# Patient Record
Sex: FEMALE | Race: WHITE | NOT HISPANIC OR LATINO | Employment: OTHER | URBAN - METROPOLITAN AREA
[De-identification: names, ages, dates, MRNs, and addresses within clinical notes are randomized per-mention and may not be internally consistent; named-entity substitution may affect disease eponyms.]

---

## 2016-03-08 LAB — PHQ9 SCORE: 20

## 2016-03-17 LAB — PHQ9 SCORE: 20

## 2016-05-09 LAB — PHQ9 SCORE: 15

## 2017-01-04 ENCOUNTER — TELEPHONE (OUTPATIENT)
Dept: FAMILY MEDICINE | Facility: OTHER | Age: 57
End: 2017-01-04

## 2017-01-04 ENCOUNTER — TRANSFERRED RECORDS (OUTPATIENT)
Dept: HEALTH INFORMATION MANAGEMENT | Facility: CLINIC | Age: 57
End: 2017-01-04

## 2017-01-04 DIAGNOSIS — F41.1 GENERALIZED ANXIETY DISORDER: Primary | ICD-10-CM

## 2017-01-04 LAB — PHQ9 SCORE: 18

## 2017-01-04 NOTE — TELEPHONE ENCOUNTER
Pt calling. She would like a call in regards to not being able to take Xanax with her pain meds anymore. She saw a psychologist and they told her they couldn't prescribe it anyway. She would like to know what she can do. Please call.  Thank you,  Coby Ruelas- Pt Rep.

## 2017-01-05 RX ORDER — ALPRAZOLAM 0.5 MG/1
0.5 TABLET, EXTENDED RELEASE ORAL EVERY MORNING
Qty: 60 TABLET | Refills: 0 | Status: SHIPPED | OUTPATIENT
Start: 2017-01-05 | End: 2017-05-08 | Stop reason: ALTCHOICE

## 2017-01-05 NOTE — TELEPHONE ENCOUNTER
Spoke with pt, she saw psych that do not prescribe benzo to chronic pain patients either anymore.  They did not offer to help taper her off, she has been without xanax for 1 week and is not feeling well.  She was put on buspar.  I will reduce her dosage to 0.5 mg bid of xanax xr, she was taking 1mg bid.  Recheck in 1 month sooner as needed.  Plan to further taper to 1 5m xr daily for 1 month at that visit  Jonna Gaitan PA-C

## 2017-01-19 NOTE — PROGRESS NOTES
SUBJECTIVE:                                                    Linda Walsh is a 56 year old female who presents to clinic today for the following health issues:        Depression and Anxiety Follow-Up    Status since last visit: Worsened anxiety but depression is ok, she started BuSpar that Dr. Do at [facility] recommended to use in place of her Xanax. We have tapered her down in dosage to a 0.5 mg daily p.o. The extended release version. She states it is not particularly helpful but she is using to taper off. She is aware that she may not use this and narcotics together anymore.  Does not have planned follow-up with [facility] though her current counselor is no longer covered by her insurance.  She will see a different counselor in the interim.  She has downloaded an david called mindful meditation will be started tonight.  She has no current suicidal ideation, the guns were removed from her home as that was her plan that she has no active plans or intention.    Other associated symptoms:snapping at people and not letting people finish what they are saying    Complicating factors:     Significant life event: Yes-  Not able to see counselor any more due to insurance, she is willing to see summary also interim     Current substance abuse: None    PHQ-9 SCORE 6/20/2016 9/15/2016 11/22/2016   Total Score - - -   Total Score MyChart - - -   Total Score 20 24 22     MARLIN-7 SCORE 6/20/2016 9/15/2016 11/22/2016   Total Score - - -   Total Score 19 21 20        PHQ-9  English      PHQ-9   Any Language     GAD7         Amount of exercise or physical activity: depends on day and pain    Problems taking medications regularly: No    Medication side effects: none    Diet: regular (no restrictions)        Problem list and histories reviewed & adjusted, as indicated.  Additional history: as documented    BP Readings from Last 3 Encounters:   01/23/17 124/70   11/22/16 120/72   09/15/16 120/70    Wt Readings from  Last 3 Encounters:   09/15/16 162 lb (73.483 kg)   11/29/15 150 lb (68.04 kg)   03/27/15 154 lb (69.854 kg)                  Labs reviewed in EPIC    ROS:  C: NEGATIVE for fever, chills, change in weight  E/M: NEGATIVE for ear, mouth and throat problems  R: NEGATIVE for significant cough or SOB  CV: NEGATIVE for chest pain, palpitations or peripheral edema  MUSCULOSKELETAL: have nerve ablation as the trial nerve blocks were somewhat helpful. She still has her spinal cord stimulator that causes discomfort at the site of its insertion does help with her pain to some degree  PSYCHIATRIC: as above    OBJECTIVE:                                                    /70 mmHg  Pulse 86  Temp(Src) 98.6  F (37  C) (Oral)  Resp 12  Ht   Wt   There is no weight on file to calculate BMI.  GENERAL: healthy, alert and no distress  NECK: no adenopathy, no asymmetry, masses, or scars and thyroid normal to palpation  RESP: lungs clear to auscultation - no rales, rhonchi or wheezes  CV: regular rate and rhythm, normal S1 S2, no S3 or S4, no murmur, click or rub, no peripheral edema and peripheral pulses strong  MS: no gross musculoskeletal defects noted, no edema  PSYCH: mentation appears normal, affect normal/bright    Diagnostic Test Results:  No results found for this or any previous visit (from the past 24 hour(s)).     ASSESSMENT/PLAN:                                                        1. Major depressive disorder, single episode, moderate (H)  Continue current meds she has not yet increased her dosage she is only taking 60 mg per day, she will continue this until she has gotten accustomed to BuSpar then she may increase to 30 mg in the evening as prescribed  - DULoxetine (CYMBALTA) 30 MG EC capsule; Take 2 ( 60 mg ) in the morning, and 1 pill ( 30 mg ) in the evening  Dispense: 90 capsule; Refill: 1    2. Gastroesophageal reflux disease without esophagitis  Stable well-controlled  - pantoprazole (PROTONIX) 40 MG EC  tablet; Take 1 tablet (40 mg) by mouth daily  Dispense: 30 tablet; Refill: 5    3. Nausea  Uses as needed  - ondansetron (ZOFRAN-ODT) 4 MG ODT tab; DISSOLVE ONE TABLET BY MOUTH EVERY 8 HOURS AS NEEDED FOR NAUSEA  Dispense: 20 tablet; Refill: 1    4. Generalized anxiety disorder  Discussed use of medication, common side effects, how medication works and the fact that improvement in anticipated in 4-6 weeks. , tapering off of Xanax  - busPIRone (BUSPAR) 5 MG tablet; Start at 5 mg twice daily for 3 days, then 7.5 mg (1.5 tabs) twice daily for 3 days, then 10 mg (2 tabs) twice daily for 3 days, then 12.5 mg (2.5 tabs) twice daily for 3 days, then 15 mg (3 tabs) twice daily and stay at that dose  Dispense: 150 tablet; Refill: 0    Recheck in one month she should be seeing her counselor on a regular basis at preference is for her to have psychiatry management we will readdress this at a later date for now I am happy enough that she will be continuing counseling    Jonna Gaitan PA-C  [facility]  Electronically signed by Jonna Gaitan PA-C

## 2017-01-23 ENCOUNTER — OFFICE VISIT (OUTPATIENT)
Dept: FAMILY MEDICINE | Facility: OTHER | Age: 57
End: 2017-01-23
Payer: COMMERCIAL

## 2017-01-23 ENCOUNTER — TELEPHONE (OUTPATIENT)
Dept: FAMILY MEDICINE | Facility: OTHER | Age: 57
End: 2017-01-23

## 2017-01-23 VITALS
DIASTOLIC BLOOD PRESSURE: 70 MMHG | SYSTOLIC BLOOD PRESSURE: 124 MMHG | RESPIRATION RATE: 12 BRPM | TEMPERATURE: 98.6 F | HEART RATE: 86 BPM

## 2017-01-23 DIAGNOSIS — R11.0 NAUSEA: ICD-10-CM

## 2017-01-23 DIAGNOSIS — F32.1 MAJOR DEPRESSIVE DISORDER, SINGLE EPISODE, MODERATE (H): Primary | ICD-10-CM

## 2017-01-23 DIAGNOSIS — K21.9 GASTROESOPHAGEAL REFLUX DISEASE WITHOUT ESOPHAGITIS: ICD-10-CM

## 2017-01-23 DIAGNOSIS — F41.1 GENERALIZED ANXIETY DISORDER: ICD-10-CM

## 2017-01-23 PROCEDURE — 99213 OFFICE O/P EST LOW 20 MIN: CPT | Performed by: PHYSICIAN ASSISTANT

## 2017-01-23 RX ORDER — PANTOPRAZOLE SODIUM 40 MG/1
40 TABLET, DELAYED RELEASE ORAL DAILY
Qty: 30 TABLET | Refills: 5 | Status: SHIPPED | OUTPATIENT
Start: 2017-01-23 | End: 2019-04-24

## 2017-01-23 RX ORDER — ONDANSETRON 4 MG/1
TABLET, ORALLY DISINTEGRATING ORAL
Qty: 20 TABLET | Refills: 1 | Status: SHIPPED | OUTPATIENT
Start: 2017-01-23 | End: 2017-05-08

## 2017-01-23 RX ORDER — BUSPIRONE HYDROCHLORIDE 5 MG/1
TABLET ORAL
Qty: 150 TABLET | Refills: 0 | Status: SHIPPED
Start: 2017-01-23 | End: 2017-02-02 | Stop reason: SINTOL

## 2017-01-23 RX ORDER — DULOXETIN HYDROCHLORIDE 30 MG/1
CAPSULE, DELAYED RELEASE ORAL
Qty: 90 CAPSULE | Refills: 1 | Status: SHIPPED | OUTPATIENT
Start: 2017-01-23 | End: 2017-02-02

## 2017-01-23 ASSESSMENT — ANXIETY QUESTIONNAIRES
3. WORRYING TOO MUCH ABOUT DIFFERENT THINGS: NEARLY EVERY DAY
2. NOT BEING ABLE TO STOP OR CONTROL WORRYING: NEARLY EVERY DAY
GAD7 TOTAL SCORE: 20
1. FEELING NERVOUS, ANXIOUS, OR ON EDGE: NEARLY EVERY DAY
5. BEING SO RESTLESS THAT IT IS HARD TO SIT STILL: NEARLY EVERY DAY
IF YOU CHECKED OFF ANY PROBLEMS ON THIS QUESTIONNAIRE, HOW DIFFICULT HAVE THESE PROBLEMS MADE IT FOR YOU TO DO YOUR WORK, TAKE CARE OF THINGS AT HOME, OR GET ALONG WITH OTHER PEOPLE: EXTREMELY DIFFICULT
7. FEELING AFRAID AS IF SOMETHING AWFUL MIGHT HAPPEN: MORE THAN HALF THE DAYS
6. BECOMING EASILY ANNOYED OR IRRITABLE: NEARLY EVERY DAY

## 2017-01-23 ASSESSMENT — PAIN SCALES - GENERAL: PAINLEVEL: EXTREME PAIN (9)

## 2017-01-23 ASSESSMENT — PATIENT HEALTH QUESTIONNAIRE - PHQ9: 5. POOR APPETITE OR OVEREATING: NEARLY EVERY DAY

## 2017-01-23 NOTE — NURSING NOTE
"Chief Complaint   Patient presents with     Depression     Panel Management     Flu, Mammo, CC screen, Honoring Choices, Hep C, DAP, PHQ/MARLIN       Initial /70 mmHg  Pulse 86  Temp(Src) 98.6  F (37  C) (Oral)  Resp 12  Ht   Wt  Estimated body mass index is 27.79 kg/(m^2) as calculated from the following:    Height as of 6/29/16: 5' 4\" (1.626 m).    Weight as of 9/15/16: 162 lb (73.483 kg).  BP completed using cuff size: regular      Carmencita Vela CMA (AAMA)      "

## 2017-01-23 NOTE — MR AVS SNAPSHOT
After Visit Summary   1/23/2017    Linda Walsh    MRN: [id number]           Patient Information     Date Of Birth          1960        Visit Information        Provider Department      1/23/2017 3:15 PM Jonna Gaitan PA-C [facility]        Today's Diagnoses     Major depressive disorder, single episode, moderate (H)    -  1     Gastroesophageal reflux disease without esophagitis         Nausea         Generalized anxiety disorder            Follow-ups after your visit        Who to contact     If you have questions or need follow up information about today's clinic visit or your schedule please contact [facility] directly at [phone].  Normal or non-critical lab and imaging results will be communicated to you by Momspothart, letter or phone within 4 business days after the clinic has received the results. If you do not hear from us within 7 days, please contact the clinic through Momspothart or phone. If you have a critical or abnormal lab result, we will notify you by phone as soon as possible.  Submit refill requests through Exacaster or call your pharmacy and they will forward the refill request to us. Please allow 3 business days for your refill to be completed.          Additional Information About Your Visit        MyChart Information     Exacaster gives you secure access to your electronic health record. If you see a primary care provider, you can also send messages to your care team and make appointments. If you have questions, please call your primary care clinic.  If you do not have a primary care provider, please call [phone] and they will assist you.        Care EveryWhere ID     This is your Care EveryWhere ID. This could be used by other organizations to access your [facility] medical records  [id number]        Your Vitals Were     Pulse Temperature Respirations             86 98.6  F (37  C) (Oral) 12          Blood Pressure from Last 3 Encounters:    01/23/17 124/70   11/22/16 120/72   09/15/16 120/70    Weight from Last 3 Encounters:   09/15/16 162 lb (73.483 kg)   11/29/15 150 lb (68.04 kg)   03/27/15 154 lb (69.854 kg)              Today, you had the following     No orders found for display         Today's Medication Changes          These changes are accurate as of: 1/23/17  3:49 PM.  If you have any questions, ask your nurse or doctor.               Start taking these medicines.        Dose/Directions    busPIRone 5 MG tablet   Commonly known as:  BUSPAR   Used for:  Generalized anxiety disorder   Started by:  Jonna Gaitan PA-C        Start at 5 mg twice daily for 3 days, then 7.5 mg (1.5 tabs) twice daily for 3 days, then 10 mg (2 tabs) twice daily for 3 days, then 12.5 mg (2.5 tabs) twice daily for 3 days, then 15 mg (3 tabs) twice daily and stay at that dose   Quantity:  150 tablet   Refills:  0            Where to get your medicines      These medications were sent to [facility] [place] [facility] [place] - [address]   [address] [place] [address]     Phone:  [phone]    - busPIRone 5 MG tablet  - DULoxetine 30 MG EC capsule  - ondansetron 4 MG ODT tab  - pantoprazole 40 MG EC tablet             Primary Care Provider Office Phone # Fax #    Jonna Gaitan PA-C [phone]       [facility] [address] [place] [address]        Thank you!     Thank you for choosing [facility]  for your care. Our goal is always to provide you with excellent care. Hearing back from our patients is one way we can continue to improve our services. Please take a few minutes to complete the written survey that you may receive in the mail after your visit with us. Thank you!             Your Updated Medication List - Protect others around you: Learn how to safely use, store and throw away your medicines at www.disposemymeds.org.          This list is accurate as of: 1/23/17  3:49 PM.  Always  use your most recent med list.                   Brand Name Dispense Instructions for use    albuterol 108 (90 BASE) MCG/ACT Inhaler    albuterol    1 Inhaler    Inhale 2 puffs into the lungs every 4 hours as needed for shortness of breath / dyspnea       ALPRAZolam 0.5 MG 24 hr tablet    XANAX XR    60 tablet    Take 1 tablet (0.5 mg) by mouth every morning       busPIRone 5 MG tablet    BUSPAR    150 tablet    Start at 5 mg twice daily for 3 days, then 7.5 mg (1.5 tabs) twice daily for 3 days, then 10 mg (2 tabs) twice daily for 3 days, then 12.5 mg (2.5 tabs) twice daily for 3 days, then 15 mg (3 tabs) twice daily and stay at that dose       CELEBREX PO      Take 400 mg by mouth daily       DULoxetine 30 MG EC capsule    CYMBALTA    90 capsule    Take 2 ( 60 mg ) in the morning, and 1 pill ( 30 mg ) in the evening       FentaNYL 37.5 MCG/HR Pt72      1 patch every 72 hours       gabapentin 300 MG capsule    NEURONTIN     Take 900 mg by mouth daily       ipratropium - albuterol 0.5 mg/2.5 mg/3 mL 0.5-2.5 (3) MG/3ML neb solution    DUONEB    1 Box    Take 1 vial (3 mLs) by nebulization every 6 hours as needed for shortness of breath / dyspnea       ondansetron 4 MG ODT tab    ZOFRAN-ODT    20 tablet    DISSOLVE ONE TABLET BY MOUTH EVERY 8 HOURS AS NEEDED FOR NAUSEA       order for DME     1 Device    Equipment being ordered: Nebulizer       oxyCODONE 5 MG IR tablet    ROXICODONE    20 tablet    Take 1 tablet (5 mg) by mouth every 6 hours as needed for pain       pantoprazole 40 MG EC tablet    PROTONIX    30 tablet    Take 1 tablet (40 mg) by mouth daily       polyethylene glycol powder    MIRALAX    3 Bottle    Take 51 g (3 capfuls) by mouth 2 times daily       traZODone 50 MG tablet    DESYREL    30 tablet    TAKE ONE TO TWO TABLETS BY MOUTH AT BEDTIME AS NEEDED FOR SLEEP

## 2017-01-23 NOTE — TELEPHONE ENCOUNTER
PA required on: Ondansetron (B vs D coverage determination required)  Patient Insurance is: [employer] D  Insurance phone number is:   ID #: [id number]  BIN#: [id number]    Please let us know when PA is granted or denied. Thank you.    Josefina Gilliam, Float Technician  [place]: [place]

## 2017-01-24 ENCOUNTER — TELEPHONE (OUTPATIENT)
Dept: FAMILY MEDICINE | Facility: OTHER | Age: 57
End: 2017-01-24

## 2017-01-24 DIAGNOSIS — R53.83 FATIGUE, UNSPECIFIED TYPE: ICD-10-CM

## 2017-01-24 DIAGNOSIS — Z11.59 NEED FOR HEPATITIS C SCREENING TEST: Primary | ICD-10-CM

## 2017-01-24 DIAGNOSIS — R11.0 NAUSEA: ICD-10-CM

## 2017-01-24 DIAGNOSIS — R23.2 HOT FLASHES: ICD-10-CM

## 2017-01-24 DIAGNOSIS — F32.1 MAJOR DEPRESSIVE DISORDER, SINGLE EPISODE, MODERATE (H): ICD-10-CM

## 2017-01-24 NOTE — TELEPHONE ENCOUNTER
Reason for call:  Other  Reason for Call: Request for an order or referral:    Order or referral being requested: Labs    Date needed: as soon as possible    Has the patient been seen by the PCP for this problem? YES    Additional comments: Pt was seen on 1/23/17 with NP and pt was told she needs to have labs done . I do not see any orders . Please call and advice     Phone number Patient can be reached at:  Home number on file [phone] (home)    Best Time:  anytime    Can we leave a detailed message on this number?  YES    Call taken on 1/24/2017 at 12:26 PM by Sindi Hutton

## 2017-01-24 NOTE — TELEPHONE ENCOUNTER
Hormone levels, liver functions, and thyroid labs is also something she stated you had talked about yesterday and would like to do. Pt is scheduled for lab visit, please place order.    Carmencita Vela CMA ([facility])

## 2017-01-25 DIAGNOSIS — R11.0 NAUSEA: ICD-10-CM

## 2017-01-25 DIAGNOSIS — Z11.59 NEED FOR HEPATITIS C SCREENING TEST: ICD-10-CM

## 2017-01-25 DIAGNOSIS — R23.2 HOT FLASHES: ICD-10-CM

## 2017-01-25 DIAGNOSIS — F32.1 MAJOR DEPRESSIVE DISORDER, SINGLE EPISODE, MODERATE (H): ICD-10-CM

## 2017-01-25 DIAGNOSIS — R53.83 FATIGUE, UNSPECIFIED TYPE: ICD-10-CM

## 2017-01-25 PROCEDURE — 86803 HEPATITIS C AB TEST: CPT | Performed by: PHYSICIAN ASSISTANT

## 2017-01-25 PROCEDURE — 83002 ASSAY OF GONADOTROPIN (LH): CPT | Performed by: PHYSICIAN ASSISTANT

## 2017-01-25 PROCEDURE — 84443 ASSAY THYROID STIM HORMONE: CPT | Performed by: PHYSICIAN ASSISTANT

## 2017-01-25 PROCEDURE — 36415 COLL VENOUS BLD VENIPUNCTURE: CPT | Performed by: PHYSICIAN ASSISTANT

## 2017-01-25 PROCEDURE — 83001 ASSAY OF GONADOTROPIN (FSH): CPT | Performed by: PHYSICIAN ASSISTANT

## 2017-01-25 PROCEDURE — 80053 COMPREHEN METABOLIC PANEL: CPT | Performed by: PHYSICIAN ASSISTANT

## 2017-01-25 ASSESSMENT — ANXIETY QUESTIONNAIRES: GAD7 TOTAL SCORE: 20

## 2017-01-25 ASSESSMENT — PATIENT HEALTH QUESTIONNAIRE - PHQ9: SUM OF ALL RESPONSES TO PHQ QUESTIONS 1-9: 24

## 2017-01-26 LAB
ALBUMIN SERPL-MCNC: 4 G/DL (ref 3.4–5)
ALP SERPL-CCNC: 71 U/L (ref 40–150)
ALT SERPL W P-5'-P-CCNC: 28 U/L (ref 0–50)
ANION GAP SERPL CALCULATED.3IONS-SCNC: 9 MMOL/L (ref 3–14)
AST SERPL W P-5'-P-CCNC: 20 U/L (ref 0–45)
BILIRUB SERPL-MCNC: 0.8 MG/DL (ref 0.2–1.3)
BUN SERPL-MCNC: 12 MG/DL (ref 7–30)
CALCIUM SERPL-MCNC: 8.3 MG/DL (ref 8.5–10.1)
CHLORIDE SERPL-SCNC: 106 MMOL/L (ref 94–109)
CO2 SERPL-SCNC: 26 MMOL/L (ref 20–32)
CREAT SERPL-MCNC: 0.89 MG/DL (ref 0.52–1.04)
FSH SERPL-ACNC: 75 IU/L
GFR SERPL CREATININE-BSD FRML MDRD: 66 ML/MIN/1.7M2
GLUCOSE SERPL-MCNC: 87 MG/DL (ref 70–99)
LH SERPL-ACNC: 25.2 IU/L
POTASSIUM SERPL-SCNC: 3.9 MMOL/L (ref 3.4–5.3)
PROT SERPL-MCNC: 6.9 G/DL (ref 6.8–8.8)
SODIUM SERPL-SCNC: 141 MMOL/L (ref 133–144)
TSH SERPL DL<=0.005 MIU/L-ACNC: 1.38 MU/L (ref 0.4–4)

## 2017-01-27 LAB — HCV AB SERPL QL IA: NORMAL

## 2017-01-31 ENCOUNTER — TELEPHONE (OUTPATIENT)
Dept: FAMILY MEDICINE | Facility: OTHER | Age: 57
End: 2017-01-31

## 2017-01-31 NOTE — TELEPHONE ENCOUNTER
"Linda Walsh is a 56 year old female    S-(situation): Patient is calling today with report of vomiting s/p taking buspar    B-(background): Patient started on Buspar on 01/25/2017 and vomited within 2 hours of taking.    A-(assessment):   Vomited all day after taking  No vomiting since  Reports \"I will not take that medication again.\"      R-(recommendations): Would like to discuss other medications.  Scheduled OV for 02/07/2016 with NP. Patient feels it would be ok to wait until then.  Will comply with recommendation: yes     If further questions/concerns or if Sxs do not improve, worsen or new Sxs develop, call your PCP or [facility] as soon as possible.    Ketty Jiang RN      "

## 2017-01-31 NOTE — TELEPHONE ENCOUNTER
[facility] phone call message- patient reporting a symptom:    Symptom or request: sweating and vomiting    Duration (how long have symptoms been present): since starting new medication  Have you been treated for this before? Yes    Additional comments: Patient was put on Buspirone and 2 hours after taking it she was sweating and vomiting all day. Please advise.    Call taken on 1/31/2017 at 8:19 AM by Neelam Erwin

## 2017-02-01 ENCOUNTER — TELEPHONE (OUTPATIENT)
Dept: FAMILY MEDICINE | Facility: OTHER | Age: 57
End: 2017-02-01

## 2017-02-01 NOTE — PROGRESS NOTES
SUBJECTIVE:                                                    Linda Walsh is a 56 year old female who presents to clinic today for the following health issues:           Depression and Anxiety Follow-Up    Status since last visit: Worsened , she tried BuSpar in hopes of improving her anxiety. It made her sick. She was vomiting to the point of needing antinausea medications.  She felt ill even 3 days after her first pill. She is now back to baseline.  She is only taking 30 mg of Cymbalta twice daily. She finds this to be the most be beneficial of all of the antidepressant med she has been on.  Her anxiety is heightened because we tapered her off of her Xanax.  She is not able to take Xanax along with her chronic pain medications.    Other associated symptoms: cries all the time, not able to sleep, irritable, restless, mind races  No current suicidal ideation intent. She states she must get back to counseling, she had to stop seeing her therapist due to insurance changes  They were doing DBT She is waiting to hear from her counselor regarding who she should see.  She is doing different apps on her phone regarding mindfulness     Complicating factors:     Significant life event: No     Current substance abuse: None    PHQ-9 SCORE 9/15/2016 11/22/2016 1/23/2017   Total Score - - -   Total Score MyChart - - -   Total Score 24 22 24     MARLIN-7 SCORE 9/15/2016 11/22/2016 1/23/2017   Total Score - - -   Total Score 21 20 20        PHQ-9  English      PHQ-9   Any Language     GAD7         Amount of exercise or physical activity: depends on the day    Problems taking medications regularly: No    Medication side effects: Buspar made her feel sick and vomited    Diet: regular (no restrictions)        Problem list and histories reviewed & adjusted, as indicated.  Additional history: as documented    BP Readings from Last 3 Encounters:   02/02/17 120/70   01/23/17 124/70   11/22/16 120/72    Wt Readings from Last 3  "Encounters:   02/02/17 165 lb (74.844 kg)   09/15/16 162 lb (73.483 kg)   11/29/15 150 lb (68.04 kg)                  Labs reviewed in EPIC    ROS:      OBJECTIVE:                                                    /70 mmHg  Pulse 84  Temp(Src) 99.1  F (37.3  C) (Oral)  Resp 12  Ht 5' 4\" (1.626 m)  Wt 165 lb (74.844 kg)  BMI 28.31 kg/m2  Body mass index is 28.31 kg/(m^2).  GENERAL: healthy, alert and no distress  PSYCH: affect is flat, tearful at times  Particularly when discussing her frustration regarding not being, judgement and insight intact and appearance well groomed    Diagnostic Test Results:  none      ASSESSMENT/PLAN:                                                        I encouraged her to go back to psychiatry to seek medication management, I do not have any more to offer her in regards to other medication management options.  I certainly agree she must go back to counseling which she will work on this afternoon    1. Generalized anxiety disorder  Discontinue BuSpar, will try increased dosing of Cymbalta, max doing is 120 mg daily Called pharmacy regarding if viibryd could be used in combination with Cymbalta, he thought it was too similar med, we will not use this med We will try to increase dosing of Cymbalta to see if we can get improvement    2. Insomnia, unspecified type  She does best if she takes 3 pills , due to chronic pain she is not a candidate for Hypnotics such as Ambien  - traZODone (DESYREL) 50 MG tablet; Take 3 tablets (150 mg) by mouth At Bedtime  Dispense: 90 tablet; Refill: 5    3. Major depressive disorder, single episode, moderate (H)    - DULoxetine (CYMBALTA) 30 MG EC capsule; Take 2 capsules (60 mg) by mouth 2 times daily  Dispense: 120 capsule; Refill: 1    To ER or call 911 if feeling suicidal,, it back into counseling and start to see psych once again    Jonna Gaitan PA-C  [facility]  Electronically signed by Jonna Gaitan PA-C   "

## 2017-02-01 NOTE — TELEPHONE ENCOUNTER
Reason for call:  Patient is having trouble with her medication Busbar.  She would like to know what she can do.  She is not sleeping. She would like to know if she could be worked in.

## 2017-02-01 NOTE — TELEPHONE ENCOUNTER
Spoke with patient. Took it one day and she vomited crazy.  Did need antinausea suppository.  States that she felt so crappy. Was not sleeping. Maybe 2-3 hours.   would like to talk with Jonna about other options.    appointment made for tomorrow.    Jeremias Díaz RN

## 2017-02-02 ENCOUNTER — OFFICE VISIT (OUTPATIENT)
Dept: FAMILY MEDICINE | Facility: OTHER | Age: 57
End: 2017-02-02
Payer: COMMERCIAL

## 2017-02-02 VITALS
HEART RATE: 84 BPM | RESPIRATION RATE: 12 BRPM | HEIGHT: 64 IN | TEMPERATURE: 99.1 F | WEIGHT: 165 LBS | BODY MASS INDEX: 28.17 KG/M2 | SYSTOLIC BLOOD PRESSURE: 120 MMHG | DIASTOLIC BLOOD PRESSURE: 70 MMHG

## 2017-02-02 DIAGNOSIS — F41.1 GENERALIZED ANXIETY DISORDER: Primary | ICD-10-CM

## 2017-02-02 DIAGNOSIS — G47.00 INSOMNIA, UNSPECIFIED TYPE: ICD-10-CM

## 2017-02-02 DIAGNOSIS — F32.1 MAJOR DEPRESSIVE DISORDER, SINGLE EPISODE, MODERATE (H): ICD-10-CM

## 2017-02-02 PROCEDURE — 99213 OFFICE O/P EST LOW 20 MIN: CPT | Performed by: PHYSICIAN ASSISTANT

## 2017-02-02 RX ORDER — DULOXETIN HYDROCHLORIDE 30 MG/1
60 CAPSULE, DELAYED RELEASE ORAL 2 TIMES DAILY
Qty: 120 CAPSULE | Refills: 1 | Status: SHIPPED | OUTPATIENT
Start: 2017-02-02 | End: 2017-02-02

## 2017-02-02 RX ORDER — ALPRAZOLAM 0.5 MG/1
0.5 TABLET, EXTENDED RELEASE ORAL EVERY MORNING
Qty: 60 TABLET | Refills: 0 | Status: CANCELLED | OUTPATIENT
Start: 2017-02-02

## 2017-02-02 RX ORDER — DULOXETIN HYDROCHLORIDE 30 MG/1
60 CAPSULE, DELAYED RELEASE ORAL 2 TIMES DAILY
Qty: 120 CAPSULE | Refills: 1 | Status: SHIPPED | OUTPATIENT
Start: 2017-02-02 | End: 2019-04-24

## 2017-02-02 RX ORDER — TRAZODONE HYDROCHLORIDE 50 MG/1
150 TABLET, FILM COATED ORAL AT BEDTIME
Qty: 90 TABLET | Refills: 5 | Status: SHIPPED | OUTPATIENT
Start: 2017-02-02 | End: 2017-10-27

## 2017-02-02 ASSESSMENT — ANXIETY QUESTIONNAIRES
5. BEING SO RESTLESS THAT IT IS HARD TO SIT STILL: NEARLY EVERY DAY
7. FEELING AFRAID AS IF SOMETHING AWFUL MIGHT HAPPEN: NEARLY EVERY DAY
IF YOU CHECKED OFF ANY PROBLEMS ON THIS QUESTIONNAIRE, HOW DIFFICULT HAVE THESE PROBLEMS MADE IT FOR YOU TO DO YOUR WORK, TAKE CARE OF THINGS AT HOME, OR GET ALONG WITH OTHER PEOPLE: EXTREMELY DIFFICULT
3. WORRYING TOO MUCH ABOUT DIFFERENT THINGS: NEARLY EVERY DAY
1. FEELING NERVOUS, ANXIOUS, OR ON EDGE: NEARLY EVERY DAY
GAD7 TOTAL SCORE: 21
2. NOT BEING ABLE TO STOP OR CONTROL WORRYING: NEARLY EVERY DAY
6. BECOMING EASILY ANNOYED OR IRRITABLE: NEARLY EVERY DAY

## 2017-02-02 ASSESSMENT — PAIN SCALES - GENERAL: PAINLEVEL: WORST PAIN (10)

## 2017-02-02 ASSESSMENT — PATIENT HEALTH QUESTIONNAIRE - PHQ9: 5. POOR APPETITE OR OVEREATING: NEARLY EVERY DAY

## 2017-02-02 NOTE — NURSING NOTE
"Chief Complaint   Patient presents with     Anxiety     Panel Management     Flu shot, Mammo, Colon cancer screen, Honoring choices, dap, phq, pratik       Initial /70 mmHg  Pulse 84  Temp(Src) 99.1  F (37.3  C) (Oral)  Resp 12  Ht 5' 4\" (1.626 m)  Wt 165 lb (74.844 kg)  BMI 28.31 kg/m2 Estimated body mass index is 28.31 kg/(m^2) as calculated from the following:    Height as of this encounter: 5' 4\" (1.626 m).    Weight as of this encounter: 165 lb (74.844 kg).  BP completed using cuff size: regular    Carmencita Vela CMA (AAMA)    "

## 2017-02-02 NOTE — MR AVS SNAPSHOT
After Visit Summary   2/2/2017    Linda Walsh    MRN: [id number]           Patient Information     Date Of Birth          1960        Visit Information        Provider Department      2/2/2017 11:00 AM Jonna Gaitan PA-C [facility]        Today's Diagnoses     Generalized anxiety disorder    -  1     Insomnia, unspecified type         Major depressive disorder, single episode, moderate (H)            Follow-ups after your visit        Your next 10 appointments already scheduled     Feb 07, 2017 10:00 AM   Office Visit with Jonna Gaitan PA-C   [facility] ([facility])    [address] [address] [phone]           Bring a current list of meds and any records pertaining to this visit.  For Physicals, please bring immunization records and any forms needing to be filled out.  Please arrive 10 minutes early to complete paperwork.              Who to contact     If you have questions or need follow up information about today's clinic visit or your schedule please contact [facility] directly at [phone].  Normal or non-critical lab and imaging results will be communicated to you by Seventymmhart, letter or phone within 4 business days after the clinic has received the results. If you do not hear from us within 7 days, please contact the clinic through DorsaVIt or phone. If you have a critical or abnormal lab result, we will notify you by phone as soon as possible.  Submit refill requests through [email or web address] or call your pharmacy and they will forward the refill request to us. Please allow 3 business days for your refill to be completed.          Additional Information About Your Visit        MyChart Information     [email or web address] gives you secure access to your electronic health record. If you see a primary care provider, you can also send messages to your care team and make appointments. If you have questions, please  "call your primary care clinic.  If you do not have a primary care provider, please call [phone] and they will assist you.        Care EveryWhere ID     This is your Care EveryWhere ID. This could be used by other organizations to access your [place] medical records  [id number]        Your Vitals Were     Pulse Temperature Respirations Height BMI (Body Mass Index)       84 99.1  F (37.3  C) (Oral) 12 5' 4\" (1.626 m) 28.31 kg/m2        Blood Pressure from Last 3 Encounters:   02/02/17 120/70   01/23/17 124/70   11/22/16 120/72    Weight from Last 3 Encounters:   02/02/17 165 lb (74.844 kg)   09/15/16 162 lb (73.483 kg)   11/29/15 150 lb (68.04 kg)              We Performed the Following     DEPRESSION ACTION PLAN (DAP)          Today's Medication Changes          These changes are accurate as of: 2/2/17 11:48 AM.  If you have any questions, ask your nurse or doctor.               These medicines have changed or have updated prescriptions.        Dose/Directions    DULoxetine 30 MG EC capsule   Commonly known as:  CYMBALTA   This may have changed:    - how much to take  - how to take this  - when to take this   Used for:  Major depressive disorder, single episode, moderate (H)   Changed by:  Jonna Gaitan PA-C        Dose:  60 mg   Take 2 capsules (60 mg) by mouth 2 times daily Take 2 ( 60 mg ) in the morning, and 1 pill ( 30 mg ) in the evening   Quantity:  120 capsule   Refills:  1       traZODone 50 MG tablet   Commonly known as:  DESYREL   This may have changed:  See the new instructions.   Used for:  Insomnia, unspecified type   Changed by:  Jonna Gaitan PA-C        Dose:  150 mg   Take 3 tablets (150 mg) by mouth At Bedtime   Quantity:  90 tablet   Refills:  5         Stop taking these medicines if you haven't already. Please contact your care team if you have questions.     busPIRone 5 MG tablet   Commonly known as:  BUSPAR   Stopped by:  Jonna Gaitan PA-C                Where to get your " medicines      These medications were sent to [facility] [place] - [address]   [address] [place] [address]     Phone:  [phone]    - DULoxetine 30 MG EC capsule  - traZODone 50 MG tablet             Primary Care Provider Office Phone # Fax #    Jonna Gaitan PA-C [phone]       [facility] [address] [place] [address]        Thank you!     Thank you for choosing [facility]  for your care. Our goal is always to provide you with excellent care. Hearing back from our patients is one way we can continue to improve our services. Please take a few minutes to complete the written survey that you may receive in the mail after your visit with us. Thank you!             Your Updated Medication List - Protect others around you: Learn how to safely use, store and throw away your medicines at www.disposemymeds.org.          This list is accurate as of: 2/2/17 11:48 AM.  Always use your most recent med list.                   Brand Name Dispense Instructions for use    albuterol 108 (90 BASE) MCG/ACT Inhaler    albuterol    1 Inhaler    Inhale 2 puffs into the lungs every 4 hours as needed for shortness of breath / dyspnea       ALPRAZolam 0.5 MG 24 hr tablet    XANAX XR    60 tablet    Take 1 tablet (0.5 mg) by mouth every morning       CELEBREX PO      Take 400 mg by mouth daily       DULoxetine 30 MG EC capsule    CYMBALTA    120 capsule    Take 2 capsules (60 mg) by mouth 2 times daily Take 2 ( 60 mg ) in the morning, and 1 pill ( 30 mg ) in the evening       FentaNYL 37.5 MCG/HR Pt72      1 patch every 72 hours       gabapentin 300 MG capsule    NEURONTIN     Take 900 mg by mouth daily       ipratropium - albuterol 0.5 mg/2.5 mg/3 mL 0.5-2.5 (3) MG/3ML neb solution    DUONEB    1 Box    Take 1 vial (3 mLs) by nebulization every 6 hours as needed for shortness of breath / dyspnea       ondansetron 4 MG ODT tab     ZOFRAN-ODT    20 tablet    DISSOLVE ONE TABLET BY MOUTH EVERY 8 HOURS AS NEEDED FOR NAUSEA       order for DME     1 Device    Equipment being ordered: Nebulizer       oxyCODONE 5 MG IR tablet    ROXICODONE    20 tablet    Take 1 tablet (5 mg) by mouth every 6 hours as needed for pain       pantoprazole 40 MG EC tablet    PROTONIX    30 tablet    Take 1 tablet (40 mg) by mouth daily       polyethylene glycol powder    MIRALAX    3 Bottle    Take 51 g (3 capfuls) by mouth 2 times daily       traZODone 50 MG tablet    DESYREL    90 tablet    Take 3 tablets (150 mg) by mouth At Bedtime

## 2017-02-02 NOTE — Clinical Note
My Depression Action Plan  Name: Linda Walsh   Date of Birth 1960  Date: 2/1/2017    My doctor: Jonna Gaitan   My clinic: [address] [address] [phone]          GREEN    ZONE   Good Control    What it looks like:     Things are going generally well. You have normal up s and down s. You may even feel depressed from time to time, but bad moods usually last less than a day.   What you need to do:  1. Continue to care for yourself (see self care plan)  2. Check your depression survival kit and update it as needed  3. Follow your physician s recommendations including any medication.  4. Do not stop taking medication unless you consult with your physician first.           YELLOW         ZONE Getting Worse    What it looks like:     Depression is starting to interfere with your life.     It may be hard to get out of bed; you may be starting to isolate yourself from others.    Symptoms of depression are starting to last most all day and this has happened for several days.     You may have suicidal thoughts but they are not constant.   What you need to do:     1. Call your care team, your response to treatment will improve if you keep your care team informed of your progress. Yellow periods are signs an adjustment may need to be made.     2. Continue your self-care, even if you have to fake it!    3. Talk to someone in your support network    4. Open up your depression survival kit           RED    ZONE Medical Alert - Get Help    What it looks like:     Depression is seriously interfering with your life.     You may experience these or other symptoms: You can t get out of bed most days, can t work or engage in other necessary activities, you have trouble taking care of basic hygiene, or basic responsibilities, thoughts of suicide or death that will not go away, self-injurious behavior.     What you need to do:  1. Call your care team and  request a same-day appointment. If they are not available (weekends or after hours) call your local crisis line, emergency room or 911.      Electronically signed by: Amilcar Murphy, February 1, 2017    Depression Self Care Plan / Survival Kit    Self-Care for Depression  Here s the deal. Your body and mind are really not as separate as most people think.  What you do and think affects how you feel and how you feel influences what you do and think. This means if you do things that people who feel good do, it will help you feel better.  Sometimes this is all it takes.  There is also a place for medication and therapy depending on how severe your depression is, so be sure to consult with your medical provider and/ or Behavioral Health Consultant if your symptoms are worsening or not improving.     In order to better manage my stress, I will:    Exercise  Get some form of exercise, every day. This will help reduce pain and release endorphins, the  feel good  chemicals in your brain. This is almost as good as taking antidepressants!  This is not the same as joining a gym and then never going! (they count on that by the way ) It can be as simple as just going for a walk or doing some gardening, anything that will get you moving.      Hygiene   Maintain good hygiene (Get out of bed in the morning, Make your bed, Brush your teeth, Take a shower, and Get dressed like you were going to work, even if you are unemployed).  If your clothes don't fit try to get ones that do.    Diet  I will strive to eat foods that are good for me, drink plenty of water, and avoid excessive sugar, caffeine, alcohol, and other mood-altering substances.  Some foods that are helpful in depression are: complex carbohydrates, B vitamins, flaxseed, fish or fish oil, fresh fruits and vegetables.    Psychotherapy  I agree to participate in Individual Therapy (if recommended).    Medication  If prescribed medications, I agree to take them.  Missing  doses can result in serious side effects.  I understand that drinking alcohol, or other illicit drug use, may cause potential side effects.  I will not stop my medication abruptly without first discussing it with my provider.    Staying Connected With Others  I will stay in touch with my friends, family members, and my primary care provider/team.    Use your imagination  Be creative.  We all have a creative side; it doesn t matter if it s oil painting, sand castles, or mud pies! This will also kick up the endorphins.    Witness Beauty  (AKA stop and smell the roses) Take a look outside, even in mid-winter. Notice colors, textures. Watch the squirrels and birds.     Service to others  Be of service to others.  There is always someone else in need.  By helping others we can  get out of ourselves  and remember the really important things.  This also provides opportunities for practicing all the other parts of the program.    Humor  Laugh and be silly!  Adjust your TV habits for less news and crime-drama and more comedy.    Control your stress  Try breathing deep, massage therapy, biofeedback, and meditation. Find time to relax each day.     My support system    Clinic Contact:  Phone number:    Contact 1:  Phone number:    Contact 2:  Phone number:    [facility]/:  Phone number:    Therapist:  Phone number:    Local crisis center:    Phone number:    Other community support:  Phone number:

## 2017-02-03 ASSESSMENT — PATIENT HEALTH QUESTIONNAIRE - PHQ9: SUM OF ALL RESPONSES TO PHQ QUESTIONS 1-9: 25

## 2017-02-03 ASSESSMENT — ANXIETY QUESTIONNAIRES: GAD7 TOTAL SCORE: 21

## 2017-02-06 ENCOUNTER — TELEPHONE (OUTPATIENT)
Dept: FAMILY MEDICINE | Facility: OTHER | Age: 57
End: 2017-02-06

## 2017-02-06 NOTE — TELEPHONE ENCOUNTER
2/6/2017    Call Regarding Preventive Health Screening Colonoscopy and Mammogram    Attempt 1    Message Patient will call back on own time due to insurance    Comments:       Outreach   CC

## 2017-03-15 ENCOUNTER — OFFICE VISIT (OUTPATIENT)
Dept: FAMILY MEDICINE | Facility: OTHER | Age: 57
End: 2017-03-15
Payer: COMMERCIAL

## 2017-03-15 VITALS
RESPIRATION RATE: 16 BRPM | HEIGHT: 64 IN | TEMPERATURE: 99.8 F | DIASTOLIC BLOOD PRESSURE: 70 MMHG | WEIGHT: 164.7 LBS | HEART RATE: 89 BPM | OXYGEN SATURATION: 98 % | BODY MASS INDEX: 28.12 KG/M2 | SYSTOLIC BLOOD PRESSURE: 104 MMHG

## 2017-03-15 DIAGNOSIS — J01.90 ACUTE SINUSITIS WITH SYMPTOMS > 10 DAYS: Primary | ICD-10-CM

## 2017-03-15 PROCEDURE — 99213 OFFICE O/P EST LOW 20 MIN: CPT | Performed by: FAMILY MEDICINE

## 2017-03-15 RX ORDER — CEPHALEXIN 500 MG/1
500 CAPSULE ORAL 4 TIMES DAILY
Qty: 40 CAPSULE | Refills: 0 | Status: SHIPPED | OUTPATIENT
Start: 2017-03-15 | End: 2017-10-31

## 2017-03-15 ASSESSMENT — ANXIETY QUESTIONNAIRES
7. FEELING AFRAID AS IF SOMETHING AWFUL MIGHT HAPPEN: NEARLY EVERY DAY
5. BEING SO RESTLESS THAT IT IS HARD TO SIT STILL: NOT AT ALL
6. BECOMING EASILY ANNOYED OR IRRITABLE: NEARLY EVERY DAY
2. NOT BEING ABLE TO STOP OR CONTROL WORRYING: NEARLY EVERY DAY
3. WORRYING TOO MUCH ABOUT DIFFERENT THINGS: NEARLY EVERY DAY
GAD7 TOTAL SCORE: 18
1. FEELING NERVOUS, ANXIOUS, OR ON EDGE: NEARLY EVERY DAY

## 2017-03-15 ASSESSMENT — PAIN SCALES - GENERAL: PAINLEVEL: NO PAIN (0)

## 2017-03-15 ASSESSMENT — PATIENT HEALTH QUESTIONNAIRE - PHQ9: 5. POOR APPETITE OR OVEREATING: NEARLY EVERY DAY

## 2017-03-15 NOTE — MR AVS SNAPSHOT
After Visit Summary   3/15/2017    Linda Walsh    MRN: [id number]           Patient Information     Date Of Birth          1960        Visit Information        Provider Department      3/15/2017 10:50 AM Dian Vaughan MD [facility]        Today's Diagnoses     Acute sinusitis with symptoms > 10 days    -  1      Care Instructions      Sinusitis (Antibiotic Treatment)    The sinuses are air-filled spaces within the bones of the face. They connect to the inside of the nose. Sinusitis is an inflammation of the tissue lining the sinus cavity. Sinus inflammation can occur during a cold. It can also be due to allergies to pollens and other particles in the air. Sinusitis can cause symptoms of sinus congestion and fullness. A sinus infection causes fever, headache and facial pain. There is often green or yellow drainage from the nose or into the back of the throat (post-nasal drip). You have been given antibiotics to treat this condition.  Home care:    Take the full course of antibiotics as instructed. Do not stop taking them, even if you feel better.    Drink plenty of water, hot tea, and other liquids. This may help thin mucus. It also may promote sinus drainage.    Heat may help soothe painful areas of the face. Use a towel soaked in hot water. Or,  the shower and direct the hot spray onto your face. Using a vaporizer along with a menthol rub at night may also help.     An expectorant containing guaifenesin may help thin the mucus and promote drainage from the sinuses.    Over-the-counter decongestants may be used unless a similar medicine was prescribed. Nasal sprays work the fastest. Use one that contains phenylephrine or oxymetazoline. First blow the nose gently. Then use the spray. Do not use these medicines more often than directed on the label or symptoms may get worse. You may also use tablets containing pseudoephedrine. Avoid products that combine  ingredients, because side effects may be increased. Read labels. You can also ask the pharmacist for help. (NOTE: Persons with high blood pressure should not use decongestants. They can raise blood pressure.)    Over-the-counter antihistamines may help if allergies contributed to your sinusitis.      Do not use nasal rinses or irrigation during an acute sinus infection, unless told to by your health care provider. Rinsing may spread the infection to other sinuses.    Use acetaminophen or ibuprofen to control pain, unless another pain medicine was prescribed. (If you have chronic liver or kidney disease or ever had a stomach ulcer, talk with your doctor before using these medicines. Aspirin should never be used in anyone under 18 years of age who is ill with a fever. It may cause severe liver damage.)    Don't smoke. This can worsen symptoms.  Follow-up care  Follow up with your healthcare provider or our staff if you are not improving within the next week.  When to seek medical advice  Call your healthcare provider if any of these occur:    Facial pain or headache becoming more severe    Stiff neck    Unusual drowsiness or confusion    Swelling of the forehead or eyelids    Vision problems, including blurred or double vision    Fever of 100.4 F (38 C) or higher, or as directed by your healthcare provider    Seizure    Breathing problems    Symptoms not resolving within 10 days    5996-1191 [employer]. [address] [place] [address], [place] [address]. All rights reserved. This information is not intended as a substitute for professional medical care. Always follow your healthcare professional's instructions.              Follow-ups after your visit        Who to contact     If you have questions or need follow up information about today's clinic visit or your schedule please contact [facility] directly at [phone].  Normal or non-critical lab and imaging results will be communicated to you  "by Inway Studiost, letter or phone within 4 business days after the clinic has received the results. If you do not hear from us within 7 days, please contact the clinic through Graffiti or phone. If you have a critical or abnormal lab result, we will notify you by phone as soon as possible.  Submit refill requests through Graffiti or call your pharmacy and they will forward the refill request to us. Please allow 3 business days for your refill to be completed.          Additional Information About Your Visit        Graffiti Information     Graffiti gives you secure access to your electronic health record. If you see a primary care provider, you can also send messages to your care team and make appointments. If you have questions, please call your primary care clinic.  If you do not have a primary care provider, please call [phone] and they will assist you.        Care EveryWhere ID     This is your Care EveryWhere ID. This could be used by other organizations to access your [place] medical records  [id number]        Your Vitals Were     Pulse Temperature Respirations Height Pulse Oximetry Breastfeeding?    89 99.8  F (37.7  C) (Temporal) 16 5' 4\" (1.626 m) 98% No    BMI (Body Mass Index)                   28.27 kg/m2            Blood Pressure from Last 3 Encounters:   03/15/17 104/70   02/02/17 120/70   01/23/17 124/70    Weight from Last 3 Encounters:   03/15/17 164 lb 11.2 oz (74.7 kg)   02/02/17 165 lb (74.8 kg)   09/15/16 162 lb (73.5 kg)              Today, you had the following     No orders found for display         Today's Medication Changes          These changes are accurate as of: 3/15/17 11:16 AM.  If you have any questions, ask your nurse or doctor.               Start taking these medicines.        Dose/Directions    cephALEXin 500 MG capsule   Commonly known as:  KEFLEX   Used for:  Acute sinusitis with symptoms > 10 days   Started by:  Dian Vaughan MD        Dose:  500 mg   Take 1 capsule " (500 mg) by mouth 4 times daily For ten days   Quantity:  40 capsule   Refills:  0            Where to get your medicines      These medications were sent to [facility] - [address]  [address] [place] [address]     Phone:  [phone]     cephALEXin 500 MG capsule                Primary Care Provider Office Phone # Fax #    Jonna MARI Gaitan [phone]       [facility] [address] [place] [address]        Thank you!     Thank you for choosing [facility]  for your care. Our goal is always to provide you with excellent care. Hearing back from our patients is one way we can continue to improve our services. Please take a few minutes to complete the written survey that you may receive in the mail after your visit with us. Thank you!             Your Updated Medication List - Protect others around you: Learn how to safely use, store and throw away your medicines at www.disposemymeds.org.          This list is accurate as of: 3/15/17 11:16 AM.  Always use your most recent med list.                   Brand Name Dispense Instructions for use    albuterol 108 (90 BASE) MCG/ACT Inhaler    albuterol    1 Inhaler    Inhale 2 puffs into the lungs every 4 hours as needed for shortness of breath / dyspnea       ALPRAZolam 0.5 MG 24 hr tablet    XANAX XR    60 tablet    Take 1 tablet (0.5 mg) by mouth every morning       CELEBREX PO      Take 400 mg by mouth daily       cephALEXin 500 MG capsule    KEFLEX    40 capsule    Take 1 capsule (500 mg) by mouth 4 times daily For ten days       DULoxetine 30 MG EC capsule    CYMBALTA    120 capsule    Take 2 capsules (60 mg) by mouth 2 times daily       FentaNYL 37.5 MCG/HR Pt72      1 patch every 72 hours       gabapentin 300 MG capsule    NEURONTIN     Take 900 mg by mouth daily       ipratropium - albuterol 0.5 mg/2.5 mg/3 mL 0.5-2.5 (3) MG/3ML neb solution    DUONEB    1 Box     Take 1 vial (3 mLs) by nebulization every 6 hours as needed for shortness of breath / dyspnea       ondansetron 4 MG ODT tab    ZOFRAN-ODT    20 tablet    DISSOLVE ONE TABLET BY MOUTH EVERY 8 HOURS AS NEEDED FOR NAUSEA       order for DME     1 Device    Equipment being ordered: Nebulizer       oxyCODONE 5 MG IR tablet    ROXICODONE    20 tablet    Take 1 tablet (5 mg) by mouth every 6 hours as needed for pain       pantoprazole 40 MG EC tablet    PROTONIX    30 tablet    Take 1 tablet (40 mg) by mouth daily       polyethylene glycol powder    MIRALAX    3 Bottle    Take 51 g (3 capfuls) by mouth 2 times daily       traZODone 50 MG tablet    DESYREL    90 tablet    Take 3 tablets (150 mg) by mouth At Bedtime

## 2017-03-15 NOTE — PATIENT INSTRUCTIONS
Sinusitis (Antibiotic Treatment)    The sinuses are air-filled spaces within the bones of the face. They connect to the inside of the nose. Sinusitis is an inflammation of the tissue lining the sinus cavity. Sinus inflammation can occur during a cold. It can also be due to allergies to pollens and other particles in the air. Sinusitis can cause symptoms of sinus congestion and fullness. A sinus infection causes fever, headache and facial pain. There is often green or yellow drainage from the nose or into the back of the throat (post-nasal drip). You have been given antibiotics to treat this condition.  Home care:    Take the full course of antibiotics as instructed. Do not stop taking them, even if you feel better.    Drink plenty of water, hot tea, and other liquids. This may help thin mucus. It also may promote sinus drainage.    Heat may help soothe painful areas of the face. Use a towel soaked in hot water. Or,  the shower and direct the hot spray onto your face. Using a vaporizer along with a menthol rub at night may also help.     An expectorant containing guaifenesin may help thin the mucus and promote drainage from the sinuses.    Over-the-counter decongestants may be used unless a similar medicine was prescribed. Nasal sprays work the fastest. Use one that contains phenylephrine or oxymetazoline. First blow the nose gently. Then use the spray. Do not use these medicines more often than directed on the label or symptoms may get worse. You may also use tablets containing pseudoephedrine. Avoid products that combine ingredients, because side effects may be increased. Read labels. You can also ask the pharmacist for help. (NOTE: Persons with high blood pressure should not use decongestants. They can raise blood pressure.)    Over-the-counter antihistamines may help if allergies contributed to your sinusitis.      Do not use nasal rinses or irrigation during an acute sinus infection, unless told to by  your health care provider. Rinsing may spread the infection to other sinuses.    Use acetaminophen or ibuprofen to control pain, unless another pain medicine was prescribed. (If you have chronic liver or kidney disease or ever had a stomach ulcer, talk with your doctor before using these medicines. Aspirin should never be used in anyone under 18 years of age who is ill with a fever. It may cause severe liver damage.)    Don't smoke. This can worsen symptoms.  Follow-up care  Follow up with your healthcare provider or our staff if you are not improving within the next week.  When to seek medical advice  Call your healthcare provider if any of these occur:    Facial pain or headache becoming more severe    Stiff neck    Unusual drowsiness or confusion    Swelling of the forehead or eyelids    Vision problems, including blurred or double vision    Fever of 100.4 F (38 C) or higher, or as directed by your healthcare provider    Seizure    Breathing problems    Symptoms not resolving within 10 days    0769-1055 [employer]. [address] [place] [address], [place] [address]. All rights reserved. This information is not intended as a substitute for professional medical care. Always follow your healthcare professional's instructions.
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
## 2017-03-15 NOTE — NURSING NOTE
"Chief Complaint   Patient presents with     Cough     Panel Management     Flu shot, Mammo, Colon Screening, Honoring Choices, PHQ9/MARLIN       Initial /70 (BP Location: Right arm, Cuff Size: Adult Regular)  Pulse 89  Temp 99.8  F (37.7  C) (Temporal)  Resp 16  Ht 5' 4\" (1.626 m)  Wt 164 lb 11.2 oz (74.7 kg)  SpO2 98%  Breastfeeding? No  BMI 28.27 kg/m2 Estimated body mass index is 28.27 kg/(m^2) as calculated from the following:    Height as of this encounter: 5' 4\" (1.626 m).    Weight as of this encounter: 164 lb 11.2 oz (74.7 kg).  Medication Reconciliation: complete     Aleja Thakkar MA    "

## 2017-03-16 ASSESSMENT — ANXIETY QUESTIONNAIRES: GAD7 TOTAL SCORE: 18

## 2017-03-16 ASSESSMENT — PATIENT HEALTH QUESTIONNAIRE - PHQ9: SUM OF ALL RESPONSES TO PHQ QUESTIONS 1-9: 22

## 2017-04-17 ENCOUNTER — MYC MEDICAL ADVICE (OUTPATIENT)
Dept: FAMILY MEDICINE | Facility: OTHER | Age: 57
End: 2017-04-17

## 2017-04-18 ENCOUNTER — TRANSFERRED RECORDS (OUTPATIENT)
Dept: HEALTH INFORMATION MANAGEMENT | Facility: CLINIC | Age: 57
End: 2017-04-18

## 2017-04-19 ENCOUNTER — TELEPHONE (OUTPATIENT)
Dept: FAMILY MEDICINE | Facility: OTHER | Age: 57
End: 2017-04-19

## 2017-04-19 NOTE — TELEPHONE ENCOUNTER
Summary:    Patient is due/failing the following:   COLONOSCOPY, MAMMOGRAM and PHQ9    Action needed:   Patient needs to do PHQ9. and schedule a colonoscopy or complete a FIT test and schedule a mammogram     Type of outreach:    Phone, left message for patient to call back.     Questions for provider review:    None                                                                                                                                    Berta Perez       Chart routed to Care Team .      Panel Management Review      Patient has the following on her problem list:     Depression / Dysthymia review  PHQ-9 SCORE 1/23/2017 2/2/2017 3/15/2017   Total Score - - -   Total Score MyChart - - -   Total Score 24 25 22      Patient is due for:  PHQ9      Composite cancer screening  Chart review shows that this patient is due/due soon for the following Mammogram and Colonoscopy

## 2017-04-21 ASSESSMENT — ANXIETY QUESTIONNAIRES
7. FEELING AFRAID AS IF SOMETHING AWFUL MIGHT HAPPEN: NEARLY EVERY DAY
3. WORRYING TOO MUCH ABOUT DIFFERENT THINGS: NEARLY EVERY DAY
IF YOU CHECKED OFF ANY PROBLEMS ON THIS QUESTIONNAIRE, HOW DIFFICULT HAVE THESE PROBLEMS MADE IT FOR YOU TO DO YOUR WORK, TAKE CARE OF THINGS AT HOME, OR GET ALONG WITH OTHER PEOPLE: VERY DIFFICULT
1. FEELING NERVOUS, ANXIOUS, OR ON EDGE: NEARLY EVERY DAY
2. NOT BEING ABLE TO STOP OR CONTROL WORRYING: NEARLY EVERY DAY
6. BECOMING EASILY ANNOYED OR IRRITABLE: SEVERAL DAYS

## 2017-04-21 ASSESSMENT — PATIENT HEALTH QUESTIONNAIRE - PHQ9: 5. POOR APPETITE OR OVEREATING: NEARLY EVERY DAY

## 2017-04-21 NOTE — TELEPHONE ENCOUNTER
Pt informed. Going to check with insurance on coverage. Will call back to schedule.  Thank you,  Coby Ruelas- Pt Rep.

## 2017-04-22 ASSESSMENT — PATIENT HEALTH QUESTIONNAIRE - PHQ9: SUM OF ALL RESPONSES TO PHQ QUESTIONS 1-9: 17

## 2017-04-26 ENCOUNTER — HOSPITAL ENCOUNTER (OUTPATIENT)
Dept: MAMMOGRAPHY | Facility: CLINIC | Age: 57
Discharge: HOME | End: 2017-04-26
Attending: PHYSICIAN ASSISTANT | Admitting: PHYSICIAN ASSISTANT
Payer: MEDICARE

## 2017-04-26 DIAGNOSIS — Z12.31 VISIT FOR SCREENING MAMMOGRAM: ICD-10-CM

## 2017-04-26 PROCEDURE — G0202 SCR MAMMO BI INCL CAD: HCPCS

## 2017-04-26 NOTE — PROGRESS NOTES
Appropriate assistive devices provided during their visit. yes(Yes, No, N/A) cane (list device)    Exam table and/or cart  placed in the lowest position. na (Yes, No, N/A)    Brakes on tables/carts/wheelchairs used at all times. na (Yes, No, N/A)    Non slip footwear applied. na(Yes, No, NA)    Patient was accompanied by staff throughout visit. yes (Yes, No, N/A)    Equipment safety straps used. na (Yes, No, N/A)    Assist with toileting. na (Yes, No, N/A)

## 2017-04-26 NOTE — TELEPHONE ENCOUNTER
"Scheduled patient for OV visit to discuss Menopause and hot flashes. She states she thought it was medications causing the hot flashes but then received them \"full force\". She is fine waiting until appointment to discuss treatment options.     Aleja Thakkar MA    "

## 2017-05-01 NOTE — PROGRESS NOTES
SUBJECTIVE:                                                    Linda Walsh is a 56 year old female who presents to clinic today for the following health issues:    Concern - hot flashes      Onset: x 1 year, she has had hysterectomy     Description:   Hot flashes- gets them anytime of the day, typically 6 times per day. Happens more at night. Patient will have to get up and change her clothes and sheets.  She is drenched. She wakes up 3 times per night with night sweats.    Intensity: severe    Progression of Symptoms:  worsening    Accompanying Signs & Symptoms:  no       Previous history of similar problem:   Has had for a good year but getting worse, getting to be intolerable.    Precipitating factors:   Worsened by: no    Alleviating factors:  Improved by: no    She has no history of breast cancer. She has recently had her mammogram.  She has not been informed of the results and they are not read yet.       Therapies Tried and outcome: nothing, notices that alcohol which she rarely drinks trigger hot flashes and night sweats          Problem list and histories reviewed & adjusted, as indicated.  Additional history: she sees a spine for pain management. She has been referred to the cannabis program. She is taking liquid cannabis twice a day for anxiety and for pain. They are tapering off her cannabis use and reducing her narcotic use at this time. She has not noted great pain relief yet though it is a work in progress.    BP Readings from Last 3 Encounters:   05/08/17 124/78   03/15/17 104/70   02/02/17 120/70    Wt Readings from Last 3 Encounters:   05/08/17 166 lb 8 oz (75.5 kg)   03/15/17 164 lb 11.2 oz (74.7 kg)   02/02/17 165 lb (74.8 kg)                  Labs reviewed in EPIC    Reviewed and updated as needed this visit by clinical staff       Reviewed and updated as needed this visit by Provider         ROS:  C: NEGATIVE for fever, chills, change in weight  E/M: NEGATIVE for ear, mouth and throat  "problems  R: NEGATIVE for significant cough or SOB  CV: NEGATIVE for chest pain, palpitations or peripheral edema  MUSCULOSKELETAL: sees pain clinic, she is requesting a physical therapy referral for her neck pain that has been causing her headaches  PSYCHIATRIC: she is using cannabis for her anxiety not helpful as of yet but they are in the initial part of the program, otherwise she states she has been stable, she is hopeful her pain will improve    OBJECTIVE:                                                    /78 (BP Location: Left arm, Patient Position: Fowlers, Cuff Size: Adult Large)  Pulse 86  Temp 99.5  F (37.5  C) (Temporal)  Resp 16  Ht 5' 4.57\" (1.64 m)  Wt 166 lb 8 oz (75.5 kg)  Breastfeeding? No  BMI 28.08 kg/m2  Body mass index is 28.08 kg/(m^2).   GENERAL: healthy, alert and no distress  NECK: no adenopathy, no asymmetry, masses, or scars and thyroid normal to palpation  RESP: lungs clear to auscultation - no rales, rhonchi or wheezes  CV: regular rate and rhythm, normal S1 S2, no S3 or S4, no murmur, click or rub, no peripheral edema and peripheral pulses strong  PSYCH: mentation appears normal, affect normal/bright  PSYCH: no paranoid or suicidal statements made    Diagnostic Test Results:  none      ASSESSMENT:                                                        PLAN:                                                    1. Menopausal syndrome (hot flashes)  We discussed the risks and benefits of HRT to include increased risk of heart attack stroke and blood clot. Patient is aware of the risks she is also aware that it is not clear whether or not HRT causes breast cancer. Certainly she has an estrogen receptor positive breast cancer will make it grow faster. She is aware of this. She has so many other medical issues she does not want to deal with hot flashes and is willing to accept the risks of HRT she is currently uninsured. She will call me back when she has insurance and we will try " transdermal estrogen only as she has had hysterectomy    2. Nausea  P.r.n. Usage use of this will likely reduce once her cannabis program has been properly titrated per patient  - ondansetron (ZOFRAN-ODT) 4 MG ODT tab; DISSOLVE ONE TABLET BY MOUTH EVERY 8 HOURS AS NEEDED FOR NAUSEA  Dispense: 20 tablet; Refill: 1    3. Neck pain  Patient request  - PHYSICAL THERAPY REFERRAL    4- cannabis drug program- referred by [facility]    Recheck as needed she may send me a mychart when she has insurance for transdermal estrogen,, she will not need another appointment as we discussed today    Jonna Gaitan PA-C  [facility]  Electronically signed by Jonna Gaitan PA-C

## 2017-05-08 ENCOUNTER — OFFICE VISIT (OUTPATIENT)
Dept: FAMILY MEDICINE | Facility: OTHER | Age: 57
End: 2017-05-08
Payer: MEDICARE

## 2017-05-08 VITALS
WEIGHT: 166.5 LBS | RESPIRATION RATE: 16 BRPM | HEIGHT: 65 IN | DIASTOLIC BLOOD PRESSURE: 78 MMHG | SYSTOLIC BLOOD PRESSURE: 124 MMHG | TEMPERATURE: 99.5 F | BODY MASS INDEX: 27.74 KG/M2 | HEART RATE: 86 BPM

## 2017-05-08 DIAGNOSIS — R11.0 NAUSEA: ICD-10-CM

## 2017-05-08 DIAGNOSIS — Z79.899 MEDICAL CANNABIS USE: ICD-10-CM

## 2017-05-08 DIAGNOSIS — M54.2 NECK PAIN: ICD-10-CM

## 2017-05-08 DIAGNOSIS — N95.1 MENOPAUSAL SYNDROME (HOT FLASHES): Primary | ICD-10-CM

## 2017-05-08 PROCEDURE — 99213 OFFICE O/P EST LOW 20 MIN: CPT | Performed by: PHYSICIAN ASSISTANT

## 2017-05-08 RX ORDER — ONDANSETRON 4 MG/1
TABLET, ORALLY DISINTEGRATING ORAL
Qty: 20 TABLET | Refills: 1 | Status: SHIPPED | OUTPATIENT
Start: 2017-05-08 | End: 2019-04-24

## 2017-05-08 ASSESSMENT — PAIN SCALES - GENERAL: PAINLEVEL: EXTREME PAIN (9)

## 2017-05-08 NOTE — NURSING NOTE
"Chief Complaint   Patient presents with     Menopausal Sx     Panel Management     honoring choices, mammogram, colonoscopy        Initial /78 (BP Location: Left arm, Patient Position: Fowlers, Cuff Size: Adult Large)  Pulse 86  Temp 99.5  F (37.5  C) (Temporal)  Resp 16  Ht 5' 4.57\" (1.64 m)  Wt 166 lb 8 oz (75.5 kg)  Breastfeeding? No  BMI 28.08 kg/m2 Estimated body mass index is 28.08 kg/(m^2) as calculated from the following:    Height as of this encounter: 5' 4.57\" (1.64 m).    Weight as of this encounter: 166 lb 8 oz (75.5 kg).  Medication Reconciliation: complete    "

## 2017-05-08 NOTE — MR AVS SNAPSHOT
After Visit Summary   5/8/2017    Linda Walsh    MRN: [id number]           Patient Information     Date Of Birth          1960        Visit Information        Provider Department      5/8/2017 12:00 PM Jonna Gaitan PA-C [facility] [place]        Today's Diagnoses     Menopausal syndrome (hot flashes)    -  1    Nausea        Neck pain        Medical cannabis use           Follow-ups after your visit        Additional Services     PHYSICAL THERAPY REFERRAL       *This therapy referral will be filtered to a centralized scheduling office at [facility] and the patient will receive a call to schedule an appointment at a [place] location most convenient for them. *     [facility] provides Physical Therapy evaluation and treatment and many specialty services across the [place] system.  If requesting a specialty program, please choose from the list below.    If you have not heard from the scheduling office within 2 business days, please call [phone] for all locations, with the exception of [place], please call [phone].  Treatment: Evaluation & Treatment  Special Instructions/Modalities: none  Special Programs: None    Please be aware that coverage of these services is subject to the terms and limitations of your health insurance plan.  Call member services at your health plan with any benefit or coverage questions.      **Note to Provider:  If you are referring outside of [place] for the therapy appointment, please list the name of the location in the  special instructions  above, print the referral and give to the patient to schedule the appointment.                  Who to contact     If you have questions or need follow up information about today's clinic visit or your schedule please contact [facility] directly at [phone].  Normal or non-critical lab and imaging results will be communicated to you by Dona  "letter or phone within 4 business days after the clinic has received the results. If you do not hear from us within 7 days, please contact the clinic through [facility] or phone. If you have a critical or abnormal lab result, we will notify you by phone as soon as possible.  Submit refill requests through [facility] or call your pharmacy and they will forward the refill request to us. Please allow 3 business days for your refill to be completed.          Additional Information About Your Visit        [facility] Information     [facility] gives you secure access to your electronic health record. If you see a primary care provider, you can also send messages to your care team and make appointments. If you have questions, please call your primary care clinic.  If you do not have a primary care provider, please call [phone] and they will assist you.        Care EveryWhere ID     This is your Care EveryWhere ID. This could be used by other organizations to access your [place] medical records  [id number]        Your Vitals Were     Pulse Temperature Respirations Height Breastfeeding? BMI (Body Mass Index)    86 99.5  F (37.5  C) (Temporal) 16 5' 4.57\" (1.64 m) No 28.08 kg/m2       Blood Pressure from Last 3 Encounters:   05/08/17 124/78   03/15/17 104/70   02/02/17 120/70    Weight from Last 3 Encounters:   05/08/17 166 lb 8 oz (75.5 kg)   03/15/17 164 lb 11.2 oz (74.7 kg)   02/02/17 165 lb (74.8 kg)              We Performed the Following     PHYSICAL THERAPY REFERRAL          Today's Medication Changes          These changes are accurate as of: 5/8/17  2:00 PM.  If you have any questions, ask your nurse or doctor.               Stop taking these medicines if you haven't already. Please contact your care team if you have questions.     ALPRAZolam 0.5 MG 24 hr tablet   Commonly known as:  XANAX XR   Stopped by:  Jonna Gaitan PA-C                Where to get your medicines      These medications were sent to [facility] - [id number]   [address] [place] [address]     Phone:  [phone]     ondansetron 4 MG ODT tab                Primary Care Provider Office Phone # Fax #    Jonna Gaitan PA-C [phone]       [facility] [address] [place] [address]        Thank you!     Thank you for choosing [facility]  for your care. Our goal is always to provide you with excellent care. Hearing back from our patients is one way we can continue to improve our services. Please take a few minutes to complete the written survey that you may receive in the mail after your visit with us. Thank you!             Your Updated Medication List - Protect others around you: Learn how to safely use, store and throw away your medicines at www.disposemymeds.org.          This list is accurate as of: 5/8/17  2:00 PM.  Always use your most recent med list.                   Brand Name Dispense Instructions for use    albuterol 108 (90 BASE) MCG/ACT Inhaler    albuterol    1 Inhaler    Inhale 2 puffs into the lungs every 4 hours as needed for shortness of breath / dyspnea       CELEBREX PO      Take 400 mg by mouth daily       cephALEXin 500 MG capsule    KEFLEX    40 capsule    Take 1 capsule (500 mg) by mouth 4 times daily For ten days       DULoxetine 30 MG EC capsule    CYMBALTA    120 capsule    Take 2 capsules (60 mg) by mouth 2 times daily       FentaNYL 37.5 MCG/HR Pt72      1 patch every 72 hours 25mcg/hr       gabapentin 300 MG capsule    NEURONTIN     Take 900 mg by mouth daily       ipratropium - albuterol 0.5 mg/2.5 mg/3 mL 0.5-2.5 (3) MG/3ML neb solution    DUONEB    1 Box    Take 1 vial (3 mLs) by nebulization every 6 hours as needed for shortness of breath / dyspnea       medical cannabis liquid     0 Information only    (This is NOT a prescription, and does not certify that the patient has a qualifying medical condition for medical  cannabis.  The purpose of this order is  to document that the patient reports taking medical cannabis.)       ondansetron 4 MG ODT tab    ZOFRAN-ODT    20 tablet    DISSOLVE ONE TABLET BY MOUTH EVERY 8 HOURS AS NEEDED FOR NAUSEA       order for DME     1 Device    Equipment being ordered: Nebulizer       oxyCODONE 5 MG IR tablet    ROXICODONE    20 tablet    Take 1 tablet (5 mg) by mouth every 6 hours as needed for pain       pantoprazole 40 MG EC tablet    PROTONIX    30 tablet    Take 1 tablet (40 mg) by mouth daily       polyethylene glycol powder    MIRALAX    3 Bottle    Take 51 g (3 capfuls) by mouth 2 times daily       traZODone 50 MG tablet    DESYREL    90 tablet    Take 3 tablets (150 mg) by mouth At Bedtime

## 2017-06-01 ENCOUNTER — TELEPHONE (OUTPATIENT)
Dept: FAMILY MEDICINE | Facility: OTHER | Age: 57
End: 2017-06-01

## 2017-06-01 NOTE — TELEPHONE ENCOUNTER
Pt informed. She is going to try and get in with one of her specialists.   Thank you,  Coby Ruelas- Pt Rep.

## 2017-06-01 NOTE — TELEPHONE ENCOUNTER
Lm for the patient to return call to the clinic to discuss the below. Will await to hear from patient. Corry Dolan RN, BSN     Please offer appointment in [place] for an acute issue. Corry Dolan RN, BSN

## 2017-06-01 NOTE — TELEPHONE ENCOUNTER
Attempted to reach patient.  Unable to reach will try again. There are currently no openings in [place] Jonna is unable to see patient today.  Karma Mc CMA ([facility])

## 2017-06-12 ENCOUNTER — TRANSFERRED RECORDS (OUTPATIENT)
Dept: HEALTH INFORMATION MANAGEMENT | Facility: CLINIC | Age: 57
End: 2017-06-12

## 2017-06-13 ENCOUNTER — MYC MEDICAL ADVICE (OUTPATIENT)
Dept: FAMILY MEDICINE | Facility: OTHER | Age: 57
End: 2017-06-13

## 2017-06-13 DIAGNOSIS — N95.1 MENOPAUSAL SYNDROME (HOT FLASHES): Primary | ICD-10-CM

## 2017-06-13 NOTE — TELEPHONE ENCOUNTER
Provider to review and advise. Per LOV note patient to contact the provider when she has insurance and we will try transdermal estrogen only as she has had hysterectomy. Corry Dolan RN, BSN

## 2017-06-19 ENCOUNTER — HOSPITAL ENCOUNTER (OUTPATIENT)
Dept: PHYSICAL THERAPY | Facility: OTHER | Age: 57
Setting detail: THERAPIES SERIES
End: 2017-06-19
Attending: PHYSICIAN ASSISTANT
Payer: MEDICARE

## 2017-06-19 PROCEDURE — 97140 MANUAL THERAPY 1/> REGIONS: CPT | Mod: GP | Performed by: PHYSICAL THERAPIST

## 2017-06-19 PROCEDURE — 40000718 ZZHC STATISTIC PT DEPARTMENT ORTHO VISIT: Performed by: PHYSICAL THERAPIST

## 2017-06-19 PROCEDURE — G8981 BODY POS CURRENT STATUS: HCPCS | Mod: GP,CL | Performed by: PHYSICAL THERAPIST

## 2017-06-19 PROCEDURE — 97163 PT EVAL HIGH COMPLEX 45 MIN: CPT | Mod: GP | Performed by: PHYSICAL THERAPIST

## 2017-06-19 PROCEDURE — G8982 BODY POS GOAL STATUS: HCPCS | Mod: GP,CJ | Performed by: PHYSICAL THERAPIST

## 2017-06-19 PROCEDURE — 97110 THERAPEUTIC EXERCISES: CPT | Mod: GP | Performed by: PHYSICAL THERAPIST

## 2017-06-19 NOTE — PROGRESS NOTES
06/19/17 1410   General Information   Type of Visit Initial OP Ortho PT Evaluation   Start of Care Date 06/19/17   Referring Physician Jonna Gaitan PA-C   Patient/Family Goals Statement To get rid of arm pain, neck and headache pain.    Orders Evaluate and Treat   Orders Comment None   Date of Order 05/08/17   Insurance Type Medicare   Medical Diagnosis Neck and headache pain.    Surgical/Medical history reviewed Yes   Precautions/Limitations other (see comments)  (Disability)   Weight-Bearing Status - LUE full weight-bearing   Weight-Bearing Status - RUE full weight-bearing   Weight-Bearing Status - LLE full weight-bearing   Weight-Bearing Status - RLE full weight-bearing   Body Part(s)   Body Part(s) Cervical Spine   Presentation and Etiology   Pertinent history of current problem (include personal factors and/or comorbidities that impact the POC) Mary reports that she started with neck and headache pain about 6 years ago after she hurt her ankle. 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 She walks with a SEC.    Impairments A. Pain;C. Swelling;D. Decreased ROM;E. Decreased flexibility;G. Impaired balance;F. Decreased strength and endurance;J. Burning;K. Numbness;N. Headaches;L. Tingling   Functional Limitations perform activities of daily living;perform required work activities;perform desired leisure / sports activities   Symptom Location Neck, head to the face and mouth, thorax and bilateral arms.    How/Where did it occur From insidious onset   Onset date of current episode/exacerbation 06/19/16   Chronicity Chronic   Pain rating (0-10 point scale) Other   Pain rating comment Neck pain is a 6/10 average 8/10 at worst, the headache pain to the teeth is a 5/10 pain average and 9/10 at worst and the arm symptoms to the fingers are  a 7/10 pain average and 8.5/10 at worst.    Pain quality A. Sharp;B. Dull;C. Aching   Frequency of pain/symptoms B. Intermittent   Pain/symptoms are: Worse during the night   Pain symptoms comment Wakes up with symptoms to the hands, then she feels better after about an hour, the early to mid afternoon the symptoms get worse as the day progresses.    Pain/symptoms exacerbated by G. Certain positions;C. Lifting;D. Carrying;A. Sitting;H. Overhead reach;J. ADL;K. Home tasks;L. Work tasks;I. Bending   Pain exacerbation comment Lying down in her bed increases the hand symptoms so will try to address sleeping position in the first 3 visits.    Pain/symptoms eased by E. Changing positions;I. OTC medication(s)   Pain eased by comment Better after pain meds and gabapentin kick in.    Progression of symptoms since onset: Worsened   Prior Level of Function   Prior Level of Function-Mobility Very limited due to lower extremity injury and RSD   Prior Level of Function-ADLs Independent   Functional Level Prior Comment Walks with a cane.   Current Level of Function   Current Community Support Family/friend caregiver   Patient role/employment history G. Disabled   Living environment [place]   Home/community accessibility 4 steps to enter   Current equipment-Gait/Locomotion Walker;Front wheeled walker;Axillary crutches;Standard cane   Fall Risk Screen   Fall screen completed by PT   Per patient - Fall 2 or more times in past year? Yes   Per patient - Fall with injury in past year? No   Is patient a fall risk? Yes   Fall screen comments Walking with an assistive device due to fall risk.    Functional Scales   Functional Scales Other   Other Scales  NDI 64%   Cervical Spine   Observation Mary has many pain behaviors from numerous muskuloskeletal and neurological issues.    Integumentary  Well healed scar at mid to lower thorax for nerve stimulator.    Posture Slight forward head posture.    Cervical Flexion ROM Limited by 100%    Cervical Extension ROM Limited by 50%   Cervical Right Side Bending ROM Limited by 50%   Cervical Left Side Bending ROM Limited by 50%   Cervical Right Rotation ROM Limited by 25%   Cervical Left Rotation ROM Limited by 25%   Shoulder/Wrist/Hand Strength Comments  strength left 52#s, right 813s.    Cervical/Shoulder Special Tests Comments Directional preference. Seated cervical retraction 25% able to decrease symptoms to the eyes from a 4/10 to a 3/10 and right mid finger from a 2/10 to a 1/10.    Palpation Very tight upper cervical paraspinals, levator scap tightness, upper trap. tightness.    Planned Therapy Interventions   Planned Therapy Interventions joint mobilization;manual therapy;neuromuscular re-education;ROM;strengthening;stretching   Planned Therapy Interventions Comment directional preference.   Planned Modality Interventions   Planned Modality Interventions Hot packs;Ultrasound   Planned Modality Interventions Comments Will use modalities as needed for symptom relief.    Clinical Impression   Criteria for Skilled Therapeutic Interventions Met yes, treatment indicated   PT Diagnosis Mechanical cervical involvment, limited ROM, headache pain, upper extremity weakness, numbness and tingling.    Influenced by the following impairments Pain, limited ROM, weakness, headaches.    Functional limitations due to impairments Sleeping, reading, turning/bending head and neck.    Clinical Presentation Unstable/Unpredictable   Clinical Presentation Rationale Clinical judgement, multiple body systems involved.   Clinical Decision Making (Complexity) High complexity   Therapy Frequency 2 times/Week   Predicted Duration of Therapy Intervention (days/wks) 12 weeks   Risk & Benefits of therapy have been explained Yes   Patient, Family & other staff in agreement with plan of care Yes   Education Assessment   Preferred Learning Style Listening;Reading;Demonstration   Barriers to Learning No barriers   ORTHO GOALS   PT  Ortho Eval Goals 1;2;3   Ortho Goal 1   Goal Identifier Pain   Goal Description Mary will use strategies learned in P.T. to decrease pain levels by 25% most days of the week   Target Date 07/19/17   Ortho Goal 2   Goal Identifier Numbness   Goal Description Mary will use strategies learned in P.T. to reduce the amount of numbness to the hands by 50% allowing for a more restful night's sleep.    Target Date 08/18/17   Ortho Goal 3   Goal Identifier Function   Goal Description Mary will improve her NDI by 30% indicating overall functional improvement.    Target Date 09/17/17   Total Evaluation Time   Total Evaluation Time 30   Therapy Certification   Certification date from 06/19/17   Certification date to 09/17/17   Medical Diagnosis Neck pain.

## 2017-06-20 RX ORDER — ESTRADIOL 0.04 MG/D
1 PATCH TRANSDERMAL WEEKLY
Qty: 4 PATCH | Refills: 11 | Status: SHIPPED | OUTPATIENT
Start: 2017-06-20 | End: 2018-07-31

## 2017-07-05 ENCOUNTER — HOSPITAL ENCOUNTER (OUTPATIENT)
Dept: PHYSICAL THERAPY | Facility: OTHER | Age: 57
Setting detail: THERAPIES SERIES
End: 2017-07-05
Attending: PHYSICIAN ASSISTANT
Payer: MEDICARE

## 2017-07-05 PROCEDURE — 97530 THERAPEUTIC ACTIVITIES: CPT | Mod: GP | Performed by: PHYSICAL THERAPIST

## 2017-07-05 PROCEDURE — 97140 MANUAL THERAPY 1/> REGIONS: CPT | Mod: GP | Performed by: PHYSICAL THERAPIST

## 2017-07-05 PROCEDURE — 97110 THERAPEUTIC EXERCISES: CPT | Mod: GP | Performed by: PHYSICAL THERAPIST

## 2017-07-05 PROCEDURE — 40000718 ZZHC STATISTIC PT DEPARTMENT ORTHO VISIT: Performed by: PHYSICAL THERAPIST

## 2017-07-08 ENCOUNTER — HEALTH MAINTENANCE LETTER (OUTPATIENT)
Age: 57
End: 2017-07-08

## 2017-08-02 ENCOUNTER — HOSPITAL ENCOUNTER (OUTPATIENT)
Dept: PHYSICAL THERAPY | Facility: OTHER | Age: 57
Setting detail: THERAPIES SERIES
End: 2017-08-02
Attending: PHYSICIAN ASSISTANT
Payer: MEDICARE

## 2017-08-02 PROCEDURE — 97140 MANUAL THERAPY 1/> REGIONS: CPT | Mod: GP | Performed by: PHYSICAL THERAPIST

## 2017-08-02 PROCEDURE — 97035 APP MDLTY 1+ULTRASOUND EA 15: CPT | Mod: GP | Performed by: PHYSICAL THERAPIST

## 2017-08-02 PROCEDURE — 97110 THERAPEUTIC EXERCISES: CPT | Mod: GP | Performed by: PHYSICAL THERAPIST

## 2017-08-02 PROCEDURE — 40000718 ZZHC STATISTIC PT DEPARTMENT ORTHO VISIT: Performed by: PHYSICAL THERAPIST

## 2017-08-17 ENCOUNTER — HOSPITAL ENCOUNTER (OUTPATIENT)
Dept: PHYSICAL THERAPY | Facility: OTHER | Age: 57
Setting detail: THERAPIES SERIES
End: 2017-08-17
Attending: PHYSICIAN ASSISTANT
Payer: MEDICARE

## 2017-08-17 PROCEDURE — 40000718 ZZHC STATISTIC PT DEPARTMENT ORTHO VISIT: Performed by: PHYSICAL THERAPIST

## 2017-08-17 PROCEDURE — 97140 MANUAL THERAPY 1/> REGIONS: CPT | Mod: GP | Performed by: PHYSICAL THERAPIST

## 2017-08-17 PROCEDURE — 97110 THERAPEUTIC EXERCISES: CPT | Mod: GP | Performed by: PHYSICAL THERAPIST

## 2017-08-17 PROCEDURE — 97035 APP MDLTY 1+ULTRASOUND EA 15: CPT | Mod: GP | Performed by: PHYSICAL THERAPIST

## 2017-08-18 ENCOUNTER — TELEPHONE (OUTPATIENT)
Dept: FAMILY MEDICINE | Facility: OTHER | Age: 57
End: 2017-08-18

## 2017-08-18 DIAGNOSIS — K59.00 CONSTIPATION, UNSPECIFIED CONSTIPATION TYPE: Primary | ICD-10-CM

## 2017-08-18 NOTE — TELEPHONE ENCOUNTER
----- Message from Sophy Berger, PT sent at 8/17/2017  1:40 PM CDT -----  Regarding: KIMBERLY marks for constipation  Jonna,   I am seeing Mary for her neck, we are able to get rid of her headaches with cervical positional exercises.   Today she was talking about her longstanding history of constipation so I told her about the work Sylvia So is doing with the constipation program and she is very interested in seeing Sylvia for it. Could you put an order in for women's health for her to see Sylvia if you are okay with it.   Thanks  Sophy HARRIS

## 2017-08-21 NOTE — TELEPHONE ENCOUNTER
Quita DIAZ (R) contacted Linda on 08/21/17 and left a message. If patient calls back please inform patient of message below, thanks

## 2017-09-12 ENCOUNTER — HOSPITAL ENCOUNTER (OUTPATIENT)
Dept: PHYSICAL THERAPY | Facility: OTHER | Age: 57
Setting detail: THERAPIES SERIES
End: 2017-09-12
Attending: PHYSICIAN ASSISTANT
Payer: MEDICARE

## 2017-09-12 PROCEDURE — 97110 THERAPEUTIC EXERCISES: CPT | Mod: GP | Performed by: PHYSICAL THERAPIST

## 2017-09-12 PROCEDURE — 40000718 ZZHC STATISTIC PT DEPARTMENT ORTHO VISIT: Performed by: PHYSICAL THERAPIST

## 2017-09-12 PROCEDURE — 97035 APP MDLTY 1+ULTRASOUND EA 15: CPT | Mod: GP | Performed by: PHYSICAL THERAPIST

## 2017-09-12 PROCEDURE — 97140 MANUAL THERAPY 1/> REGIONS: CPT | Mod: GP | Performed by: PHYSICAL THERAPIST

## 2017-09-18 ENCOUNTER — OFFICE VISIT (OUTPATIENT)
Dept: PSYCHOLOGY | Facility: CLINIC | Age: 57
End: 2017-09-18
Payer: COMMERCIAL

## 2017-09-18 DIAGNOSIS — F43.10 POST TRAUMATIC STRESS DISORDER (PTSD): ICD-10-CM

## 2017-09-18 DIAGNOSIS — F33.2 SEVERE RECURRENT MAJOR DEPRESSION WITHOUT PSYCHOTIC FEATURES (H): ICD-10-CM

## 2017-09-18 DIAGNOSIS — F41.1 GENERALIZED ANXIETY DISORDER: Primary | ICD-10-CM

## 2017-09-18 PROCEDURE — 90834 PSYTX W PT 45 MINUTES: CPT | Performed by: COUNSELOR

## 2017-09-18 ASSESSMENT — ANXIETY QUESTIONNAIRES
2. NOT BEING ABLE TO STOP OR CONTROL WORRYING: NEARLY EVERY DAY
5. BEING SO RESTLESS THAT IT IS HARD TO SIT STILL: NEARLY EVERY DAY
3. WORRYING TOO MUCH ABOUT DIFFERENT THINGS: NEARLY EVERY DAY
6. BECOMING EASILY ANNOYED OR IRRITABLE: NEARLY EVERY DAY
1. FEELING NERVOUS, ANXIOUS, OR ON EDGE: NEARLY EVERY DAY
7. FEELING AFRAID AS IF SOMETHING AWFUL MIGHT HAPPEN: NEARLY EVERY DAY
GAD7 TOTAL SCORE: 21
IF YOU CHECKED OFF ANY PROBLEMS ON THIS QUESTIONNAIRE, HOW DIFFICULT HAVE THESE PROBLEMS MADE IT FOR YOU TO DO YOUR WORK, TAKE CARE OF THINGS AT HOME, OR GET ALONG WITH OTHER PEOPLE: EXTREMELY DIFFICULT

## 2017-09-18 ASSESSMENT — PATIENT HEALTH QUESTIONNAIRE - PHQ9
5. POOR APPETITE OR OVEREATING: NEARLY EVERY DAY
SUM OF ALL RESPONSES TO PHQ QUESTIONS 1-9: 26

## 2017-09-18 NOTE — MR AVS SNAPSHOT
"                  MRN:[id number]                      After Visit Summary   2017    Linda Walsh    MRN: [id number]           Visit Information        Provider Department      2017 3:30 PM Nasrin Valladares LPC [facility] Generic      Your next 10 appointments already scheduled     Oct 16, 2017  2:30 PM CDT   Return Visit with Nasrin Valladares LPC   [facility] [place] ([facility])    [address]  [place] [address] [phone]              Care Instructions                                               Linda Walsh     SAFETY PLAN:   Step 1: Warning signs / cues (Thoughts, images, mood, situation, behavior) that a crisis may be developing:    Thoughts: \"I don't matter\", \"People would be better off without me\", \"I'm a burden\", \"I can't do this anymore\", \"I just want this to end\" and \"Nothing makes it better\"    Images: thoughts of her     Thinking Processes: ruminations (can't stop thinking about my problems): that she is in pain all the time and can't do things she used to, gets depressed that she will make plans to do something with people and ends up not being able to go due to health, racing thoughts, intrusive thoughts (bothersome, unwanted thoughts that come out of nowhere): \"what did I do to deserve this? Did I do something to provoke that (childhood)?\", highly critical and negative thoughts: \"I'm too fat, need to lose weight, that I'm not my usual self, I'm not pretty, I'm not good enough, I can't help myself or others, I'm inadequate.\" and disorganized thinking: \"I can't remember where I've put things, can't recall what I've done or if I've taken my meds. Missing appointments, get so confused.\"     Mood: worsening depression, hopelessness, helplessness, intense anger, intense worry, agitation, disinhibited (not caring about things or consequences) and mood swings    Behaviors: isolating/withdrawing , " "can't stop crying, impulsive, reckless behaviors (acting without thinking): acting on negative thoughts, giving things away, saying good-bye, aggression, not taking care of myself, not taking care of my responsibilities and sleeping too much    Situations: pain, relationship problems, trauma , financial stress and medical condition / diagnosis: CRPS.    Step 2: Coping strategies - Things I can do to take my mind off of my problems without contacting another person (relaxation technique, physical activity):    Distress Tolerance Strategies:  relaxation activities: cooking, lake-thinking about it or going there, arts and crafts: furniture, painting, madhuri jars, making signs, play with my pet , watch a funny movie: \"skit things,\" Funniest Home Videos, pray and paced breathing/progressive muscle relaxation    Physical Activities: [employer], crafts    Focus on helpful thoughts:  \"This is temporary\", \"I will get through this\", \"It always passes\", \"Ride the wave\" and think of grandkids.   Step 3: People and social settings that provide distraction:   Name: Susan Douglass Phone: [phone]   Name: Velasquez Douglass Phone: [phone]   Name: Amanda                 Phone: [phone]                  Name: Alpesh Douglass Phone: [phone]    work and hanging out at the [facility] with friends.   Step 4: Remind myself of people and things that are important to me and worth living for:  Going to the lake, my company, my grandchildren, my animals.     Step 5: When I am in crisis, I can ask these people to help me use my safety plan:   Name: Susan Dogulass Phone: [phone]   Name: Velasquez Douglass Phone: [phone]   Name: Amanda                 Phone: [phone]                  Name: Alpesh Douglass Phone: [phone]  Step 6: Making the environment safe:     be around others and secure medications in a safe, find alternate transportation.  Step 7: Professionals or agencies I can contact during a crisis:    [facility] Daytime and After Hours Crisis Number: 718-636-6785    Suicide Prevention Lifeline: 6-889-158-TALK (4695)    Text for Life: Text the word  LIFE  to 05511.  Local Crisis Services:     Call 911 or go to my nearest emergency department.   I helped develop this safety plan and agree to use it when needed.  I have been given a copy of this plan.      Client signature _________________________________________________________________  Today s date:  2017  Adapted from Safety Plan Template 2008 Griselda Perez and Livan Cutler is reprinted with the express permission of the authors.  No portion of the Safety Plan Template may be reproduced without the express, written permission.  You can contact the authors at [email or web address] or [email or web address].                                               Linda Walsh     SAFETY PLAN:   Step 1: Warning signs / cues (Thoughts, images, mood, situation, behavior) that a crisis may be developing:    Thoughts: \"I don't matter\", \"People would be better off without me\", \"I'm a burden\", \"I can't do this anymore\", \"I just want this to end\" and \"Nothing makes it better\"    Images: thoughts of her     Thinking Processes: ruminations (can't stop thinking about my problems): that she is in pain all the time and can't do things she used to, gets depressed that she will make plans to do something with people and ends up not being able to go due to health, racing thoughts, intrusive thoughts (bothersome, unwanted thoughts that come out of nowhere): \"what did I do to deserve this? Did I do something to provoke that (childhood)?\", highly critical and negative thoughts: \"I'm too fat, need to lose weight, that I'm not my usual self, I'm not pretty, I'm not good enough, I can't help myself or others, I'm inadequate.\" and disorganized thinking: \"I can't remember where I've put things, can't recall what I've done or if I've taken my meds. Missing " "appointments, get so confused.\"     Mood: worsening depression, hopelessness, helplessness, intense anger, intense worry, agitation, disinhibited (not caring about things or consequences) and mood swings    Behaviors: isolating/withdrawing , can't stop crying, impulsive, reckless behaviors (acting without thinking): acting on negative thoughts, giving things away, saying good-bye, aggression, not taking care of myself, not taking care of my responsibilities and sleeping too much    Situations: pain, relationship problems, trauma , financial stress and medical condition / diagnosis: CRPS.    Step 2: Coping strategies - Things I can do to take my mind off of my problems without contacting another person (relaxation technique, physical activity):    Distress Tolerance Strategies:  relaxation activities: cooking, lake-thinking about it or going there, arts and crafts: furniture, painting, madhuri jars, making signs, play with my pet , watch a funny movie: \"skit things,\" Funniest Home Videos, pray and paced breathing/progressive muscle relaxation    Physical Activities: [place] gym-pool, crafts    Focus on helpful thoughts:  \"This is temporary\", \"I will get through this\", \"It always passes\", \"Ride the wave\" and think of grandkids.   Step 3: People and social settings that provide distraction:   Name: Susan Douglass Phone: [phone]   Name: Velasquez Douglass Phone: [phone]   Name: Amanda                 Phone: [phone]                  Name: Alpesh Douglass Phone: [phone]    work and hanging out at the [facility] with friends.   Step 4: Remind myself of people and things that are important to me and worth living for:  Going to the lake, my company, my grandchildren, my animals.     Step 5: When I am in crisis, I can ask these people to help me use my safety plan:   Name: Susan Douglass Phone: [phone]   Name: Velasquez Douglass Phone: [phone]   Name: Amanda                 Phone: [phone]                  " Name: Alpesh Douglass Phone: [phone]  Step 6: Making the environment safe:     be around others and secure medications in a safe, find alternate transportation.  Step 7: Professionals or agencies I can contact during a crisis:    [facility] Daytime and After Hours Crisis Number: 917-411-1791    Suicide Prevention Lifeline: 3-467-856-TALK (9300)    Text for Life: Text the word  LIFE  to 22492.  Local Crisis Services:     Call 911 or go to my nearest emergency department.   I helped develop this safety plan and agree to use it when needed.  I have been given a copy of this plan.      Client signature _________________________________________________________________  Today s date:  9/18/2017  Adapted from Safety Plan Template 2008 Griselda Perez and [place] Cutler is reprinted with the express permission of the authors.  No portion of the Safety Plan Template may be reproduced without the express, written permission.  You can contact the authors at [email or web address] or [email or web address].               [facility] Information     [employer] gives you secure access to your electronic health record. If you see a primary care provider, you can also send messages to your care team and make appointments. If you have questions, please call your primary care clinic.  If you do not have a primary care provider, please call [phone] and they will assist you.        Care EveryWhere ID     This is your Care EveryWhere ID. This could be used by other organizations to access your [place] medical records  [id number]        Equal Access to Services     KARLOS GAR : selena Huitron, frank subramanian. So [facility] [phone].    ATENCIÓN: Si habla español, tiene a brandt disposición servicios gratuitos de asistencia lingüística. Llame al [phone].    We comply with applicable federal civil rights laws and [place] laws. We  do not discriminate on the basis of race, color, national origin, age, disability sex, sexual orientation or gender identity.

## 2017-09-18 NOTE — PATIENT INSTRUCTIONS
"Linda Walsh     SAFETY PLAN:   Step 1: Warning signs / cues (Thoughts, images, mood, situation, behavior) that a crisis may be developing:    Thoughts: \"I don't matter\", \"People would be better off without me\", \"I'm a burden\", \"I can't do this anymore\", \"I just want this to end\" and \"Nothing makes it better\"    Images: thoughts of her     Thinking Processes: ruminations (can't stop thinking about my problems): that she is in pain all the time and can't do things she used to, gets depressed that she will make plans to do something with people and ends up not being able to go due to health, racing thoughts, intrusive thoughts (bothersome, unwanted thoughts that come out of nowhere): \"what did I do to deserve this? Did I do something to provoke that (childhood)?\", highly critical and negative thoughts: \"I'm too fat, need to lose weight, that I'm not my usual self, I'm not pretty, I'm not good enough, I can't help myself or others, I'm inadequate.\" and disorganized thinking: \"I can't remember where I've put things, can't recall what I've done or if I've taken my meds. Missing appointments, get so confused.\"     Mood: worsening depression, hopelessness, helplessness, intense anger, intense worry, agitation, disinhibited (not caring about things or consequences) and mood swings    Behaviors: isolating/withdrawing , can't stop crying, impulsive, reckless behaviors (acting without thinking): acting on negative thoughts, giving things away, saying good-bye, aggression, not taking care of myself, not taking care of my responsibilities and sleeping too much    Situations: pain, relationship problems, trauma , financial stress and medical condition / diagnosis: CRPS.    Step 2: Coping strategies - Things I can do to take my mind off of my problems without contacting another person (relaxation technique, physical activity):    Distress Tolerance Strategies:  relaxation " "activities: cooking, lake-thinking about it or going there, arts and crafts: furniture, painting, madhuri jars, making signs, play with my pet , watch a funny movie: \"skit things,\" Funniest Home Videos, pray and paced breathing/progressive muscle relaxation    Physical Activities: [facility], crafts    Focus on helpful thoughts:  \"This is temporary\", \"I will get through this\", \"It always passes\", \"Ride the wave\" and think of grandkids.   Step 3: People and social settings that provide distraction:   Name: Susan Douglass Phone: [phone]   Name: Velasquez Douglass Phone: [phone]   Name: Amanda                 Phone: [phone]                  Name: Alpesh Douglass Phone: [phone]    work and hanging out at the [facility] with friends.   Step 4: Remind myself of people and things that are important to me and worth living for:  Going to the lake, my company, my grandchildren, my animals.     Step 5: When I am in crisis, I can ask these people to help me use my safety plan:   Name: Susan Douglass Phone: [phone]   Name: Velasquez Douglass Phone: [phone]   Name: Amanda                 Phone: [phone]                  Name: Alpesh Douglass Phone: [phone]  Step 6: Making the environment safe:     be around others and secure medications in a safe, find alternate transportation.  Step 7: Professionals or agencies I can contact during a crisis:    [facility] Daytime and After Hours Crisis Number: 423-180-8406    Suicide Prevention Lifeline: 0-807-123-TALK 8255)    Text for Life: Text the word  LIFE  to 08703.  Local Crisis Services:     Call 911 or go to my nearest emergency department.   I helped develop this safety plan and agree to use it when needed.  I have been given a copy of this plan.      Client signature _________________________________________________________________  Today s date:  9/18/2017  Adapted from Safety Plan Template 2008 Griselda Perez and Livan Cutler is " "reprinted with the express permission of the authors.  No portion of the Safety Plan Template may be reproduced without the express, written permission.  You can contact the authors at [email or web address] or [email or web address].                                               Linda   Ni[place]     SAFETY PLAN:   Step 1: Warning signs / cues (Thoughts, images, mood, situation, behavior) that a crisis may be developing:    Thoughts: \"I don't matter\", \"People would be better off without me\", \"I'm a burden\", \"I can't do this anymore\", \"I just want this to end\" and \"Nothing makes it better\"    Images: thoughts of her     Thinking Processes: ruminations (can't stop thinking about my problems): that she is in pain all the time and can't do things she used to, gets depressed that she will make plans to do something with people and ends up not being able to go due to health, racing thoughts, intrusive thoughts (bothersome, unwanted thoughts that come out of nowhere): \"what did I do to deserve this? Did I do something to provoke that (childhood)?\", highly critical and negative thoughts: \"I'm too fat, need to lose weight, that I'm not my usual self, I'm not pretty, I'm not good enough, I can't help myself or others, I'm inadequate.\" and disorganized thinking: \"I can't remember where I've put things, can't recall what I've done or if I've taken my meds. Missing appointments, get so confused.\"     Mood: worsening depression, hopelessness, helplessness, intense anger, intense worry, agitation, disinhibited (not caring about things or consequences) and mood swings    Behaviors: isolating/withdrawing , can't stop crying, impulsive, reckless behaviors (acting without thinking): acting on negative thoughts, giving things away, saying good-bye, aggression, not taking care of myself, not taking care of my responsibilities and sleeping too much    Situations: pain, relationship problems, trauma , financial stress and medical " "condition / diagnosis: CRPS.    Step 2: Coping strategies - Things I can do to take my mind off of my problems without contacting another person (relaxation technique, physical activity):    Distress Tolerance Strategies:  relaxation activities: cooking, lake-thinking about it or going there, arts and crafts: furniture, painting, madhuri jars, making signs, play with my pet , watch a funny movie: \"skit things,\" Funniest Home Videos, pray and paced breathing/progressive muscle relaxation    Physical Activities: [place] gym-pool, crafts    Focus on helpful thoughts:  \"This is temporary\", \"I will get through this\", \"It always passes\", \"Ride the wave\" and think of grandkids.   Step 3: People and social settings that provide distraction:   Name: Susan Douglass Phone: [phone]   Name: Velasquez oDuglass Phone: [phone]   Name: Amanda                 Phone: [phone]                  Name: Alpesh Douglass Phone: [phone]    work and hanging out at the [facility] with friends.   Step 4: Remind myself of people and things that are important to me and worth living for:  Going to the lake, my company, my grandchildren, my animals.     Step 5: When I am in crisis, I can ask these people to help me use my safety plan:   Name: Susan Douglass Phone: [phone]   Name: Velasquez Douglass Phone: [phone]   Name: Amanda                 Phone: [phone]                  Name: Alpesh Douglass Phone: [phone]  Step 6: Making the environment safe:     be around others and secure medications in a safe, find alternate transportation.  Step 7: Professionals or agencies I can contact during a crisis:    [facility] Daytime and After Hours Crisis Number: 233-687-0910    Suicide Prevention Lifeline: 5-103-552-IZPI (1745)    Text for Life: Text the word  LIFE  to 97998.  Local Crisis Services:     Call 911 or go to my nearest emergency department.   I helped develop this safety plan and agree to use it when needed.  I " have been given a copy of this plan.      Client signature _________________________________________________________________  Today s date:  9/18/2017  Adapted from Safety Plan Template 2008 Griselda Perez and Livan Cutler is reprinted with the express permission of the authors.  No portion of the Safety Plan Template may be reproduced without the express, written permission.  You can contact the authors at [email or web address] or [email or web address].

## 2017-09-18 NOTE — PROGRESS NOTES
Progress Note    Client Name: Linda Walsh  Date: 17         Service Type: Individual      Session Start Time: 3:15pm  Session End Time: 4:15pm      Session Length: 60mins     Session #: 1     Attendees: Client attended alone    Treatment Plan Last Reviewed: Will create next session  PHQ-9 / MARLIN-7 :      DATA      Progress Since Last Session (Related to Symptoms / Goals / Homework):   Symptoms: Unchanged    Homework: Did not complete      Episode of Care Goals: No improvement - CONTEMPLATION (Considering change and yet undecided); Intervened by assessing the negative and positive thinking (ambivalence) about behavior change     Current / Ongoing Stressors and Concerns:   The client is transferring from her previous clinic following the therapist.  The client has a current DA done this year in April.  The client stated she continues to struggle with high stress and high levels of depression.  She stated her  thought he was having a stroke about a month ago or so.  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  The client stated she had been suspecting this for some time.  She talked about how the transportation company they started is very busy but her  isn't running it effectively and has hired some rude people.  She stated she has been struggling still with her health and the pain she has been in while having to care for her dad who has been having several medical issues.  She said she and her daughter-in-law have been making him food and taking him to his appointments.  She said initially her dad almost . He had refused to go to the hospital in an ambulance so the client's mom didn't call one.  The client stated she transported him to the hospital and the doctor later told her that he should have been taken in an ambulance.        Treatment Objective(s) Addressed in This Session:   use  "thought-stopping strategy daily to reduce intrusive thoughts  Increase interest, engagement, and pleasure in doing things  Decrease frequency and intensity of feeling down, depressed, hopeless  Identify negative self-talk and behaviors: challenge core beliefs, myths, and actions       Intervention:   Worked with client on how she will implement self care for herself on a daily basis. Challeneged negative beliefs and completed safety plan.         ASSESSMENT: Current Emotional / Mental Status (status of significant symptoms):   Risk status (Self / Other harm or suicidal ideation)   Client denies current fears or concerns for personal safety.   Client reports the following current or recent suicidal ideation or behaviors: client \"always\" has SI. She will think about driving her car into something. She denies intention and consents to safety. .   Client denies current or recent homicidal ideation or behaviors.   Client denies current or recent self injurious behavior or ideation.   Client denies other safety concerns.   A safety and risk management plan has been developed including: Client consented to co-developed safety plan, which includes see safety plan in Pt. Instructions.     Appearance:   Appropriate    Eye Contact:   Good    Psychomotor Behavior: Normal    Attitude:   Cooperative  Guarded    Orientation:   All   Speech    Rate / Production: Normal     Volume:  Normal    Mood:    Anxious  Depressed    Affect:    Constricted    Thought Content:  Clear    Thought Form:  Coherent  Logical  Tangential    Insight:    Fair      Medication Review:   No changes to current psychiatric medication(s)     Medication Compliance:   Yes     Changes in Health Issues:   Yes: Pain, Associated Psychological Distress     Chemical Use Review:   Substance Use: Chemical use reviewed, no active concerns identified      Tobacco Use: No current tobacco use.       Collateral Reports Completed:   Not Applicable    PLAN: (Client Tasks / " Therapist Tasks / Other)  Return in one month. Practice mindfulness and follow safety plan.                                                                Nasrin Valladares [facility]  September 18, 2017

## 2017-09-19 ASSESSMENT — ANXIETY QUESTIONNAIRES: GAD7 TOTAL SCORE: 21

## 2017-09-29 ENCOUNTER — MYC REFILL (OUTPATIENT)
Dept: FAMILY MEDICINE | Facility: OTHER | Age: 57
End: 2017-09-29

## 2017-09-29 DIAGNOSIS — J45.40 MODERATE PERSISTENT ASTHMA WITHOUT COMPLICATION: ICD-10-CM

## 2017-09-29 RX ORDER — ALBUTEROL SULFATE 90 UG/1
2 AEROSOL, METERED RESPIRATORY (INHALATION) EVERY 4 HOURS PRN
Qty: 1 INHALER | Refills: 3 | Status: SHIPPED | OUTPATIENT
Start: 2017-09-29 | End: 2018-05-01

## 2017-09-29 NOTE — TELEPHONE ENCOUNTER
albuterol       Last Written Prescription Date: 6/20/16  Last Fill Quantity: 1, # refills: 3    Last Office Visit with [facility], [facility] or  Health prescribing provider:  5/8/17   Future Office Visit:    Next 5 appointments (look out 90 days)     Oct 16, 2017  2:30 PM CDT   Return Visit with Nasrin Valladares LPC   [facility] ([facility])    [address]  [facility] [address] [phone]                   Date of Last Asthma Action Plan Letter:   There are no preventive care reminders to display for this patient.   Asthma Control Test:   ACT Total Scores 11/14/2013   ACT TOTAL SCORE 21   ASTHMA ER VISITS 0 = None   ASTHMA HOSPITALIZATIONS 0 = None       Date of Last Spirometry Test:   No results found for this or any previous visit.    Routing refill request to provider for review/approval because:  A break in medication

## 2017-09-29 NOTE — TELEPHONE ENCOUNTER
Message from [facility]:  Original authorizing provider: Jonna Gaitan PA-C    Mary Walsh would like a refill of the following medications:  albuterol (ALBUTEROL) 108 (90 BASE) MCG/ACT inhaler [Jonna Gaitan PA-C]    Preferred pharmacy: [facility] [place] - [address]     Comment:  Could I please get a refill on my inhaler please?

## 2017-10-09 ENCOUNTER — TELEPHONE (OUTPATIENT)
Dept: FAMILY MEDICINE | Facility: OTHER | Age: 57
End: 2017-10-09

## 2017-10-09 NOTE — TELEPHONE ENCOUNTER
Reason for call:  Patient reporting a symptom    Symptom or request: symptoms    Duration (how long have symptoms been present): since Friday    Have you been treated for this before? No    Additional comments: Possible shingles. Patient is asking to speak to a nurse about this.    Phone Number patient can be reached at:  Home number on file [phone] (home) or Cell number on file:    Telephone Information:   Mobile [phone]       Best Time:  anytime    Can we leave a detailed message on this number:  YES    Call taken on 10/9/2017 at 10:41 AM by Neelam Erwin

## 2017-10-09 NOTE — TELEPHONE ENCOUNTER
Spoke with Mary.  Friday night upper back was burning. Burning and hurting on skin. No rash has developed. Right side is what is bothering.     Possible shingles.  Will continue to monitor.  OTC as needed for itching and pain.  Will call back with any questions or concerns.    Advised to keep distance from pregnant or know people not vaccinated to chicken pox.    No other questions at this time.    RECOMMENDED DISPOSITION:  Home care advice -   Will comply with recommendation: yes   If further questions/concerns or if Sx do not improve, worsen or new Sx develop, call your PCP or [facility] as soon as possible.    NOTES:  Disposition was determined by the first positive assessment question, therefore all previous assessment questions were negative.     Guideline used:adult rash  Telephone Triage Protocols for Nurses, 5th Edition, Shanita Díaz, BENI, BSN

## 2017-10-11 ENCOUNTER — TELEPHONE (OUTPATIENT)
Dept: FAMILY MEDICINE | Facility: OTHER | Age: 57
End: 2017-10-11

## 2017-10-11 NOTE — TELEPHONE ENCOUNTER
Reason for call:  Patient reporting a symptom    Symptom or request: symptoms    Duration (how long have symptoms been present): since last Friday    Have you been treated for this before? No    Additional comments: super sensitive skin and sore muscles      Phone Number patient can be reached at:  Home number on file [phone] (home) or Cell number on file:    Telephone Information:   Mobile [phone]       Best Time:  anytime    Can we leave a detailed message on this number:  YES    Call taken on 10/11/2017 at 9:13 AM by Neelam Erwin

## 2017-10-11 NOTE — TELEPHONE ENCOUNTER
Spoke with patient. Calling regarding same symptoms from last week.  See telephone encounter from Friday.  Took part of a muscle relaxer.  Pain in back and side, but has not noticed a rash. Hurts when anything touches her skin.   Has been taking pain medication as instructed by pain management.  Has appointment with them Tuesday.  Also called them.  Will continue to monitor now and follow up them.    Jeremias Díaz, RN, BSN

## 2017-10-23 ENCOUNTER — HOSPITAL ENCOUNTER (OUTPATIENT)
Dept: PHYSICAL THERAPY | Facility: OTHER | Age: 57
Setting detail: THERAPIES SERIES
End: 2017-10-23
Attending: PHYSICIAN ASSISTANT
Payer: MEDICARE

## 2017-10-23 PROCEDURE — 97140 MANUAL THERAPY 1/> REGIONS: CPT | Mod: GP | Performed by: PHYSICAL THERAPIST

## 2017-10-23 PROCEDURE — 97035 APP MDLTY 1+ULTRASOUND EA 15: CPT | Mod: GP | Performed by: PHYSICAL THERAPIST

## 2017-10-23 PROCEDURE — 97112 NEUROMUSCULAR REEDUCATION: CPT | Mod: GP | Performed by: PHYSICAL THERAPIST

## 2017-10-23 PROCEDURE — 40000718 ZZHC STATISTIC PT DEPARTMENT ORTHO VISIT: Performed by: PHYSICAL THERAPIST

## 2017-10-27 ENCOUNTER — TRANSFERRED RECORDS (OUTPATIENT)
Dept: HEALTH INFORMATION MANAGEMENT | Facility: CLINIC | Age: 57
End: 2017-10-27

## 2017-10-27 DIAGNOSIS — G47.00 INSOMNIA, UNSPECIFIED TYPE: ICD-10-CM

## 2017-10-30 RX ORDER — TRAZODONE HYDROCHLORIDE 50 MG/1
TABLET, FILM COATED ORAL
Qty: 90 TABLET | Refills: 5 | Status: SHIPPED | OUTPATIENT
Start: 2017-10-30 | End: 2019-09-10

## 2017-10-30 NOTE — TELEPHONE ENCOUNTER
Prescription approved per [facility] Refill Protocol.  Jreemias Díaz, RN, BSN

## 2017-10-31 ENCOUNTER — OFFICE VISIT (OUTPATIENT)
Dept: NEUROSURGERY | Facility: CLINIC | Age: 57
End: 2017-10-31
Payer: COMMERCIAL

## 2017-10-31 VITALS — RESPIRATION RATE: 18 BRPM | TEMPERATURE: 97.5 F | HEIGHT: 65 IN

## 2017-10-31 DIAGNOSIS — M54.50 CHRONIC BILATERAL LOW BACK PAIN WITHOUT SCIATICA: Primary | ICD-10-CM

## 2017-10-31 DIAGNOSIS — G89.29 CHRONIC BILATERAL LOW BACK PAIN WITHOUT SCIATICA: Primary | ICD-10-CM

## 2017-10-31 PROCEDURE — 99215 OFFICE O/P EST HI 40 MIN: CPT | Performed by: PHYSICIAN ASSISTANT

## 2017-10-31 ASSESSMENT — PAIN SCALES - GENERAL: PAINLEVEL: EXTREME PAIN (8)
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
## 2017-10-31 NOTE — PROGRESS NOTES
"Linda Walsh is a 57 year old female who presents for:  Chief Complaint   Patient presents with     Neurologic Problem     neck pain- but pt states that mid to low back is bad also        Initial Vitals:  Temp 97.5  F (36.4  C) (Temporal)  Resp 18  Ht 5' 4.57\" (1.64 m) Estimated body mass index is 28.08 kg/(m^2) as calculated from the following:    Height as of 5/8/17: 5' 4.57\" (1.64 m).    Weight as of 5/8/17: 166 lb 8 oz (75.5 kg).. There is no height or weight on file to calculate BSA. BP completed using cuff size: NA (Not Taken)  Extreme Pain (8)    Do you feel safe in your environment?  Yes  Do you need any refills today? No    Nursing Comments:         Anny Levin CMA    "
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
## 2017-10-31 NOTE — MR AVS SNAPSHOT
After Visit Summary   10/31/2017    Linda Walsh    MRN: [id number]           Patient Information     Date Of Birth          1960        Visit Information        Provider Department      10/31/2017 12:50 PM Teddy Andrews PA-C [facility]        Today's Diagnoses     Chronic bilateral low back pain without sciatica    -  1       Follow-ups after your visit        Follow-up notes from your care team     Return if symptoms worsen or fail to improve.      Future tests that were ordered for you today     Open Future Orders        Priority Expected Expires Ordered    MR Lumbar Spine w/o Contrast Routine  10/31/2018 10/31/2017            Who to contact     If you have questions or need follow up information about today's clinic visit or your schedule please contact [facility] directly at [phone].  Normal or non-critical lab and imaging results will be communicated to you by MyChart, letter or phone within 4 business days after the clinic has received the results. If you do not hear from us within 7 days, please contact the clinic through MyChart or phone. If you have a critical or abnormal lab result, we will notify you by phone as soon as possible.  Submit refill requests through [facility] or call your pharmacy and they will forward the refill request to us. Please allow 3 business days for your refill to be completed.          Additional Information About Your Visit        MyChart Information     [facility] gives you secure access to your electronic health record. If you see a primary care provider, you can also send messages to your care team and make appointments. If you have questions, please call your primary care clinic.  If you do not have a primary care provider, please call [phone] and they will assist you.        Care EveryWhere ID     This is your Care EveryWhere ID. This could be used by other organizations to access your [facility]  "records  [id number]        Your Vitals Were     Temperature Respirations Height             97.5  F (36.4  C) (Temporal) 18 5' 4.57\" (1.64 m)          Blood Pressure from Last 3 Encounters:   05/08/17 124/78   03/15/17 104/70   02/02/17 120/70    Weight from Last 3 Encounters:   05/08/17 166 lb 8 oz (75.5 kg)   03/15/17 164 lb 11.2 oz (74.7 kg)   02/02/17 165 lb (74.8 kg)               Primary Care Provider Office Phone # Fax #    Jonna Gaitan PA-C [phone] [address] [place] [address]        Equal Access to Services     [facility] RIN : Hadii aad ku hadasho Sokalebali, waaxda luqadaha, qaybta kaalmada adeegyada, frank marcos. So [facility] [phone].    ATENCIÓN: Si habla español, tiene a brandt disposición servicios gratuitos de asistencia lingüística. [facility] [phone].    We comply with applicable federal civil rights laws and [place] laws. We do not discriminate on the basis of race, color, national origin, age, disability, sex, sexual orientation, or gender identity.            Thank you!     Thank you for choosing [facility]  for your care. Our goal is always to provide you with excellent care. Hearing back from our patients is one way we can continue to improve our services. Please take a few minutes to complete the written survey that you may receive in the mail after your visit with us. Thank you!             Your Updated Medication List - Protect others around you: Learn how to safely use, store and throw away your medicines at www.disposemymeds.org.          This list is accurate as of: 10/31/17  2:51 PM.  Always use your most recent med list.                   Brand Name Dispense Instructions for use Diagnosis    albuterol 108 (90 BASE) MCG/ACT Inhaler    PROAIR HFA    1 Inhaler    Inhale 2 puffs into the lungs every 4 hours as needed for shortness of breath / dyspnea    Moderate persistent asthma without complication       " CELEBREX PO      Take 400 mg by mouth daily        DULoxetine 30 MG EC capsule    CYMBALTA    120 capsule    Take 2 capsules (60 mg) by mouth 2 times daily    Major depressive disorder, single episode, moderate (H)       estradiol 0.0375 MG/24HR Ptwk    CLIMARA    4 patch    Place 1 patch onto the skin once a week    Menopausal syndrome (hot flashes)       FentaNYL 37.5 MCG/HR Pt72      1 patch every 72 hours 25mcg/hr        gabapentin 300 MG capsule    NEURONTIN     Take 900 mg by mouth daily        ipratropium - albuterol 0.5 mg/2.5 mg/3 mL 0.5-2.5 (3) MG/3ML neb solution    DUONEB    1 Box    Take 1 vial (3 mLs) by nebulization every 6 hours as needed for shortness of breath / dyspnea    Moderate persistent asthma without complication       medical cannabis liquid     0 Information only    (This is NOT a prescription, and does not certify that the patient has a qualifying medical condition for medical cannabis.  The purpose of this order is  to document that the patient reports taking medical cannabis.)        ondansetron 4 MG ODT tab    ZOFRAN-ODT    20 tablet    DISSOLVE ONE TABLET BY MOUTH EVERY 8 HOURS AS NEEDED FOR NAUSEA    Nausea       order for DME     1 Device    Equipment being ordered: Nebulizer    Asthma exacerbation       oxyCODONE 5 MG IR tablet    ROXICODONE    20 tablet    Take 1 tablet (5 mg) by mouth every 6 hours as needed for pain        pantoprazole 40 MG EC tablet    PROTONIX    30 tablet    Take 1 tablet (40 mg) by mouth daily    Gastroesophageal reflux disease without esophagitis       polyethylene glycol powder    MIRALAX    3 Bottle    Take 51 g (3 capfuls) by mouth 2 times daily    Constipation, unspecified constipation type       traZODone 50 MG tablet    DESYREL    90 tablet    TAKE THREE TABLETS BY MOUTH AT BEDTIME    Insomnia, unspecified type

## 2017-11-08 ENCOUNTER — HOSPITAL ENCOUNTER (OUTPATIENT)
Dept: PHYSICAL THERAPY | Facility: OTHER | Age: 57
Setting detail: THERAPIES SERIES
End: 2017-11-08
Attending: PHYSICIAN ASSISTANT
Payer: MEDICARE

## 2017-11-08 PROCEDURE — 40000718 ZZHC STATISTIC PT DEPARTMENT ORTHO VISIT: Performed by: PHYSICAL THERAPIST

## 2017-11-08 PROCEDURE — 97140 MANUAL THERAPY 1/> REGIONS: CPT | Mod: GP | Performed by: PHYSICAL THERAPIST

## 2017-11-08 PROCEDURE — 97035 APP MDLTY 1+ULTRASOUND EA 15: CPT | Mod: GP | Performed by: PHYSICAL THERAPIST

## 2017-11-16 ENCOUNTER — HOSPITAL ENCOUNTER (OUTPATIENT)
Dept: PHYSICAL THERAPY | Facility: OTHER | Age: 57
Setting detail: THERAPIES SERIES
End: 2017-11-16
Attending: PHYSICIAN ASSISTANT
Payer: MEDICARE

## 2017-11-16 PROCEDURE — 97012 MECHANICAL TRACTION THERAPY: CPT | Mod: GP | Performed by: PHYSICAL THERAPIST

## 2017-11-16 PROCEDURE — 40000718 ZZHC STATISTIC PT DEPARTMENT ORTHO VISIT: Performed by: PHYSICAL THERAPIST

## 2017-11-16 PROCEDURE — 97530 THERAPEUTIC ACTIVITIES: CPT | Mod: GP | Performed by: PHYSICAL THERAPIST

## 2017-11-16 PROCEDURE — 97110 THERAPEUTIC EXERCISES: CPT | Mod: GP | Performed by: PHYSICAL THERAPIST

## 2017-11-20 ENCOUNTER — HOSPITAL ENCOUNTER (OUTPATIENT)
Dept: PHYSICAL THERAPY | Facility: OTHER | Age: 57
Setting detail: THERAPIES SERIES
End: 2017-11-20
Attending: PHYSICIAN ASSISTANT
Payer: MEDICARE

## 2017-11-20 PROCEDURE — 97140 MANUAL THERAPY 1/> REGIONS: CPT | Mod: GP | Performed by: PHYSICAL THERAPIST

## 2017-11-20 PROCEDURE — 40000718 ZZHC STATISTIC PT DEPARTMENT ORTHO VISIT: Performed by: PHYSICAL THERAPIST

## 2017-11-20 PROCEDURE — 97530 THERAPEUTIC ACTIVITIES: CPT | Mod: GP | Performed by: PHYSICAL THERAPIST

## 2017-11-22 ENCOUNTER — HOSPITAL ENCOUNTER (OUTPATIENT)
Dept: PHYSICAL THERAPY | Facility: OTHER | Age: 57
Setting detail: THERAPIES SERIES
End: 2017-11-22
Attending: PHYSICIAN ASSISTANT
Payer: MEDICARE

## 2017-11-22 ENCOUNTER — TELEPHONE (OUTPATIENT)
Dept: FAMILY MEDICINE | Facility: OTHER | Age: 57
End: 2017-11-22

## 2017-11-22 DIAGNOSIS — M54.2 CERVICALGIA: Primary | ICD-10-CM

## 2017-11-22 PROCEDURE — 97140 MANUAL THERAPY 1/> REGIONS: CPT | Mod: GP | Performed by: PHYSICAL THERAPIST

## 2017-11-22 PROCEDURE — G8982 BODY POS GOAL STATUS: HCPCS | Mod: GP,CJ | Performed by: PHYSICAL THERAPIST

## 2017-11-22 PROCEDURE — 40000718 ZZHC STATISTIC PT DEPARTMENT ORTHO VISIT: Performed by: PHYSICAL THERAPIST

## 2017-11-22 PROCEDURE — 97530 THERAPEUTIC ACTIVITIES: CPT | Mod: GP | Performed by: PHYSICAL THERAPIST

## 2017-11-22 PROCEDURE — G8981 BODY POS CURRENT STATUS: HCPCS | Mod: GP,CL | Performed by: PHYSICAL THERAPIST

## 2017-11-22 NOTE — TELEPHONE ENCOUNTER
----- Message from Sophy Berger, PT sent at 11/22/2017  1:13 PM CST -----  Regarding: Order for home cervical traction  I have tried cervical traction with Mary and it really seems to help decrease symptoms. Wondering if we can get an order for a home unit. Not sure if insurance will cover but there is a better chance with an order.   Thanks,   If you don't like this idea let me know.   Sophy Berger P.T.

## 2017-11-27 NOTE — ADDENDUM NOTE
Encounter addended by: Sophy Berger PT on: 11/27/2017  1:01 PM<BR>     Actions taken: Flowsheet data copied forward, Flowsheet accepted, Charge Capture section accepted

## 2017-11-28 ENCOUNTER — FCC EXTENDED DOCUMENTATION (OUTPATIENT)
Dept: PSYCHOLOGY | Facility: CLINIC | Age: 57
End: 2017-11-28

## 2017-11-28 ENCOUNTER — TELEPHONE (OUTPATIENT)
Dept: FAMILY MEDICINE | Facility: OTHER | Age: 57
End: 2017-11-28

## 2017-11-28 DIAGNOSIS — F43.10 POST TRAUMATIC STRESS DISORDER (PTSD): ICD-10-CM

## 2017-11-28 DIAGNOSIS — F33.2 SEVERE RECURRENT MAJOR DEPRESSION WITHOUT PSYCHOTIC FEATURES (H): Primary | ICD-10-CM

## 2017-11-28 DIAGNOSIS — F41.1 GENERALIZED ANXIETY DISORDER: ICD-10-CM

## 2017-11-28 NOTE — PROGRESS NOTES
Discharge Summary  Single Session    Client Name: Linda Walsh MRN#: [id number] YOB: 1960      Intake / Discharge Date: 9/18/17-11/28/17      DSM5 Diagnoses: (Sustained by DSM5 Criteria Listed Above)  Diagnoses: 296.33 (F33.2) Major Depressive Disorder, Recurrent Episode, Severe _  300.02 (F41.1) Generalized Anxiety Disorder  309.81 (F43.10) Posttraumatic Stress Disorder (includes Posttraumatic Stress Disorder for Children 6 Years and Younger)  Without dissociative symptoms  Psychosocial & Contextual Factors: Client has CRPS and other chronic health issues, she owns her own business, dysfunctional relationship with her   WHODAS 2.0 (12 item) Score: Unable to assess at discharge          Presenting Concern:  Client with severe functional depression.  Chronic suicidal ideation but plan and intent were not intense enough for her to act on the thoughts.  Chronic dysfunction between her and her  which would perpetuate her SI.  Client has significant history of complex trauma since childhood.  Client has chronic regional pain syndrome which makes her dependant on her  for support.  The disease has made it severely difficult for her to be her independent self and can leave her bed ridden for a day or so at a time.        Reason for Discharge:  Insurance: Client sought a provider who took her insurance.      Disposition at Time of Last Encounter:   Comments:   The writer called the client to check in as she hadn't come back in a few months.  Client stated she is now seeing a provider with [facility] whom the writer had recommended to her before.       Risk Management:   Client has had a history of suicidal ideation: intense impulses to hurt or kill herself.   A safety and risk management plan has been developed including: Referred client to: client stated she will be seeing the new therapist 2x a week      Referred To:  Susan Redd at [facility].         Nasrin Valladares, [facility]   11/28/2017

## 2017-11-28 NOTE — TELEPHONE ENCOUNTER
Reason for Call:  Form, our goal is to have forms completed with 72 hours, however, some forms may require a visit or additional information.    Type of letter, form or note:  medical    Who is the form from?: [facility] (if other please explain)    Where did the form come from: form was faxed in    What clinic location was the form placed at?: [facility] - [phone]    Where the form was placed: 's Box    What number is listed as a contact on the form?: [phone]       Additional comments: n/a    Call taken on 11/28/2017 at 12:23 PM by Neelam Erwin

## 2017-11-29 ENCOUNTER — TRANSFERRED RECORDS (OUTPATIENT)
Dept: HEALTH INFORMATION MANAGEMENT | Facility: CLINIC | Age: 57
End: 2017-11-29

## 2017-11-29 NOTE — TELEPHONE ENCOUNTER
Form has been faxed, sent to scanning and copy placed in Jonna Gaitan's fax folder  Closing encounter    Quita DIAZ (R)

## 2017-12-13 ENCOUNTER — HOSPITAL ENCOUNTER (OUTPATIENT)
Dept: PHYSICAL THERAPY | Facility: OTHER | Age: 57
Setting detail: THERAPIES SERIES
End: 2017-12-13
Attending: PHYSICIAN ASSISTANT
Payer: MEDICARE

## 2017-12-13 PROCEDURE — 97110 THERAPEUTIC EXERCISES: CPT | Mod: GP | Performed by: PHYSICAL THERAPIST

## 2017-12-13 PROCEDURE — 97530 THERAPEUTIC ACTIVITIES: CPT | Mod: GP,XU | Performed by: PHYSICAL THERAPIST

## 2017-12-13 PROCEDURE — 40000718 ZZHC STATISTIC PT DEPARTMENT ORTHO VISIT: Performed by: PHYSICAL THERAPIST

## 2017-12-13 PROCEDURE — 97140 MANUAL THERAPY 1/> REGIONS: CPT | Mod: GP | Performed by: PHYSICAL THERAPIST

## 2017-12-14 ENCOUNTER — TRANSFERRED RECORDS (OUTPATIENT)
Dept: HEALTH INFORMATION MANAGEMENT | Facility: CLINIC | Age: 57
End: 2017-12-14

## 2018-01-03 ENCOUNTER — TRANSFERRED RECORDS (OUTPATIENT)
Dept: HEALTH INFORMATION MANAGEMENT | Facility: CLINIC | Age: 58
End: 2018-01-03

## 2018-01-18 ENCOUNTER — TRANSFERRED RECORDS (OUTPATIENT)
Dept: HEALTH INFORMATION MANAGEMENT | Facility: CLINIC | Age: 58
End: 2018-01-18

## 2018-02-07 ENCOUNTER — TRANSFERRED RECORDS (OUTPATIENT)
Dept: HEALTH INFORMATION MANAGEMENT | Facility: CLINIC | Age: 58
End: 2018-02-07

## 2018-02-22 ENCOUNTER — TRANSFERRED RECORDS (OUTPATIENT)
Dept: HEALTH INFORMATION MANAGEMENT | Facility: CLINIC | Age: 58
End: 2018-02-22

## 2018-05-01 ENCOUNTER — VIRTUAL VISIT (OUTPATIENT)
Dept: FAMILY MEDICINE | Facility: OTHER | Age: 58
End: 2018-05-01
Payer: COMMERCIAL

## 2018-05-01 DIAGNOSIS — J01.00 ACUTE MAXILLARY SINUSITIS, RECURRENCE NOT SPECIFIED: ICD-10-CM

## 2018-05-01 DIAGNOSIS — J45.40 MODERATE PERSISTENT ASTHMA WITHOUT COMPLICATION: ICD-10-CM

## 2018-05-01 DIAGNOSIS — J30.2 ACUTE SEASONAL ALLERGIC RHINITIS, UNSPECIFIED TRIGGER: Primary | ICD-10-CM

## 2018-05-01 DIAGNOSIS — R11.0 NAUSEA: ICD-10-CM

## 2018-05-01 PROCEDURE — 98966 PH1 ASSMT&MGMT NQHP 5-10: CPT | Performed by: PHYSICIAN ASSISTANT

## 2018-05-01 RX ORDER — AZITHROMYCIN 250 MG/1
TABLET, FILM COATED ORAL
Qty: 6 TABLET | Refills: 0 | Status: SHIPPED | OUTPATIENT
Start: 2018-05-01 | End: 2018-08-21

## 2018-05-01 RX ORDER — FLUTICASONE PROPIONATE 50 MCG
1-2 SPRAY, SUSPENSION (ML) NASAL DAILY
Qty: 1 BOTTLE | Refills: 11 | Status: SHIPPED | OUTPATIENT
Start: 2018-05-01 | End: 2019-04-24

## 2018-05-01 RX ORDER — ONDANSETRON 4 MG/1
4-8 TABLET, ORALLY DISINTEGRATING ORAL
Qty: 20 TABLET | Refills: 1 | Status: SHIPPED | OUTPATIENT
Start: 2018-05-01 | End: 2018-08-21

## 2018-05-01 RX ORDER — ALBUTEROL SULFATE 90 UG/1
2 AEROSOL, METERED RESPIRATORY (INHALATION) EVERY 4 HOURS PRN
Qty: 1 INHALER | Refills: 3 | Status: SHIPPED | OUTPATIENT
Start: 2018-05-01 | End: 2019-04-24 | Stop reason: ALTCHOICE

## 2018-05-01 NOTE — PROGRESS NOTES
"Linda Walsh is a 57 year old female who is being evaluated via a telephone visit.      The patient has been notified of following:     \"This telephone visit will be conducted via a call between you and your physician/provider. We have found that certain health care needs can be provided without the need for a physical exam.  This service lets us provide the care you need with a short phone conversation.  If a prescription is necessary we can send it directly to your pharmacy.  If lab work is needed we can place an order for that and you can then stop by our lab to have the test done at a later time.    We will bill your insurance company for this service.  Please check with your medical insurance if this type of visit is covered. You may be responsible for the cost of this type of visit if insurance coverage is denied.  The typical cost is $30 (10min), $59 (11-20min) and $85 (21-30min).  Most often these visits are shorter than 10 minutes.    If during the course of the call the physician/provider feels a telephone visit is not appropriate, you will not be charged for this service.\"       Consent has been obtained for this service by care team member: yes.   See the scanned image in the medical record.    Linda Walsh complains of  Sinus Problem      I have reviewed and updated the patient's Past Medical History, Social History, Family History and Medication List.    ALLERGIES  Penicillins    Carmencita Vela CMA (AAMA)    (MA signature)    Additional provider notes: She has had 2-1/2 weeks of increasing sinus symptoms.  Initially she thought it may be allergies though it does not seem to be at this point.  Now she has intermittent low-grade temps, she started to become nauseated and vomit.  She has headaches facial pain pressure sore throat and chest congestion all typical symptoms when she has a sinus infection.  She has no diarrhea.  She is treating her discomfort with Tylenol alternating with ibuprofen.  She " has tried Nasacort but it made her feel wired.  She has done well with Flonase in the past.    Assessment/Plan:  (J45.40) Moderate persistent asthma without complication  Comment:   Plan: albuterol prn    Sinusitis-we will treat her with Zithromax and Flonase    Seasonal allergies-she may use Flonase year-round or seasonally as needed    Nausea-I have prescribed Zofran to be used as needed    Follow-up if she is having any worsening or changes of her symptoms    I have reviewed the note as documented above.  This accurately captures the substance of my conversation with the patient,  Electronically signed by Jonna Gaitan PA-C     Total time of call between patient and provider was 7 minutes

## 2018-05-01 NOTE — MR AVS SNAPSHOT
After Visit Summary   5/1/2018    Linda Walsh    MRN: [id number]           Patient Information     Date Of Birth          1960        Visit Information        Provider Department      5/1/2018 1:45 PM Jonna Gaitan PA-C [facility]        Today's Diagnoses     Acute seasonal allergic rhinitis, unspecified trigger    -  1    Moderate persistent asthma without complication        Acute maxillary sinusitis, recurrence not specified        Nausea           Follow-ups after your visit        Your next 10 appointments already scheduled     May 01, 2018  1:45 PM CDT   Telephone Visit with Jonna Gaitan PA-C   [facility]    [address] [address] [phone]           Note: this is not an onsite visit; there is no need to come to the facility.              Who to contact     If you have questions or need follow up information about today's clinic visit or your schedule please contact [facility] directly at [phone].  Normal or non-critical lab and imaging results will be communicated to you by beModelhart, letter or phone within 4 business days after the clinic has received the results. If you do not hear from us within 7 days, please contact the clinic through [employer] or phone. If you have a critical or abnormal lab result, we will notify you by phone as soon as possible.  Submit refill requests through [employer] or call your pharmacy and they will forward the refill request to us. Please allow 3 business days for your refill to be completed.          Additional Information About Your Visit        beModelhart Information     [employer] gives you secure access to your electronic health record. If you see a primary care provider, you can also send messages to your care team and make appointments. If you have questions, please call your primary care clinic.  If you do not have a primary care provider, please  call [phone] and they will assist you.        Care EveryWhere ID     This is your Care EveryWhere ID. This could be used by other organizations to access your [place] medical records  [id number]         Blood Pressure from Last 3 Encounters:   05/08/17 124/78   03/15/17 104/70   02/02/17 120/70    Weight from Last 3 Encounters:   05/08/17 166 lb 8 oz (75.5 kg)   03/15/17 164 lb 11.2 oz (74.7 kg)   02/02/17 165 lb (74.8 kg)              We Performed the Following     NONPHYSICIAN TELEPHONE ASSESSMENT 5-10 MIN          Today's Medication Changes          These changes are accurate as of 5/1/18 11:39 AM.  If you have any questions, ask your nurse or doctor.               Start taking these medicines.        Dose/Directions    azithromycin 250 MG tablet   Commonly known as:  ZITHROMAX   Used for:  Acute maxillary sinusitis, recurrence not specified        Two tablets first day, then one tablet daily for four days.   Quantity:  6 tablet   Refills:  0       fluticasone 50 MCG/ACT spray   Commonly known as:  FLONASE   Used for:  Acute maxillary sinusitis, recurrence not specified, Acute seasonal allergic rhinitis, unspecified trigger        Dose:  1-2 spray   Spray 1-2 sprays into both nostrils daily   Quantity:  1 Bottle   Refills:  11         These medicines have changed or have updated prescriptions.        Dose/Directions    * ondansetron 4 MG ODT tab   Commonly known as:  ZOFRAN-ODT   This may have changed:  Another medication with the same name was added. Make sure you understand how and when to take each.   Used for:  Nausea        DISSOLVE ONE TABLET BY MOUTH EVERY 8 HOURS AS NEEDED FOR NAUSEA   Quantity:  20 tablet   Refills:  1       * ondansetron 4 MG ODT tab   Commonly known as:  ZOFRAN ODT   This may have changed:  You were already taking a medication with the same name, and this prescription was added. Make sure you understand how and when to take each.   Used for:  Nausea        Dose:  4-8 mg   Take  1-2 tablets (4-8 mg) by mouth 3 times daily (before meals)   Quantity:  20 tablet   Refills:  1       * Notice:  This list has 2 medication(s) that are the same as other medications prescribed for you. Read the directions carefully, and ask your doctor or other care provider to review them with you.         Where to get your medicines      These medications were sent to [facility] - [address]   [address] [place] [address]     Phone:  [phone]     albuterol 108 (90 Base) MCG/ACT Inhaler    azithromycin 250 MG tablet    fluticasone 50 MCG/ACT spray    ondansetron 4 MG ODT tab                Primary Care Provider Office Phone # Fax #    Jonna Gaitan PA-C [phone] [address] [place] [address]        Equal Access to Services     [facility]: Hadii yasmine meier hadasho Soomaali, waaxda luqadaha, qaybta kaalmada adeegyada, frank angel haymarlene lorenzo . So [facility] [place] [phone].    ATENCIÓN: Si habla español, tiene a brandt disposición servicios gratuitos de asistencia lingüística. [facility] [phone].    We comply with applicable federal civil rights laws and [place] laws. We do not discriminate on the basis of race, color, national origin, age, disability, sex, sexual orientation, or gender identity.            Thank you!     Thank you for choosing [facility]  for your care. Our goal is always to provide you with excellent care. Hearing back from our patients is one way we can continue to improve our services. Please take a few minutes to complete the written survey that you may receive in the mail after your visit with us. Thank you!             Your Updated Medication List - Protect others around you: Learn how to safely use, store and throw away your medicines at www.disposemymeds.org.          This list is accurate as of 5/1/18 11:39 AM.  Always use your most recent med list.                   Brand Name  Dispense Instructions for use Diagnosis    albuterol 108 (90 Base) MCG/ACT Inhaler    PROAIR HFA    1 Inhaler    Inhale 2 puffs into the lungs every 4 hours as needed for shortness of breath / dyspnea    Moderate persistent asthma without complication       azithromycin 250 MG tablet    ZITHROMAX    6 tablet    Two tablets first day, then one tablet daily for four days.    Acute maxillary sinusitis, recurrence not specified       CELEBREX PO      Take 400 mg by mouth daily        DULoxetine 30 MG EC capsule    CYMBALTA    120 capsule    Take 2 capsules (60 mg) by mouth 2 times daily    Major depressive disorder, single episode, moderate (H)       estradiol 0.0375 MG/24HR Ptwk    CLIMARA    4 patch    Place 1 patch onto the skin once a week    Menopausal syndrome (hot flashes)       FentaNYL 37.5 MCG/HR Pt72      1 patch every 72 hours 25mcg/hr        fluticasone 50 MCG/ACT spray    FLONASE    1 Bottle    Spray 1-2 sprays into both nostrils daily    Acute maxillary sinusitis, recurrence not specified, Acute seasonal allergic rhinitis, unspecified trigger       gabapentin 300 MG capsule    NEURONTIN     Take 900 mg by mouth daily        ipratropium - albuterol 0.5 mg/2.5 mg/3 mL 0.5-2.5 (3) MG/3ML neb solution    DUONEB    1 Box    Take 1 vial (3 mLs) by nebulization every 6 hours as needed for shortness of breath / dyspnea    Moderate persistent asthma without complication       * ondansetron 4 MG ODT tab    ZOFRAN-ODT    20 tablet    DISSOLVE ONE TABLET BY MOUTH EVERY 8 HOURS AS NEEDED FOR NAUSEA    Nausea       * ondansetron 4 MG ODT tab    ZOFRAN ODT    20 tablet    Take 1-2 tablets (4-8 mg) by mouth 3 times daily (before meals)    Nausea       order for DME     1 Device    Equipment being ordered: Nebulizer    Asthma exacerbation       order for DME     1 Device    Equipment being ordered: home cervical traction unit    Cervicalgia       oxyCODONE IR 5 MG tablet    ROXICODONE    20 tablet    Take 1 tablet (5 mg)  by mouth every 6 hours as needed for pain        pantoprazole 40 MG EC tablet    PROTONIX    30 tablet    Take 1 tablet (40 mg) by mouth daily    Gastroesophageal reflux disease without esophagitis       polyethylene glycol powder    MIRALAX    3 Bottle    Take 51 g (3 capfuls) by mouth 2 times daily    Constipation, unspecified constipation type       traZODone 50 MG tablet    DESYREL    90 tablet    TAKE THREE TABLETS BY MOUTH AT BEDTIME    Insomnia, unspecified type       * Notice:  This list has 2 medication(s) that are the same as other medications prescribed for you. Read the directions carefully, and ask your doctor or other care provider to review them with you.

## 2018-05-08 ENCOUNTER — TELEPHONE (OUTPATIENT)
Dept: FAMILY MEDICINE | Facility: OTHER | Age: 58
End: 2018-05-08

## 2018-05-08 DIAGNOSIS — J45.40 MODERATE PERSISTENT ASTHMA WITHOUT COMPLICATION: ICD-10-CM

## 2018-05-08 RX ORDER — ALBUTEROL SULFATE 90 UG/1
2 AEROSOL, METERED RESPIRATORY (INHALATION) EVERY 6 HOURS PRN
Qty: 1 INHALER | Refills: 5 | Status: SHIPPED | OUTPATIENT
Start: 2018-05-08 | End: 2019-04-24

## 2018-05-08 NOTE — TELEPHONE ENCOUNTER
Per fax from [facility], a prior authorization is needed for ProAir HFA 90 MCG inhaler, or medication can be changed to Ventolin HFA which is on their formulary list  Please advise if you would like a PA started or change the medication  Thank you  Quita Vallejo RT (R)

## 2018-05-08 NOTE — TELEPHONE ENCOUNTER
Spoke with patient informed her of message below, patient states Ventolin actually works better for her any ways so she is fine with the change  Thanks  Quita Vallejo RT (R)

## 2018-06-04 ENCOUNTER — OFFICE VISIT (OUTPATIENT)
Dept: PSYCHOLOGY | Facility: CLINIC | Age: 58
End: 2018-06-04
Payer: COMMERCIAL

## 2018-06-04 DIAGNOSIS — F33.2 SEVERE RECURRENT MAJOR DEPRESSION WITHOUT PSYCHOTIC FEATURES (H): Primary | ICD-10-CM

## 2018-06-04 DIAGNOSIS — F43.10 POST TRAUMATIC STRESS DISORDER (PTSD): ICD-10-CM

## 2018-06-04 DIAGNOSIS — F41.1 GENERALIZED ANXIETY DISORDER: ICD-10-CM

## 2018-06-04 PROCEDURE — 90837 PSYTX W PT 60 MINUTES: CPT | Performed by: COUNSELOR

## 2018-06-04 ASSESSMENT — ANXIETY QUESTIONNAIRES
7. FEELING AFRAID AS IF SOMETHING AWFUL MIGHT HAPPEN: NEARLY EVERY DAY
1. FEELING NERVOUS, ANXIOUS, OR ON EDGE: NEARLY EVERY DAY
IF YOU CHECKED OFF ANY PROBLEMS ON THIS QUESTIONNAIRE, HOW DIFFICULT HAVE THESE PROBLEMS MADE IT FOR YOU TO DO YOUR WORK, TAKE CARE OF THINGS AT HOME, OR GET ALONG WITH OTHER PEOPLE: EXTREMELY DIFFICULT
5. BEING SO RESTLESS THAT IT IS HARD TO SIT STILL: NEARLY EVERY DAY
GAD7 TOTAL SCORE: 21
6. BECOMING EASILY ANNOYED OR IRRITABLE: NEARLY EVERY DAY
2. NOT BEING ABLE TO STOP OR CONTROL WORRYING: NEARLY EVERY DAY
3. WORRYING TOO MUCH ABOUT DIFFERENT THINGS: NEARLY EVERY DAY

## 2018-06-04 ASSESSMENT — PATIENT HEALTH QUESTIONNAIRE - PHQ9: 5. POOR APPETITE OR OVEREATING: NEARLY EVERY DAY

## 2018-06-04 NOTE — MR AVS SNAPSHOT
MRN:[id number]                      After Visit Summary   6/4/2018    Linda Walsh    MRN: [id number]           Visit Information        Provider Department      6/4/2018 12:30 PM English Nasrin Carin [facility] Generic      Your next 10 appointments already scheduled     Jun 11, 2018 12:30 PM CDT   Return Visit with Nasrin Valladares [facility] ([facility])    [address]  [facility] [address]   [phone]            Jun 18, 2018 12:30 PM CDT   Return Visit with Nasrin Valladares [facility] ([facility])    [address] [facility] [address] [phone]            Jun 25, 2018 12:30 PM CDT   Return Visit with Nasrin Valladares [facility]   [facility] ([facility])    [address] [facility] [address] [phone]              MyChart Information     [employer] gives you secure access to your electronic health record. If you see a primary care provider, you can also send messages to your care team and make appointments. If you have questions, please call your primary care clinic.  If you do not have a primary care provider, please call [phone] and they will assist you.        Care EveryWhere ID     This is your Care EveryWhere ID. This could be used by other organizations to access your [place] medical records  [id number]        Equal Access to Services     [facility]: Hadii yasmine geronimoo Soember, waaxda luqadaha, qaybta kaalmada adeegyada, waxay jeanne de la torre jaynachito marcos. So [facility] [phone].    ATENCIÓN: Si habla español, tiene a brandt disposición servicios gratuitos de asistencia lingüística. Llame al [phone].    We comply with applicable federal civil rights laws and [place] laws. We do not discriminate on the basis of race, color, national origin, age, disability, sex, sexual  orientation, or gender identity.

## 2018-06-05 ASSESSMENT — ANXIETY QUESTIONNAIRES: GAD7 TOTAL SCORE: 21

## 2018-06-05 ASSESSMENT — PATIENT HEALTH QUESTIONNAIRE - PHQ9: SUM OF ALL RESPONSES TO PHQ QUESTIONS 1-9: 22

## 2018-06-10 NOTE — PROGRESS NOTES
Progress Note    Client Name: Linda Walsh  Date: 6/4/18         Service Type: Individual      Session Start Time: 12:30pm  Session End Time: 1:30pm      Session Length: 60mins     Session #: 2     Attendees: Client attended alone    Treatment Plan Last Reviewed: Will create next session  PHQ-9 / MARLIN-7 : 22//21     DATA      Progress Since Last Session (Related to Symptoms / Goals / Homework):   Symptoms: Worsening    Homework: Did not complete      Episode of Care Goals: No improvement - CONTEMPLATION (Considering change and yet undecided); Intervened by assessing the negative and positive thinking (ambivalence) about behavior change     Current / Ongoing Stressors and Concerns:   The client is transferring from her previous clinic. She isn't sure if she is due for a renewed DA. She stated she's had a roller coaster of the past several months. She stated she was just recently in the hospital for suicidal ideation with intention. She stated that was because of a lot of the same stressors as before. She stated she and her  continue not to get along and she continues to be very unhappy. Her chronic illness isn't improving at all and she is continuously needing more help from her  and he doesn't help. 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  Her  went back to doing electrical work. She is no longer on medicare and that is why she is returning to receive services from [place].      Treatment Objective(s) Addressed in This Session:   use thought-stopping strategy daily to reduce intrusive thoughts  Increase interest, engagement, and pleasure in doing things  Decrease frequency and intensity of feeling down, depressed, hopeless       Intervention:   Processed through some of the recent events. Reviewed previous safety plan client had with her last therapist.   "        ASSESSMENT: Current Emotional / Mental Status (status of significant symptoms):   Risk status (Self / Other harm or suicidal ideation)   Client denies current fears or concerns for personal safety.   Client reports the following current or recent suicidal ideation or behaviors: client \"always\" has SI. She will think about driving her car into something. She denies intention and consents to safety. .   Client denies current or recent homicidal ideation or behaviors.   Client denies current or recent self injurious behavior or ideation.   Client denies other safety concerns.   A safety and risk management plan has been developed including: Client consented to co-developed safety plan, which includes see safety plan in Pt. Instructions.     Appearance:   Appropriate    Eye Contact:   Good    Psychomotor Behavior: Normal    Attitude:   Cooperative  Guarded    Orientation:   All   Speech    Rate / Production: Normal     Volume:  Normal    Mood:    Anxious  Depressed    Affect:    Constricted    Thought Content:  Clear    Thought Form:  Coherent  Logical  Tangential    Insight:    Fair      Medication Review:   No changes to current psychiatric medication(s)     Medication Compliance:   Yes     Changes in Health Issues:   Yes: Pain, Associated Psychological Distress     Chemical Use Review:   Substance Use: Chemical use reviewed, no active concerns identified      Tobacco Use: No current tobacco use.       Collateral Reports Completed:   Not Applicable    PLAN: (Client Tasks / Therapist Tasks / Other)   Practice mindfulness and follow safety plan.                                                           [facility]res [facility]    "

## 2018-06-11 ENCOUNTER — OFFICE VISIT (OUTPATIENT)
Dept: PSYCHOLOGY | Facility: CLINIC | Age: 58
End: 2018-06-11
Payer: COMMERCIAL

## 2018-06-11 DIAGNOSIS — F33.2 SEVERE RECURRENT MAJOR DEPRESSION WITHOUT PSYCHOTIC FEATURES (H): Primary | ICD-10-CM

## 2018-06-11 DIAGNOSIS — F41.1 GENERALIZED ANXIETY DISORDER: ICD-10-CM

## 2018-06-11 DIAGNOSIS — F43.10 POST TRAUMATIC STRESS DISORDER (PTSD): ICD-10-CM

## 2018-06-11 PROCEDURE — 90837 PSYTX W PT 60 MINUTES: CPT | Performed by: COUNSELOR

## 2018-06-11 NOTE — MR AVS SNAPSHOT
MRN:[id number]                      After Visit Summary   6/11/2018    Linda Walsh    MRN: [id number]           Visit Information        Provider Department      6/11/2018 12:30 PM Nasrin Valladares LPC [facility] Generic      Your next 10 appointments already scheduled     Jun 18, 2018 12:30 PM CDT   Return Visit with Nasrin Valladares LPC   [facility] ([facility])    [address]  [facility] [address]   [phone]            Jun 25, 2018 12:30 PM CDT   Return Visit with Nasrin Valladares LPC   [facility] ([facility])    [address]  [facility] [address]   [phone]            Jul 25, 2018  2:00 PM CDT   Return Visit with Nasrin Valladares LPC   [facility] [place] ([facility] [place])    [address]  [place] [address]   [phone]            Jul 30, 2018 12:30 PM CDT   Return Visit with Nasrin Valladares LPC   [facility] ([facility])    [address]  [facility] [address]   [phone]            Aug 07, 2018  1:00 PM CDT   Return Visit with Nasrin Valladares LPC   [facility] [place] ([facility] [place])    [address]  [place] [address]   [phone]            Aug 14, 2018  1:00 PM CDT   Return Visit with Nasrin Valladares LPC   [facility] [place] ([facility] [place])    [address]  [place] [address]   [phone]            Aug 21, 2018  1:00 PM CDT   Return Visit with Nasrin Valladares LPC   [facility] [place] ([facility] [place])    [address]  [place] [address]   [phone]            Aug 28, 2018  1:00 PM CDT   Return Visit with Nasrin Valladares LPC   [facility] [place] ([facility] [place])    [address]  [facility] [address]   [phone]              MyChart Information     [employer] gives you secure access to your electronic health record. If you see a primary care provider, you can also send messages to your care team and make appointments. If you have questions, please call your primary care clinic.  If you do not have a primary care provider, please call [phone] and they will assist you.        Care EveryWhere ID     This is your Care EveryWhere ID. This could be used by other organizations to access your [facility] medical records  [id number]        Equal Access to Services     KARLOS GAR : Geovanna Carranza, selena gómez, frank subramanian. So [facility] [phone].    ATENCIÓN: Si habla español, tiene a brandt disposición servicios gratuitos de asistencia lingüística. Raya al [phone].    We comply with applicable federal civil rights laws and [place] laws. We do not discriminate on the basis of race, color, national origin, age, disability, sex, sexual orientation, or gender identity.
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
## 2018-06-12 ENCOUNTER — TELEPHONE (OUTPATIENT)
Dept: PSYCHOLOGY | Facility: CLINIC | Age: 58
End: 2018-06-12

## 2018-06-21 NOTE — ADDENDUM NOTE
Encounter addended by: Sophy Berger, PT on: 6/21/2018  8:57 AM<BR>     Actions taken: Episode resolved

## 2018-06-21 NOTE — PROGRESS NOTES
Outpatient Physical Therapy Discharge Note     Patient: Linda Walsh  : 1960    Beginning/End Dates of Reporting Period:   to 2017  Mary has been seen for a total of 11 visits overall.    Referring Provider: Jonna Gaitan PA-C    Therapy Diagnosis: Mechanical cervical pain, radiating headaches and upper extremity pain.      Client Self Report: Had injections last week. She feels like her movement is better but has had increased arm symptoms. She thinks her hands feel better. She got a home cervical traction unit and has been using that during the day. At today's visit she reports left cervical pain, that travels down her upper trap. to the mid left forarm and is a 9/10.     Objective Measurements:  Objective Measure: NDI  Details: 64%    Objective Measure: Frequency of PREP  Details: 10-15 x a day    Objective Measure: Effect of PREP  Details: Consistently is able to get rid of her headaches.     Treatment: Directional preference, unloading, manual therapy, perception and reaction.     Goals:  Goal Identifier Pain   Goal Description Mary will use strategies learned in P.T. to decrease pain levels by 25% most days of the week   Target Date 17   Date Met      Progress:     Goal Identifier Numbness   Goal Description Mary will use strategies learned in P.T. to reduce the amount of numbness to the hands by 50% allowing for a more restful night's sleep.    Target Date 17   Date Met      Progress:     Goal Identifier Function   Goal Description Mary will improve her NDI by 30% indicating overall functional improvement.    Target Date 17   Date Met      Progress:     Progress Toward Goals:   Progress this reporting period: Short term relief of symptoms, not able to get long term relief.     Plan:  Discharge from therapy.    Discharge: Yes.    Reason for Discharge: No further expectation of progress.    Equipment Issued: None    Discharge Plan: Patient to continue home program to  help manage symptoms.     Thank you for this referral.    Sophy Berger P.T.

## 2018-06-25 ENCOUNTER — OFFICE VISIT (OUTPATIENT)
Dept: PSYCHOLOGY | Facility: CLINIC | Age: 58
End: 2018-06-25
Payer: COMMERCIAL

## 2018-06-25 DIAGNOSIS — F43.10 POST TRAUMATIC STRESS DISORDER (PTSD): ICD-10-CM

## 2018-06-25 DIAGNOSIS — F33.2 SEVERE RECURRENT MAJOR DEPRESSION WITHOUT PSYCHOTIC FEATURES (H): Primary | ICD-10-CM

## 2018-06-25 DIAGNOSIS — F41.1 GENERALIZED ANXIETY DISORDER: ICD-10-CM

## 2018-06-25 PROCEDURE — 90837 PSYTX W PT 60 MINUTES: CPT | Performed by: COUNSELOR
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
## 2018-06-25 NOTE — TELEPHONE ENCOUNTER
"Client called stating she was extremely stressed out by her  and his actions he is taking with their company. She stated she feels like she \"just can't take it anymore\" but was not suicidal. The writer and client came up with the plan for the client to do some self care (tanning) then readdress the issues with some safe people on her team. Then to address the issues with her  and have some people with her to help keep her grounded and diffuse the situation if needed.  "
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
## 2018-07-31 DIAGNOSIS — N95.1 MENOPAUSAL SYNDROME (HOT FLASHES): ICD-10-CM

## 2018-08-01 RX ORDER — ESTRADIOL 0.04 MG/D
PATCH TRANSDERMAL
Qty: 4 PATCH | Refills: 0 | Status: SHIPPED | OUTPATIENT
Start: 2018-08-01 | End: 2018-08-21

## 2018-08-01 NOTE — TELEPHONE ENCOUNTER
Left message for patient to return call to clinic.  Please inform of message below and help schedule appointment.    Sherry Mahan CMA  August 1, 2018

## 2018-08-01 NOTE — TELEPHONE ENCOUNTER
"Requested Prescriptions   Pending Prescriptions Disp Refills     estradiol 0.0375 MG/24HR PTWK [Pharmacy Med Name: ESTRADIOL 0.0375MG/24HR PTWK]  11     Sig: APPLY 1 PATCH ONTO THE SKIN ONCE A WEEK    Hormone Replacement Therapy Failed    7/31/2018 12:48 PM       Failed - Blood pressure under 140/90 in past 12 months    BP Readings from Last 3 Encounters:   05/08/17 124/78   03/15/17 104/70   02/02/17 120/70                Passed - Recent (12 mo) or future (30 days) visit within the authorizing provider's specialty    Patient had office visit in the last 12 months or has a visit in the next 30 days with authorizing provider or within the authorizing provider's specialty.  See \"Patient Info\" tab in inbasket, or \"Choose Columns\" in Meds & Orders section of the refill encounter.           Passed - Patient has mammogram in past 2 years on file if age 50-75       Passed - Patient is 18 years of age or older       Passed - No active pregnancy on record       Passed - No positive pregnancy test on record in past 12 months        Last ov 05/08/2017  Last fill 06/20/2017    Medication is being filled for 1 time refill only due to:  Patient needs to be seen because it has been more than one year since last visit.     Please call and help schedule.  Jeremias Díaz, RN, BSN            "

## 2018-08-02 NOTE — TELEPHONE ENCOUNTER
I informed patient of the message below.  No further questions at this time.  She will call back to schedule.  Amilcar Park, CMA

## 2018-08-14 ENCOUNTER — OFFICE VISIT (OUTPATIENT)
Dept: PSYCHOLOGY | Facility: CLINIC | Age: 58
End: 2018-08-14
Payer: COMMERCIAL

## 2018-08-14 DIAGNOSIS — F43.10 POST TRAUMATIC STRESS DISORDER (PTSD): ICD-10-CM

## 2018-08-14 DIAGNOSIS — F41.1 GENERALIZED ANXIETY DISORDER: ICD-10-CM

## 2018-08-14 DIAGNOSIS — F33.2 SEVERE RECURRENT MAJOR DEPRESSION WITHOUT PSYCHOTIC FEATURES (H): Primary | ICD-10-CM

## 2018-08-14 PROCEDURE — 90837 PSYTX W PT 60 MINUTES: CPT | Performed by: COUNSELOR

## 2018-08-14 ASSESSMENT — ANXIETY QUESTIONNAIRES
7. FEELING AFRAID AS IF SOMETHING AWFUL MIGHT HAPPEN: NEARLY EVERY DAY
5. BEING SO RESTLESS THAT IT IS HARD TO SIT STILL: MORE THAN HALF THE DAYS
2. NOT BEING ABLE TO STOP OR CONTROL WORRYING: NEARLY EVERY DAY
6. BECOMING EASILY ANNOYED OR IRRITABLE: NEARLY EVERY DAY
GAD7 TOTAL SCORE: 20
4. TROUBLE RELAXING: NEARLY EVERY DAY
3. WORRYING TOO MUCH ABOUT DIFFERENT THINGS: NEARLY EVERY DAY
GAD7 TOTAL SCORE: 20
7. FEELING AFRAID AS IF SOMETHING AWFUL MIGHT HAPPEN: NEARLY EVERY DAY
GAD7 TOTAL SCORE: 20
1. FEELING NERVOUS, ANXIOUS, OR ON EDGE: NEARLY EVERY DAY

## 2018-08-14 ASSESSMENT — PATIENT HEALTH QUESTIONNAIRE - PHQ9
10. IF YOU CHECKED OFF ANY PROBLEMS, HOW DIFFICULT HAVE THESE PROBLEMS MADE IT FOR YOU TO DO YOUR WORK, TAKE CARE OF THINGS AT HOME, OR GET ALONG WITH OTHER PEOPLE: EXTREMELY DIFFICULT
SUM OF ALL RESPONSES TO PHQ QUESTIONS 1-9: 23
SUM OF ALL RESPONSES TO PHQ QUESTIONS 1-9: 23

## 2018-08-14 NOTE — MR AVS SNAPSHOT
MRN:[id number]                      After Visit Summary   8/14/2018    Linda Walsh    MRN: [id number]           Visit Information        Provider Department      8/14/2018 1:00 PM Nasrin Valladares LPC [facility] [place] [facility]      Your next 10 appointments already scheduled     Aug 21, 2018 11:00 AM CDT   Office Visit with Jonna Gaitan PA-C   [facility] ([facility])    [address]  [facility] [address] [phone]           Bring a current list of meds and any records pertaining to this visit. For Physicals, please bring immunization records and any forms needing to be filled out. Please arrive 10 minutes early to complete paperwork.            Aug 21, 2018  1:00 PM CDT   Return Visit with Nasrin Valladares LPC   [facility] [place] ([facility] [place])    [address]  [place] [address]   [phone]            Aug 28, 2018  1:00 PM CDT   Return Visit with Nasrin Valladares LPC   [facility] [place] ([facility] [place])    [address]  [place] [address]   [phone]            Sep 05, 2018  1:00 PM CDT   Return Visit with Nasrin Valladares LPC   [facility] [place] ([facility] [place])    [address]  [place] [address]   [phone]            Sep 12, 2018 12:00 PM CDT   Return Visit with Nasrin Valladares LPC   [facility] [place] ([facility] [place])    [address]  [place] [address]   [phone]            Sep 19, 2018  1:00 PM CDT   Return Visit with Nasrin Valladares LPC   [facility] [place] ([facility] [place])    [address]  [place] [address]   [phone]            Sep 26, 2018 12:00 PM CDT   Return Visit with Nasrin Valladares LPC   [facility] [place] ([facility] [place])    [address]  [facility] [address]   [phone]            Oct 02, 2018  2:00 PM CDT   Return Visit with Nasrin Valladares LPC   [facility] ([facility])    [address]  [facility] [address]   [phone]            Oct 16, 2018  2:00 PM CDT   Return Visit with Nasrin Valladares LPC   [facility] ([facility])    [address]  [facility] [address]   [phone]            Oct 23, 2018  2:00 PM CDT   Return Visit with Nasrin Valladares LPC   [facility] ([facility])    [address] [facility] [address]  [facility] [address]   [phone]              MyChart Information     [employer] gives you secure access to your electronic health record. If you see a primary care provider, you can also send messages to your care team and make appointments. If you have questions, please call your primary care clinic.  If you do not have a primary care provider, please call [phone] and they will assist you.        Care EveryWhere ID     This is your Care EveryWhere ID. This could be used by other organizations to access your [place] medical records  [id number]        Equal Access to Services     KARLOS GAR AH: Geovanna keen Soember, waaxda luqadaha, qaybta kaalmada fanta, frank marcos. So [facility] [phone].    ATENCIÓN: Si habla español, tiene a brandt disposición servicios gratuitos de asistencia lingüística. Llame al [phone].    We comply with applicable federal civil rights laws and [place] laws. We do not discriminate on the basis of race, color, national origin, age, disability, sex, sexual orientation, or gender identity.
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
## 2018-08-15 ASSESSMENT — ANXIETY QUESTIONNAIRES: GAD7 TOTAL SCORE: 20

## 2018-08-15 ASSESSMENT — PATIENT HEALTH QUESTIONNAIRE - PHQ9: SUM OF ALL RESPONSES TO PHQ QUESTIONS 1-9: 23

## 2018-08-15 NOTE — PROGRESS NOTES
SUBJECTIVE:   Linda Walsh is a 58 year old female who presents to clinic today for the following health issues:    The ASCVD Risk score ([place] JOEY Jr, et al., 2013) failed to calculate for the following reasons:    Cannot find a previous HDL lab    Cannot find a previous total cholesterol lab  Patient is eligible for use of low-dose aspirin for primary prevention of heart attack and stroke.  Provider has discussed aspirin with patient and our decision was:     Prescribe:  Daily low-dose aspirin recommended for primary prevention, patient agrees with plan.        History of Present Illness     Asthma:     Cough::  No    Wheezing::  No    Dyspnea::  No    Use of rescue inhaler::  Twice a month    Taking Asthma medication as prescribed::  Yes    Asthma triggers::  Animal dander, Humidity and Pollens    ER/UC Visits or Admissions::  None    Depression & Anxiety Follow-up:     Depression/Anxiety:  Depression & Anxiety    Status since last visit::  Stable    Other associated symptoms of depression and anxiety::  None    Significant life event::  No    Current substance use::  Prescription Drugs       Today's PHQ-9         PHQ-9 Total Score:     (P) 21   PHQ-9 Q9 Suicidal ideation:   (P) More than half the days   Thoughts of suicide or self harm:      Self-harm Plan:        Self-harm Action:          Safety concerns for self or others:            Migraines:     Headache Symptoms are:  Worsening    Migraine frequency::  3 per week    Migraine Duration::  2 days    Ability to perform ADL's::  Yes    Migraine Rescue/Relief Medications::  Ibuprofen (Advil, Motrin)    Effectiveness of rescue/relief medications::  Minor relief    Migraine Preventative Medications::  None    Neurological symptoms::  Weakness    ER or UC Visits::  None    Diet:  Regular (no restrictions)  Frequency of exercise:  None  Taking medications regularly:  Yes  Medication side effects:  Not applicable  Additional concerns today:  No (She can tell when  the patch is on too long because it becomes less effective and she starts to have sweating and mood instability.  Both of these issues resolved when she has a fresh patch on)   estradiol is helping but can tell when she has had patch on too long        Problem list and histories reviewed & adjusted, as indicated.  Additional history: as documented    [facility] her pain clinic is managing her anxiety and pain meds at this time.  She has an appointment tomorrow she is considering doing more medical marijuana.  She tried this for very short time and did not find benefit but no she did not give it a long enough opportunity to work.  She continues to see Nasrin in counseling she is seeing her for many years her depression is well controlled but anxiety is still high.    She continues to have headaches, she believes these are stemmed from her neck pain which is worsened by her large breasts.  She has a grooving in her shoulders and is considering breast reduction to improve her neck pain and headaches.    BP Readings from Last 3 Encounters:   08/21/18 116/64   05/08/17 124/78   03/15/17 104/70    Wt Readings from Last 3 Encounters:   08/21/18 163 lb (73.9 kg)   05/08/17 166 lb 8 oz (75.5 kg)   03/15/17 164 lb 11.2 oz (74.7 kg)                  Labs reviewed in EPIC    ROS:  CONSTITUTIONAL: NEGATIVE for fever, chills, change in weight  ENT/MOUTH: NEGATIVE for ear, mouth and throat problems  RESP: NEGATIVE for significant cough or SOB  CV: NEGATIVE for chest pain, palpitations or peripheral edema  GI: Patient reports her irritable bowel syndrome and constipation are much better now that she is using MiraLAX has dietary changes and has infrequent use of Linzess.  MUSCULOSKELETAL: Chronic right foot pain due to reflex sympathetic dystrophy of left lower extremity  NEURO: Neck pain and headache  PSYCHIATRIC: Anxiety and depression managed by [facility] and counseling    OBJECTIVE:     /64  Pulse 74  Temp 97.9  F (36.6  C)  "(Temporal)  Resp 12  Ht 5' 4.57\" (1.64 m)  Wt 163 lb (73.9 kg)  LMP  (LMP Unknown)  SpO2 97%  BMI 27.49 kg/m2  Body mass index is 27.49 kg/(m^2).  GENERAL: healthy, alert and no distress  NECK: no adenopathy, no asymmetry, masses, or scars and thyroid normal to palpation  RESP: lungs clear to auscultation - no rales, rhonchi or wheezes  BREAST: Palpable grooves in her shoulders from her bra strap  CV: regular rate and rhythm, normal S1 S2, no S3 or S4, no murmur, click or rub, no peripheral edema and peripheral pulses strong  ABDOMEN: soft, nontender, no hepatosplenomegaly, no masses and bowel sounds normal  MS: Wearing a walking boot on left foot  PSYCH: mentation appears normal, affect normal/bright    Diagnostic Test Results:  none     ASSESSMENT/PLAN:         1. Major depressive disorder, single episode, moderate (H)  Managed by [facility]  - **Comprehensive metabolic panel FUTURE anytime; Future    2. Menopausal syndrome (hot flashes)  Mammogram has been scheduled  - estradiol 0.0375 MG/24HR PTWK; APPLY 1 PATCH ONTO THE SKIN ONCE A WEEK  Dispense: 12 patch; Refill: 11  - **Comprehensive metabolic panel FUTURE anytime; Future    3. Insomnia, unspecified type    - **Comprehensive metabolic panel FUTURE anytime; Future    4. Gastroesophageal reflux disease without esophagitis    - **Comprehensive metabolic panel FUTURE anytime; Future    5. Screen for colon cancer  Due to the adenomatous polyp history, colonoscopy ordered  - GASTROENTEROLOGY ADULT REF PROCEDURE ONLY [facility] [phone]; [place] Provider    6. CARDIOVASCULAR SCREENING; LDL GOAL LESS THAN 160    - Lipid panel reflex to direct LDL Fasting; Future    7. Encounter for screening mammogram for breast cancer    - *MA Screening Digital Bilateral; Future    8. Chronic nonintractable headache, unspecified headache type  Patient has requested physical therapy, it is likely that her large breasts are contributing to her neck pain as well as to " her chronic headaches  - PHYSICAL THERAPY REFERRAL    9. Cervicalgia    - PHYSICAL THERAPY REFERRAL    10. Large breasts  As above,      Recheck one year sooner as needed    Jonna Gaitan PA-C  [facility]  Answers for HPI/ROS submitted by the patient on 8/21/2018   PHQ-2 Score: 6  If you checked off any problems, how difficult have these problems made it for you to do your work, take care of things at home, or get along with other people?: Extremely difficult  PHQ9 TOTAL SCORE: 21  Electronically signed by Jonna Gaitan PA-C

## 2018-08-21 ENCOUNTER — OFFICE VISIT (OUTPATIENT)
Dept: FAMILY MEDICINE | Facility: OTHER | Age: 58
End: 2018-08-21
Payer: COMMERCIAL

## 2018-08-21 ENCOUNTER — OFFICE VISIT (OUTPATIENT)
Dept: PSYCHOLOGY | Facility: CLINIC | Age: 58
End: 2018-08-21
Payer: COMMERCIAL

## 2018-08-21 VITALS
HEART RATE: 74 BPM | HEIGHT: 65 IN | BODY MASS INDEX: 27.16 KG/M2 | RESPIRATION RATE: 12 BRPM | SYSTOLIC BLOOD PRESSURE: 116 MMHG | WEIGHT: 163 LBS | DIASTOLIC BLOOD PRESSURE: 64 MMHG | TEMPERATURE: 97.9 F | OXYGEN SATURATION: 97 %

## 2018-08-21 DIAGNOSIS — Z13.6 CARDIOVASCULAR SCREENING; LDL GOAL LESS THAN 160: Primary | ICD-10-CM

## 2018-08-21 DIAGNOSIS — Z12.11 SCREEN FOR COLON CANCER: ICD-10-CM

## 2018-08-21 DIAGNOSIS — N62 LARGE BREASTS: ICD-10-CM

## 2018-08-21 DIAGNOSIS — F32.1 MAJOR DEPRESSIVE DISORDER, SINGLE EPISODE, MODERATE (H): ICD-10-CM

## 2018-08-21 DIAGNOSIS — G47.00 INSOMNIA, UNSPECIFIED TYPE: ICD-10-CM

## 2018-08-21 DIAGNOSIS — M54.2 CERVICALGIA: ICD-10-CM

## 2018-08-21 DIAGNOSIS — F33.2 SEVERE RECURRENT MAJOR DEPRESSION WITHOUT PSYCHOTIC FEATURES (H): Primary | ICD-10-CM

## 2018-08-21 DIAGNOSIS — G89.29 CHRONIC NONINTRACTABLE HEADACHE, UNSPECIFIED HEADACHE TYPE: ICD-10-CM

## 2018-08-21 DIAGNOSIS — K21.9 GASTROESOPHAGEAL REFLUX DISEASE WITHOUT ESOPHAGITIS: ICD-10-CM

## 2018-08-21 DIAGNOSIS — R51.9 CHRONIC NONINTRACTABLE HEADACHE, UNSPECIFIED HEADACHE TYPE: ICD-10-CM

## 2018-08-21 DIAGNOSIS — N95.1 MENOPAUSAL SYNDROME (HOT FLASHES): ICD-10-CM

## 2018-08-21 DIAGNOSIS — F43.10 POST TRAUMATIC STRESS DISORDER (PTSD): ICD-10-CM

## 2018-08-21 DIAGNOSIS — F41.1 GENERALIZED ANXIETY DISORDER: ICD-10-CM

## 2018-08-21 DIAGNOSIS — Z12.31 ENCOUNTER FOR SCREENING MAMMOGRAM FOR BREAST CANCER: ICD-10-CM

## 2018-08-21 PROCEDURE — 99214 OFFICE O/P EST MOD 30 MIN: CPT | Performed by: PHYSICIAN ASSISTANT

## 2018-08-21 PROCEDURE — 90837 PSYTX W PT 60 MINUTES: CPT | Performed by: COUNSELOR

## 2018-08-21 RX ORDER — ESTRADIOL 0.04 MG/D
PATCH TRANSDERMAL
Qty: 12 PATCH | Refills: 11 | Status: SHIPPED | OUTPATIENT
Start: 2018-08-21 | End: 2019-09-04

## 2018-08-21 ASSESSMENT — PATIENT HEALTH QUESTIONNAIRE - PHQ9
10. IF YOU CHECKED OFF ANY PROBLEMS, HOW DIFFICULT HAVE THESE PROBLEMS MADE IT FOR YOU TO DO YOUR WORK, TAKE CARE OF THINGS AT HOME, OR GET ALONG WITH OTHER PEOPLE: EXTREMELY DIFFICULT
SUM OF ALL RESPONSES TO PHQ QUESTIONS 1-9: 21
SUM OF ALL RESPONSES TO PHQ QUESTIONS 1-9: 21

## 2018-08-21 ASSESSMENT — PAIN SCALES - GENERAL: PAINLEVEL: EXTREME PAIN (8)

## 2018-08-21 NOTE — LETTER
My Depression Action Plan  Name: Linda Walsh   Date of Birth 1960  Date: 8/15/2018    My doctor: Jonna Gaitan   My clinic: [address] [address] [phone]          GREEN    ZONE   Good Control    What it looks like:     Things are going generally well. You have normal up s and down s. You may even feel depressed from time to time, but bad moods usually last less than a day.   What you need to do:  1. Continue to care for yourself (see self care plan)  2. Check your depression survival kit and update it as needed  3. Follow your physician s recommendations including any medication.  4. Do not stop taking medication unless you consult with your physician first.           YELLOW         ZONE Getting Worse    What it looks like:     Depression is starting to interfere with your life.     It may be hard to get out of bed; you may be starting to isolate yourself from others.    Symptoms of depression are starting to last most all day and this has happened for several days.     You may have suicidal thoughts but they are not constant.   What you need to do:     1. Call your care team, your response to treatment will improve if you keep your care team informed of your progress. Yellow periods are signs an adjustment may need to be made.     2. Continue your self-care, even if you have to fake it!    3. Talk to someone in your support network    4. Open up your depression survival kit           RED    ZONE Medical Alert - Get Help    What it looks like:     Depression is seriously interfering with your life.     You may experience these or other symptoms: You can t get out of bed most days, can t work or engage in other necessary activities, you have trouble taking care of basic hygiene, or basic responsibilities, thoughts of suicide or death that will not go away, self-injurious behavior.     What you need to do:  1. Call your care team and  request a same-day appointment. If they are not available (weekends or after hours) call your local crisis line, emergency room or 911.            Depression Self Care Plan / Survival Kit    Self-Care for Depression  Here s the deal. Your body and mind are really not as separate as most people think.  What you do and think affects how you feel and how you feel influences what you do and think. This means if you do things that people who feel good do, it will help you feel better.  Sometimes this is all it takes.  There is also a place for medication and therapy depending on how severe your depression is, so be sure to consult with your medical provider and/ or Behavioral Health Consultant if your symptoms are worsening or not improving.     In order to better manage my stress, I will:    Exercise  Get some form of exercise, every day. This will help reduce pain and release endorphins, the  feel good  chemicals in your brain. This is almost as good as taking antidepressants!  This is not the same as joining a gym and then never going! (they count on that by the way ) It can be as simple as just going for a walk or doing some gardening, anything that will get you moving.      Hygiene   Maintain good hygiene (Get out of bed in the morning, Make your bed, Brush your teeth, Take a shower, and Get dressed like you were going to work, even if you are unemployed).  If your clothes don't fit try to get ones that do.    Diet  I will strive to eat foods that are good for me, drink plenty of water, and avoid excessive sugar, caffeine, alcohol, and other mood-altering substances.  Some foods that are helpful in depression are: complex carbohydrates, B vitamins, flaxseed, fish or fish oil, fresh fruits and vegetables.    Psychotherapy  I agree to participate in Individual Therapy (if recommended).    Medication  If prescribed medications, I agree to take them.  Missing doses can result in serious side effects.  I understand that  drinking alcohol, or other illicit drug use, may cause potential side effects.  I will not stop my medication abruptly without first discussing it with my provider.    Staying Connected With Others  I will stay in touch with my friends, family members, and my primary care provider/team.    Use your imagination  Be creative.  We all have a creative side; it doesn t matter if it s oil painting, sand castles, or mud pies! This will also kick up the endorphins.    Witness Beauty  (AKA stop and smell the roses) Take a look outside, even in mid-winter. Notice colors, textures. Watch the squirrels and birds.     Service to others  Be of service to others.  There is always someone else in need.  By helping others we can  get out of ourselves  and remember the really important things.  This also provides opportunities for practicing all the other parts of the program.    Humor  Laugh and be silly!  Adjust your TV habits for less news and crime-drama and more comedy.    Control your stress  Try breathing deep, massage therapy, biofeedback, and meditation. Find time to relax each day.     My support system    Clinic Contact:  Phone number:    Contact 1:  Phone number:    Contact 2:  Phone number:    [employer]ab[employer]ist/:  Phone number:    Therapist:  Phone number:    Local crisis center:    Phone number:    Other community support:  Phone number:

## 2018-08-21 NOTE — MR AVS SNAPSHOT
"                  MRN:[id number]                      After Visit Summary   8/21/2018    Linda Walsh    MRN: [id number]           Visit Information        Provider Department      8/21/2018 1:00 PM Nasrin Valladares LPC [facility] Generic      Your next 10 appointments already scheduled     Aug 28, 2018  1:00 PM CDT   Return Visit with Nasrin Valladares LPC   [facility] ([facility])    [address]  [facility] [address]   [phone]            Sep 05, 2018  1:00 PM CDT   Return Visit with Nasrin Valladares LPC   [facility] ([facility])    [address]  [facility] [address]   [phone]            Sep 06, 2018  2:45 PM CDT   Ortho Eval with Sophy Berger, PT   [facility] ([facility])    [address] [facility] [id number] [phone]            Sep 10, 2018  1:30 PM CDT   (Arrive by 1:15 PM)   MA SCREENING DIGITAL BILATERAL with PHMA1   [facility] ([facility])    [address] [facility] [address] [phone]           Do not use any powder, lotion or deodorant under your arms or on your breast. If you do, we will ask you to remove it before your exam.  Wear comfortable, two-piece clothing.  If you have any allergies, tell your care team.  Bring any previous mammograms from other facilities or have them mailed to the breast center. Three-dimensional (3D) mammograms are available at [place] locations in [facility], [place], [place], [facility], [place], [place], and [place]. [facility] locations include [facility] in [place]. Benefits of 3D mammograms include: - Improved rate of cancer detection - Decreases your chance of having to go back for more tests, which means fewer: - \"False-positive\" results (This means that there is an " abnormal area but it isn't cancer.) - Invasive testing procedures, such as a biopsy or surgery - Can provide clearer images of the breast if you have dense breast tissue. 3D mammography is an optional exam that anyone can have with a 2D mammogram. It doesn't replace or take the place of a 2D mammogram. 2D mammograms remain an effective screening test for all women.  Not all insurance companies cover the cost of a 3D mammogram. Check with your insurance.            Sep 12, 2018 12:00 PM CDT   Return Visit with Nasrin Valladares LPC   [facility] [place] ([facility] [place])    [address]  [place] [address]   [phone]            Sep 19, 2018  1:00 PM CDT   Return Visit with Nasrin Valladares LPC   [facility] [place] ([facility] [place])    [address]  [place] [address]   [phone]            Sep 26, 2018 12:00 PM CDT   Return Visit with Nasrin Valladares LPC   [facility] [place] ([facility] [place])    [address]  [place] [address]   [phone]            Oct 02, 2018  2:00 PM CDT   Return Visit with Nasrin Valladares LPC   [facility] [place] ([facility] [place])    [address]  [place] [address]   [phone]            Oct 16, 2018  2:00 PM CDT   Return Visit with Nasrin Valladares LPC   [facility] [place] ([facility] [place])    [address]  [place] [address]   [phone]            Oct 23, 2018  2:00 PM CDT   Return Visit with Nasrin Valladares LPC   [facility] [place] ([facility] [place])    [address]  [place] [address]   [phone]              MyChart Information     Waterfall[employer] gives you secure access to your electronic health record. If you see a primary care provider, you can also send messages to your care team and make appointments. If you have questions,  please call your primary care clinic.  If you do not have a primary care provider, please call [phone] and they will assist you.        Care EveryWhere ID     This is your Care EveryWhere ID. This could be used by other organizations to access your [facility] medical records  [id number]        Equal Access to Services     KARLOS GAR : Geovanna Carranza, selena gómez, frank subramanian. So [facility] [phone].    ATENCIÓN: Si habla español, tiene a brandt disposición servicios gratuitos de asistencia lingüística. Llame al [phone].    We comply with applicable federal civil rights laws and [place] laws. We do not discriminate on the basis of race, color, national origin, age, disability, sex, sexual orientation, or gender identity.

## 2018-08-21 NOTE — MR AVS SNAPSHOT
After Visit Summary   8/21/2018    Linda Walsh    MRN: [id number]           Patient Information     Date Of Birth          1960        Visit Information        Provider Department      8/21/2018 11:00 AM Jonna Gaitan PA-C [facility]'s Diagnoses     CARDIOVASCULAR SCREENING; LDL GOAL LESS THAN 160    -  1    Major depressive disorder, single episode, moderate (H)        Menopausal syndrome (hot flashes)        Insomnia, unspecified type        Gastroesophageal reflux disease without esophagitis        Screen for colon cancer        Encounter for screening mammogram for breast cancer           Follow-ups after your visit        Your next 10 appointments already scheduled     Aug 21, 2018  1:00 PM CDT   Return Visit with Nasrin Valladares LPC   [facility]    [address]  [facility] [address]   [phone]            Aug 28, 2018  1:00 PM CDT   Return Visit with Nasrin Valladares LPC   [facility]    [address]  [facility] [address]   [phone]            Sep 05, 2018  1:00 PM CDT   Return Visit with Nasrin Valladares LPC   [facility]    [address]  [facility] [address]   [phone]            Sep 10, 2018  1:30 PM CDT   (Arrive by 1:15 PM)   MA SCREENING DIGITAL BILATERAL with PHMA1   [facility] ([facility])    [address] [place] [address] [phone]           Do not use any powder, lotion or deodorant under your arms or on your breast. If you do, we will ask you to remove it before your exam.  Wear comfortable, two-piece clothing.  If you have any allergies, tell your care team.  Bring any previous mammograms from other facilities or have them mailed to the breast center. Three-dimensional (3D) mammograms are  "available at [place] locations in [place], [place], [place], [place], [facility], [place], [place], and [place]. -Health locations include [place] and [facility] in [place]. Benefits of 3D mammograms include: - Improved rate of cancer detection - Decreases your chance of having to go back for more tests, which means fewer: - \"False-positive\" results (This means that there is an abnormal area but it isn't cancer.) - Invasive testing procedures, such as a biopsy or surgery - Can provide clearer images of the breast if you have dense breast tissue. 3D mammography is an optional exam that anyone can have with a 2D mammogram. It doesn't replace or take the place of a 2D mammogram. 2D mammograms remain an effective screening test for all women.  Not all insurance companies cover the cost of a 3D mammogram. Check with your insurance.            Sep 12, 2018 12:00 PM CDT   Return Visit with Nasrin Valladares LPC   [facility] [place] ([facility] [place])    [address]  [place] [address]   [phone]            Sep 19, 2018  1:00 PM CDT   Return Visit with Nasrin Valladares LPC   [facility] [place] ([facility] [place])    [address]  [place] [address]   [phone]            Sep 26, 2018 12:00 PM CDT   Return Visit with Nasrin Valladares LPC   [facility] [place] ([facility] [place])    [address]  [place] [address]   [phone]            Oct 02, 2018  2:00 PM CDT   Return Visit with Nasrin Valladares LPC   [facility] [place] ([facility] [place])    [address]  [place] [address]   [phone]            Oct 16, 2018  2:00 PM CDT   Return Visit with Nasrin Valladares LPC   [facility] [place] ([facility] [place])    [address]  [place] [address]   [phone]            Oct 23, 2018  " "2:00 PM CDT   Return Visit with Nasrin Valladares LPC   [facility] ([facility])    [address]  [facility] [id number] [phone]              Future tests that were ordered for you today     Open Future Orders        Priority Expected Expires Ordered    *MA Screening Digital Bilateral Routine  8/21/2019 8/21/2018            Who to contact     If you have questions or need follow up information about today's clinic visit or your schedule please contact [facility] [place] directly at [phone].  Normal or non-critical lab and imaging results will be communicated to you by Ichibahart, letter or phone within 4 business days after the clinic has received the results. If you do not hear from us within 7 days, please contact the clinic through [employer] Buzz[employer] or phone. If you have a critical or abnormal lab result, we will notify you by phone as soon as possible.  Submit refill requests through Eyegroove or call your pharmacy and they will forward the refill request to us. Please allow 3 business days for your refill to be completed.          Additional Information About Your Visit        MyChart Information     E[employer] gives you secure access to your electronic health record. If you see a primary care provider, you can also send messages to your care team and make appointments. If you have questions, please call your primary care clinic.  If you do not have a primary care provider, please call [phone] and they will assist you.        Care EveryWhere ID     This is your Care EveryWhere ID. This could be used by other organizations to access your [facility] medical records  [id number]        Your Vitals Were     Pulse Temperature Respirations Height Last Period Pulse Oximetry    74 97.9  F (36.6  C) (Temporal) 12 5' 4.57\" (1.64 m) (LMP Unknown) 97%    BMI (Body Mass Index)                   27.49 kg/m2            Blood Pressure from Last 3 Encounters:   08/21/18 " 116/64   05/08/17 124/78   03/15/17 104/70    Weight from Last 3 Encounters:   08/21/18 163 lb (73.9 kg)   05/08/17 166 lb 8 oz (75.5 kg)   03/15/17 164 lb 11.2 oz (74.7 kg)               Primary Care Provider Office Phone # Fax #    Jonna Gaitan PA-C [phone] [address] [place] [address]        Equal Access to Services     PRAMOD GAR : Hadii aad ku hadasho Soomaali, waaxda luqadaha, qaybta kaalmada adeegyada, waxay idiin hayaan adeeg kharafara lorenzo . So [facility] [phone].    ATENCIÓN: Si habla español, tiene a brandt disposición servicios gratuitos de asistencia lingüística. Llame al [phone].    We comply with applicable federal civil rights laws and [place] laws. We do not discriminate on the basis of race, color, national origin, age, disability, sex, sexual orientation, or gender identity.            Thank you!     Thank you for choosing [facility]  for your care. Our goal is always to provide you with excellent care. Hearing back from our patients is one way we can continue to improve our services. Please take a few minutes to complete the written survey that you may receive in the mail after your visit with us. Thank you!             Your Updated Medication List - Protect others around you: Learn how to safely use, store and throw away your medicines at www.disposemymeds.org.          This list is accurate as of 8/21/18 11:44 AM.  Always use your most recent med list.                   Brand Name Dispense Instructions for use Diagnosis    * albuterol 108 (90 Base) MCG/ACT inhaler    PROAIR HFA    1 Inhaler    Inhale 2 puffs into the lungs every 4 hours as needed for shortness of breath / dyspnea    Moderate persistent asthma without complication       * albuterol 108 (90 Base) MCG/ACT inhaler    PROAIR HFA/PROVENTIL HFA/VENTOLIN HFA    1 Inhaler    Inhale 2 puffs into the lungs every 6 hours as needed for shortness of breath / dyspnea or wheezing     Moderate persistent asthma without complication       CELEBREX PO      Take 400 mg by mouth daily        DULoxetine 30 MG EC capsule    CYMBALTA    120 capsule    Take 2 capsules (60 mg) by mouth 2 times daily    Major depressive disorder, single episode, moderate (H)       estradiol 0.0375 MG/24HR Ptwk     4 patch    APPLY 1 PATCH ONTO THE SKIN ONCE A WEEK    Menopausal syndrome (hot flashes)       FentaNYL 37.5 MCG/HR Pt72      1 patch every 72 hours 25mcg/hr        fluticasone 50 MCG/ACT spray    FLONASE    1 Bottle    Spray 1-2 sprays into both nostrils daily    Acute maxillary sinusitis, recurrence not specified, Acute seasonal allergic rhinitis, unspecified trigger       gabapentin 300 MG capsule    NEURONTIN     Take 900 mg by mouth daily        ipratropium - albuterol 0.5 mg/2.5 mg/3 mL 0.5-2.5 (3) MG/3ML neb solution    DUONEB    1 Box    Take 1 vial (3 mLs) by nebulization every 6 hours as needed for shortness of breath / dyspnea    Moderate persistent asthma without complication       ondansetron 4 MG ODT tab    ZOFRAN-ODT    20 tablet    DISSOLVE ONE TABLET BY MOUTH EVERY 8 HOURS AS NEEDED FOR NAUSEA    Nausea       order for DME     1 Device    Equipment being ordered: Nebulizer    Asthma exacerbation       order for DME     1 Device    Equipment being ordered: home cervical traction unit    Cervicalgia       oxyCODONE IR 5 MG tablet    ROXICODONE    20 tablet    Take 1 tablet (5 mg) by mouth every 6 hours as needed for pain        pantoprazole 40 MG EC tablet    PROTONIX    30 tablet    Take 1 tablet (40 mg) by mouth daily    Gastroesophageal reflux disease without esophagitis       polyethylene glycol powder    MIRALAX    3 Bottle    Take 51 g (3 capfuls) by mouth 2 times daily    Constipation, unspecified constipation type       traZODone 50 MG tablet    DESYREL    90 tablet    TAKE THREE TABLETS BY MOUTH AT BEDTIME    Insomnia, unspecified type       * Notice:  This list has 2 medication(s) that are  the same as other medications prescribed for you. Read the directions carefully, and ask your doctor or other care provider to review them with you.

## 2018-08-22 ENCOUNTER — TELEPHONE (OUTPATIENT)
Dept: FAMILY MEDICINE | Facility: CLINIC | Age: 58
End: 2018-08-22

## 2018-08-22 ASSESSMENT — PATIENT HEALTH QUESTIONNAIRE - PHQ9: SUM OF ALL RESPONSES TO PHQ QUESTIONS 1-9: 21

## 2018-08-22 NOTE — LETTER
[address] [address]  [phone]        August 29, 2018    Linda Walsh  [address]  [place] [address]

## 2018-08-22 NOTE — TELEPHONE ENCOUNTER
Left a message for patient to return my call to schedule a colonoscopy/endoscopy. If Aishwarya or Pippa are not available, please transfer to Same Day Surgery at [phone]

## 2018-08-22 NOTE — LETTER
Dear Linda,        At [facility] we care about your health and are committed to providing quality patient care, which includes staying current on preventive cancer screenings.  You can increase your chances of finding and treating cancers through regular screenings.     Our records indicate you may be due for the following preventive screening(s):    Colonoscopy    Colonoscopy is recommended every ten years for everyone age 50 and older. We strongly urge our patient's to consider having a colonoscopy done, which is the best screening test available and only needs to be done every 10 years if results are normal. If you are unwilling or unable to have a colonoscopy then we recommend the annual stool testing for blood. This test is called a FIT test and it looks for blood in the stool.       To schedule your colonoscopy, you may contact us by phone at [phone]    If you have had any of the screenings listed above at another facility, please call us so that we may update your chart.        Your [facility] Care Team
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
## 2018-08-28 NOTE — TELEPHONE ENCOUNTER
Called patient and she states that she will call back as she is in the clinic with her dad right now.

## 2018-08-29 NOTE — TELEPHONE ENCOUNTER
Left message for patient to return call to schedule EGD/colonoscopy. If Aishwarya or Pippa are not available, please transfer to same day surgery

## 2018-09-05 ENCOUNTER — TRANSFERRED RECORDS (OUTPATIENT)
Dept: HEALTH INFORMATION MANAGEMENT | Facility: CLINIC | Age: 58
End: 2018-09-05

## 2018-09-12 ENCOUNTER — OFFICE VISIT (OUTPATIENT)
Dept: PSYCHOLOGY | Facility: CLINIC | Age: 58
End: 2018-09-12
Payer: COMMERCIAL

## 2018-09-12 DIAGNOSIS — F41.1 GENERALIZED ANXIETY DISORDER: ICD-10-CM

## 2018-09-12 DIAGNOSIS — F33.2 SEVERE RECURRENT MAJOR DEPRESSION WITHOUT PSYCHOTIC FEATURES (H): Primary | ICD-10-CM

## 2018-09-12 DIAGNOSIS — F43.10 POST TRAUMATIC STRESS DISORDER (PTSD): ICD-10-CM

## 2018-09-12 PROCEDURE — 90837 PSYTX W PT 60 MINUTES: CPT | Performed by: COUNSELOR

## 2018-09-12 NOTE — MR AVS SNAPSHOT
MRN:[id number]                      After Visit Summary   9/12/2018    Linda Walsh    MRN: [id number]           Visit Information        Provider Department      9/12/2018 12:00 PM Nasrin Valladares [facility] [place] [facility]      Your next 10 appointments already scheduled     Sep 19, 2018  1:00 PM CDT   Return Visit with Nasrin Valladares [facility] [place] ([facility] [place])    [address]  [place] [address]   [phone]            Sep 26, 2018 12:00 PM CDT   Return Visit with Nasrin Valladares [facility] [place] ([facility] [place])    [address]  [place] [address]   [phone]            Oct 02, 2018  2:00 PM CDT   Return Visit with Nasrin Valladares LPC   [facility] [place] ([facility] [place])    [address]  [place] [address]   [phone]            Oct 16, 2018  2:00 PM CDT   Return Visit with Nasrin Valladares LPC   [facility] [place] ([facility] [place])    [address]  [place] [address]   [phone]            Oct 23, 2018  2:00 PM CDT   Return Visit with Nasrin Valladares LPC   [facility] [place] ([facility] [place])    [address]  [place] [address]   [phone]            Oct 30, 2018  2:00 PM CDT   Return Visit with Nasrin Valladares [facility] [place] ([facility] [place])    [address]  [place] [address]   [phone]              MyChart Information     [facility] gives you secure access to your electronic health record. If you see a primary care provider, you can also send messages to your care team and make appointments. If you have questions, please call your primary care clinic.  If you do not have a primary care provider, please call [phone] and they will assist you.         Care EveryWhere ID     This is your Care EveryWhere ID. This could be used by other organizations to access your [facility] medical records  [id number]        Equal Access to Services     KARLOS GAR : Geovanna Carranza, selena gómez, felipe jewell, frank marcos. So [facility] [phone].    ATENCIÓN: Si habla español, tiene a brandt disposición servicios gratuitos de asistencia lingüística. Llame al [phone].    We comply with applicable federal civil rights laws and [place] laws. We do not discriminate on the basis of race, color, national origin, age, disability, sex, sexual orientation, or gender identity.
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
## 2018-09-19 ENCOUNTER — OFFICE VISIT (OUTPATIENT)
Dept: PSYCHOLOGY | Facility: CLINIC | Age: 58
End: 2018-09-19
Payer: COMMERCIAL

## 2018-09-19 DIAGNOSIS — F41.1 GENERALIZED ANXIETY DISORDER: ICD-10-CM

## 2018-09-19 DIAGNOSIS — F43.10 POST TRAUMATIC STRESS DISORDER (PTSD): ICD-10-CM

## 2018-09-19 DIAGNOSIS — F33.2 SEVERE RECURRENT MAJOR DEPRESSION WITHOUT PSYCHOTIC FEATURES (H): Primary | ICD-10-CM

## 2018-09-19 PROCEDURE — 90837 PSYTX W PT 60 MINUTES: CPT | Performed by: COUNSELOR

## 2018-09-19 NOTE — MR AVS SNAPSHOT
MRN:[id number]                      After Visit Summary   9/19/2018    Linda Walsh    MRN: [id number]           Visit Information        Provider Department      9/19/2018 1:00 PM Nasrin Valladares LPC [facility] [place] [facility]      Your next 10 appointments already scheduled     Sep 26, 2018 12:00 PM CDT   Return Visit with Nasrin Valladares [facility] [place] ([facility] [place])    [address]  [place] [address]   [phone]            Oct 02, 2018  2:00 PM CDT   Return Visit with Nasrin Valladares [facility] [place] ([facility] [place])    [address]  [place] [address]   [phone]            Oct 16, 2018  2:00 PM CDT   Return Visit with Nasrin Valladares [facility] [place] ([facility] [place])    [address]  [place] [address]   [phone]            Oct 23, 2018  2:00 PM CDT   Return Visit with Nasrin Valladares LPC   [facility] [place] ([facility] [place])    [address]  [place] [address]   [phone]            Oct 30, 2018  2:00 PM CDT   Return Visit with Nasrin Valladares LPC   [facility] [place] ([facility] [place])    [address]  [place] [address]   [phone]              MyChart Information     [facility] gives you secure access to your electronic health record. If you see a primary care provider, you can also send messages to your care team and make appointments. If you have questions, please call your primary care clinic.  If you do not have a primary care provider, please call [phone] and they will assist you.        Care EveryWhere ID     This is your Care EveryWhere ID. This could be used by other organizations to access your [place] medical records  [id number]        Equal Access to Services     KARLOS GAR AH:  Hadii yasmine Carranza, wachuyitada lujuan carlosadaha, qacalita kaalmada fanta, frank marcos. So [facility] [phone].    ATENCIÓN: Si habla español, tiene a brandt disposición servicios gratuitos de asistencia lingüística. Llame al [phone].    We comply with applicable federal civil rights laws and [place] laws. We do not discriminate on the basis of race, color, national origin, age, disability, sex, sexual orientation, or gender identity.
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
## 2018-09-26 ENCOUNTER — OFFICE VISIT (OUTPATIENT)
Dept: PSYCHOLOGY | Facility: CLINIC | Age: 58
End: 2018-09-26
Payer: COMMERCIAL

## 2018-09-26 DIAGNOSIS — F33.2 SEVERE RECURRENT MAJOR DEPRESSION WITHOUT PSYCHOTIC FEATURES (H): Primary | ICD-10-CM

## 2018-09-26 DIAGNOSIS — F41.1 GENERALIZED ANXIETY DISORDER: ICD-10-CM

## 2018-09-26 DIAGNOSIS — F43.10 POST TRAUMATIC STRESS DISORDER (PTSD): ICD-10-CM

## 2018-09-26 PROCEDURE — 90837 PSYTX W PT 60 MINUTES: CPT | Performed by: COUNSELOR

## 2018-09-26 NOTE — MR AVS SNAPSHOT
MRN:[id number]                      After Visit Summary   9/26/2018    Linda Walsh    MRN: [id number]           Visit Information        Provider Department      9/26/2018 12:00 PM Nasrin Valladares LPC [facility] [place] [facility]      Your next 10 appointments already scheduled     Oct 02, 2018  2:00 PM CDT   Return Visit with Nasrin Valladares [facility] [place] ([facility] [place])    [address]  [place] [address]   [phone]            Oct 16, 2018  2:00 PM CDT   Return Visit with Nasrin Valladares [facility] [place] ([facility] [place])    [address]  [place] [address]   [phone]            Oct 23, 2018  2:00 PM CDT   Return Visit with Nasrin Valladares [facility] [place] ([facility] [place])    [address]  [place] [address]   [phone]            Oct 30, 2018  2:00 PM CDT   Return Visit with Nasrin Valladares LPC   [facility] [place] ([facility] [place])    [address]  [place] [address]   [phone]            Nov 07, 2018 12:00 PM CST   Return Visit with Nasrin Valladares LPC   [facility] [place] ([facility] [place])    [address]  [place] [address]   [phone]              MyChart Information     [facility] gives you secure access to your electronic health record. If you see a primary care provider, you can also send messages to your care team and make appointments. If you have questions, please call your primary care clinic.  If you do not have a primary care provider, please call [phone] and they will assist you.        Care EveryWhere ID     This is your Care EveryWhere ID. This could be used by other organizations to access your [place] medical records  [id number]        Equal Access to Services     KARLOS GAR AH:  Hadii yasmine Carranza, wachuyitada lujuan carlosadaha, qacalita kaalmada fanta, frank marcos. So [facility] [place] [facility] [phone].    ATENCIÓN: Si habla español, tiene a brandt disposición servicios gratuitos de asistencia lingüística. Llame al [phone].    We comply with applicable federal civil rights laws and [place] laws. We do not discriminate on the basis of race, color, national origin, age, disability, sex, sexual orientation, or gender identity.
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
## 2018-10-18 ENCOUNTER — OFFICE VISIT (OUTPATIENT)
Dept: PSYCHOLOGY | Facility: CLINIC | Age: 58
End: 2018-10-18
Payer: COMMERCIAL

## 2018-10-18 DIAGNOSIS — F43.10 POST TRAUMATIC STRESS DISORDER (PTSD): ICD-10-CM

## 2018-10-18 DIAGNOSIS — F41.1 GENERALIZED ANXIETY DISORDER: ICD-10-CM

## 2018-10-18 DIAGNOSIS — F33.2 SEVERE RECURRENT MAJOR DEPRESSION WITHOUT PSYCHOTIC FEATURES (H): Primary | ICD-10-CM

## 2018-10-18 PROCEDURE — 90837 PSYTX W PT 60 MINUTES: CPT | Performed by: COUNSELOR

## 2018-10-18 ASSESSMENT — ANXIETY QUESTIONNAIRES
1. FEELING NERVOUS, ANXIOUS, OR ON EDGE: NEARLY EVERY DAY
GAD7 TOTAL SCORE: 21
7. FEELING AFRAID AS IF SOMETHING AWFUL MIGHT HAPPEN: NEARLY EVERY DAY
2. NOT BEING ABLE TO STOP OR CONTROL WORRYING: NEARLY EVERY DAY
3. WORRYING TOO MUCH ABOUT DIFFERENT THINGS: NEARLY EVERY DAY
GAD7 TOTAL SCORE: 21
5. BEING SO RESTLESS THAT IT IS HARD TO SIT STILL: NEARLY EVERY DAY
4. TROUBLE RELAXING: NEARLY EVERY DAY
6. BECOMING EASILY ANNOYED OR IRRITABLE: NEARLY EVERY DAY
GAD7 TOTAL SCORE: 21
7. FEELING AFRAID AS IF SOMETHING AWFUL MIGHT HAPPEN: NEARLY EVERY DAY

## 2018-10-18 ASSESSMENT — PATIENT HEALTH QUESTIONNAIRE - PHQ9
SUM OF ALL RESPONSES TO PHQ QUESTIONS 1-9: 24
SUM OF ALL RESPONSES TO PHQ QUESTIONS 1-9: 24
10. IF YOU CHECKED OFF ANY PROBLEMS, HOW DIFFICULT HAVE THESE PROBLEMS MADE IT FOR YOU TO DO YOUR WORK, TAKE CARE OF THINGS AT HOME, OR GET ALONG WITH OTHER PEOPLE: EXTREMELY DIFFICULT

## 2018-10-18 NOTE — MR AVS SNAPSHOT
MRN:[id number]                      After Visit Summary   10/18/2018    Linda Walsh    MRN: [id number]           Visit Information        Provider Department      10/18/2018 8:00 AM Nasrin Valladares LPC [facility] Generic      Your next 10 appointments already scheduled     Oct 23, 2018  2:00 PM CDT   Return Visit with Nasrin Carinlaura Valladares LPC   [facility] [place] ([facility] [place])    [address]  [place] [address]   [phone]            Oct 30, 2018  2:00 PM CDT   Return Visit with Nasrin Del Rosario LIDA Valladares   [facility] [place] ([facility] [place])    [address]  [place] [address]   [phone]            Nov 07, 2018 12:00 PM CST   Return Visit with Nasrin Carinlaura Valladares LPC   [facility] [place] ([facility] [place])    [address]  [place] [address]   [phone]              MyChart Information     [employer] gives you secure access to your electronic health record. If you see a primary care provider, you can also send messages to your care team and make appointments. If you have questions, please call your primary care clinic.  If you do not have a primary care provider, please call [phone] and they will assist you.        Care EveryWhere ID     This is your Care EveryWhere ID. This could be used by other organizations to access your [facility] medical records  [id number]        Equal Access to Services     KARLOS GAR AH: Hadii yasmine geronimoo Sokalebali, waaxda luqadaha, qaybta kaalmada fanta, waxay jeanne marcos. So [facility] [phone].    ATENCIÓN: Si habla español, tiene a brandt disposición servicios gratuitos de asistencia lingüística. Llame al [phone].    We comply with applicable federal civil rights laws and [place] laws. We do not discriminate on the basis of race, color, national origin, age,  disability, sex, sexual orientation, or gender identity.

## 2018-10-19 ASSESSMENT — PATIENT HEALTH QUESTIONNAIRE - PHQ9: SUM OF ALL RESPONSES TO PHQ QUESTIONS 1-9: 24

## 2018-10-19 ASSESSMENT — ANXIETY QUESTIONNAIRES: GAD7 TOTAL SCORE: 21

## 2018-10-23 ENCOUNTER — OFFICE VISIT (OUTPATIENT)
Dept: PSYCHOLOGY | Facility: CLINIC | Age: 58
End: 2018-10-23
Payer: COMMERCIAL

## 2018-10-23 DIAGNOSIS — F33.2 SEVERE RECURRENT MAJOR DEPRESSION WITHOUT PSYCHOTIC FEATURES (H): Primary | ICD-10-CM

## 2018-10-23 DIAGNOSIS — F43.10 POST TRAUMATIC STRESS DISORDER (PTSD): ICD-10-CM

## 2018-10-23 DIAGNOSIS — F41.1 GENERALIZED ANXIETY DISORDER: ICD-10-CM

## 2018-10-23 PROCEDURE — 90837 PSYTX W PT 60 MINUTES: CPT | Performed by: COUNSELOR

## 2018-10-23 NOTE — MR AVS SNAPSHOT
MRN:[id number]                      After Visit Summary   10/23/2018    Linda Walsh    MRN: [id number]           Visit Information        Provider Department      10/23/2018 2:00 PM Nasrin Valladares LPC [facility] [place] [facility] Generic      Your next 10 appointments already scheduled     Nov 07, 2018 12:00 PM CST   Return Visit with Nasrin Valladares LPC   [facility] [place] ([facility] [place])    [address]  [place] [address]   [phone]            Dec 03, 2018 12:30 PM CST   Return Visit with Nasrin Valladares LPC   [facility] ([facility])    [address]  [facility] [address]   [phone]            Dec 11, 2018  1:00 PM CST   Return Visit with Nasrin Valladares LPC   [facility] [place] ([facility] [place])    [address]  [place] [address]   [phone]            Dec 18, 2018  1:00 PM CST   Return Visit with Nasrin Valladares LPC   [facility] [place] ([facility] [place])    [address]  [place] [address]   [phone]            Jan 03, 2019 12:00 PM CST   Return Visit with Nasrin Valladares LPC   [facility] ([facility])    [address]  [facility] [address]   [phone]            Pavel 10, 2019  1:00 PM CST   Return Visit with Nasrin Valladares LPC   [facility] ([facility] [place])    [address]  [facility] [address]   [phone]            Jan 17, 2019  1:00 PM CST   Return Visit with Nasrin Valladares LPC   [facility] [place] ([facility] [place])    [address]  [place] [address]   [phone]            Jan 24, 2019  2:00 PM CST   Return Visit with Nasrin Valladares LPC   [facility] ([facility])    [address]  [facility]  [address] [phone]            Jan 31, 2019  1:00 PM CST   Return Visit with Nasrin Valladares LPC   [facility] ([facility])    [address]  [facility] [address] [phone]              MyChart Information     [employer] gives you secure access to your electronic health record. If you see a primary care provider, you can also send messages to your care team and make appointments. If you have questions, please call your primary care clinic.  If you do not have a primary care provider, please call [phone] and they will assist you.        Care EveryWhere ID     This is your Care EveryWhere ID. This could be used by other organizations to access your [place] medical records  [id number]        Equal Access to Services     KARLOS GAR : Geovanna Carranza, selena gómez, felipe jewell, frank marcos. So [facility] [phone].    ATENCIÓN: Si habla español, tiene a brandt disposición servicios gratuitos de asistencia lingüística. Llame al [phone].    We comply with applicable federal civil rights laws and [place] laws. We do not discriminate on the basis of race, color, national origin, age, disability, sex, sexual orientation, or gender identity.
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
## 2018-11-01 ENCOUNTER — OFFICE VISIT (OUTPATIENT)
Dept: PSYCHOLOGY | Facility: CLINIC | Age: 58
End: 2018-11-01
Payer: COMMERCIAL

## 2018-11-01 DIAGNOSIS — F33.2 SEVERE RECURRENT MAJOR DEPRESSION WITHOUT PSYCHOTIC FEATURES (H): Primary | ICD-10-CM

## 2018-11-01 DIAGNOSIS — F41.1 GENERALIZED ANXIETY DISORDER: ICD-10-CM

## 2018-11-01 DIAGNOSIS — F43.10 POST TRAUMATIC STRESS DISORDER (PTSD): ICD-10-CM

## 2018-11-01 PROCEDURE — 90837 PSYTX W PT 60 MINUTES: CPT | Performed by: COUNSELOR

## 2018-11-01 NOTE — MR AVS SNAPSHOT
MRN:[id number]                      After Visit Summary   11/1/2018    Linda Walsh    MRN: [id number]           Visit Information        Provider Department      11/1/2018 4:00 PM Nasrin Valaldares LPC [facility] Generic      Your next 10 appointments already scheduled     Nov 07, 2018 12:00 PM CST   Return Visit with Nasrin Valladares LPC   [facility] [place] ([facility] [place])    [address]  [place] [address]   [phone]            Dec 03, 2018 12:30 PM CST   Return Visit with Nasrin Valladares LPC   [facility] ([facility])    [address]  [facility] [address]   [phone]            Dec 11, 2018  1:00 PM CST   Return Visit with Nasrin Valladares LPC   [facility] [place] ([facility] [place])    [address]  [place] [address]   [phone]            Dec 18, 2018  1:00 PM CST   Return Visit with Nasrin Valladares LPC   [facility] [place] ([facility] [place])    [address]  [place] [address]   [phone]            Jan 03, 2019 12:00 PM CST   Return Visit with Nasrin Valladares LPC   [facility] ([facility])    [address]  [facility] [address]   [phone]            Pavel 10, 2019  1:00 PM CST   Return Visit with Nasrin Valladares LPC   [facility] ([facility])    [address]  [place] [address]   [phone]            Jan 17, 2019  1:00 PM CST   Return Visit with Nasrin Valladares LPC   [facility] ([facility])    [address]  [place] [address]   [phone]            Jan 24, 2019  2:00 PM CST   Return Visit with Nasrin Valladares LPC   [facility] ([facility])    [address]  [facility]  [address] [phone]            Jan 31, 2019  1:00 PM CST   Return Visit with Nasrin Valladares LPC   [facility] ([facility])    [address]  [facility] [address] [phone]              MyChart Information     [employer] gives you secure access to your electronic health record. If you see a primary care provider, you can also send messages to your care team and make appointments. If you have questions, please call your primary care clinic.  If you do not have a primary care provider, please call [phone] and they will assist you.        Care EveryWhere ID     This is your Care EveryWhere ID. This could be used by other organizations to access your [place] medical records  [id number]        Equal Access to Services     KARLOS GAR : Geovanna Carranza, selena gómez, felipe jewell, frank marcos. So [facility] [phone].    ATENCIÓN: Si habla español, tiene a brandt disposición servicios gratuitos de asistencia lingüística. Llame al [phone].    We comply with applicable federal civil rights laws and [place] laws. We do not discriminate on the basis of race, color, national origin, age, disability, sex, sexual orientation, or gender identity.
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
## 2018-11-07 ENCOUNTER — OFFICE VISIT (OUTPATIENT)
Dept: PSYCHOLOGY | Facility: CLINIC | Age: 58
End: 2018-11-07
Payer: COMMERCIAL

## 2018-11-07 DIAGNOSIS — F41.1 GENERALIZED ANXIETY DISORDER: ICD-10-CM

## 2018-11-07 DIAGNOSIS — F43.10 POST TRAUMATIC STRESS DISORDER (PTSD): ICD-10-CM

## 2018-11-07 DIAGNOSIS — F33.2 SEVERE RECURRENT MAJOR DEPRESSION WITHOUT PSYCHOTIC FEATURES (H): Primary | ICD-10-CM

## 2018-11-07 PROCEDURE — 90837 PSYTX W PT 60 MINUTES: CPT | Performed by: COUNSELOR

## 2018-11-07 NOTE — MR AVS SNAPSHOT
MRN:[id number]                      After Visit Summary   11/7/2018    Linda Walsh    MRN: [id number]           Visit Information        Provider Department      11/7/2018 12:00 PM Nasrin Valladares LPC [facility] [place] [facility]      Your next 10 appointments already scheduled     Nov 21, 2018 10:00 AM CST   Return Visit with Nasrin Valladares LPC   [facility] [place] ([facility] [place])    [address]  [place] [address]   [phone]            Dec 03, 2018 12:30 PM CST   Return Visit with Nasrin Valladares LPC   [facility] ([facility])    [address]  [facility] [address]   [phone]            Dec 11, 2018  1:00 PM CST   Return Visit with Nasrin Valladares LPC   [facility] [place] ([facility] [place])    [address]  [place] [address]   [phone]            Dec 18, 2018  1:00 PM CST   Return Visit with Nasrin Valladares LPC   [facility] [place] ([facility] [place])    [address]  [place] [address]   [phone]            Jan 03, 2019 12:00 PM CST   Return Visit with Nasrin Valladares LPC   [facility] ([facility])    [address]  [facility] [address]   [phone]            Pavel 10, 2019  1:00 PM CST   Return Visit with Nasrin Valladares LPC   [facility] ([facility] [place])    [address]  [facility] [address]   [phone]            Jan 17, 2019  1:00 PM CST   Return Visit with Nasrin Valladares LPC   [facility] [place] ([facility])    [address]  [facility] [address]   [phone]            Jan 24, 2019  2:00 PM CST   Return Visit with Nasrin Valladares LPC   [facility] ([facility])    [address]  [facility]  [address] [phone]            Jan 31, 2019  1:00 PM CST   Return Visit with Nasrin Valladares LPC   [facility] ([facility])    [address]  [facility] [address] [phone]              MyChart Information     "[employer]hart gives you secure access to your electronic health record. If you see a primary care provider, you can also send messages to your care team and make appointments. If you have questions, please call your primary care clinic.  If you do not have a primary care provider, please call [phone] and they will assist you.        Care EveryWhere ID     This is your Care EveryWhere ID. This could be used by other organizations to access your [facility] medical records  [id number]        Equal Access to Services     KARLOS GAR : Geovanna Carranza, selena gómez, felipe jewell, frank marcos. So [facility] [phone].    ATENCIÓN: Si habla español, tiene a brandt disposición servicios gratuitos de asistencia lingüística. Llame al [phone].    We comply with applicable federal civil rights laws and [place] laws. We do not discriminate on the basis of race, color, national origin, age, disability, sex, sexual orientation, or gender identity.
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
## 2018-11-21 ENCOUNTER — OFFICE VISIT (OUTPATIENT)
Dept: PSYCHOLOGY | Facility: CLINIC | Age: 58
End: 2018-11-21
Payer: COMMERCIAL

## 2018-11-21 DIAGNOSIS — F41.1 GENERALIZED ANXIETY DISORDER: ICD-10-CM

## 2018-11-21 DIAGNOSIS — F43.10 POST TRAUMATIC STRESS DISORDER (PTSD): ICD-10-CM

## 2018-11-21 DIAGNOSIS — F33.2 SEVERE RECURRENT MAJOR DEPRESSION WITHOUT PSYCHOTIC FEATURES (H): Primary | ICD-10-CM

## 2018-11-21 PROCEDURE — 90837 PSYTX W PT 60 MINUTES: CPT | Performed by: COUNSELOR

## 2018-11-21 NOTE — MR AVS SNAPSHOT
MRN:[id number]                      After Visit Summary   11/21/2018    iLnda Walsh    MRN: [id number]           Visit Information        Provider Department      11/21/2018 10:00 AM Nasrin Valladares LPC [facility] [place] [facility] Generic      Your next 10 appointments already scheduled     Dec 03, 2018 12:30 PM CST   Return Visit with Nasrin Valladares LPC   [facility] ([facility])    [address]  [facility] [address]   [phone]            Dec 11, 2018  1:00 PM CST   Return Visit with Nasrin Valladares [facility] [place] ([facility] [place])    [address]  [place] [address]   [phone]            Dec 18, 2018  1:00 PM CST   Return Visit with Nasrin Valladares LPC   [facility] [place] ([facility] [place])    [address]  [place] [address]   [phone]            Jan 03, 2019 12:00 PM CST   Return Visit with Nasrin Valladares LPC   [facility] ([facility])    [address]  [facility] [address]   [phone]            Pavel 10, 2019  1:00 PM CST   Return Visit with Nasrin Valladares [facility] ([facility])    [address]  [facility] [address]   [phone]            Jan 17, 2019  1:00 PM CST   Return Visit with Nasrin Valladares LPC   [facility] ([facility])    [address]  [facility] [address]   [phone]            Jan 24, 2019  2:00 PM CST   Return Visit with Nasrin Valladares LPC   [facility] ([facility])    [address]  [facility] [address]   [phone]            Jan 31, 2019  1:00 PM CST   Return Visit with Nasrin Valladares LPC   [facility] ([facility])    [address]  [facility]  [address] [phone]              [employer] Information     [employer] gives you secure access to your electronic health record. If you see a primary care provider, you can also send messages to your care team and make appointments. If you have questions, please call your primary care clinic.  If you do not have a primary care provider, please call [phone] and they will assist you.        Care EveryWhere ID     This is your Care EveryWhere ID. This could be used by other organizations to access your [place] medical records  [id number]        Equal Access to Services     KARLOS GAR : Geovanna Carranza, selena gómez, frank subramanian. So [facility] [phone].    ATENCIÓN: Si habla español, tiene a brandt disposición servicios gratuitos de asistencia lingüística. Lljennifer al [phone].    We comply with applicable federal civil rights laws and [place] laws. We do not discriminate on the basis of race, color, national origin, age, disability, sex, sexual orientation, or gender identity.
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
## 2018-12-06 ENCOUNTER — OFFICE VISIT (OUTPATIENT)
Dept: PSYCHOLOGY | Facility: CLINIC | Age: 58
End: 2018-12-06
Payer: COMMERCIAL

## 2018-12-06 DIAGNOSIS — F43.10 POST TRAUMATIC STRESS DISORDER (PTSD): ICD-10-CM

## 2018-12-06 DIAGNOSIS — F33.2 SEVERE RECURRENT MAJOR DEPRESSION WITHOUT PSYCHOTIC FEATURES (H): Primary | ICD-10-CM

## 2018-12-06 DIAGNOSIS — F41.1 GENERALIZED ANXIETY DISORDER: ICD-10-CM

## 2018-12-06 PROCEDURE — 90837 PSYTX W PT 60 MINUTES: CPT | Performed by: COUNSELOR

## 2018-12-06 ASSESSMENT — ANXIETY QUESTIONNAIRES
GAD7 TOTAL SCORE: 20
GAD7 TOTAL SCORE: 20
1. FEELING NERVOUS, ANXIOUS, OR ON EDGE: NEARLY EVERY DAY
GAD7 TOTAL SCORE: 20
2. NOT BEING ABLE TO STOP OR CONTROL WORRYING: NEARLY EVERY DAY
7. FEELING AFRAID AS IF SOMETHING AWFUL MIGHT HAPPEN: NEARLY EVERY DAY
5. BEING SO RESTLESS THAT IT IS HARD TO SIT STILL: MORE THAN HALF THE DAYS
4. TROUBLE RELAXING: NEARLY EVERY DAY
3. WORRYING TOO MUCH ABOUT DIFFERENT THINGS: NEARLY EVERY DAY
6. BECOMING EASILY ANNOYED OR IRRITABLE: NEARLY EVERY DAY
7. FEELING AFRAID AS IF SOMETHING AWFUL MIGHT HAPPEN: NEARLY EVERY DAY

## 2018-12-06 ASSESSMENT — PATIENT HEALTH QUESTIONNAIRE - PHQ9
SUM OF ALL RESPONSES TO PHQ QUESTIONS 1-9: 23
10. IF YOU CHECKED OFF ANY PROBLEMS, HOW DIFFICULT HAVE THESE PROBLEMS MADE IT FOR YOU TO DO YOUR WORK, TAKE CARE OF THINGS AT HOME, OR GET ALONG WITH OTHER PEOPLE: EXTREMELY DIFFICULT
SUM OF ALL RESPONSES TO PHQ QUESTIONS 1-9: 23

## 2018-12-06 NOTE — MR AVS SNAPSHOT
MRN:[id number]                      After Visit Summary   12/6/2018    Linda Walsh    MRN: [id number]           Visit Information        Provider Department      12/6/2018 1:00 PM Nasrin Valladares LPC [facility] Generic      Your next 10 appointments already scheduled     Dec 11, 2018  1:00 PM CST   Return Visit with Nasrin Valladares LPC   [facility] [place] ([facility] [place])    [address]  [place] [address]   [phone]            Dec 18, 2018  1:00 PM CST   Return Visit with Nasrin Valladares LPC   [facility] [place] ([facility] [place])    [address]  [place] [address]   [phone]            Jan 03, 2019 12:00 PM CST   Return Visit with Nasrin Valladares LPC   [facility] ([facility])    [address]  [facility] [address]   [phone]            Pavel 10, 2019  1:00 PM CST   Return Visit with Nasrin Valladares LPC   [facility] ([facility])    [address]  [facility] [address]   [phone]            Jan 17, 2019  1:00 PM CST   Return Visit with Nasrin Valladares LPC   [facility] ([facility])    [address]  [facility] [address]   [phone]            Jan 24, 2019  2:00 PM CST   Return Visit with Nasrin Valladares LPC   [facility] ([facility])    [address]  [facility] [address]   [phone]            Jan 31, 2019  1:00 PM CST   Return Visit with Nasrin Valladares LPC   [facility] ([facility])    [address]  [facility] [address]   [phone]            Feb 07, 2019  1:00 PM CST   Return Visit with Nasrin Valladares LPC   [facility] ([facility])    [address]  [facility]  [address]   [phone]            Feb 14, 2019  1:00 PM CST   Return Visit with Nasrin Del Rosario LIDA Valladares   [facility] [place] ([facility] [place])    [address] [facility]  [place] [address] [phone]            Feb 21, 2019  2:00 PM CST   Return Visit with Nasrin Valladares LPC   [facility] ([facility] [place])    [address] [facility] [address]  [place] [address] [phone]              MyChart Information     [employer] gives you secure access to your electronic health record. If you see a primary care provider, you can also send messages to your care team and make appointments. If you have questions, please call your primary care clinic.  If you do not have a primary care provider, please call [phone] and they will assist you.        Care EveryWhere ID     This is your Care EveryWhere ID. This could be used by other organizations to access your [place] medical records  [id number]        Equal Access to Services     KARLOS GAR : Hadii yasmine geronimoo Soember, waaxda luqadaha, qaybta kaalmada adegail, frank marcos. So [facility] [phone].    ATENCIÓN: Si habla español, tiene a brandt disposición servicios gratuitos de asistencia lingüística. Llame al [phone].    We comply with applicable federal civil rights laws and [place] laws. We do not discriminate on the basis of race, color, national origin, age, disability, sex, sexual orientation, or gender identity.

## 2018-12-07 ASSESSMENT — PATIENT HEALTH QUESTIONNAIRE - PHQ9: SUM OF ALL RESPONSES TO PHQ QUESTIONS 1-9: 23

## 2018-12-07 ASSESSMENT — ANXIETY QUESTIONNAIRES: GAD7 TOTAL SCORE: 20
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
## 2018-12-11 ENCOUNTER — OFFICE VISIT (OUTPATIENT)
Dept: PSYCHOLOGY | Facility: CLINIC | Age: 58
End: 2018-12-11
Payer: COMMERCIAL

## 2018-12-11 DIAGNOSIS — F41.1 GENERALIZED ANXIETY DISORDER: ICD-10-CM

## 2018-12-11 DIAGNOSIS — F33.2 SEVERE RECURRENT MAJOR DEPRESSION WITHOUT PSYCHOTIC FEATURES (H): Primary | ICD-10-CM

## 2018-12-11 DIAGNOSIS — F43.10 POST TRAUMATIC STRESS DISORDER (PTSD): ICD-10-CM

## 2018-12-11 PROCEDURE — 90837 PSYTX W PT 60 MINUTES: CPT | Performed by: COUNSELOR
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
## 2018-12-18 ENCOUNTER — OFFICE VISIT (OUTPATIENT)
Dept: PSYCHOLOGY | Facility: CLINIC | Age: 58
End: 2018-12-18
Payer: COMMERCIAL

## 2018-12-18 DIAGNOSIS — F43.10 POST TRAUMATIC STRESS DISORDER (PTSD): ICD-10-CM

## 2018-12-18 DIAGNOSIS — F41.1 GENERALIZED ANXIETY DISORDER: ICD-10-CM

## 2018-12-18 DIAGNOSIS — F33.2 SEVERE RECURRENT MAJOR DEPRESSION WITHOUT PSYCHOTIC FEATURES (H): Primary | ICD-10-CM

## 2018-12-18 PROCEDURE — 90837 PSYTX W PT 60 MINUTES: CPT | Performed by: COUNSELOR
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
## 2019-01-03 ENCOUNTER — OFFICE VISIT (OUTPATIENT)
Dept: PSYCHOLOGY | Facility: CLINIC | Age: 59
End: 2019-01-03
Payer: COMMERCIAL

## 2019-01-03 DIAGNOSIS — F43.10 POST TRAUMATIC STRESS DISORDER (PTSD): ICD-10-CM

## 2019-01-03 DIAGNOSIS — F33.2 SEVERE RECURRENT MAJOR DEPRESSION WITHOUT PSYCHOTIC FEATURES (H): Primary | ICD-10-CM

## 2019-01-03 DIAGNOSIS — F41.1 GENERALIZED ANXIETY DISORDER: ICD-10-CM

## 2019-01-03 PROCEDURE — 90834 PSYTX W PT 45 MINUTES: CPT | Performed by: COUNSELOR
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
## 2019-01-17 ENCOUNTER — OFFICE VISIT (OUTPATIENT)
Dept: PSYCHOLOGY | Facility: CLINIC | Age: 59
End: 2019-01-17
Payer: COMMERCIAL

## 2019-01-17 DIAGNOSIS — F33.2 SEVERE RECURRENT MAJOR DEPRESSION WITHOUT PSYCHOTIC FEATURES (H): Primary | ICD-10-CM

## 2019-01-17 DIAGNOSIS — F41.1 GENERALIZED ANXIETY DISORDER: ICD-10-CM

## 2019-01-17 DIAGNOSIS — F43.10 POST TRAUMATIC STRESS DISORDER (PTSD): ICD-10-CM

## 2019-01-17 PROCEDURE — 90834 PSYTX W PT 45 MINUTES: CPT | Performed by: COUNSELOR
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
## 2019-01-18 ENCOUNTER — TELEPHONE (OUTPATIENT)
Dept: PSYCHOLOGY | Facility: CLINIC | Age: 59
End: 2019-01-18

## 2019-01-18 NOTE — TELEPHONE ENCOUNTER
The writer called the patient to check in about suicidal ideation and mood. 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 She stated she didn't get much sleep last night either. The writer informed her about the after hours crisis line to call if needed this weekend.

## 2019-01-21 ENCOUNTER — OFFICE VISIT (OUTPATIENT)
Dept: PSYCHOLOGY | Facility: CLINIC | Age: 59
End: 2019-01-21
Payer: COMMERCIAL

## 2019-01-21 DIAGNOSIS — F43.10 POST TRAUMATIC STRESS DISORDER (PTSD): ICD-10-CM

## 2019-01-21 DIAGNOSIS — F33.2 SEVERE RECURRENT MAJOR DEPRESSION WITHOUT PSYCHOTIC FEATURES (H): Primary | ICD-10-CM

## 2019-01-21 DIAGNOSIS — F41.1 GENERALIZED ANXIETY DISORDER: ICD-10-CM

## 2019-01-21 PROCEDURE — 90834 PSYTX W PT 45 MINUTES: CPT | Performed by: COUNSELOR
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
## 2019-01-23 ENCOUNTER — OFFICE VISIT (OUTPATIENT)
Dept: PSYCHOLOGY | Facility: CLINIC | Age: 59
End: 2019-01-23
Payer: COMMERCIAL

## 2019-01-23 DIAGNOSIS — F33.2 SEVERE RECURRENT MAJOR DEPRESSION WITHOUT PSYCHOTIC FEATURES (H): Primary | ICD-10-CM

## 2019-01-23 DIAGNOSIS — F43.10 POST TRAUMATIC STRESS DISORDER (PTSD): ICD-10-CM

## 2019-01-23 DIAGNOSIS — F41.1 GENERALIZED ANXIETY DISORDER: ICD-10-CM

## 2019-01-23 PROCEDURE — 90837 PSYTX W PT 60 MINUTES: CPT | Performed by: COUNSELOR
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
## 2019-02-04 ENCOUNTER — OFFICE VISIT (OUTPATIENT)
Dept: PSYCHOLOGY | Facility: CLINIC | Age: 59
End: 2019-02-04
Payer: COMMERCIAL

## 2019-02-04 DIAGNOSIS — F43.10 POST TRAUMATIC STRESS DISORDER (PTSD): ICD-10-CM

## 2019-02-04 DIAGNOSIS — F41.1 GENERALIZED ANXIETY DISORDER: ICD-10-CM

## 2019-02-04 DIAGNOSIS — F33.2 SEVERE RECURRENT MAJOR DEPRESSION WITHOUT PSYCHOTIC FEATURES (H): Primary | ICD-10-CM

## 2019-02-04 PROCEDURE — 90837 PSYTX W PT 60 MINUTES: CPT | Performed by: COUNSELOR
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
## 2019-02-06 ENCOUNTER — TELEPHONE (OUTPATIENT)
Dept: FAMILY MEDICINE | Facility: OTHER | Age: 59
End: 2019-02-06

## 2019-02-06 NOTE — TELEPHONE ENCOUNTER
Summary:    Patient is due/failing the following:   PHQ9    Action needed:   Patient needs to do PHQ9.    Type of outreach:    Sent [employer] message.    Questions for provider review:    None                                                                                                                                    Berta Perez       Chart routed to Care Team .          Panel Management Review      Patient has the following on her problem list:     Depression / Dysthymia review    Measure:  Needs PHQ-9 score of 4 or less during index window.  Administer PHQ-9 and if score is 5 or more, send encounter to provider for next steps.        PHQ-9 SCORE 8/21/2018 10/18/2018 12/6/2018   PHQ-9 Total Score - - -   PHQ-9 Total Score MyChart 21 (Severe depression) 24 (Severe depression) 23 (Severe depression)   PHQ-9 Total Score 21 24 23       If PHQ-9 recheck is 5 or more, route to provider for next steps.    Patient is due for:  PHQ9      Composite cancer screening  Chart review shows that this patient is due/due soon for the following Colonoscopy

## 2019-02-06 NOTE — LETTER
[facility]  [id number] [place] [id number]  Phone: [phone]  February 6, 2019      Linda Walsh  [address]  [facility] [id number]      Dear Linda,    We care about your health and have reviewed your health plan including your medical conditions, medications, and lab results.  Based on this review, it is recommended that you follow up regarding the following health topic(s):  -Depression    We recommend you take the following action(s):  -Complete and return the attached PHQ-9 Form.  If your total score is greater than 9, please schedule a followup appointment.  If you answer Yes to question 9, call your clinic between the hours of 8 to 5.  You may also call the Suicide Hotline at 3-282-547-MLEE (3602) any time.     Please call us at the [facility] - [phone] (or use [employer]) to address the above recommendations.     Thank you for trusting [facility] and we appreciate the opportunity to serve you.  We look forward to supporting your healthcare needs in the future.    Healthy Regards,    Your Health Care Team  [facility]

## 2019-02-14 ENCOUNTER — OFFICE VISIT (OUTPATIENT)
Dept: PSYCHOLOGY | Facility: CLINIC | Age: 59
End: 2019-02-14
Payer: COMMERCIAL

## 2019-02-14 DIAGNOSIS — F41.1 GENERALIZED ANXIETY DISORDER: ICD-10-CM

## 2019-02-14 DIAGNOSIS — F33.2 SEVERE RECURRENT MAJOR DEPRESSION WITHOUT PSYCHOTIC FEATURES (H): Primary | ICD-10-CM

## 2019-02-14 DIAGNOSIS — F43.10 POST TRAUMATIC STRESS DISORDER (PTSD): ICD-10-CM

## 2019-02-14 PROCEDURE — 90837 PSYTX W PT 60 MINUTES: CPT | Performed by: COUNSELOR
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
## 2019-02-28 ENCOUNTER — OFFICE VISIT (OUTPATIENT)
Dept: PSYCHOLOGY | Facility: CLINIC | Age: 59
End: 2019-02-28
Payer: COMMERCIAL

## 2019-02-28 ENCOUNTER — OFFICE VISIT (OUTPATIENT)
Dept: FAMILY MEDICINE | Facility: OTHER | Age: 59
End: 2019-02-28
Payer: COMMERCIAL

## 2019-02-28 VITALS
RESPIRATION RATE: 14 BRPM | HEART RATE: 67 BPM | TEMPERATURE: 98.1 F | DIASTOLIC BLOOD PRESSURE: 74 MMHG | OXYGEN SATURATION: 96 % | SYSTOLIC BLOOD PRESSURE: 112 MMHG

## 2019-02-28 DIAGNOSIS — F43.10 POST TRAUMATIC STRESS DISORDER (PTSD): ICD-10-CM

## 2019-02-28 DIAGNOSIS — F33.2 SEVERE RECURRENT MAJOR DEPRESSION WITHOUT PSYCHOTIC FEATURES (H): Primary | ICD-10-CM

## 2019-02-28 DIAGNOSIS — K21.9 GASTROESOPHAGEAL REFLUX DISEASE WITHOUT ESOPHAGITIS: Primary | ICD-10-CM

## 2019-02-28 DIAGNOSIS — F33.2 SEVERE EPISODE OF RECURRENT MAJOR DEPRESSIVE DISORDER, WITHOUT PSYCHOTIC FEATURES (H): ICD-10-CM

## 2019-02-28 DIAGNOSIS — F17.200 TOBACCO DEPENDENCE SYNDROME: ICD-10-CM

## 2019-02-28 DIAGNOSIS — F41.1 GENERALIZED ANXIETY DISORDER: ICD-10-CM

## 2019-02-28 PROCEDURE — 90837 PSYTX W PT 60 MINUTES: CPT | Performed by: COUNSELOR

## 2019-02-28 PROCEDURE — 99214 OFFICE O/P EST MOD 30 MIN: CPT | Performed by: STUDENT IN AN ORGANIZED HEALTH CARE EDUCATION/TRAINING PROGRAM

## 2019-02-28 RX ORDER — DULOXETIN HYDROCHLORIDE 60 MG/1
60 CAPSULE, DELAYED RELEASE ORAL DAILY
COMMUNITY
Start: 2019-02-04 | End: 2020-05-27

## 2019-02-28 RX ORDER — PANTOPRAZOLE SODIUM 40 MG/1
40 TABLET, DELAYED RELEASE ORAL DAILY
Qty: 90 TABLET | Refills: 0 | Status: SHIPPED | OUTPATIENT
Start: 2019-02-28 | End: 2020-11-25

## 2019-02-28 RX ORDER — PANTOPRAZOLE SODIUM 40 MG/1
40 TABLET, DELAYED RELEASE ORAL DAILY
Qty: 30 TABLET | Refills: 5 | Status: CANCELLED | OUTPATIENT
Start: 2019-02-28

## 2019-02-28 ASSESSMENT — ANXIETY QUESTIONNAIRES
7. FEELING AFRAID AS IF SOMETHING AWFUL MIGHT HAPPEN: NEARLY EVERY DAY
7. FEELING AFRAID AS IF SOMETHING AWFUL MIGHT HAPPEN: NEARLY EVERY DAY
2. NOT BEING ABLE TO STOP OR CONTROL WORRYING: NEARLY EVERY DAY
GAD7 TOTAL SCORE: 20
1. FEELING NERVOUS, ANXIOUS, OR ON EDGE: NEARLY EVERY DAY
GAD7 TOTAL SCORE: 20
3. WORRYING TOO MUCH ABOUT DIFFERENT THINGS: NEARLY EVERY DAY
GAD7 TOTAL SCORE: 20
6. BECOMING EASILY ANNOYED OR IRRITABLE: NEARLY EVERY DAY
4. TROUBLE RELAXING: NEARLY EVERY DAY
5. BEING SO RESTLESS THAT IT IS HARD TO SIT STILL: MORE THAN HALF THE DAYS

## 2019-02-28 ASSESSMENT — PATIENT HEALTH QUESTIONNAIRE - PHQ9
10. IF YOU CHECKED OFF ANY PROBLEMS, HOW DIFFICULT HAVE THESE PROBLEMS MADE IT FOR YOU TO DO YOUR WORK, TAKE CARE OF THINGS AT HOME, OR GET ALONG WITH OTHER PEOPLE: EXTREMELY DIFFICULT
SUM OF ALL RESPONSES TO PHQ QUESTIONS 1-9: 26
SUM OF ALL RESPONSES TO PHQ QUESTIONS 1-9: 26
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
## 2019-02-28 NOTE — PATIENT INSTRUCTIONS
Patient Education     Tips to Control Acid Reflux    To control acid reflux, you ll need to make some basic diet and lifestyle changes. The simple steps outlined below may be all you ll need to ease discomfort.  Watch what you eat    Avoid fatty foods and spicy foods.    Eat fewer acidic foods, such as citrus and tomato-based foods. These can increase symptoms.    Limit drinking alcohol, caffeine, and fizzy beverages. All increase acid reflux.    Try limiting chocolate, peppermint, and spearmint. These can worsen acid reflux in some people.  Watch when you eat    Avoid lying down for 3 hours after eating.    Do not snack before going to bed.  Raise your head  Raising your head and upper body by 4 to 6 inches helps limit reflux when you re lying down. Put blocks under the head of your bed frame to raise it.  Other changes    Lose weight, if you need to    Don t exercise near bedtime    Avoid tight-fitting clothes    Limit aspirin and ibuprofen    Stop smoking   Date Last Reviewed: 7/1/2016 2000-2018 [employer]. [address] [place] [address], [place] [address]. All rights reserved. This information is not intended as a substitute for professional medical care. Always follow your healthcare professional's instructions.

## 2019-03-01 ASSESSMENT — ANXIETY QUESTIONNAIRES: GAD7 TOTAL SCORE: 20

## 2019-03-01 ASSESSMENT — PATIENT HEALTH QUESTIONNAIRE - PHQ9: SUM OF ALL RESPONSES TO PHQ QUESTIONS 1-9: 26

## 2019-03-01 ASSESSMENT — ASTHMA QUESTIONNAIRES: ACT_TOTALSCORE: 17

## 2019-03-26 ENCOUNTER — TRANSFERRED RECORDS (OUTPATIENT)
Dept: HEALTH INFORMATION MANAGEMENT | Facility: CLINIC | Age: 59
End: 2019-03-26

## 2019-04-08 ENCOUNTER — OFFICE VISIT (OUTPATIENT)
Dept: PSYCHOLOGY | Facility: CLINIC | Age: 59
End: 2019-04-08
Payer: COMMERCIAL

## 2019-04-08 DIAGNOSIS — F33.2 SEVERE RECURRENT MAJOR DEPRESSION WITHOUT PSYCHOTIC FEATURES (H): Primary | ICD-10-CM

## 2019-04-08 DIAGNOSIS — F41.1 GENERALIZED ANXIETY DISORDER: ICD-10-CM

## 2019-04-08 DIAGNOSIS — F43.10 POST TRAUMATIC STRESS DISORDER (PTSD): ICD-10-CM

## 2019-04-08 PROCEDURE — 90837 PSYTX W PT 60 MINUTES: CPT | Performed by: COUNSELOR

## 2019-04-08 ASSESSMENT — ANXIETY QUESTIONNAIRES
6. BECOMING EASILY ANNOYED OR IRRITABLE: NEARLY EVERY DAY
GAD7 TOTAL SCORE: 19
7. FEELING AFRAID AS IF SOMETHING AWFUL MIGHT HAPPEN: NEARLY EVERY DAY
3. WORRYING TOO MUCH ABOUT DIFFERENT THINGS: NEARLY EVERY DAY
1. FEELING NERVOUS, ANXIOUS, OR ON EDGE: NEARLY EVERY DAY
2. NOT BEING ABLE TO STOP OR CONTROL WORRYING: NEARLY EVERY DAY
5. BEING SO RESTLESS THAT IT IS HARD TO SIT STILL: SEVERAL DAYS
7. FEELING AFRAID AS IF SOMETHING AWFUL MIGHT HAPPEN: NEARLY EVERY DAY
GAD7 TOTAL SCORE: 19
GAD7 TOTAL SCORE: 19
4. TROUBLE RELAXING: NEARLY EVERY DAY

## 2019-04-08 ASSESSMENT — PATIENT HEALTH QUESTIONNAIRE - PHQ9
SUM OF ALL RESPONSES TO PHQ QUESTIONS 1-9: 23
SUM OF ALL RESPONSES TO PHQ QUESTIONS 1-9: 23

## 2019-04-08 NOTE — TELEPHONE ENCOUNTER
"Reason for Call:  Same Day Appointment, Requested Provider:  VALDEZ Carbone    PCP: Jonna Gaitan    Reason for visit: fell over the weekend and injured left leg, very painful to walk on.     Duration of symptoms: this is her \"bad\" leg, fell 4-6 days ago    Have you been treated for this in the past? Issue with this leg, Jonna is aware of    Additional comments: asking if Jonna will see her today    Can we leave a detailed message on this number? YES    Phone number patient can be reached at: Home number on file [phone] (home)    Best Time: any      Call taken on 6/1/2017 at 7:49 AM by Ade Lr    " Adult

## 2019-04-09 ASSESSMENT — PATIENT HEALTH QUESTIONNAIRE - PHQ9: SUM OF ALL RESPONSES TO PHQ QUESTIONS 1-9: 23

## 2019-04-09 ASSESSMENT — ANXIETY QUESTIONNAIRES: GAD7 TOTAL SCORE: 19
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
## 2019-04-15 ENCOUNTER — OFFICE VISIT (OUTPATIENT)
Dept: PSYCHOLOGY | Facility: CLINIC | Age: 59
End: 2019-04-15
Payer: COMMERCIAL

## 2019-04-15 DIAGNOSIS — F41.1 GENERALIZED ANXIETY DISORDER: ICD-10-CM

## 2019-04-15 DIAGNOSIS — F43.10 POST TRAUMATIC STRESS DISORDER (PTSD): ICD-10-CM

## 2019-04-15 DIAGNOSIS — F33.2 SEVERE RECURRENT MAJOR DEPRESSION WITHOUT PSYCHOTIC FEATURES (H): Primary | ICD-10-CM

## 2019-04-15 PROCEDURE — 90837 PSYTX W PT 60 MINUTES: CPT | Performed by: COUNSELOR
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
## 2019-04-23 NOTE — PROGRESS NOTES
"  SUBJECTIVE:   Linda Walsh is a 58 year old female who presents to clinic today for the following health issues:  Provider review pantoprazole-pt was not able to  as insurance would not cover-pt wondering if we need to do PA    History of Present Illness   Frequency of exercise:  None  Taking medications regularly:  Yes  Medication side effects:  None  Additional concerns today:  No    GERD/Heartburn      Duration: on going     Description (location/character/radiation): feels full all the way up to her throat , if she bends over she will have regurgitation of stomach contents into her mouth.  Her voice has been changing due to what she believes is acid.  She has early satiety.  If she belches she will have acid come into her mouth.  She states she does not actually feel heartburn however.    Intensity:  moderate, severe    Accompanying signs and symptoms:  food getting stuck: no   nausea/vomiting/blood: YES- nausea/vomiting only but thinks this might be from \"just taking her meds\"  abdominal pain: no   black/tarry or bloody stools: no :    History (similar episodes/previous evaluation): YES     Precipitating or alleviating factors:  worse with acidic food, diary.  current NSAID/Aspirin use: no   She sleeps with the head of her bed elevated and she avoids eating before bed.    Therapies tried and outcome: Omeprazole (Prilosec), Nexium, Zantac (Ranitidine) and famotidine all without any benefit and medication not helpful.  The only medication that has been helpful for her is pantoprazole    Additional history: She reports being told that her stomach emptying is delayed    Reviewed and updated as needed this visit by clinical staff         Reviewed and updated as needed this visit by Provider         Continues to follow with psychiatry and counseling.  She is following with chronic pain management through [facility], doing injections.    BP Readings from Last 3 Encounters:   04/24/19 128/70   02/28/19 112/74 "   08/21/18 116/64    Wt Readings from Last 3 Encounters:   08/21/18 73.9 kg (163 lb)   05/08/17 75.5 kg (166 lb 8 oz)   03/15/17 74.7 kg (164 lb 11.2 oz)                  Labs reviewed in EPIC    ROS:  CONSTITUTIONAL: NEGATIVE for fever, chills, change in weight  Skin-has had very dry skin over the past several years, uses very thick moisturizing ointments and creams with minimal benefit  ENT/MOUTH: NEGATIVE for ear, mouth and throat problems  RESP: NEGATIVE for significant cough or SOB  CV: NEGATIVE for chest pain, palpitations or peripheral edema  GI: reflux as above  MUSCULOSKELETAL: chronic pain    OBJECTIVE:     /70   Pulse 86   Temp 98.6  F (37  C) (Temporal)   Resp 16   LMP  (LMP Unknown)   SpO2 97%   There is no height or weight on file to calculate BMI.  GENERAL: healthy, alert and no distress  NECK: no adenopathy, no asymmetry, masses, or scars and thyroid normal to palpation  RESP: lungs clear to auscultation - no rales, rhonchi or wheezes  CV: regular rate and rhythm, normal S1 S2, no S3 or S4, no murmur, click or rub, no peripheral edema and peripheral pulses strong  ABDOMEN: soft, nontender, no hepatosplenomegaly, no masses and bowel sounds normal  MS: Wearing walking boot on her left foot  PSYCH: mentation appears normal, affect normal/bright    Diagnostic Test Results:  Pending    ASSESSMENT/PLAN:         1. Gastroesophageal reflux disease without esophagitis  We will try to resend pantoprazole, she has tried and failed omeprazole, ranitidine, Nexium OTC, and famotidine she only does well on pantoprazole  - pantoprazole (PROTONIX) 40 MG EC tablet; Take 1 tablet (40 mg) by mouth daily  Dispense: 30 tablet; Refill: 5  - CBC with platelets  - **Comprehensive metabolic panel FUTURE anytime  If she is not improving with acid suppression, would recommend upper GI endoscopy and likely referral to GI to address history of stomach emptying concerns    2. Screen for colon cancer  Ordered  -  GASTROENTEROLOGY ADULT REF PROCEDURE ONLY [facility] [phone]; [facility]    3. Visit for screening mammogram  Scheduled for patient    4. Moderate persistent asthma without complication  Well-controlled PRN use  - albuterol (PROAIR HFA/PROVENTIL HFA/VENTOLIN HFA) 108 (90 Base) MCG/ACT inhaler; Inhale 2 puffs into the lungs every 6 hours as needed for shortness of breath / dyspnea or wheezing  Dispense: 18 g; Refill: 3    5. Acute seasonal allergic rhinitis  Allergies are starting to worsen once again patient requests refill  - fluticasone (FLONASE) 50 MCG/ACT nasal spray; Spray 1-2 sprays into both nostrils daily  Dispense: 16 g; Refill: 11    6. Nausea    - ondansetron (ZOFRAN-ODT) 4 MG ODT tab; DISSOLVE ONE TABLET BY MOUTH EVERY 8 HOURS AS NEEDED FOR NAUSEA  Dispense: 20 tablet; Refill: 1    7. Major depressive disorder, single episode, moderate (H)  Following with psychiatry  - **Comprehensive metabolic panel FUTURE anytime    8. Menopausal syndrome (hot flashes)  Patient due for physical before next refill in August  - **Comprehensive metabolic panel FUTURE anytime    9. Insomnia, unspecified type    - **Comprehensive metabolic panel FUTURE anytime    10. Dry skin    - TSH with free T4 reflex    11. CARDIOVASCULAR SCREENING; LDL GOAL LESS THAN 160    - Lipid panel reflex to direct LDL Fasting    This chart documentation was completed in part with Dragon voice recognition software.  Documentation is reviewed after completion, however, some words and grammatical errors may remain.  MARI Carbone PA-C  [facility] [place]  Answers for HPI/ROS submitted by the patient on 4/24/2019   Chronic problems general questions HPI Form  If you checked off any problems, how difficult have these problems made it for you to do your work, take care of things at home, or get along with other people?: Somewhat difficult  PHQ9 TOTAL SCORE: 21  MARLIN 7 TOTAL SCORE: 21

## 2019-04-24 ENCOUNTER — OFFICE VISIT (OUTPATIENT)
Dept: FAMILY MEDICINE | Facility: OTHER | Age: 59
End: 2019-04-24
Payer: COMMERCIAL

## 2019-04-24 ENCOUNTER — TELEPHONE (OUTPATIENT)
Dept: FAMILY MEDICINE | Facility: OTHER | Age: 59
End: 2019-04-24

## 2019-04-24 VITALS
DIASTOLIC BLOOD PRESSURE: 70 MMHG | TEMPERATURE: 98.6 F | HEART RATE: 86 BPM | OXYGEN SATURATION: 97 % | RESPIRATION RATE: 16 BRPM | SYSTOLIC BLOOD PRESSURE: 128 MMHG

## 2019-04-24 DIAGNOSIS — Z13.6 CARDIOVASCULAR SCREENING; LDL GOAL LESS THAN 160: ICD-10-CM

## 2019-04-24 DIAGNOSIS — L85.3 DRY SKIN: Primary | ICD-10-CM

## 2019-04-24 DIAGNOSIS — G47.00 INSOMNIA, UNSPECIFIED TYPE: ICD-10-CM

## 2019-04-24 DIAGNOSIS — K21.9 GASTROESOPHAGEAL REFLUX DISEASE WITHOUT ESOPHAGITIS: ICD-10-CM

## 2019-04-24 DIAGNOSIS — J30.2 ACUTE SEASONAL ALLERGIC RHINITIS: ICD-10-CM

## 2019-04-24 DIAGNOSIS — Z12.11 SCREEN FOR COLON CANCER: ICD-10-CM

## 2019-04-24 DIAGNOSIS — N95.1 MENOPAUSAL SYNDROME (HOT FLASHES): ICD-10-CM

## 2019-04-24 DIAGNOSIS — R11.0 NAUSEA: ICD-10-CM

## 2019-04-24 DIAGNOSIS — F32.1 MAJOR DEPRESSIVE DISORDER, SINGLE EPISODE, MODERATE (H): ICD-10-CM

## 2019-04-24 DIAGNOSIS — Z12.31 VISIT FOR SCREENING MAMMOGRAM: ICD-10-CM

## 2019-04-24 DIAGNOSIS — J45.40 MODERATE PERSISTENT ASTHMA WITHOUT COMPLICATION: ICD-10-CM

## 2019-04-24 LAB
ALBUMIN SERPL-MCNC: 4.2 G/DL (ref 3.4–5)
ALP SERPL-CCNC: 58 U/L (ref 40–150)
ALT SERPL W P-5'-P-CCNC: 17 U/L (ref 0–50)
ANION GAP SERPL CALCULATED.3IONS-SCNC: 8 MMOL/L (ref 3–14)
AST SERPL W P-5'-P-CCNC: 14 U/L (ref 0–45)
BILIRUB SERPL-MCNC: 1 MG/DL (ref 0.2–1.3)
BUN SERPL-MCNC: 10 MG/DL (ref 7–30)
CALCIUM SERPL-MCNC: 9.2 MG/DL (ref 8.5–10.1)
CHLORIDE SERPL-SCNC: 107 MMOL/L (ref 94–109)
CHOLEST SERPL-MCNC: 240 MG/DL
CO2 SERPL-SCNC: 26 MMOL/L (ref 20–32)
CREAT SERPL-MCNC: 0.98 MG/DL (ref 0.52–1.04)
ERYTHROCYTE [DISTWIDTH] IN BLOOD BY AUTOMATED COUNT: 13.1 % (ref 10–15)
GFR SERPL CREATININE-BSD FRML MDRD: 63 ML/MIN/{1.73_M2}
GLUCOSE SERPL-MCNC: 87 MG/DL (ref 70–99)
HCT VFR BLD AUTO: 46.1 % (ref 35–47)
HDLC SERPL-MCNC: 61 MG/DL
HGB BLD-MCNC: 15.9 G/DL (ref 11.7–15.7)
LDLC SERPL CALC-MCNC: 141 MG/DL
MCH RBC QN AUTO: 31.1 PG (ref 26.5–33)
MCHC RBC AUTO-ENTMCNC: 34.5 G/DL (ref 31.5–36.5)
MCV RBC AUTO: 90 FL (ref 78–100)
NONHDLC SERPL-MCNC: 179 MG/DL
PLATELET # BLD AUTO: 243 10E9/L (ref 150–450)
POTASSIUM SERPL-SCNC: 4.2 MMOL/L (ref 3.4–5.3)
PROT SERPL-MCNC: 7.4 G/DL (ref 6.8–8.8)
RBC # BLD AUTO: 5.11 10E12/L (ref 3.8–5.2)
SODIUM SERPL-SCNC: 141 MMOL/L (ref 133–144)
TRIGL SERPL-MCNC: 189 MG/DL
TSH SERPL DL<=0.005 MIU/L-ACNC: 2.05 MU/L (ref 0.4–4)
WBC # BLD AUTO: 6.4 10E9/L (ref 4–11)

## 2019-04-24 PROCEDURE — 99214 OFFICE O/P EST MOD 30 MIN: CPT | Performed by: PHYSICIAN ASSISTANT

## 2019-04-24 PROCEDURE — 85027 COMPLETE CBC AUTOMATED: CPT | Performed by: PHYSICIAN ASSISTANT

## 2019-04-24 PROCEDURE — 80053 COMPREHEN METABOLIC PANEL: CPT | Performed by: PHYSICIAN ASSISTANT

## 2019-04-24 PROCEDURE — 80061 LIPID PANEL: CPT | Performed by: PHYSICIAN ASSISTANT

## 2019-04-24 PROCEDURE — 36415 COLL VENOUS BLD VENIPUNCTURE: CPT | Performed by: PHYSICIAN ASSISTANT

## 2019-04-24 PROCEDURE — 84443 ASSAY THYROID STIM HORMONE: CPT | Performed by: PHYSICIAN ASSISTANT

## 2019-04-24 RX ORDER — ALBUTEROL SULFATE 90 UG/1
2 AEROSOL, METERED RESPIRATORY (INHALATION) EVERY 6 HOURS PRN
Qty: 18 G | Refills: 3 | Status: SHIPPED | OUTPATIENT
Start: 2019-04-24 | End: 2023-03-21

## 2019-04-24 RX ORDER — PANTOPRAZOLE SODIUM 40 MG/1
40 TABLET, DELAYED RELEASE ORAL DAILY
Qty: 30 TABLET | Refills: 5 | Status: SHIPPED | OUTPATIENT
Start: 2019-04-24 | End: 2019-09-16

## 2019-04-24 RX ORDER — ONDANSETRON 4 MG/1
TABLET, ORALLY DISINTEGRATING ORAL
Qty: 20 TABLET | Refills: 1 | Status: SHIPPED | OUTPATIENT
Start: 2019-04-24 | End: 2021-07-29

## 2019-04-24 RX ORDER — FLUTICASONE PROPIONATE 50 MCG
1-2 SPRAY, SUSPENSION (ML) NASAL DAILY
Qty: 16 G | Refills: 11 | Status: SHIPPED | OUTPATIENT
Start: 2019-04-24 | End: 2020-11-25

## 2019-04-24 ASSESSMENT — PAIN SCALES - GENERAL: PAINLEVEL: EXTREME PAIN (8)

## 2019-04-24 ASSESSMENT — ANXIETY QUESTIONNAIRES
4. TROUBLE RELAXING: NEARLY EVERY DAY
GAD7 TOTAL SCORE: 21
2. NOT BEING ABLE TO STOP OR CONTROL WORRYING: NEARLY EVERY DAY
3. WORRYING TOO MUCH ABOUT DIFFERENT THINGS: NEARLY EVERY DAY
7. FEELING AFRAID AS IF SOMETHING AWFUL MIGHT HAPPEN: NEARLY EVERY DAY
6. BECOMING EASILY ANNOYED OR IRRITABLE: NEARLY EVERY DAY
GAD7 TOTAL SCORE: 21
GAD7 TOTAL SCORE: 21
1. FEELING NERVOUS, ANXIOUS, OR ON EDGE: NEARLY EVERY DAY
5. BEING SO RESTLESS THAT IT IS HARD TO SIT STILL: NEARLY EVERY DAY
7. FEELING AFRAID AS IF SOMETHING AWFUL MIGHT HAPPEN: NEARLY EVERY DAY

## 2019-04-24 ASSESSMENT — PATIENT HEALTH QUESTIONNAIRE - PHQ9
10. IF YOU CHECKED OFF ANY PROBLEMS, HOW DIFFICULT HAVE THESE PROBLEMS MADE IT FOR YOU TO DO YOUR WORK, TAKE CARE OF THINGS AT HOME, OR GET ALONG WITH OTHER PEOPLE: SOMEWHAT DIFFICULT
SUM OF ALL RESPONSES TO PHQ QUESTIONS 1-9: 21
SUM OF ALL RESPONSES TO PHQ QUESTIONS 1-9: 21

## 2019-04-24 NOTE — LETTER
[facility] [address] [address]  [phone]      April 26, 2019      Linda Walsh  [address] [address] [place] [address]      Dear Linda:     To better serve you, we are sending this letter to notify you that we have attempted to contact you by telephone to schedule the following procedure(s) ordered by your physician.     ____x___   Colonoscopy   _______   Upper GI Endoscopy (EGD)   _______   Colonoscopy and Upper GI Endoscopy    To provide the highest quality of care, we strongly encourage you to call and schedule the prescribed test/procedure at your earliest convenience.   The number to the Specialty Scheduling department is [phone] and the hours are 8:00am - 4:30pm Monday through Friday.   We look forward to hearing from you.    Sincerely,    [facility]

## 2019-04-25 ENCOUNTER — TRANSFERRED RECORDS (OUTPATIENT)
Dept: HEALTH INFORMATION MANAGEMENT | Facility: CLINIC | Age: 59
End: 2019-04-25

## 2019-04-25 ASSESSMENT — ASTHMA QUESTIONNAIRES: ACT_TOTALSCORE: 20

## 2019-04-25 ASSESSMENT — ANXIETY QUESTIONNAIRES: GAD7 TOTAL SCORE: 21

## 2019-04-25 ASSESSMENT — PATIENT HEALTH QUESTIONNAIRE - PHQ9: SUM OF ALL RESPONSES TO PHQ QUESTIONS 1-9: 21

## 2019-04-25 NOTE — TELEPHONE ENCOUNTER
Left message for patient to return call to schedule colonoscopy or EGD. If Pippa or Aishwarya are unavailable, please transfer to the surgery center.

## 2019-04-29 ENCOUNTER — OFFICE VISIT (OUTPATIENT)
Dept: PSYCHOLOGY | Facility: CLINIC | Age: 59
End: 2019-04-29
Payer: COMMERCIAL

## 2019-04-29 DIAGNOSIS — F43.10 POST TRAUMATIC STRESS DISORDER (PTSD): ICD-10-CM

## 2019-04-29 DIAGNOSIS — F33.2 SEVERE RECURRENT MAJOR DEPRESSION WITHOUT PSYCHOTIC FEATURES (H): Primary | ICD-10-CM

## 2019-04-29 DIAGNOSIS — F41.1 GENERALIZED ANXIETY DISORDER: ICD-10-CM

## 2019-04-29 PROCEDURE — 90837 PSYTX W PT 60 MINUTES: CPT | Performed by: COUNSELOR
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
## 2019-05-08 ENCOUNTER — OFFICE VISIT (OUTPATIENT)
Dept: PSYCHOLOGY | Facility: CLINIC | Age: 59
End: 2019-05-08
Payer: COMMERCIAL

## 2019-05-08 DIAGNOSIS — F33.2 SEVERE RECURRENT MAJOR DEPRESSION WITHOUT PSYCHOTIC FEATURES (H): Primary | ICD-10-CM

## 2019-05-08 DIAGNOSIS — F43.10 POST TRAUMATIC STRESS DISORDER (PTSD): ICD-10-CM

## 2019-05-08 DIAGNOSIS — F41.1 GENERALIZED ANXIETY DISORDER: ICD-10-CM

## 2019-05-08 PROCEDURE — 90837 PSYTX W PT 60 MINUTES: CPT | Performed by: COUNSELOR

## 2019-05-13 ENCOUNTER — OFFICE VISIT (OUTPATIENT)
Dept: PSYCHOLOGY | Facility: CLINIC | Age: 59
End: 2019-05-13
Payer: COMMERCIAL

## 2019-05-13 DIAGNOSIS — F33.2 SEVERE RECURRENT MAJOR DEPRESSION WITHOUT PSYCHOTIC FEATURES (H): Primary | ICD-10-CM

## 2019-05-13 DIAGNOSIS — F43.10 POST TRAUMATIC STRESS DISORDER (PTSD): ICD-10-CM

## 2019-05-13 DIAGNOSIS — F41.1 GENERALIZED ANXIETY DISORDER: ICD-10-CM

## 2019-05-13 PROCEDURE — 90837 PSYTX W PT 60 MINUTES: CPT | Performed by: COUNSELOR
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
## 2019-05-30 ENCOUNTER — TRANSFERRED RECORDS (OUTPATIENT)
Dept: HEALTH INFORMATION MANAGEMENT | Facility: CLINIC | Age: 59
End: 2019-05-30

## 2019-06-13 ENCOUNTER — TELEPHONE (OUTPATIENT)
Dept: PSYCHOLOGY | Facility: CLINIC | Age: 59
End: 2019-06-13

## 2019-06-17 ENCOUNTER — OFFICE VISIT (OUTPATIENT)
Dept: PSYCHOLOGY | Facility: CLINIC | Age: 59
End: 2019-06-17
Payer: COMMERCIAL

## 2019-06-17 DIAGNOSIS — F33.2 SEVERE RECURRENT MAJOR DEPRESSION WITHOUT PSYCHOTIC FEATURES (H): Primary | ICD-10-CM

## 2019-06-17 DIAGNOSIS — F43.10 POST TRAUMATIC STRESS DISORDER (PTSD): ICD-10-CM

## 2019-06-17 DIAGNOSIS — F41.1 GENERALIZED ANXIETY DISORDER: ICD-10-CM

## 2019-06-17 PROCEDURE — 90837 PSYTX W PT 60 MINUTES: CPT | Performed by: COUNSELOR
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
## 2019-07-08 ENCOUNTER — TRANSFERRED RECORDS (OUTPATIENT)
Dept: HEALTH INFORMATION MANAGEMENT | Facility: CLINIC | Age: 59
End: 2019-07-08

## 2019-07-11 ENCOUNTER — OFFICE VISIT (OUTPATIENT)
Dept: PSYCHOLOGY | Facility: CLINIC | Age: 59
End: 2019-07-11
Payer: COMMERCIAL

## 2019-07-11 DIAGNOSIS — F43.10 POST TRAUMATIC STRESS DISORDER (PTSD): ICD-10-CM

## 2019-07-11 DIAGNOSIS — F41.1 GENERALIZED ANXIETY DISORDER: ICD-10-CM

## 2019-07-11 DIAGNOSIS — F33.2 SEVERE RECURRENT MAJOR DEPRESSION WITHOUT PSYCHOTIC FEATURES (H): Primary | ICD-10-CM

## 2019-07-11 PROCEDURE — 90837 PSYTX W PT 60 MINUTES: CPT | Performed by: COUNSELOR

## 2019-07-11 ASSESSMENT — PATIENT HEALTH QUESTIONNAIRE - PHQ9
SUM OF ALL RESPONSES TO PHQ QUESTIONS 1-9: 22
SUM OF ALL RESPONSES TO PHQ QUESTIONS 1-9: 22

## 2019-07-11 ASSESSMENT — ANXIETY QUESTIONNAIRES
7. FEELING AFRAID AS IF SOMETHING AWFUL MIGHT HAPPEN: NEARLY EVERY DAY
1. FEELING NERVOUS, ANXIOUS, OR ON EDGE: NEARLY EVERY DAY
4. TROUBLE RELAXING: NEARLY EVERY DAY
7. FEELING AFRAID AS IF SOMETHING AWFUL MIGHT HAPPEN: NEARLY EVERY DAY
6. BECOMING EASILY ANNOYED OR IRRITABLE: NEARLY EVERY DAY
2. NOT BEING ABLE TO STOP OR CONTROL WORRYING: NEARLY EVERY DAY
GAD7 TOTAL SCORE: 21
GAD7 TOTAL SCORE: 21
3. WORRYING TOO MUCH ABOUT DIFFERENT THINGS: NEARLY EVERY DAY
5. BEING SO RESTLESS THAT IT IS HARD TO SIT STILL: NEARLY EVERY DAY
GAD7 TOTAL SCORE: 21
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
## 2019-07-12 ASSESSMENT — ANXIETY QUESTIONNAIRES: GAD7 TOTAL SCORE: 21

## 2019-07-12 ASSESSMENT — PATIENT HEALTH QUESTIONNAIRE - PHQ9: SUM OF ALL RESPONSES TO PHQ QUESTIONS 1-9: 22

## 2019-07-15 ENCOUNTER — OFFICE VISIT (OUTPATIENT)
Dept: PSYCHOLOGY | Facility: CLINIC | Age: 59
End: 2019-07-15
Payer: COMMERCIAL

## 2019-07-15 DIAGNOSIS — F41.1 GENERALIZED ANXIETY DISORDER: ICD-10-CM

## 2019-07-15 DIAGNOSIS — F33.2 SEVERE RECURRENT MAJOR DEPRESSION WITHOUT PSYCHOTIC FEATURES (H): Primary | ICD-10-CM

## 2019-07-15 DIAGNOSIS — F43.10 POST TRAUMATIC STRESS DISORDER (PTSD): ICD-10-CM

## 2019-07-15 PROCEDURE — 90837 PSYTX W PT 60 MINUTES: CPT | Performed by: COUNSELOR
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
## 2019-08-01 ENCOUNTER — TELEPHONE (OUTPATIENT)
Dept: FAMILY MEDICINE | Facility: OTHER | Age: 59
End: 2019-08-01

## 2019-08-01 NOTE — LETTER
[facility]  [id number] [place] [id number]  Phone: [phone]  August 16, 2019      Linda Walsh  [address]  [facility] [id number]      Dear Linda,    We care about your health and have reviewed your health plan including your medical conditions, medications, and lab results.  Based on this review, it is recommended that you follow up regarding the following health topic(s):  -Breast Cancer Screening  -Colon Cancer Screening    We recommend you take the following action(s):  -schedule a MAMMOGRAM which is due. Please disregard this reminder if you have had this exam elsewhere within the last 1-2 years please let us know so we can update your records.  -schedule a COLONOSCOPY to look for colon cancer (due every 10 years or 5 years in higher risk situations.)  Colonoscopies can prevent 90-95% of colon cancer deaths.  Problem lesions can be removed before they ever become cancer.  If you do not wish to do a colonoscopy or cannot afford to do one at this time, there is another option called a Fecal Immunochemical Occult Blood Test (FIT) a take home stool sample kit.  It does not replace the colonoscopy for colorectal cancer screening, but it can detect hidden bleeding in the lower colon.  It does need to be repeated every year and if a positive result is obtained, you would be referred for a colonoscopy.  If you have completed either one of these tests at another facility, please have the records sent to our clinic for our records.     Please call us at the [facility] - [phone] (or use [facility]) to address the above recommendations.     Thank you for trusting [facility] and we appreciate the opportunity to serve you.  We look forward to supporting your healthcare needs in the future.    Healthy Regards,    Your Health Care Team  [facility]

## 2019-08-01 NOTE — TELEPHONE ENCOUNTER
Summary:    Patient is due/failing the following:   COLONOSCOPY and MAMMOGRAM    Action needed:   Schedule a mammogram and colonoscopy or complete a FIT test     Type of outreach:    Phone, left message for patient to call back.     Questions for provider review:    None                                                                                                                                    Berta Ana       Chart routed to Care Team .        Panel Management Review      Patient has the following on her problem list:     Depression / Dysthymia review    Measure:  Needs PHQ-9 score of 4 or less during index window.  Administer PHQ-9 and if score is 5 or more, send encounter to provider for next steps.      PHQ-9 SCORE 4/8/2019 4/24/2019 7/11/2019   PHQ-9 Total Score - - -   PHQ-9 Total Score MyChart 23 (Severe depression) 21 (Severe depression) 22 (Severe depression)   PHQ-9 Total Score 23 21 22       If PHQ-9 recheck is 5 or more, route to provider for next steps.    Patient is due for:  PHQ9      Composite cancer screening  Chart review shows that this patient is due/due soon for the following Mammogram and Colonoscopy

## 2019-08-05 ENCOUNTER — OFFICE VISIT (OUTPATIENT)
Dept: PSYCHOLOGY | Facility: CLINIC | Age: 59
End: 2019-08-05
Payer: COMMERCIAL

## 2019-08-05 DIAGNOSIS — F43.10 POST TRAUMATIC STRESS DISORDER (PTSD): ICD-10-CM

## 2019-08-05 DIAGNOSIS — F41.1 GENERALIZED ANXIETY DISORDER: ICD-10-CM

## 2019-08-05 DIAGNOSIS — F33.2 SEVERE RECURRENT MAJOR DEPRESSION WITHOUT PSYCHOTIC FEATURES (H): Primary | ICD-10-CM

## 2019-08-05 PROCEDURE — 90837 PSYTX W PT 60 MINUTES: CPT | Performed by: COUNSELOR
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
## 2019-08-06 ENCOUNTER — TRANSFERRED RECORDS (OUTPATIENT)
Dept: HEALTH INFORMATION MANAGEMENT | Facility: CLINIC | Age: 59
End: 2019-08-06

## 2019-08-19 ENCOUNTER — TRANSFERRED RECORDS (OUTPATIENT)
Dept: HEALTH INFORMATION MANAGEMENT | Facility: CLINIC | Age: 59
End: 2019-08-19

## 2019-08-26 ENCOUNTER — NURSE TRIAGE (OUTPATIENT)
Dept: NURSING | Facility: CLINIC | Age: 59
End: 2019-08-26

## 2019-08-26 NOTE — TELEPHONE ENCOUNTER
Low back has been really bad with pain that started on Saturday.  Pain is keeping kamala from doing her daily activities.  Denies urination problems.      Additional Information    Negative: Passed out (i.e., fainted, collapsed and was not responding)    Negative: Shock suspected (e.g., cold/pale/clammy skin, too weak to stand, low BP, rapid pulse)    Negative: Sounds like a life-threatening emergency to the triager    Negative: SEVERE back pain of sudden onset and age > 60    Negative: SEVERE abdominal pain (e.g., excruciating)    Negative: Abdominal pain and age > 60    Negative: Unable to urinate (or only a few drops) and bladder feels very full    Negative: Loss of bladder or bowel control (urine or bowel incontinence; wetting self, leaking stool) of new onset    Negative: Numbness (loss of sensation) in groin or rectal area    Negative: Pain radiates into groin, scrotum    Negative: Blood in urine (red, pink, or tea-colored)    Negative: Vomiting and pain over lower ribs of back (i.e., flank - kidney area)    Negative: Weakness of a leg or foot (e.g., unable to bear weight, dragging foot)    Negative: Patient sounds very sick or weak to the triager    Negative: Fever > 100.5 F (38.1 C) and flank pain    Negative: Pain or burning with passing urine (urination)    SEVERE back pain (e.g., excruciating, unable to do any normal activities) and not improved after pain medicine and CARE ADVICE    Protocols used: BACK PAIN-A-OH

## 2019-09-04 DIAGNOSIS — Z12.31 ENCOUNTER FOR SCREENING MAMMOGRAM FOR BREAST CANCER: Primary | ICD-10-CM

## 2019-09-04 DIAGNOSIS — N95.1 MENOPAUSAL SYNDROME (HOT FLASHES): ICD-10-CM

## 2019-09-04 NOTE — TELEPHONE ENCOUNTER
Pending Prescriptions:                       Disp   Refills    estradiol (CLIMARA) 0.0375 MG/24HR weekly*       11           Sig: APPLY 1 PATCH ONTO THE SKIN ONCE WEEKLY (REMOVE           OLD PATCH)    Routing refill request to provider for review/approval because:  Mammogram    Susan Deras RN, BSN

## 2019-09-05 RX ORDER — ESTRADIOL 0.04 MG/D
PATCH TRANSDERMAL
Qty: 4 PATCH | Refills: 0 | Status: SHIPPED | OUTPATIENT
Start: 2019-09-05 | End: 2019-11-13

## 2019-09-06 NOTE — TELEPHONE ENCOUNTER
Advised patient of message. She stated she will call and make an appointment.     Medina Coelho MA

## 2019-09-09 ENCOUNTER — TELEPHONE (OUTPATIENT)
Dept: FAMILY MEDICINE | Facility: OTHER | Age: 59
End: 2019-09-09

## 2019-09-09 NOTE — LETTER
My Asthma Action Plan    Name: Linda Walsh   YOB: 1960  Date: 9/9/2019   My doctor: Jonna Gaitan PA-C   My clinic: [facility]        My Rescue Medicine:   Albuterol inhaler (Proair/Ventolin/Proventil HFA)  2-4 puffs EVERY 4 HOURS as needed. Use a spacer if recommended by your provider.   My Asthma Severity:   Mild Persistent  Know your asthma triggers:              GREEN ZONE   Good Control    I feel good    No cough or wheeze    Can work, sleep and play without asthma symptoms       Take your asthma control medicine every day.     1. If exercise triggers your asthma, take your rescue medication    15 minutes before exercise or sports, and    During exercise if you have asthma symptoms  2. Spacer to use with inhaler: If you have a spacer, make sure to use it with your inhaler             YELLOW ZONE Getting Worse  I have ANY of these:    I do not feel good    Cough or wheeze    Chest feels tight    Wake up at night   1. Keep taking your Green Zone medications  2. Start taking your rescue medicine:    every 20 minutes for up to 1 hour. Then every 4 hours for 24-48 hours.  3. If you stay in the Yellow Zone for more than 12-24 hours, contact your doctor.  4. If you do not return to the Green Zone in 12-24 hours or you get worse, start taking your oral steroid medicine if prescribed by your provider.           RED ZONE Medical Alert - Get Help  I have ANY of these:    I feel awful    Medicine is not helping    Breathing getting harder    Trouble walking or talking    Nose opens wide to breathe       1. Take your rescue medicine NOW  2. If your provider has prescribed an oral steroid medicine, start taking it NOW  3. Call your doctor NOW  4. If you are still in the Red Zone after 20 minutes and you have not reached your doctor:    Take your rescue medicine again and    Call 911 or go to the emergency room right away    See your regular doctor within 2 weeks of an Emergency Room or  Urgent Care visit for follow-up treatment.          Annual Reminders:  Meet with Asthma Educator,  Flu Shot in the Fall, consider Pneumonia Vaccination for patients with asthma (aged 19 and older).    Pharmacy:    [facility] [place] - [address]  [facility] [address] [place] [address] - [address]                        Asthma Triggers  How To Control Things That Make Your Asthma Worse    Triggers are things that make your asthma worse.  Look at the list below to help you find your triggers and   what you can do about them. You can help prevent asthma flare-ups by staying away from your triggers.      Trigger                                                          What you can do   Cigarette Smoke  Tobacco smoke can make asthma worse. Do not allow smoking in your home, car or around you.  Be sure no one smokes at a child s day care or school.  If you smoke, ask your health care provider for ways to help you quit.  Ask family members to quit too.  Ask your health care provider for a referral to Quit Plan to help you quit smoking, or call 7-537-526-PLAN.     Colds, Flu, Bronchitis  These are common triggers of asthma. Wash your hands often.  Don t touch your eyes, nose or mouth.  Get a flu shot every year.     Dust Mites  These are tiny bugs that live in cloth or carpet. They are too small to see. Wash sheets and blankets in hot water every week.   Encase pillows and mattress in dust mite proof covers.  Avoid having carpet if you can. If you have carpet, vacuum weekly.   Use a dust mask and HEPA vacuum.   Pollen and Outdoor Mold  Some people are allergic to trees, grass, or weed pollen, or molds. Try to keep your windows closed.  Limit time out doors when pollen count is high.   Ask you health care provider about taking medicine during allergy season.     Animal Dander  Some people are allergic to skin flakes, urine or saliva from pets with fur or feathers. Keep pets with fur or  feathers out of your home.    If you can t keep the pet outdoors, then keep the pet out of your bedroom.  Keep the bedroom door closed.  Keep pets off cloth furniture and away from stuffed toys.     Mice, Rats, and Cockroaches  Some people are allergic to the waste from these pests.   Cover food and garbage.  Clean up spills and food crumbs.  Store grease in the refrigerator.   Keep food out of the bedroom.   Indoor Mold  This can be a trigger if your home has high moisture. Fix leaking faucets, pipes, or other sources of water.   Clean moldy surfaces.  Dehumidify basement if it is damp and smelly.   Smoke, Strong Odors, and Sprays  These can reduce air quality. Stay away from strong odors and sprays, such as perfume, powder, hair spray, paints, smoke incense, paint, cleaning products, candles and new carpet.   Exercise or Sports  Some people with asthma have this trigger. Be active!  Ask your doctor about taking medicine before sports or exercise to prevent symptoms.    Warm up for 5-10 minutes before and after sports or exercise.     Other Triggers of Asthma  Cold air:  Cover your nose and mouth with a scarf.  Sometimes laughing or crying can be a trigger.  Some medicines and food can trigger asthma.

## 2019-09-09 NOTE — TELEPHONE ENCOUNTER
Summary:    Patient is due/failing the following:   AAP, COLONOSCOPY, MAMMOGRAM and PHYSICAL    Action needed:   Patient needs office visit for annual physical. Due now, please help schedule.  Patient needs referral/order: for colonoscopy or FIT test. Please ask which and can then place order.  Patient needs to complete mammogram. Order placed.      Type of outreach:    Phone, left message for patient to call back.     Questions for provider review:    YENI Vela CMA ([facility])       Chart routed to Care Team and provider .        Panel Management Review      Patient has the following on her problem list:     Depression / Dysthymia review    Measure:  Needs PHQ-9 score of 4 or less during index window.  Administer PHQ-9 and if score is 5 or more, send encounter to provider for next steps.    5 - 7 month window range:     PHQ-9 SCORE 4/8/2019 4/24/2019 7/11/2019   PHQ-9 Total Score - - -   PHQ-9 Total Score MyChart 23 (Severe depression) 21 (Severe depression) 22 (Severe depression)   PHQ-9 Total Score 23 21 22       If PHQ-9 recheck is 5 or more, route to provider for next steps.    Patient is due for:  None      Composite cancer screening  Chart review shows that this patient is due/due soon for the following Mammogram and Colonoscopy

## 2019-09-09 NOTE — LETTER
[facility]  [address] [place] [id number] [phone]        September 11, 2019    Linad Walsh                                                                                                                     [address]  [facility] [place] [address]                Dear Linda,    Dear Linda Walsh,    Your clinic record indicates that you are due for Mammogram, Colonoscopy or yearly stool blood testing (FIT) and complete physical exam. Please call the  at [phone] to schedule an appointment.     If you have questions about this letter please contact your provider.    Sincerely,    Your [facility] Team

## 2019-09-10 ENCOUNTER — HOSPITAL ENCOUNTER (EMERGENCY)
Facility: CLINIC | Age: 59
Discharge: HOME | End: 2019-09-10
Attending: EMERGENCY MEDICINE | Admitting: EMERGENCY MEDICINE
Payer: COMMERCIAL

## 2019-09-10 VITALS
DIASTOLIC BLOOD PRESSURE: 50 MMHG | SYSTOLIC BLOOD PRESSURE: 94 MMHG | HEART RATE: 72 BPM | TEMPERATURE: 98 F | OXYGEN SATURATION: 98 % | RESPIRATION RATE: 20 BRPM | BODY MASS INDEX: 28.23 KG/M2 | WEIGHT: 167.4 LBS

## 2019-09-10 DIAGNOSIS — K08.89 PAIN, DENTAL: ICD-10-CM

## 2019-09-10 DIAGNOSIS — G47.00 INSOMNIA, UNSPECIFIED TYPE: ICD-10-CM

## 2019-09-10 DIAGNOSIS — Z92.89 HISTORY OF DENTAL SURGERY: ICD-10-CM

## 2019-09-10 DIAGNOSIS — G89.4 CHRONIC PAIN SYNDROME: ICD-10-CM

## 2019-09-10 LAB
ALBUMIN SERPL-MCNC: 3.5 G/DL (ref 3.4–5)
ALP SERPL-CCNC: 136 U/L (ref 40–150)
ALT SERPL W P-5'-P-CCNC: 123 U/L (ref 0–50)
ANION GAP SERPL CALCULATED.3IONS-SCNC: 11 MMOL/L (ref 3–14)
AST SERPL W P-5'-P-CCNC: 79 U/L (ref 0–45)
BASOPHILS # BLD AUTO: 0 10E9/L (ref 0–0.2)
BASOPHILS NFR BLD AUTO: 0.4 %
BILIRUB SERPL-MCNC: 1.3 MG/DL (ref 0.2–1.3)
BUN SERPL-MCNC: 9 MG/DL (ref 7–30)
CALCIUM SERPL-MCNC: 9.2 MG/DL (ref 8.5–10.1)
CHLORIDE SERPL-SCNC: 109 MMOL/L (ref 94–109)
CO2 SERPL-SCNC: 22 MMOL/L (ref 20–32)
CREAT SERPL-MCNC: 0.8 MG/DL (ref 0.52–1.04)
DIFFERENTIAL METHOD BLD: NORMAL
EOSINOPHIL NFR BLD AUTO: 0.4 %
ERYTHROCYTE [DISTWIDTH] IN BLOOD BY AUTOMATED COUNT: 11.9 % (ref 10–15)
GFR SERPL CREATININE-BSD FRML MDRD: 80 ML/MIN/{1.73_M2}
GLUCOSE SERPL-MCNC: 93 MG/DL (ref 70–99)
HCT VFR BLD AUTO: 44.3 % (ref 35–47)
HGB BLD-MCNC: 15 G/DL (ref 11.7–15.7)
IMM GRANULOCYTES # BLD: 0 10E9/L (ref 0–0.4)
IMM GRANULOCYTES NFR BLD: 0.3 %
LIPASE SERPL-CCNC: 82 U/L (ref 73–393)
LYMPHOCYTES # BLD AUTO: 1.5 10E9/L (ref 0.8–5.3)
LYMPHOCYTES NFR BLD AUTO: 22 %
MCH RBC QN AUTO: 30.4 PG (ref 26.5–33)
MCHC RBC AUTO-ENTMCNC: 33.9 G/DL (ref 31.5–36.5)
MCV RBC AUTO: 90 FL (ref 78–100)
MONOCYTES # BLD AUTO: 0.3 10E9/L (ref 0–1.3)
MONOCYTES NFR BLD AUTO: 4.7 %
NEUTROPHILS # BLD AUTO: 5 10E9/L (ref 1.6–8.3)
NEUTROPHILS NFR BLD AUTO: 72.2 %
NRBC # BLD AUTO: 0 10*3/UL
NRBC BLD AUTO-RTO: 0 /100
PLATELET # BLD AUTO: 252 10E9/L (ref 150–450)
POTASSIUM SERPL-SCNC: 4.2 MMOL/L (ref 3.4–5.3)
PROT SERPL-MCNC: 7.5 G/DL (ref 6.8–8.8)
RBC # BLD AUTO: 4.94 10E12/L (ref 3.8–5.2)
SODIUM SERPL-SCNC: 142 MMOL/L (ref 133–144)
WBC # BLD AUTO: 7 10E9/L (ref 4–11)

## 2019-09-10 PROCEDURE — 96374 THER/PROPH/DIAG INJ IV PUSH: CPT | Performed by: EMERGENCY MEDICINE

## 2019-09-10 PROCEDURE — 99285 EMERGENCY DEPT VISIT HI MDM: CPT | Mod: Z6 | Performed by: EMERGENCY MEDICINE

## 2019-09-10 PROCEDURE — 96361 HYDRATE IV INFUSION ADD-ON: CPT | Performed by: EMERGENCY MEDICINE

## 2019-09-10 PROCEDURE — 25000128 H RX IP 250 OP 636: Performed by: EMERGENCY MEDICINE

## 2019-09-10 PROCEDURE — 96375 TX/PRO/DX INJ NEW DRUG ADDON: CPT | Performed by: EMERGENCY MEDICINE

## 2019-09-10 PROCEDURE — 85025 COMPLETE CBC W/AUTO DIFF WBC: CPT | Performed by: EMERGENCY MEDICINE

## 2019-09-10 PROCEDURE — 25800030 ZZH RX IP 258 OP 636: Performed by: EMERGENCY MEDICINE

## 2019-09-10 PROCEDURE — 80053 COMPREHEN METABOLIC PANEL: CPT | Performed by: EMERGENCY MEDICINE

## 2019-09-10 PROCEDURE — 99285 EMERGENCY DEPT VISIT HI MDM: CPT | Mod: 25 | Performed by: EMERGENCY MEDICINE

## 2019-09-10 PROCEDURE — 83690 ASSAY OF LIPASE: CPT | Performed by: EMERGENCY MEDICINE

## 2019-09-10 RX ORDER — CLINDAMYCIN HCL 300 MG
300 CAPSULE ORAL 4 TIMES DAILY
Qty: 20 CAPSULE | Refills: 0 | Status: SHIPPED | OUTPATIENT
Start: 2019-09-10 | End: 2019-09-16

## 2019-09-10 RX ORDER — TRAZODONE HYDROCHLORIDE 50 MG/1
150 TABLET, FILM COATED ORAL AT BEDTIME
Qty: 15 TABLET | Refills: 5 | Status: SHIPPED | OUTPATIENT
Start: 2019-09-10 | End: 2024-09-23

## 2019-09-10 RX ORDER — OXYCODONE HYDROCHLORIDE 5 MG/1
5 TABLET ORAL EVERY 6 HOURS PRN
Qty: 6 TABLET | Refills: 0 | Status: ON HOLD | OUTPATIENT
Start: 2019-09-10 | End: 2021-10-05

## 2019-09-10 RX ORDER — FENTANYL 25 UG/1
1 PATCH TRANSDERMAL
Qty: 1 PATCH | Refills: 0 | Status: SHIPPED | OUTPATIENT
Start: 2019-09-10

## 2019-09-10 RX ORDER — FENTANYL 37.5 UG/H
1 PATCH, EXTENDED RELEASE TRANSDERMAL
Qty: 1 PATCH | Refills: 0 | Status: SHIPPED | OUTPATIENT
Start: 2019-09-10 | End: 2020-11-25

## 2019-09-10 RX ORDER — ONDANSETRON 4 MG/1
4 TABLET, FILM COATED ORAL EVERY 6 HOURS PRN
Qty: 10 TABLET | Refills: 0 | Status: SHIPPED | OUTPATIENT
Start: 2019-09-10 | End: 2020-02-26

## 2019-09-10 RX ADMIN — SODIUM CHLORIDE 1000 ML: 9 INJECTION, SOLUTION INTRAVENOUS at 14:02

## 2019-09-10 RX ADMIN — PROMETHAZINE HYDROCHLORIDE 25 MG: 25 INJECTION INTRAMUSCULAR; INTRAVENOUS at 14:08

## 2019-09-10 RX ADMIN — HYDROMORPHONE HYDROCHLORIDE 1 MG: 1 INJECTION, SOLUTION INTRAMUSCULAR; INTRAVENOUS; SUBCUTANEOUS at 14:06

## 2019-09-10 ASSESSMENT — ENCOUNTER SYMPTOMS
NAUSEA: 1
NERVOUS/ANXIOUS: 1
ABDOMINAL PAIN: 0
VOMITING: 1
NEUROLOGICAL NEGATIVE: 1
RESPIRATORY NEGATIVE: 1
APPETITE CHANGE: 1
FEVER: 0
ACTIVITY CHANGE: 1
CARDIOVASCULAR NEGATIVE: 1

## 2019-09-10 NOTE — ED TRIAGE NOTES
Had dental work done on Thursday. Was put on Antibiotics and pain medication on Sunday. States took one dose of abx, tramadol, ibuprofen and has been nauseated and vomiting since.

## 2019-09-10 NOTE — ED AVS SNAPSHOT
[facility]  [address] [place] [address]  Phone:  [phone]  Fax:  [phone]                                    Linda Walsh   MRN: [id number]    Department:  [facility] Emergency Department   Date of Visit:  9/10/2019           After Visit Summary Signature Page    I have received my discharge instructions, and my questions have been answered. I have discussed any challenges I see with this plan with the nurse or doctor.    ..........................................................................................................................................  Patient/Patient Representative Signature      ..........................................................................................................................................  Patient Representative Print Name and Relationship to Patient    ..................................................               ................................................  Date                                   Time    ..........................................................................................................................................  Reviewed by Signature/Title    ...................................................              ..............................................  Date                                               Time          22EPIC Rev 08/18

## 2019-09-10 NOTE — DISCHARGE INSTRUCTIONS
Authorized refill of your routine pain medications until you can get into see your clinic provider who manages chronic pain Rx's.  Fentanyl -1 patch  Trazodone -15 tablets  Oxycodone 5 -6 tablets    Current antibiotic with erythromycin.  Start clindamycin 3 mg 4 times daily.  Must take with some food.  Prescription for Zofran has been provided. If the antibiotic makes you nauseous please take as direcrted
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
## 2019-09-16 ENCOUNTER — OFFICE VISIT (OUTPATIENT)
Dept: PSYCHOLOGY | Facility: CLINIC | Age: 59
End: 2019-09-16
Payer: COMMERCIAL

## 2019-09-16 ENCOUNTER — OFFICE VISIT (OUTPATIENT)
Dept: FAMILY MEDICINE | Facility: OTHER | Age: 59
End: 2019-09-16
Payer: COMMERCIAL

## 2019-09-16 VITALS
SYSTOLIC BLOOD PRESSURE: 110 MMHG | RESPIRATION RATE: 16 BRPM | HEART RATE: 68 BPM | TEMPERATURE: 98.6 F | WEIGHT: 165 LBS | OXYGEN SATURATION: 97 % | DIASTOLIC BLOOD PRESSURE: 66 MMHG | BODY MASS INDEX: 27.82 KG/M2

## 2019-09-16 DIAGNOSIS — K21.9 GASTROESOPHAGEAL REFLUX DISEASE WITHOUT ESOPHAGITIS: ICD-10-CM

## 2019-09-16 DIAGNOSIS — F33.2 SEVERE RECURRENT MAJOR DEPRESSION WITHOUT PSYCHOTIC FEATURES (H): Primary | ICD-10-CM

## 2019-09-16 DIAGNOSIS — F43.10 POST TRAUMATIC STRESS DISORDER (PTSD): ICD-10-CM

## 2019-09-16 DIAGNOSIS — J01.90 ACUTE NON-RECURRENT SINUSITIS, UNSPECIFIED LOCATION: Primary | ICD-10-CM

## 2019-09-16 DIAGNOSIS — F41.1 GENERALIZED ANXIETY DISORDER: ICD-10-CM

## 2019-09-16 PROCEDURE — 99213 OFFICE O/P EST LOW 20 MIN: CPT | Performed by: PHYSICIAN ASSISTANT

## 2019-09-16 PROCEDURE — 90837 PSYTX W PT 60 MINUTES: CPT | Performed by: COUNSELOR

## 2019-09-16 RX ORDER — AZITHROMYCIN 250 MG/1
TABLET, FILM COATED ORAL
COMMUNITY
Start: 2019-09-08 | End: 2019-11-19

## 2019-09-16 RX ORDER — ESOMEPRAZOLE MAGNESIUM 40 MG/1
40 CAPSULE, DELAYED RELEASE ORAL
Qty: 30 CAPSULE | Refills: 5 | Status: SHIPPED | OUTPATIENT
Start: 2019-09-16 | End: 2020-10-01

## 2019-09-16 RX ORDER — CEFDINIR 300 MG/1
300 CAPSULE ORAL 2 TIMES DAILY
Qty: 20 CAPSULE | Refills: 0 | Status: SHIPPED | OUTPATIENT
Start: 2019-09-16 | End: 2019-11-19

## 2019-09-16 ASSESSMENT — PAIN SCALES - GENERAL: PAINLEVEL: EXTREME PAIN (8)

## 2019-09-16 NOTE — PROGRESS NOTES
Subjective     Linda Walsh is a 59 year old female who presents to clinic today for the following health issues:    HPI   Acute Illness   Acute illness concerns: sinuses Symptoms began after she had her tooth removed on September 5.  Apparently her dentist told her that her sinus cavity was exposed, she placed sutures to protect it.  She has been in a lot of pain ever since.  She went to the emergency room on September 10 regarding this.  Her dentist started her on Zithromax and told her to use Tylenol.  She is also been using ibuprofen.  She went to the emergency room because the Zithromax was making her vomit profusely.  Symptoms never resolved.  Onset: started after having tooth removed, late Saturday/early Sunday(x1-2 days)    Fever: YES- low grade, not recently per patient    Chills/Sweats: no    Headache (location?): YES, frontal    Sinus Pressure:YES, worse over the left cheek and forehead than the right, her tooth was pulled on the left upper jaw    Conjunctivitis:  No but eyes hurt a lot     Ear Pain: YES and feel plugged     Rhinorrhea: YES, tastes and smells purulent per patient    Congestion: YES    Sore Throat: no but having jaw pain  Teeth hurt   Cough: no    Wheeze: no    Decreased Appetite: YES    Nausea: YES    Vomiting: no    Diarrhea:  no    Dysuria/Freq.: no    Fatigue/Achiness: YES- fatigue     Sick/Strep Exposure: no     Therapies Tried and outcome: heat, steam, peppermint oil, vics, cold, massage face, azithromycin, ibuprofen, tylenol    Also, she is unable to afford the pantoprazole.  She has been using omeprazole over-the-counter but it is not been helpful for her heartburn.  Wonders if we can try a different prescription.    BP Readings from Last 3 Encounters:   09/16/19 110/66   09/10/19 94/50   04/24/19 128/70    Wt Readings from Last 3 Encounters:   09/16/19 74.8 kg (165 lb)   09/10/19 75.9 kg (167 lb 6.4 oz)   08/21/18 73.9 kg (163 lb)                    Reviewed and updated as  needed this visit by Provider         Review of Systems   ROS COMP: As documented above       Objective    /66   Pulse 68   Temp 98.6  F (37  C) (Temporal)   Resp 16   Wt 74.8 kg (165 lb)   LMP  (LMP Unknown)   SpO2 97%   BMI 27.82 kg/m     Body mass index is 27.82 kg/m .  Physical Exam   GENERAL: healthy, alert and no distress  EYES: Eyes grossly normal to inspection  HENT: normal cephalic/atraumatic, ear canals and TM's normal, nasal mucosa edematous , oropharynx clear, oral mucous membranes moist and sinuses: maxillary, frontal tenderness on bilaterally  NECK: no adenopathy, no asymmetry, masses, or scars and thyroid normal to palpation  RESP: lungs clear to auscultation - no rales, rhonchi or wheezes  CV: regular rate and rhythm, normal S1 S2, no S3 or S4, no murmur, click or rub, no peripheral edema and peripheral pulses strong  MS: no gross musculoskeletal defects noted, no edema  PSYCH: mentation appears normal, affect normal/bright    Diagnostic Test Results:  Labs reviewed in Epic  none         Assessment & Plan     1. Gastroesophageal reflux disease without esophagitis  Discontinue over-the-counter omeprazole switch her to Nexium and see if this is beneficial  - esomeprazole (NEXIUM) 40 MG DR capsule; Take 1 capsule (40 mg) by mouth every morning (before breakfast) Take 30-60 minutes before eating.  Dispense: 30 capsule; Refill: 5    2. Acute non-recurrent sinusitis, unspecified location  Over-the-counter meds as needed we will cover her with Omnicef, she is aware there is a risk of cross-reactivity with her penicillin allergy, she will alert me at once if she develops hives  - cefdinir (OMNICEF) 300 MG capsule; Take 1 capsule (300 mg) by mouth 2 times daily for 10 days  Dispense: 20 capsule; Refill: 0       This chart documentation was completed in part with Dragon voice recognition software.  Documentation is reviewed after completion, however, some words and grammatical errors may  remain.  Jonna Gaitan PA-C      Return in about 6 months (around 3/16/2020), or if symptoms worsen or fail to improve, for Physical Exam.    Jonna Gaitan PA-C  [facility]
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
## 2019-10-09 ENCOUNTER — HOSPITAL ENCOUNTER (OUTPATIENT)
Dept: MAMMOGRAPHY | Facility: CLINIC | Age: 59
Discharge: HOME | End: 2019-10-09
Attending: PHYSICIAN ASSISTANT | Admitting: PHYSICIAN ASSISTANT
Payer: COMMERCIAL

## 2019-10-09 ENCOUNTER — TRANSFERRED RECORDS (OUTPATIENT)
Dept: HEALTH INFORMATION MANAGEMENT | Facility: CLINIC | Age: 59
End: 2019-10-09

## 2019-10-09 ENCOUNTER — TELEPHONE (OUTPATIENT)
Dept: FAMILY MEDICINE | Facility: OTHER | Age: 59
End: 2019-10-09

## 2019-10-09 DIAGNOSIS — Z12.31 ENCOUNTER FOR SCREENING MAMMOGRAM FOR BREAST CANCER: ICD-10-CM

## 2019-10-09 DIAGNOSIS — Z12.31 VISIT FOR SCREENING MAMMOGRAM: ICD-10-CM

## 2019-10-09 LAB — PHQ9 SCORE: 23

## 2019-10-09 PROCEDURE — 77063 BREAST TOMOSYNTHESIS BI: CPT

## 2019-10-09 NOTE — TELEPHONE ENCOUNTER
Reason for call:  Patient reporting a symptom    Symptom or request: patient seen a couple of weeks ago for sinus inf. She is still stuffed up and feels awful. She has been using saline and anything she can    Duration (how long have symptoms been present):     Have you been treated for this before? Yes    Additional comments: is wondering what she should do. Declined appt, wanted to speak with nurse    Phone Number patient can be reached at:  Home number on file [phone] (home)    Best Time:  any    Can we leave a detailed message on this number:  YES    Call taken on 10/9/2019 at 10:14 AM by Tosha Lemus

## 2019-10-09 NOTE — TELEPHONE ENCOUNTER
Spoke with patient. No improvement on antibiotics.  It feels so far up in the sinus. Used saline rinse spray and leans all direction.   No fever.  Worn out and stuffed up.  Face and eyes hurts.    Has been having headaches.      Advised to try an OTC flonase and antihistamine like claritin or zyrtec.  Follow up in clinic if not improving.    Patient agreed to plan.    Jeremias Díaz, RN, BSN

## 2019-10-29 ENCOUNTER — OFFICE VISIT (OUTPATIENT)
Dept: PSYCHOLOGY | Facility: CLINIC | Age: 59
End: 2019-10-29
Payer: COMMERCIAL

## 2019-10-29 DIAGNOSIS — F33.2 SEVERE RECURRENT MAJOR DEPRESSION WITHOUT PSYCHOTIC FEATURES (H): Primary | ICD-10-CM

## 2019-10-29 DIAGNOSIS — F43.10 POST TRAUMATIC STRESS DISORDER (PTSD): ICD-10-CM

## 2019-10-29 DIAGNOSIS — F41.1 GENERALIZED ANXIETY DISORDER: ICD-10-CM

## 2019-10-29 PROCEDURE — 90837 PSYTX W PT 60 MINUTES: CPT | Performed by: COUNSELOR
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
## 2019-11-04 ENCOUNTER — TRANSFERRED RECORDS (OUTPATIENT)
Dept: HEALTH INFORMATION MANAGEMENT | Facility: CLINIC | Age: 59
End: 2019-11-04

## 2019-11-13 DIAGNOSIS — N95.1 MENOPAUSAL SYNDROME (HOT FLASHES): ICD-10-CM

## 2019-11-13 RX ORDER — ESTRADIOL 0.04 MG/D
PATCH TRANSDERMAL
Qty: 12 PATCH | Refills: 3 | Status: SHIPPED | OUTPATIENT
Start: 2019-11-13 | End: 2020-11-19

## 2019-11-13 NOTE — TELEPHONE ENCOUNTER
Prescription approved per [facility] Refill Protocol.    Giovana Chavez, RN, BSN

## 2019-11-18 ENCOUNTER — OFFICE VISIT (OUTPATIENT)
Dept: PSYCHOLOGY | Facility: CLINIC | Age: 59
End: 2019-11-18
Payer: COMMERCIAL

## 2019-11-18 DIAGNOSIS — F41.1 GENERALIZED ANXIETY DISORDER: ICD-10-CM

## 2019-11-18 DIAGNOSIS — F43.10 POST TRAUMATIC STRESS DISORDER (PTSD): ICD-10-CM

## 2019-11-18 DIAGNOSIS — F33.2 SEVERE RECURRENT MAJOR DEPRESSION WITHOUT PSYCHOTIC FEATURES (H): Primary | ICD-10-CM

## 2019-11-18 PROCEDURE — 90837 PSYTX W PT 60 MINUTES: CPT | Performed by: COUNSELOR

## 2019-11-18 ASSESSMENT — ANXIETY QUESTIONNAIRES
GAD7 TOTAL SCORE: 20
7. FEELING AFRAID AS IF SOMETHING AWFUL MIGHT HAPPEN: MORE THAN HALF THE DAYS
GAD7 TOTAL SCORE: 20
6. BECOMING EASILY ANNOYED OR IRRITABLE: NEARLY EVERY DAY
GAD7 TOTAL SCORE: 20
3. WORRYING TOO MUCH ABOUT DIFFERENT THINGS: NEARLY EVERY DAY
1. FEELING NERVOUS, ANXIOUS, OR ON EDGE: NEARLY EVERY DAY
7. FEELING AFRAID AS IF SOMETHING AWFUL MIGHT HAPPEN: MORE THAN HALF THE DAYS
2. NOT BEING ABLE TO STOP OR CONTROL WORRYING: NEARLY EVERY DAY
4. TROUBLE RELAXING: NEARLY EVERY DAY
5. BEING SO RESTLESS THAT IT IS HARD TO SIT STILL: NEARLY EVERY DAY

## 2019-11-18 ASSESSMENT — PATIENT HEALTH QUESTIONNAIRE - PHQ9
SUM OF ALL RESPONSES TO PHQ QUESTIONS 1-9: 25
10. IF YOU CHECKED OFF ANY PROBLEMS, HOW DIFFICULT HAVE THESE PROBLEMS MADE IT FOR YOU TO DO YOUR WORK, TAKE CARE OF THINGS AT HOME, OR GET ALONG WITH OTHER PEOPLE: EXTREMELY DIFFICULT
SUM OF ALL RESPONSES TO PHQ QUESTIONS 1-9: 25

## 2019-11-18 NOTE — PROGRESS NOTES
Subjective     Linda Walsh is a 59 year old female who presents to clinic today for the following health issues:  .[facility]  Patient is eligible for use of low-dose aspirin for primary prevention of heart attack and stroke.  Provider has discussed aspirin with patient and our decision was:     Not Indicated:  Daily low-dose aspirin recommended for primary prevention, patient not eligible.        HPI   Acute Illness   Acute illness concerns: Sinus  Onset: September    Fever: no    Chills/Sweats: no    Headache (location?): YES, PND, frontal and max    Sinus Pressure:YES- tender, post-nasal drainage and facial pain    Conjunctivitis:  no    Ear Pain: no    Rhinorrhea: no    Congestion: YES, yellow    Sore Throat: no     Cough: no    Wheeze: no    Decreased Appetite: YES    Nausea: YES    Vomiting: no    Diarrhea:  no    Dysuria/Freq.: no    Fatigue/Achiness: YES    Sick/Strep Exposure: no     Therapies Tried and outcome: Antibiotics in the beginning- EES , saline, flonase heat,         BP Readings from Last 3 Encounters:   11/19/19 126/84   09/16/19 110/66   09/10/19 94/50    Wt Readings from Last 3 Encounters:   11/19/19 76.7 kg (169 lb 3.2 oz)   09/16/19 74.8 kg (165 lb)   09/10/19 75.9 kg (167 lb 6.4 oz)                    Reviewed and updated as needed this visit by Provider         Review of Systems   As documented above     She sees psychiatry, she has no suicidal intention or plan      Objective    /84   Pulse 54   Temp 97.5  F (36.4  C) (Temporal)   Resp 16   Wt 76.7 kg (169 lb 3.2 oz)   LMP  (LMP Unknown)   SpO2 99%   BMI 28.53 kg/m    Body mass index is 28.53 kg/m .  Physical Exam   GENERAL: healthy, alert and no distress  EYES: Eyes grossly normal to inspection  HENT: normal cephalic/atraumatic, ear canals and TM's normal, nose and mouth without ulcers or lesions, nasal mucosa edematous , oropharynx clear, oral mucous membranes moist and sinuses: maxillary, frontal tenderness on  bilaterally  NECK: no adenopathy, no asymmetry, masses, or scars and thyroid normal to palpation  RESP: lungs clear to auscultation - no rales, rhonchi or wheezes  CV: regular rate and rhythm, normal S1 S2, no S3 or S4, no murmur, click or rub, no peripheral edema and peripheral pulses strong  ABDOMEN: soft, nontender, no hepatosplenomegaly, no masses and bowel sounds normal  MS: no gross musculoskeletal defects noted, no edema    Diagnostic Test Results:  Labs reviewed in Epic  none         Assessment & Plan     1. Acute non-recurrent maxillary sinusitis  otc meds prn, nasal saline and steroid nasal spray  - doxycycline hyclate (VIBRAMYCIN) 100 MG capsule; Take 1 capsule (100 mg) by mouth 2 times daily for 10 days  Dispense: 20 capsule; Refill: 0           No follow-ups on file.    Jonna Gaitan PA-C  [facility]    Answers for HPI/ROS submitted by the patient on 11/19/2019   If you checked off any problems, how difficult have these problems made it for you to do your work, take care of things at home, or get along with other people?: Extremely difficult  PHQ9 TOTAL SCORE: 23

## 2019-11-19 ENCOUNTER — OFFICE VISIT (OUTPATIENT)
Dept: FAMILY MEDICINE | Facility: OTHER | Age: 59
End: 2019-11-19
Payer: COMMERCIAL

## 2019-11-19 VITALS
SYSTOLIC BLOOD PRESSURE: 126 MMHG | WEIGHT: 169.2 LBS | OXYGEN SATURATION: 99 % | TEMPERATURE: 97.5 F | DIASTOLIC BLOOD PRESSURE: 84 MMHG | HEART RATE: 54 BPM | BODY MASS INDEX: 28.53 KG/M2 | RESPIRATION RATE: 16 BRPM

## 2019-11-19 DIAGNOSIS — J01.00 ACUTE NON-RECURRENT MAXILLARY SINUSITIS: Primary | ICD-10-CM

## 2019-11-19 PROCEDURE — 99213 OFFICE O/P EST LOW 20 MIN: CPT | Performed by: PHYSICIAN ASSISTANT

## 2019-11-19 RX ORDER — DOXYCYCLINE 100 MG/1
100 CAPSULE ORAL 2 TIMES DAILY
Qty: 20 CAPSULE | Refills: 0 | Status: SHIPPED | OUTPATIENT
Start: 2019-11-19 | End: 2019-12-31

## 2019-11-19 ASSESSMENT — PATIENT HEALTH QUESTIONNAIRE - PHQ9
10. IF YOU CHECKED OFF ANY PROBLEMS, HOW DIFFICULT HAVE THESE PROBLEMS MADE IT FOR YOU TO DO YOUR WORK, TAKE CARE OF THINGS AT HOME, OR GET ALONG WITH OTHER PEOPLE: EXTREMELY DIFFICULT
SUM OF ALL RESPONSES TO PHQ QUESTIONS 1-9: 23
SUM OF ALL RESPONSES TO PHQ QUESTIONS 1-9: 23
SUM OF ALL RESPONSES TO PHQ QUESTIONS 1-9: 25

## 2019-11-19 ASSESSMENT — ASTHMA QUESTIONNAIRES
QUESTION_2 LAST FOUR WEEKS HOW OFTEN HAVE YOU HAD SHORTNESS OF BREATH: THREE TO SIX TIMES A WEEK
ACT_TOTALSCORE: 21
QUESTION_4 LAST FOUR WEEKS HOW OFTEN HAVE YOU USED YOUR RESCUE INHALER OR NEBULIZER MEDICATION (SUCH AS ALBUTEROL): ONCE A WEEK OR LESS
QUESTION_5 LAST FOUR WEEKS HOW WOULD YOU RATE YOUR ASTHMA CONTROL: WELL CONTROLLED
QUESTION_1 LAST FOUR WEEKS HOW MUCH OF THE TIME DID YOUR ASTHMA KEEP YOU FROM GETTING AS MUCH DONE AT WORK, SCHOOL OR AT HOME: NONE OF THE TIME
QUESTION_3 LAST FOUR WEEKS HOW OFTEN DID YOUR ASTHMA SYMPTOMS (WHEEZING, COUGHING, SHORTNESS OF BREATH, CHEST TIGHTNESS OR PAIN) WAKE YOU UP AT NIGHT OR EARLIER THAN USUAL IN THE MORNING: NOT AT ALL

## 2019-11-19 ASSESSMENT — PAIN SCALES - GENERAL: PAINLEVEL: EXTREME PAIN (8)

## 2019-11-19 ASSESSMENT — ANXIETY QUESTIONNAIRES: GAD7 TOTAL SCORE: 20
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
## 2019-11-20 ASSESSMENT — ASTHMA QUESTIONNAIRES: ACT_TOTALSCORE: 21

## 2019-12-02 ENCOUNTER — OFFICE VISIT (OUTPATIENT)
Dept: PSYCHOLOGY | Facility: CLINIC | Age: 59
End: 2019-12-02
Payer: COMMERCIAL

## 2019-12-02 DIAGNOSIS — F43.10 POST TRAUMATIC STRESS DISORDER (PTSD): ICD-10-CM

## 2019-12-02 DIAGNOSIS — F41.1 GENERALIZED ANXIETY DISORDER: ICD-10-CM

## 2019-12-02 DIAGNOSIS — F33.2 SEVERE RECURRENT MAJOR DEPRESSION WITHOUT PSYCHOTIC FEATURES (H): Primary | ICD-10-CM

## 2019-12-02 PROCEDURE — 90837 PSYTX W PT 60 MINUTES: CPT | Performed by: COUNSELOR
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
## 2019-12-09 ENCOUNTER — TRANSFERRED RECORDS (OUTPATIENT)
Dept: HEALTH INFORMATION MANAGEMENT | Facility: CLINIC | Age: 59
End: 2019-12-09

## 2019-12-30 ENCOUNTER — OFFICE VISIT (OUTPATIENT)
Dept: PSYCHOLOGY | Facility: CLINIC | Age: 59
End: 2019-12-30
Payer: COMMERCIAL

## 2019-12-30 DIAGNOSIS — F41.1 GENERALIZED ANXIETY DISORDER: ICD-10-CM

## 2019-12-30 DIAGNOSIS — F33.2 SEVERE RECURRENT MAJOR DEPRESSION WITHOUT PSYCHOTIC FEATURES (H): Primary | ICD-10-CM

## 2019-12-30 DIAGNOSIS — F43.10 POST TRAUMATIC STRESS DISORDER (PTSD): ICD-10-CM

## 2019-12-30 PROCEDURE — 90834 PSYTX W PT 45 MINUTES: CPT | Performed by: COUNSELOR

## 2019-12-30 ASSESSMENT — ANXIETY QUESTIONNAIRES
7. FEELING AFRAID AS IF SOMETHING AWFUL MIGHT HAPPEN: MORE THAN HALF THE DAYS
1. FEELING NERVOUS, ANXIOUS, OR ON EDGE: NEARLY EVERY DAY
7. FEELING AFRAID AS IF SOMETHING AWFUL MIGHT HAPPEN: MORE THAN HALF THE DAYS
5. BEING SO RESTLESS THAT IT IS HARD TO SIT STILL: MORE THAN HALF THE DAYS
GAD7 TOTAL SCORE: 19
GAD7 TOTAL SCORE: 19
3. WORRYING TOO MUCH ABOUT DIFFERENT THINGS: NEARLY EVERY DAY
4. TROUBLE RELAXING: NEARLY EVERY DAY
GAD7 TOTAL SCORE: 19
2. NOT BEING ABLE TO STOP OR CONTROL WORRYING: NEARLY EVERY DAY
6. BECOMING EASILY ANNOYED OR IRRITABLE: NEARLY EVERY DAY

## 2019-12-30 ASSESSMENT — PATIENT HEALTH QUESTIONNAIRE - PHQ9
SUM OF ALL RESPONSES TO PHQ QUESTIONS 1-9: 17
SUM OF ALL RESPONSES TO PHQ QUESTIONS 1-9: 17
10. IF YOU CHECKED OFF ANY PROBLEMS, HOW DIFFICULT HAVE THESE PROBLEMS MADE IT FOR YOU TO DO YOUR WORK, TAKE CARE OF THINGS AT HOME, OR GET ALONG WITH OTHER PEOPLE: VERY DIFFICULT
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
## 2019-12-31 ENCOUNTER — OFFICE VISIT (OUTPATIENT)
Dept: FAMILY MEDICINE | Facility: OTHER | Age: 59
End: 2019-12-31
Payer: COMMERCIAL

## 2019-12-31 VITALS
RESPIRATION RATE: 16 BRPM | OXYGEN SATURATION: 97 % | BODY MASS INDEX: 29.04 KG/M2 | HEART RATE: 60 BPM | DIASTOLIC BLOOD PRESSURE: 72 MMHG | TEMPERATURE: 98.3 F | SYSTOLIC BLOOD PRESSURE: 120 MMHG | HEIGHT: 64 IN

## 2019-12-31 DIAGNOSIS — J01.00 ACUTE NON-RECURRENT MAXILLARY SINUSITIS: ICD-10-CM

## 2019-12-31 PROCEDURE — 99213 OFFICE O/P EST LOW 20 MIN: CPT | Performed by: PHYSICIAN ASSISTANT

## 2019-12-31 RX ORDER — DOXYCYCLINE 100 MG/1
100 CAPSULE ORAL 2 TIMES DAILY
Qty: 20 CAPSULE | Refills: 0 | Status: SHIPPED | OUTPATIENT
Start: 2019-12-31 | End: 2020-05-27

## 2019-12-31 ASSESSMENT — ANXIETY QUESTIONNAIRES: GAD7 TOTAL SCORE: 19

## 2019-12-31 ASSESSMENT — PATIENT HEALTH QUESTIONNAIRE - PHQ9: SUM OF ALL RESPONSES TO PHQ QUESTIONS 1-9: 17

## 2019-12-31 NOTE — LETTER
December 31, 2019      Linda Walsh  [id number]  [place] [id number]        To Whom It May Concern:    Linda Walsh was seen on 12/31/2019.  Please excuse her from work 12/29/2019 through 12/31/2019. She can return tomorrow as long as symptoms improving.         Sincerely,        Mary Peter PA-C

## 2020-01-06 ENCOUNTER — TRANSFERRED RECORDS (OUTPATIENT)
Dept: HEALTH INFORMATION MANAGEMENT | Facility: CLINIC | Age: 60
End: 2020-01-06

## 2020-01-06 LAB — PHQ9 SCORE: 16

## 2020-01-07 ENCOUNTER — OFFICE VISIT (OUTPATIENT)
Dept: PSYCHOLOGY | Facility: CLINIC | Age: 60
End: 2020-01-07
Payer: COMMERCIAL

## 2020-01-07 DIAGNOSIS — F43.10 POST TRAUMATIC STRESS DISORDER (PTSD): ICD-10-CM

## 2020-01-07 DIAGNOSIS — F41.1 GENERALIZED ANXIETY DISORDER: ICD-10-CM

## 2020-01-07 DIAGNOSIS — F33.2 SEVERE RECURRENT MAJOR DEPRESSION WITHOUT PSYCHOTIC FEATURES (H): Primary | ICD-10-CM

## 2020-01-07 PROCEDURE — 90837 PSYTX W PT 60 MINUTES: CPT | Performed by: COUNSELOR
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
## 2020-01-14 ENCOUNTER — OFFICE VISIT (OUTPATIENT)
Dept: PSYCHOLOGY | Facility: CLINIC | Age: 60
End: 2020-01-14
Payer: COMMERCIAL

## 2020-01-14 DIAGNOSIS — F33.2 SEVERE RECURRENT MAJOR DEPRESSION WITHOUT PSYCHOTIC FEATURES (H): Primary | ICD-10-CM

## 2020-01-14 DIAGNOSIS — F43.10 POST TRAUMATIC STRESS DISORDER (PTSD): ICD-10-CM

## 2020-01-14 DIAGNOSIS — F41.1 GENERALIZED ANXIETY DISORDER: ICD-10-CM

## 2020-01-14 PROCEDURE — 90837 PSYTX W PT 60 MINUTES: CPT | Performed by: COUNSELOR
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
## 2020-01-21 ENCOUNTER — OFFICE VISIT (OUTPATIENT)
Dept: PSYCHOLOGY | Facility: CLINIC | Age: 60
End: 2020-01-21
Payer: COMMERCIAL

## 2020-01-21 DIAGNOSIS — F41.1 GENERALIZED ANXIETY DISORDER: ICD-10-CM

## 2020-01-21 DIAGNOSIS — F43.10 POST TRAUMATIC STRESS DISORDER (PTSD): ICD-10-CM

## 2020-01-21 DIAGNOSIS — F33.2 SEVERE RECURRENT MAJOR DEPRESSION WITHOUT PSYCHOTIC FEATURES (H): Primary | ICD-10-CM

## 2020-01-21 PROCEDURE — 90837 PSYTX W PT 60 MINUTES: CPT | Performed by: COUNSELOR
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
## 2020-01-28 ENCOUNTER — OFFICE VISIT (OUTPATIENT)
Dept: PSYCHOLOGY | Facility: CLINIC | Age: 60
End: 2020-01-28
Payer: COMMERCIAL

## 2020-01-28 DIAGNOSIS — F41.1 GENERALIZED ANXIETY DISORDER: ICD-10-CM

## 2020-01-28 DIAGNOSIS — F43.10 POST TRAUMATIC STRESS DISORDER (PTSD): ICD-10-CM

## 2020-01-28 DIAGNOSIS — F33.2 SEVERE RECURRENT MAJOR DEPRESSION WITHOUT PSYCHOTIC FEATURES (H): Primary | ICD-10-CM

## 2020-01-28 PROCEDURE — 90837 PSYTX W PT 60 MINUTES: CPT | Performed by: COUNSELOR
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
## 2020-02-04 ENCOUNTER — TRANSFERRED RECORDS (OUTPATIENT)
Dept: HEALTH INFORMATION MANAGEMENT | Facility: CLINIC | Age: 60
End: 2020-02-04

## 2020-02-05 ENCOUNTER — TELEPHONE (OUTPATIENT)
Dept: PSYCHOLOGY | Facility: CLINIC | Age: 60
End: 2020-02-05

## 2020-02-05 NOTE — TELEPHONE ENCOUNTER
Called client back. She stated she got the discovery documents from the trial. She is concerned about the medical records they obtained access to and wanted to talk about her anxiety about the documents. The writer advised she talk with her  regarding any advice she may need with these. Writer recommended she wait to read the documents with her  or son and take breaks while reading through them. Advised client to practice self care and to rest as this is very overwhelmed.

## 2020-02-10 ENCOUNTER — HEALTH MAINTENANCE LETTER (OUTPATIENT)
Age: 60
End: 2020-02-10

## 2020-02-10 ENCOUNTER — OFFICE VISIT (OUTPATIENT)
Dept: PSYCHOLOGY | Facility: CLINIC | Age: 60
End: 2020-02-10
Payer: COMMERCIAL

## 2020-02-10 DIAGNOSIS — F41.1 GENERALIZED ANXIETY DISORDER: ICD-10-CM

## 2020-02-10 DIAGNOSIS — F33.2 SEVERE RECURRENT MAJOR DEPRESSION WITHOUT PSYCHOTIC FEATURES (H): Primary | ICD-10-CM

## 2020-02-10 DIAGNOSIS — F43.10 POST TRAUMATIC STRESS DISORDER (PTSD): ICD-10-CM

## 2020-02-10 PROCEDURE — 90837 PSYTX W PT 60 MINUTES: CPT | Performed by: COUNSELOR
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
## 2020-02-17 ENCOUNTER — OFFICE VISIT (OUTPATIENT)
Dept: PSYCHOLOGY | Facility: CLINIC | Age: 60
End: 2020-02-17
Payer: COMMERCIAL

## 2020-02-17 DIAGNOSIS — F41.1 GENERALIZED ANXIETY DISORDER: ICD-10-CM

## 2020-02-17 DIAGNOSIS — F43.10 POST TRAUMATIC STRESS DISORDER (PTSD): ICD-10-CM

## 2020-02-17 DIAGNOSIS — F33.2 SEVERE RECURRENT MAJOR DEPRESSION WITHOUT PSYCHOTIC FEATURES (H): Primary | ICD-10-CM

## 2020-02-17 PROCEDURE — 90837 PSYTX W PT 60 MINUTES: CPT | Performed by: COUNSELOR

## 2020-02-19 ENCOUNTER — MYC MEDICAL ADVICE (OUTPATIENT)
Dept: FAMILY MEDICINE | Facility: OTHER | Age: 60
End: 2020-02-19

## 2020-02-19 NOTE — LETTER
[facility]  [id number] [place] [id number]  Phone: [phone]  February 21, 2020      Linda Walsh  [address]  [facility] [address]      Dear Linda,    We care about your health and have reviewed your health plan including your medical conditions, medications, and lab results.  Based on this review, it is recommended that you follow up regarding the following health topic(s):  -Physical and discuss referral for Colonoscopy.    We recommend you take the following action(s):  Please call to schedule physical appointment.      Please call us at the [facility] - [phone] (or use [employer]) to address the above recommendations.     Thank you for trusting [facility] and we appreciate the opportunity to serve you.  We look forward to supporting your healthcare needs in the future.    Healthy Regards,    Your Health Care Team  [facility]

## 2020-02-19 NOTE — TELEPHONE ENCOUNTER
Summary:    Patient is due/failing the following:   COLONOSCOPY, Reheck phq9, recheck ACT and PHYSICAL    Action needed:   Patient needs office visit for physical. and Patient needs referral/order: colonoscopy    Type of outreach:    Sent You[employer]i message.    Questions for provider review:    None                                                                                                                                    Carmencita Vela CMA (AAMA)       Chart routed to Care Team .        Panel Management Review      Patient has the following on her problem list:     Depression / Dysthymia review    Measure:  Needs PHQ-9 score of 4 or less during index window.  Administer PHQ-9 and if score is 5 or more, send encounter to provider for next steps.    5 - 7 month window range:     PHQ-9 SCORE 11/18/2019 11/19/2019 12/30/2019   PHQ-9 Total Score - - -   PHQ-9 Total Score MyChart 25 (Severe depression) 23 (Severe depression) 17 (Moderately severe depression)   PHQ-9 Total Score 25 23 17       If PHQ-9 recheck is 5 or more, route to provider for next steps.    Patient is due for:  None    Asthma review     ACT Total Scores 11/19/2019   ACT TOTAL SCORE -   ASTHMA ER VISITS -   ASTHMA HOSPITALIZATIONS -   ACT TOTAL SCORE (Goal Greater than or Equal to 20) 21   In the past 12 months, how many times did you visit the emergency room for your asthma without being admitted to the hospital? 0   In the past 12 months, how many times were you hospitalized overnight because of your asthma? 0      1. Is Asthma diagnosis on the Problem List? Yes , No   2. Is Asthma listed on Health Maintenance? Yes    3. Patient is due for:  none      Composite cancer screening  Chart review shows that this patient is due/due soon for the following Colonoscopy
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
## 2020-02-20 NOTE — PROGRESS NOTES
Subjective     Linda Walsh is a 59 year old female who presents to clinic today for the following health issues:  Have you ever done Advance Care Planning? (For example, a Health Directive, POLST, or a discussion with a medical provider or your loved ones about your wishes): No, advance care planning information given to patient to review.  Patient plans to discuss their wishes with loved ones or provider.      HPI   Abnormal Mood Symptoms- anxiety  Onset: 2011     Description:   Depression: YES  Anxiety: YES    Accompanying Signs & Symptoms:  Still participating in activities that you used to enjoy: no, she is very anxious.  She is going through getting prepared for a trial.  She is isolating herself she feels very panicked when she is outside of her home.  She is seeing counseling on a routine basis.  Her counselor is the one who recommended she come in today to see what other medications we can offer to help her with her anxiety.  She is on chronic pain meds for her CRPS.  Previously, she was on benzodiazepines though this is no longer an option due to the new guidelines and safety concerns with the combinations of narcotics and benzodiazepines.  Fatigue: YES  Irritability: YES  Difficulty concentrating: YES  Changes in appetite: no   Problems with sleep: YES  Heart racing/beating fast : YES  Thoughts of hurting yourself or others: none, no suicidal intention or plan    History:   Recent stress: YES  Prior depression hospitalization: yes  Family history of depression: no   Family history of anxiety: no     Precipitating factors:   Alcohol/drug use: no     Alleviating factors:  work    Therapies Tried and outcome: Cymbalta-prescribed through her pain clinic  BP Readings from Last 3 Encounters:   02/26/20 118/58   12/31/19 120/72   11/19/19 126/84    Wt Readings from Last 3 Encounters:   11/19/19 76.7 kg (169 lb 3.2 oz)   09/16/19 74.8 kg (165 lb)   09/10/19 75.9 kg (167 lb 6.4 oz)                      Current  Outpatient Medications   Medication Sig Dispense Refill     albuterol (PROAIR HFA/PROVENTIL HFA/VENTOLIN HFA) 108 (90 Base) MCG/ACT inhaler Inhale 2 puffs into the lungs every 6 hours as needed for shortness of breath / dyspnea or wheezing 18 g 3     doxycycline hyclate (VIBRAMYCIN) 100 MG capsule Take 1 capsule (100 mg) by mouth 2 times daily 20 capsule 0     DULoxetine (CYMBALTA) 60 MG capsule 60 mg daily        esomeprazole (NEXIUM) 40 MG DR capsule Take 1 capsule (40 mg) by mouth every morning (before breakfast) Take 30-60 minutes before eating. 30 capsule 5     estradiol (CLIMARA) 0.0375 MG/24HR weekly patch APPLY 1 PATCH ONTO THE SKIN ONCE WEEKLY (REMOVE OLD PATCH) *NEED MAMMO BEFORE MORE REFILLS* 12 patch 3     fentaNYL (DURAGESIC) 25 mcg/hr 72 hr patch Place 1 patch onto the skin every 72 hours remove old patch. 1 patch 0     fluticasone (FLONASE) 50 MCG/ACT nasal spray Spray 1-2 sprays into both nostrils daily 16 g 11     gabapentin (NEURONTIN) 300 MG capsule Take 900 mg by mouth 3 times daily   3     hydrOXYzine (ATARAX) 25 MG tablet Take 1-2 tablets (25-50 mg) by mouth 3 times daily as needed for anxiety 60 tablet 5     ipratropium - albuterol 0.5 mg/2.5 mg/3 mL (DUONEB) 0.5-2.5 (3) MG/3ML nebulization Take 1 vial (3 mLs) by nebulization every 6 hours as needed for shortness of breath / dyspnea 1 Box prn     ondansetron (ZOFRAN-ODT) 4 MG ODT tab DISSOLVE ONE TABLET BY MOUTH EVERY 8 HOURS AS NEEDED FOR NAUSEA 20 tablet 1     order for DME Equipment being ordered: home cervical traction unit 1 Device 0     order for DME Equipment being ordered: Nebulizer 1 Device 0     oxyCODONE (ROXICODONE) 5 MG tablet Take 1 tablet (5 mg) by mouth every 6 hours as needed for pain 6 tablet 0     polyethylene glycol (MIRALAX) powder Take 51 g (3 capfuls) by mouth 2 times daily 3 Bottle 3     traZODone (DESYREL) 50 MG tablet Take 3 tablets (150 mg) by mouth At Bedtime (Patient taking differently: Take 100 mg by mouth At  Bedtime ) 15 tablet 5     Celecoxib (CELEBREX PO) Take 400 mg by mouth daily        fentaNYL 37.5 MCG/HR PT72 Place 1 patch onto the skin every 72 hours 25mcg/hr (Patient not taking: Reported on 12/31/2019) 1 patch 0     pantoprazole (PROTONIX) 40 MG EC tablet Take 1 tablet (40 mg) by mouth daily (Patient not taking: Reported on 12/31/2019) 90 tablet 0     BP Readings from Last 3 Encounters:   02/26/20 118/58   12/31/19 120/72   11/19/19 126/84    Wt Readings from Last 3 Encounters:   11/19/19 76.7 kg (169 lb 3.2 oz)   09/16/19 74.8 kg (165 lb)   09/10/19 75.9 kg (167 lb 6.4 oz)                 Reviewed and updated as needed this visit by Provider         Review of Systems   See PHQ 9 and MARLIN 7 questionnaires for symptoms.       Objective    /58   Pulse 64   Temp 98.3  F (36.8  C) (Temporal)   Resp 16   LMP  (LMP Unknown)   There is no height or weight on file to calculate BMI.  Physical Exam   GENERAL: healthy, alert and no distress  MS: wearing cam walker on her foot  PSYCH: mentation appears normal, tearful, anxious, judgement and insight intact and appearance well groomed    Diagnostic Test Results:  Labs reviewed in Epic  none         Assessment & Plan         2. Anxiety--symptoms are worsening  We will use hydroxyzine in addition to her Cymbalta.  She could discuss increasing the dosage of Cymbalta with the pain clinic.  If they are not comfortable doing so, I am happy to increase it to 90 mg for her.  She will use hydroxyzine as needed benzodiazepines are not recommended due to her chronic narcotic use  - hydrOXYzine (ATARAX) 25 MG tablet; Take 1-2 tablets (25-50 mg) by mouth 3 times daily as needed for anxiety    Dispense: 60 tablet; Refill:     Return in about 1 month (around 3/26/2020), or if symptoms worsen or fail to improve, for depression/anxiety.    Jonna Gaitan PA-C  [facility]

## 2020-02-24 ENCOUNTER — OFFICE VISIT (OUTPATIENT)
Dept: PSYCHOLOGY | Facility: CLINIC | Age: 60
End: 2020-02-24
Payer: COMMERCIAL

## 2020-02-24 DIAGNOSIS — F33.2 SEVERE RECURRENT MAJOR DEPRESSION WITHOUT PSYCHOTIC FEATURES (H): Primary | ICD-10-CM

## 2020-02-24 DIAGNOSIS — F43.10 POST TRAUMATIC STRESS DISORDER (PTSD): ICD-10-CM

## 2020-02-24 DIAGNOSIS — F41.1 GENERALIZED ANXIETY DISORDER: ICD-10-CM

## 2020-02-24 PROCEDURE — 90837 PSYTX W PT 60 MINUTES: CPT | Performed by: COUNSELOR
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
## 2020-02-26 ENCOUNTER — OFFICE VISIT (OUTPATIENT)
Dept: FAMILY MEDICINE | Facility: OTHER | Age: 60
End: 2020-02-26
Payer: COMMERCIAL

## 2020-02-26 VITALS
SYSTOLIC BLOOD PRESSURE: 118 MMHG | HEART RATE: 64 BPM | TEMPERATURE: 98.3 F | DIASTOLIC BLOOD PRESSURE: 58 MMHG | RESPIRATION RATE: 16 BRPM

## 2020-02-26 DIAGNOSIS — F41.9 ANXIETY: Primary | ICD-10-CM

## 2020-02-26 PROCEDURE — 99214 OFFICE O/P EST MOD 30 MIN: CPT | Performed by: PHYSICIAN ASSISTANT

## 2020-02-26 RX ORDER — HYDROXYZINE HYDROCHLORIDE 25 MG/1
25-50 TABLET, FILM COATED ORAL 3 TIMES DAILY PRN
Qty: 60 TABLET | Refills: 5 | Status: SHIPPED | OUTPATIENT
Start: 2020-02-26 | End: 2020-11-25

## 2020-02-26 ASSESSMENT — ANXIETY QUESTIONNAIRES
2. NOT BEING ABLE TO STOP OR CONTROL WORRYING: NEARLY EVERY DAY
5. BEING SO RESTLESS THAT IT IS HARD TO SIT STILL: MORE THAN HALF THE DAYS
6. BECOMING EASILY ANNOYED OR IRRITABLE: MORE THAN HALF THE DAYS
1. FEELING NERVOUS, ANXIOUS, OR ON EDGE: NEARLY EVERY DAY
IF YOU CHECKED OFF ANY PROBLEMS ON THIS QUESTIONNAIRE, HOW DIFFICULT HAVE THESE PROBLEMS MADE IT FOR YOU TO DO YOUR WORK, TAKE CARE OF THINGS AT HOME, OR GET ALONG WITH OTHER PEOPLE: VERY DIFFICULT
GAD7 TOTAL SCORE: 19
7. FEELING AFRAID AS IF SOMETHING AWFUL MIGHT HAPPEN: NEARLY EVERY DAY
3. WORRYING TOO MUCH ABOUT DIFFERENT THINGS: NEARLY EVERY DAY

## 2020-02-26 ASSESSMENT — PAIN SCALES - GENERAL: PAINLEVEL: SEVERE PAIN (7)

## 2020-02-26 ASSESSMENT — PATIENT HEALTH QUESTIONNAIRE - PHQ9
5. POOR APPETITE OR OVEREATING: NEARLY EVERY DAY
SUM OF ALL RESPONSES TO PHQ QUESTIONS 1-9: 23

## 2020-02-27 ASSESSMENT — ANXIETY QUESTIONNAIRES: GAD7 TOTAL SCORE: 19

## 2020-03-02 ENCOUNTER — OFFICE VISIT (OUTPATIENT)
Dept: PSYCHOLOGY | Facility: CLINIC | Age: 60
End: 2020-03-02
Payer: COMMERCIAL

## 2020-03-02 DIAGNOSIS — F33.2 SEVERE RECURRENT MAJOR DEPRESSION WITHOUT PSYCHOTIC FEATURES (H): Primary | ICD-10-CM

## 2020-03-02 DIAGNOSIS — F43.10 POST TRAUMATIC STRESS DISORDER (PTSD): ICD-10-CM

## 2020-03-02 DIAGNOSIS — F41.1 GENERALIZED ANXIETY DISORDER: ICD-10-CM

## 2020-03-02 PROCEDURE — 90837 PSYTX W PT 60 MINUTES: CPT | Performed by: COUNSELOR
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
## 2020-03-09 ENCOUNTER — TRANSFERRED RECORDS (OUTPATIENT)
Dept: HEALTH INFORMATION MANAGEMENT | Facility: CLINIC | Age: 60
End: 2020-03-09

## 2020-03-16 ENCOUNTER — VIRTUAL VISIT (OUTPATIENT)
Dept: FAMILY MEDICINE | Facility: OTHER | Age: 60
End: 2020-03-16

## 2020-03-16 ENCOUNTER — OFFICE VISIT (OUTPATIENT)
Dept: PSYCHOLOGY | Facility: CLINIC | Age: 60
End: 2020-03-16
Payer: COMMERCIAL

## 2020-03-16 ENCOUNTER — TELEPHONE (OUTPATIENT)
Dept: FAMILY MEDICINE | Facility: OTHER | Age: 60
End: 2020-03-16

## 2020-03-16 DIAGNOSIS — F43.10 POST TRAUMATIC STRESS DISORDER (PTSD): ICD-10-CM

## 2020-03-16 DIAGNOSIS — F33.2 SEVERE RECURRENT MAJOR DEPRESSION WITHOUT PSYCHOTIC FEATURES (H): Primary | ICD-10-CM

## 2020-03-16 DIAGNOSIS — F41.1 GENERALIZED ANXIETY DISORDER: ICD-10-CM

## 2020-03-16 PROCEDURE — 98968 PH1 ASSMT&MGMT NQHP 21-30: CPT | Performed by: COUNSELOR

## 2020-03-16 NOTE — PROGRESS NOTES
"Linda Walsh is a 59 year old female who is being evaluated via a billable telephone visit.      The patient has been notified of following:     \"This telephone visit will be conducted via a call between you and your physician/provider. We have found that certain health care needs can be provided without the need for a physical exam.  This service lets us provide the care you need with a short phone conversation.  If a prescription is necessary we can send it directly to your pharmacy.  If lab work is needed we can place an order for that and you can then stop by our lab to have the test done at a later time.    If during the course of the call the physician/provider feels a telephone visit is not appropriate, you will not be charged for this service.\"       Linda Walsh complains of continued stress related to legal issues, feeling like she can't be happy right now due to not being able to do the things she wants.       I have reviewed and updated the patient's Past Medical History, Social History, Family History and Medication List.    ALLERGIES  Penicillins    Additional provider notes: Client reported she and her  are both concerned about the COVID-19 virus. She stated her  wants her to quit her job and stay home so she doesn't get ill or get someone else ill. He even said he would like to not be going to work due to fear of getting ill.  Client continues to struggle with depression and the legal issues she is experiencing.      Assessment/Plan:  1. 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 Generalized anxiety disorder    3. Post traumatic stress disorder (PTSD)          I have reviewed the note as documented above.  This accurately captures the substance of my conversation with the patient.        Phone call contact time  Call Started at 10:00am  Call Ended at 10:55am    Nasrin Valladares [facility]      "

## 2020-03-16 NOTE — TELEPHONE ENCOUNTER
I spoke with the pt who states she has a cough and stuffy nose. Burning feeling in her chest. Temp 98.5. No SOB or difficulty breathing. Gets the chills. No travel. No known COVID-19 exposure. She will complete an oncare visit.    Susan Deras, MSN, RN

## 2020-03-16 NOTE — TELEPHONE ENCOUNTER
Reason for call:  Patient reporting a symptom    Symptom or request: cold dry cough fever 97.9 to 98.2, no rash, -states not short of breath just feels like she might have bronchitis    Duration (how long have symptoms been present):     Have you been treated for this before? 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 No traveling or exposure    Phone Number patient can be reached at:  Home number on file [phone] (home)    Best Time:  any    Can we leave a detailed message on this number:  YES    Call taken on 3/16/2020 at 12:23 PM by Tosha Lemus

## 2020-03-16 NOTE — PROGRESS NOTES
"Date: 2020 15:29:23  Clinician: Fabrizio Marie  Clinician NPI: [id number]  Patient: Diana Walsh  Patient : 1960  Patient Address: [facility] [place] [address]  Patient Phone: [phone]  Visit Protocol: URI  Patient Summary:  Diana is a 59 year old ( : 1960 ) female who initiated a Visit for cold, sinus infection, or influenza. When asked the question \"Please sign me up to receive news, health information and promotions. \", Diana responded \"Yes\".    Diana states her symptoms started gradually 10-13 days ago. After her symptoms started, they improved and then got worse again.   Her symptoms consist of malaise, a headache, enlarged lymph nodes, facial pain or pressure, myalgia, a sore throat, a cough, nasal congestion, tooth pain, and chills. She is experiencing mild difficulty breathing with activities but can speak normally in full sentences.   Symptom details     Nasal secretions: The color of her mucus is yellow.    Cough: Diana coughs a few times an hour and her cough is more bothersome at night. Phlegm does not come into her throat when she coughs. She believes her cough is caused by post-nasal drip.     Sore throat: Diana reports having moderate throat pain (4-6 on a 10 point pain scale), does not have exudate on her tonsils, and can swallow liquids. The lymph nodes in her neck are enlarged. A rash has not appeared on the skin since the sore throat started.     Facial pain or pressure: The facial pain or pressure feels worse when bending over or leaning forward.     Headache: She states the headache is moderate (4-6 on a 10 point pain scale).     Tooth pain: The tooth pain is not caused by a cavity, recent dental work, or other mouth problems.      Diana denies having ear pain, rhinitis, wheezing, and fever. She also denies having recent facial or sinus surgery in the past 60 days and taking antibiotic medication for the symptoms.   Precipitating " events  Diana is not sure if she has been exposed to someone with strep throat. She has not recently been exposed to someone with influenza. Diana has been in close contact with the following high risk individuals: immunocompromised people, adults 65 or older, and people with asthma, heart disease or diabetes.   Pertinent COVID-19 (Coronavirus) information  Diana has not traveled internationally or to the areas where COVID-19 (Coronavirus) is widespread in the last 14 days before the start of her symptoms.   Diana has not had close contact with a suspected or laboratory-confirmed COVID-19 patient within 14 days of symptom onset.   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   Pertinent medical history  Diana had 3 sinus infections within the past year.   Diana does not get yeast infections when she takes antibiotics.   Diana needs a return to work/school note.   Weight: 158 lbs   Diana does not smoke or use smokeless tobacco.   Additional information as reported by the patient (free text): Allergic to penicillin   Weight: 158 lbs    MEDICATIONS: fentanyl transdermal, ALLERGIES: Cymbalta, gabapentin, oxycodone, trazodone, Estrogens, Linzess  Clinician Response:  Dear Diana,  Based on the information provided, you have acute bacterial sinusitis, also known as a sinus infection. Sinus infections are caused by bacteria or a virus and symptoms are almost always identical. The difference between the 2 types of infections is timing.  Sinus infections start as viral infections and symptoms improve on their own in about 7 days. If symptoms have not improved after 7 days or have even worsened, a bacterial infection may have developed.  Medication information  I am prescribing:     Amoxicillin-pot clavulanate 875-125 mg oral tablet. Take 1 tablet by mouth every 12 hours for 7 days. There are no refills with this prescription.   Yeast infections can be a common side  effect of antibiotics. The most common symptom of a yeast infection is itchiness in and around the vagina. Other signs and symptoms include burning, redness, or a thick, white vaginal discharge that looks like cottage cheese and does not have a bad smell.  Self care  The following tips will keep you as comfortable as possible while you recover:     Rest    Drink plenty of water and other liquids    Take a hot shower to loosen congestion    Use throat lozenges    Gargle with warm salt water (1/4 teaspoon of salt per 8 ounce glass of water)    Suck on frozen items such as popsicles or ice cubes    Drink hot tea with lemon and honey    Take a spoonful of honey to reduce your cough     When to seek care  Please be seen in a clinic or urgent care if any of the following occur:     Symptoms do not start to improve after 3 days of treatment    New symptoms develop, or symptoms become worse     Please be seen in a clinic or urgent care if new symptoms develop, or symptoms become worse.  It is possible to have an allergic reaction to an antibiotic even if you have not had one in the past. If you notice a new rash, significant swelling, or difficulty breathing, stop taking this medication immediately and go to a clinic or urgent care.  Call 911 or go to the emergency room if you feel that your throat is closing off, you suddenly develop a rash, you are unable to swallow fluids, you are drooling, or you are having difficulty breathing.  Additional treatment plan   Based on the information you have provided, it is recommended that you go to one of our designated Coronavirus (Covid-19) testing centers to get a test done from your car.  We are offering testing from your car in [facility], [facility] and [place].   To schedule a visit at [facility] or [place], please call [phone] to find out when the next available testing  window is. Testing will be done in 1 hour blocks so that you can wait at home until we are available to more quickly perform your testing from the car.   To complete testing in [place] ONLY, follow the instructions below:    Go as soon as possible during the hours noted to [facility] ([facility]) [address], [place] [address]. Hours: M-F 7:30am-5pm    What to expect:   When you arrive please come park in the parking lot.   Be prepared to present photo ID   Call [phone] and let them know you have arrived.    They will ask for information to get you registered for a visit and will ask for the description of your car and where you are parked.    They will add you to the queue to get your test (you will stay in your car the entire time).   You will then be met by a provider who will perform a brief assessment in your car and collect samples to test for coronavirus and possibly influenza or RSV.    Isolate yourself while traveling.  Do Not allow any visitors within 6 feet.  Do Not go to work or school.  Do Not go to [employer],  centers, shopping, or other public places.  Do Not shake hands.  Avoid close contact with others (hugging, kissing).Protect Others:     Cover Your Mouth and Nose with a mask, disposable tissue or wash cloth to avoid spreading germs to others.  Wash your hands and face frequently with soap and water   Fever Medicines:    For fever relief, take acetaminophen or ibuprofen.  Treat fevers above 101deg F (38.3deg C) to lower fevers and make you more comfortable.   Acetaminophen (e.g., Tylenol): Take 650 mg (two 325 mg pills) by mouth every 4-6 hours as needed of regular strength Tylenol or 1,000 mg (two 500 mg pills) every 8 hours as needed of Extra Strength Tylenol.   Ibuprofen (e.g., Motrin, Advil): Take 400 mg (two 200 mg pills) by mouth every 6 hours as needed.   Acetaminophen is thought to be safer than ibuprofen or naproxen for people over 65 years  old. Acetaminophen is in many OTC and prescription medicines. It might be in more than one medicine that you are taking. You need to be careful and not take an overdose. Before taking any medicine, read all the instructions on the package.  Caution -NSAIDs (e.g., ibuprofen, naproxen): Do not take nonsteroidal anti-inflammatory drugs (NSAIDs) if you have stomach problems, kidney disease, heart failure, or other contraindications to using this type of medicine. Do not take NSAID medicines for over 7 days without consulting your PCP. Do not take NSAID medicines if you are pregnant. Do not take NSAID medicines if you are also taking blood thinners.    Call or submit a new visit if: Breathing difficulty develops or you become worse.  Thank you for limiting contact with others, wearing a simple mask to cover your cough, practice good hand hygiene habits and accessing our virtual services where possible to limit the spread of this virus.  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  Prescription: amoxicillin-pot clavulanate 875-125 mg oral tablet 14 tablet, 7 days supply. Take 1 tablet by mouth every 12 hours for 7 days. Refills: 0, Refill as needed: no, Allow substitutions: yes  Pharmacy: [facility] - [phone] - [id number] [place] [address]

## 2020-03-23 ENCOUNTER — VIRTUAL VISIT (OUTPATIENT)
Dept: PSYCHOLOGY | Facility: CLINIC | Age: 60
End: 2020-03-23
Payer: COMMERCIAL

## 2020-03-23 DIAGNOSIS — F41.1 GENERALIZED ANXIETY DISORDER: ICD-10-CM

## 2020-03-23 DIAGNOSIS — F33.2 SEVERE RECURRENT MAJOR DEPRESSION WITHOUT PSYCHOTIC FEATURES (H): Primary | ICD-10-CM

## 2020-03-23 DIAGNOSIS — F43.10 POST TRAUMATIC STRESS DISORDER (PTSD): ICD-10-CM

## 2020-03-23 PROCEDURE — 90832 PSYTX W PT 30 MINUTES: CPT | Mod: TEL | Performed by: COUNSELOR

## 2020-03-23 NOTE — PROGRESS NOTES
"Linda Walsh is a 59 year old female who is being evaluated via a billable telephone visit.      The patient has been notified of following:     \"This telephone visit will be conducted via a call between you and your physician/provider. We have found that certain health care needs can be provided without the need for a physical exam.  This service lets us provide the care you need with a short phone conversation.  If a prescription is necessary we can send it directly to your pharmacy.  If lab work is needed we can place an order for that and you can then stop by our lab to have the test done at a later time.    If during the course of the call the physician/provider feels a telephone visit is not appropriate, you will not be charged for this service.\"     Linda Walsh complains of depression, stress, anxiety.    I have reviewed and updated the patient's Past Medical History, Social History, Family History and Medication List.    ALLERGIES  Penicillins      Assessment/Plan:  1. 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 Generalized anxiety disorder    3. Post traumatic stress disorder (PTSD)    Client stated she's working on having boundaries with the information she is taking in about the COVID virus so she's not increasing her anxiety. She stated her  is \"obsessed\" with the information about the virus. She said she's trying to find ways to be happy but it's a big struggle. [place]. This makes her feel depressed.    Currently, things with her legal case are on pause right now due to the shutdown. This has given her some relief so she doesn't have to think about this right now.    Talked with client about continuing to be mindful and keeping those boundaries. She stated she wants to really try to get together with her family members to do activities together. Encouraged the client to keep reaching out to " them especially during this time.       Phone call duration: 33 minutes    Nasrin Valladares [facility]

## 2020-03-27 ENCOUNTER — TELEPHONE (OUTPATIENT)
Dept: FAMILY MEDICINE | Facility: OTHER | Age: 60
End: 2020-03-27

## 2020-03-27 NOTE — TELEPHONE ENCOUNTER
Reason for Call:  Other note    Detailed comments: patient wondering if she an get a note excusing her from work for a while due to her asthma. 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 YES    Call taken on 3/27/2020 at 2:37 PM by Tosha Lemus

## 2020-03-27 NOTE — LETTER
March 30, 2020      Linda Walsh  [id number]  [facility] [id number]        To Whom It May Concern:    Linda Walsh is a patient at our clinic.  She has medical conditions that place her in the high risk category.   She would be at increased risk of developing major complications if she contracted COVID 19.        Sincerely,        Jonna Gaitan PA-C

## 2020-03-30 NOTE — TELEPHONE ENCOUNTER
Pt called back and she would like it say She is at high risk medical issues. And she will  when complete thank you

## 2020-03-30 NOTE — TELEPHONE ENCOUNTER
Please call pt- I can right a letter stating she has asthma and she is at high risk of complications if she were to become ill with COVID 19.  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  What should we due with the letter?  Does she want me to say she has asthma or just say has high risk medical issues?  Jonna Gaitan PA-C

## 2020-04-02 ENCOUNTER — TELEPHONE (OUTPATIENT)
Dept: FAMILY MEDICINE | Facility: OTHER | Age: 60
End: 2020-04-02

## 2020-04-05 ENCOUNTER — E-VISIT (OUTPATIENT)
Dept: FAMILY MEDICINE | Facility: OTHER | Age: 60
End: 2020-04-05
Payer: COMMERCIAL

## 2020-04-05 DIAGNOSIS — J01.90 ACUTE SINUSITIS WITH SYMPTOMS > 10 DAYS: Primary | ICD-10-CM

## 2020-04-05 PROCEDURE — 99421 OL DIG E/M SVC 5-10 MIN: CPT | Performed by: PHYSICIAN ASSISTANT

## 2020-04-06 ENCOUNTER — VIRTUAL VISIT (OUTPATIENT)
Dept: PSYCHOLOGY | Facility: CLINIC | Age: 60
End: 2020-04-06
Payer: COMMERCIAL

## 2020-04-06 DIAGNOSIS — F41.1 GENERALIZED ANXIETY DISORDER: ICD-10-CM

## 2020-04-06 DIAGNOSIS — F33.2 SEVERE RECURRENT MAJOR DEPRESSION WITHOUT PSYCHOTIC FEATURES (H): Primary | ICD-10-CM

## 2020-04-06 DIAGNOSIS — F43.10 POST TRAUMATIC STRESS DISORDER (PTSD): ICD-10-CM

## 2020-04-06 PROCEDURE — 90834 PSYTX W PT 45 MINUTES: CPT | Mod: 95 | Performed by: COUNSELOR

## 2020-04-06 RX ORDER — DOXYCYCLINE 100 MG/1
100 CAPSULE ORAL 2 TIMES DAILY
Qty: 20 CAPSULE | Refills: 0 | Status: SHIPPED | OUTPATIENT
Start: 2020-04-06 | End: 2020-05-27
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
## 2020-04-16 ENCOUNTER — MYC MEDICAL ADVICE (OUTPATIENT)
Dept: FAMILY MEDICINE | Facility: OTHER | Age: 60
End: 2020-04-16

## 2020-04-16 ASSESSMENT — PATIENT HEALTH QUESTIONNAIRE - PHQ9
SUM OF ALL RESPONSES TO PHQ QUESTIONS 1-9: 18
10. IF YOU CHECKED OFF ANY PROBLEMS, HOW DIFFICULT HAVE THESE PROBLEMS MADE IT FOR YOU TO DO YOUR WORK, TAKE CARE OF THINGS AT HOME, OR GET ALONG WITH OTHER PEOPLE: VERY DIFFICULT
SUM OF ALL RESPONSES TO PHQ QUESTIONS 1-9: 18

## 2020-04-16 NOTE — TELEPHONE ENCOUNTER
I spoke with patient.   She states she is doing ok - her PHQ-9 score has actually decreased.   She reviews the documents from psychology and doesn't have a plan in place.   She knows her resources and will reach out if anything additional is needed.     Giovana Chavez, RN, BSN

## 2020-04-16 NOTE — TELEPHONE ENCOUNTER
Please call patient for triage. Patient returned high PHQ9 and suicidal thoughts.    Berta Eden, [facility]

## 2020-04-17 ASSESSMENT — PATIENT HEALTH QUESTIONNAIRE - PHQ9: SUM OF ALL RESPONSES TO PHQ QUESTIONS 1-9: 18

## 2020-05-04 ENCOUNTER — VIRTUAL VISIT (OUTPATIENT)
Dept: PSYCHOLOGY | Facility: CLINIC | Age: 60
End: 2020-05-04
Payer: COMMERCIAL

## 2020-05-04 DIAGNOSIS — F33.2 SEVERE RECURRENT MAJOR DEPRESSION WITHOUT PSYCHOTIC FEATURES (H): Primary | ICD-10-CM

## 2020-05-04 DIAGNOSIS — F41.1 GENERALIZED ANXIETY DISORDER: ICD-10-CM

## 2020-05-04 DIAGNOSIS — F43.10 POST TRAUMATIC STRESS DISORDER (PTSD): ICD-10-CM

## 2020-05-04 PROCEDURE — 90834 PSYTX W PT 45 MINUTES: CPT | Mod: 95 | Performed by: COUNSELOR
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
## 2020-05-26 ENCOUNTER — MYC MEDICAL ADVICE (OUTPATIENT)
Dept: FAMILY MEDICINE | Facility: OTHER | Age: 60
End: 2020-05-26

## 2020-05-26 ENCOUNTER — TELEPHONE (OUTPATIENT)
Dept: PSYCHOLOGY | Facility: CLINIC | Age: 60
End: 2020-05-26

## 2020-05-26 NOTE — TELEPHONE ENCOUNTER
Client called due to not feeling the greatest with her mental health.  She stated she feels hopeless and helpless. She stated her  has been more depressed as well. He waits until the last minute to refill his meds and therefore has been out for a few days. She's feeling like she can't do anything and really struggling with her emotions. The provider recommended she go to the ER if she is having suicidal ideation. Client refused this option. Provider reminding the client to follow her safety plan. Talked with client about different skills she has learned that she can utilize (build mastery, engage in pleasant activities, distress tolerance skills).
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
## 2020-05-27 ENCOUNTER — VIRTUAL VISIT (OUTPATIENT)
Dept: FAMILY MEDICINE | Facility: OTHER | Age: 60
End: 2020-05-27
Payer: COMMERCIAL

## 2020-05-27 DIAGNOSIS — F32.1 MAJOR DEPRESSIVE DISORDER, SINGLE EPISODE, MODERATE (H): Primary | ICD-10-CM

## 2020-05-27 DIAGNOSIS — F41.1 GENERALIZED ANXIETY DISORDER: ICD-10-CM

## 2020-05-27 PROCEDURE — 99214 OFFICE O/P EST MOD 30 MIN: CPT | Mod: 95 | Performed by: PHYSICIAN ASSISTANT

## 2020-05-27 RX ORDER — DULOXETIN HYDROCHLORIDE 30 MG/1
CAPSULE, DELAYED RELEASE ORAL
Qty: 90 CAPSULE | Refills: 1 | Status: SHIPPED | OUTPATIENT
Start: 2020-05-27 | End: 2020-07-28

## 2020-05-27 ASSESSMENT — ANXIETY QUESTIONNAIRES
3. WORRYING TOO MUCH ABOUT DIFFERENT THINGS: NEARLY EVERY DAY
GAD7 TOTAL SCORE: 21
6. BECOMING EASILY ANNOYED OR IRRITABLE: NEARLY EVERY DAY
7. FEELING AFRAID AS IF SOMETHING AWFUL MIGHT HAPPEN: NEARLY EVERY DAY
2. NOT BEING ABLE TO STOP OR CONTROL WORRYING: NEARLY EVERY DAY
1. FEELING NERVOUS, ANXIOUS, OR ON EDGE: NEARLY EVERY DAY
5. BEING SO RESTLESS THAT IT IS HARD TO SIT STILL: NEARLY EVERY DAY
IF YOU CHECKED OFF ANY PROBLEMS ON THIS QUESTIONNAIRE, HOW DIFFICULT HAVE THESE PROBLEMS MADE IT FOR YOU TO DO YOUR WORK, TAKE CARE OF THINGS AT HOME, OR GET ALONG WITH OTHER PEOPLE: VERY DIFFICULT

## 2020-05-27 ASSESSMENT — PATIENT HEALTH QUESTIONNAIRE - PHQ9
5. POOR APPETITE OR OVEREATING: NEARLY EVERY DAY
SUM OF ALL RESPONSES TO PHQ QUESTIONS 1-9: 24

## 2020-05-28 ASSESSMENT — ANXIETY QUESTIONNAIRES: GAD7 TOTAL SCORE: 21

## 2020-05-30 ENCOUNTER — MYC MEDICAL ADVICE (OUTPATIENT)
Dept: FAMILY MEDICINE | Facility: OTHER | Age: 60
End: 2020-05-30

## 2020-05-30 DIAGNOSIS — J01.90 ACUTE NON-RECURRENT SINUSITIS, UNSPECIFIED LOCATION: ICD-10-CM

## 2020-06-01 RX ORDER — DOXYCYCLINE 100 MG/1
100 CAPSULE ORAL 2 TIMES DAILY
Qty: 20 CAPSULE | Refills: 0 | Status: SHIPPED | OUTPATIENT
Start: 2020-06-01 | End: 2020-06-29

## 2020-06-02 NOTE — TELEPHONE ENCOUNTER
LMTC< please inform patient of message below     Mary Marsh, I have sent in a prescription for you for doxycycline for your sinus symptoms.  This med may make you more sensitive to a sunburn so please be careful to stay out of the sun  Jonna Gaitan PA-C

## 2020-06-03 NOTE — TELEPHONE ENCOUNTER
Pt has read message with no response on 6/3/2020 at 133pm. Will close encounter.    Carmencita Vela CMA (AAMA)

## 2020-06-08 ENCOUNTER — VIRTUAL VISIT (OUTPATIENT)
Dept: PSYCHOLOGY | Facility: CLINIC | Age: 60
End: 2020-06-08
Payer: COMMERCIAL

## 2020-06-08 DIAGNOSIS — F33.2 SEVERE RECURRENT MAJOR DEPRESSION WITHOUT PSYCHOTIC FEATURES (H): Primary | ICD-10-CM

## 2020-06-08 DIAGNOSIS — F43.10 POST TRAUMATIC STRESS DISORDER (PTSD): ICD-10-CM

## 2020-06-08 DIAGNOSIS — F41.1 GENERALIZED ANXIETY DISORDER: ICD-10-CM

## 2020-06-08 PROCEDURE — 90837 PSYTX W PT 60 MINUTES: CPT | Mod: 95 | Performed by: COUNSELOR
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
## 2020-06-28 ENCOUNTER — E-VISIT (OUTPATIENT)
Dept: FAMILY MEDICINE | Facility: OTHER | Age: 60
End: 2020-06-28
Payer: COMMERCIAL

## 2020-06-28 DIAGNOSIS — J01.90 ACUTE NON-RECURRENT SINUSITIS, UNSPECIFIED LOCATION: ICD-10-CM

## 2020-06-28 PROCEDURE — 99421 OL DIG E/M SVC 5-10 MIN: CPT | Performed by: PHYSICIAN ASSISTANT

## 2020-06-29 RX ORDER — DOXYCYCLINE 100 MG/1
100 CAPSULE ORAL 2 TIMES DAILY
Qty: 20 CAPSULE | Refills: 0 | Status: SHIPPED | OUTPATIENT
Start: 2020-06-29 | End: 2020-11-19

## 2020-07-07 ENCOUNTER — VIRTUAL VISIT (OUTPATIENT)
Dept: PSYCHOLOGY | Facility: CLINIC | Age: 60
End: 2020-07-07
Payer: COMMERCIAL

## 2020-07-07 DIAGNOSIS — F41.1 GENERALIZED ANXIETY DISORDER: ICD-10-CM

## 2020-07-07 DIAGNOSIS — F43.10 POST TRAUMATIC STRESS DISORDER (PTSD): ICD-10-CM

## 2020-07-07 DIAGNOSIS — F33.2 SEVERE RECURRENT MAJOR DEPRESSION WITHOUT PSYCHOTIC FEATURES (H): Primary | ICD-10-CM

## 2020-07-07 PROCEDURE — 90837 PSYTX W PT 60 MINUTES: CPT | Mod: 95 | Performed by: COUNSELOR
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
## 2020-07-20 ENCOUNTER — TRANSFERRED RECORDS (OUTPATIENT)
Dept: HEALTH INFORMATION MANAGEMENT | Facility: CLINIC | Age: 60
End: 2020-07-20

## 2020-07-28 ENCOUNTER — E-VISIT (OUTPATIENT)
Dept: FAMILY MEDICINE | Facility: OTHER | Age: 60
End: 2020-07-28
Payer: COMMERCIAL

## 2020-07-28 DIAGNOSIS — F41.1 GENERALIZED ANXIETY DISORDER: ICD-10-CM

## 2020-07-28 DIAGNOSIS — F32.1 MAJOR DEPRESSIVE DISORDER, SINGLE EPISODE, MODERATE (H): ICD-10-CM

## 2020-07-28 PROCEDURE — 99421 OL DIG E/M SVC 5-10 MIN: CPT | Performed by: PHYSICIAN ASSISTANT

## 2020-07-28 RX ORDER — BUSPIRONE HYDROCHLORIDE 5 MG/1
5 TABLET ORAL 2 TIMES DAILY
Qty: 60 TABLET | Refills: 1 | Status: SHIPPED | OUTPATIENT
Start: 2020-07-28 | End: 2020-11-25 | Stop reason: SINTOL

## 2020-07-28 RX ORDER — DULOXETIN HYDROCHLORIDE 30 MG/1
CAPSULE, DELAYED RELEASE ORAL
Qty: 90 CAPSULE | Refills: 3 | Status: SHIPPED | OUTPATIENT
Start: 2020-07-28 | End: 2020-11-25

## 2020-07-28 ASSESSMENT — ANXIETY QUESTIONNAIRES
GAD7 TOTAL SCORE: 19
6. BECOMING EASILY ANNOYED OR IRRITABLE: MORE THAN HALF THE DAYS
GAD7 TOTAL SCORE: 19
7. FEELING AFRAID AS IF SOMETHING AWFUL MIGHT HAPPEN: NEARLY EVERY DAY
GAD7 TOTAL SCORE: 19
2. NOT BEING ABLE TO STOP OR CONTROL WORRYING: NEARLY EVERY DAY
7. FEELING AFRAID AS IF SOMETHING AWFUL MIGHT HAPPEN: NEARLY EVERY DAY
4. TROUBLE RELAXING: NEARLY EVERY DAY
3. WORRYING TOO MUCH ABOUT DIFFERENT THINGS: NEARLY EVERY DAY
1. FEELING NERVOUS, ANXIOUS, OR ON EDGE: NEARLY EVERY DAY
5. BEING SO RESTLESS THAT IT IS HARD TO SIT STILL: MORE THAN HALF THE DAYS

## 2020-07-28 ASSESSMENT — PATIENT HEALTH QUESTIONNAIRE - PHQ9
SUM OF ALL RESPONSES TO PHQ QUESTIONS 1-9: 17
10. IF YOU CHECKED OFF ANY PROBLEMS, HOW DIFFICULT HAVE THESE PROBLEMS MADE IT FOR YOU TO DO YOUR WORK, TAKE CARE OF THINGS AT HOME, OR GET ALONG WITH OTHER PEOPLE: EXTREMELY DIFFICULT
SUM OF ALL RESPONSES TO PHQ QUESTIONS 1-9: 17

## 2020-07-29 ASSESSMENT — ANXIETY QUESTIONNAIRES: GAD7 TOTAL SCORE: 19

## 2020-07-29 ASSESSMENT — PATIENT HEALTH QUESTIONNAIRE - PHQ9: SUM OF ALL RESPONSES TO PHQ QUESTIONS 1-9: 17

## 2020-08-03 NOTE — TELEPHONE ENCOUNTER
Please call pt- who has been prescribing this med recently, I have not prescribed it since 2016  Jonna Gaitan PA-C

## 2020-08-03 NOTE — TELEPHONE ENCOUNTER
Spoke with patient she states that [facility] has been prescribing it for her, she will contact them for refills   No further questions  Closing encounter  Quita Vallejo RT (R)

## 2020-08-14 ENCOUNTER — VIRTUAL VISIT (OUTPATIENT)
Dept: PSYCHOLOGY | Facility: CLINIC | Age: 60
End: 2020-08-14
Payer: COMMERCIAL

## 2020-08-14 DIAGNOSIS — F33.2 SEVERE RECURRENT MAJOR DEPRESSION WITHOUT PSYCHOTIC FEATURES (H): Primary | ICD-10-CM

## 2020-08-14 DIAGNOSIS — F41.1 GENERALIZED ANXIETY DISORDER: ICD-10-CM

## 2020-08-14 DIAGNOSIS — F43.10 POST TRAUMATIC STRESS DISORDER (PTSD): ICD-10-CM

## 2020-08-14 PROCEDURE — 90837 PSYTX W PT 60 MINUTES: CPT | Mod: 95 | Performed by: COUNSELOR
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
## 2020-08-17 ENCOUNTER — TRANSFERRED RECORDS (OUTPATIENT)
Dept: HEALTH INFORMATION MANAGEMENT | Facility: CLINIC | Age: 60
End: 2020-08-17

## 2020-08-31 ENCOUNTER — TRANSFERRED RECORDS (OUTPATIENT)
Dept: HEALTH INFORMATION MANAGEMENT | Facility: CLINIC | Age: 60
End: 2020-08-31

## 2020-09-02 ENCOUNTER — VIRTUAL VISIT (OUTPATIENT)
Dept: PSYCHOLOGY | Facility: CLINIC | Age: 60
End: 2020-09-02
Payer: COMMERCIAL

## 2020-09-02 DIAGNOSIS — F43.10 POST TRAUMATIC STRESS DISORDER (PTSD): ICD-10-CM

## 2020-09-02 DIAGNOSIS — F41.1 GENERALIZED ANXIETY DISORDER: ICD-10-CM

## 2020-09-02 DIAGNOSIS — F33.2 SEVERE RECURRENT MAJOR DEPRESSION WITHOUT PSYCHOTIC FEATURES (H): Primary | ICD-10-CM

## 2020-09-02 PROCEDURE — 90834 PSYTX W PT 45 MINUTES: CPT | Mod: 95 | Performed by: COUNSELOR
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
## 2020-09-21 ENCOUNTER — TRANSFERRED RECORDS (OUTPATIENT)
Dept: HEALTH INFORMATION MANAGEMENT | Facility: CLINIC | Age: 60
End: 2020-09-21

## 2020-10-01 DIAGNOSIS — K21.9 GASTROESOPHAGEAL REFLUX DISEASE WITHOUT ESOPHAGITIS: ICD-10-CM

## 2020-10-01 RX ORDER — ESOMEPRAZOLE MAGNESIUM 40 MG/1
CAPSULE, DELAYED RELEASE ORAL
Qty: 90 CAPSULE | Refills: 0 | Status: SHIPPED | OUTPATIENT
Start: 2020-10-01 | End: 2021-07-27

## 2020-10-07 ENCOUNTER — VIRTUAL VISIT (OUTPATIENT)
Dept: PSYCHOLOGY | Facility: OTHER | Age: 60
End: 2020-10-07
Payer: COMMERCIAL

## 2020-10-07 DIAGNOSIS — F41.1 GENERALIZED ANXIETY DISORDER: ICD-10-CM

## 2020-10-07 DIAGNOSIS — F43.10 POST TRAUMATIC STRESS DISORDER (PTSD): ICD-10-CM

## 2020-10-07 DIAGNOSIS — F33.2 SEVERE RECURRENT MAJOR DEPRESSION WITHOUT PSYCHOTIC FEATURES (H): Primary | ICD-10-CM

## 2020-10-07 PROCEDURE — 90834 PSYTX W PT 45 MINUTES: CPT | Mod: 95 | Performed by: COUNSELOR
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
## 2020-10-08 ENCOUNTER — OFFICE VISIT (OUTPATIENT)
Dept: NEUROSURGERY | Facility: CLINIC | Age: 60
End: 2020-10-08
Payer: COMMERCIAL

## 2020-10-08 VITALS
BODY MASS INDEX: 29.74 KG/M2 | SYSTOLIC BLOOD PRESSURE: 126 MMHG | HEART RATE: 98 BPM | DIASTOLIC BLOOD PRESSURE: 80 MMHG | WEIGHT: 174.2 LBS | HEIGHT: 64 IN | OXYGEN SATURATION: 99 % | TEMPERATURE: 97.1 F

## 2020-10-08 DIAGNOSIS — M54.16 LUMBAR RADICULOPATHY: Primary | ICD-10-CM

## 2020-10-08 PROCEDURE — 99203 OFFICE O/P NEW LOW 30 MIN: CPT | Performed by: NEUROLOGICAL SURGERY

## 2020-10-08 ASSESSMENT — MIFFLIN-ST. JEOR: SCORE: 1345.17

## 2020-10-08 ASSESSMENT — PAIN SCALES - GENERAL: PAINLEVEL: EXTREME PAIN (8)

## 2020-10-08 NOTE — PROGRESS NOTES
"Linda Walsh is a 60 year old female who presents for:  Chief Complaint   Patient presents with     Neurologic Problem     Surgical Consult : Low Back         Initial Vitals:  /80   Pulse 98   Temp 97.1  F (36.2  C) (Temporal)   Ht 5' 4\" (1.626 m)   Wt 174 lb 3.2 oz (79 kg)   LMP  (LMP Unknown)   SpO2 99%   BMI 29.90 kg/m   Estimated body mass index is 29.9 kg/m  as calculated from the following:    Height as of this encounter: 5' 4\" (1.626 m).    Weight as of this encounter: 174 lb 3.2 oz (79 kg).. Body surface area is 1.89 meters squared. BP completed using cuff size: regular  Extreme Pain (8)    Nursing Comments    Evelyne Singer    "

## 2020-10-08 NOTE — PATIENT INSTRUCTIONS
Today's Visit    1. Lumbar-sacral corset. Orthotic Department will call you to schedule an appointment. They are located on the lower level of [facility]. If you don't hear from them within 48 hours, call [phone] to schedule.     Please call our clinic with any questions or concerns    Sherry NAVARRO RN  [facility] - [address] [address]  Telephone:  [phone]       Fax:  [phone]

## 2020-10-08 NOTE — LETTER
"    10/8/2020         RE: Linda Walsh  [address]  [place] [address]        Dear Colleague,    Thank you for referring your patient, Linda Walsh, to the [facility] [place]. Please see a copy of my visit note below.    Linda Walsh is a 60 year old female who presents for:  Chief Complaint   Patient presents with     Neurologic Problem     Surgical Consult : Low Back         Initial Vitals:  /80   Pulse 98   Temp 97.1  F (36.2  C) (Temporal)   Ht 5' 4\" (1.626 m)   Wt 174 lb 3.2 oz (79 kg)   LMP  (LMP Unknown)   SpO2 99%   BMI 29.90 kg/m   Estimated body mass index is 29.9 kg/m  as calculated from the following:    Height as of this encounter: 5' 4\" (1.626 m).    Weight as of this encounter: 174 lb 3.2 oz (79 kg).. Body surface area is 1.89 meters squared. BP completed using cuff size: regular  Extreme Pain (8)    Nursing Comments    Evelyne Singer      I was asked by Dr. Duran to see this patient in consultation    60F w/ chronic back and leg pain, lumbar spondylosis.  Many years of chronic back pain, treated with SCS.  Also with chronic leg pain.  Recently, daily, continued aching low back pain, hip pain, and right>left thigh, leg, and groin pain.  MR Lumbar with multi-level spondylotic change, facet arthropathy, slight spondylolisthesis, and radiology notes of small right sided foraminal disc herniations at L3-4 and L5-S1.       Past Medical History:   Diagnosis Date     Anxiety      Asthma      Backache, unspecified      Esophageal reflux      Generalized anxiety disorder      Mild intermittent asthma     Asthma, allergy induced     Reflex sympathetic dystrophy of left lower extremity      Past Surgical History:   Procedure Laterality Date     COLONOSCOPY  6/29/2011    Procedure:COMBINED COLONOSCOPY, SINGLE BIOPSY/POLYPECTOMY BY BIOPSY; Surgeon:JENIFER LANDA; Location: GI     COLONOSCOPY  6/29/2011    Procedure:COMBINED COLONOSCOPY, " REMOVE TUMOR/POLYP/LESION BY SNARE; Surgeon:JENIFER LANDA; Location:[facility]     ESOPHAGOSCOPY, GASTROSCOPY, DUODENOSCOPY (EGD), COMBINED  2011    Procedure:COMBINED ESOPHAGOSCOPY, GASTROSCOPY, DUODENOSCOPY (EGD), BIOPSY SINGLE OR MULTIPLE; ESOPHAGOGASTRODUODENOSCOPY WITH       HC INJECTION ANES AGENT AND/OR STERIOD, SCIATIC NERVE  13    [facility]     HYSTERECTOMY      fibroids, no h/o previous abn paps     HYSTERECTOMY, PAP NO LONGER INDICATED       LAPAROSCOPIC CHOLECYSTECTOMY  3/19/2014    Procedure: LAPAROSCOPIC CHOLECYSTECTOMY;  Laparoscopic Cholecystectomy;  Surgeon: Marcus Griffith MD;  Location: [facility] OR     NERVE BLOCK SYMPATHETIC LUMBAR  2014    [facility]     OPEN REDUCTION INTERNAL FIXATION ANKLE  2011    Procedure:OPEN REDUCTION INTERNAL FIXATION ANKLE; Open Reduction Internal Fixation Left Ankle; Surgeon:ADONAY SHRESTHA; Location:[facility] OR     OPEN REDUCTION INTERNAL FIXATION ANKLE  12    Left ankle.  [facility]     ZZC NONSPECIFIC PROCEDURE      Hysterectomy     Social History     Socioeconomic History     Marital status:      Spouse name: Not on file     Number of children: Not on file     Years of education: Not on file     Highest education level: Not on file   Occupational History     Not on file   Social Needs     Financial resource strain: Not on file     Food insecurity     Worry: Not on file     Inability: Not on file     Transportation needs     Medical: Not on file     Non-medical: Not on file   Tobacco Use     Smoking status: Former Smoker     Quit date: 2000     Years since quittin.7     Smokeless tobacco: Never Used   Substance and Sexual Activity     Alcohol use: Yes     Comment: rare use      Drug use: Not Currently     Comment: Medical Cannabis     Sexual activity: Yes     Partners: Male     Birth control/protection: Female Surgical     Comment: hyst   Lifestyle     Physical activity     Days per week: Not on file  "    Minutes per session: Not on file     Stress: Not on file   Relationships     Social connections     Talks on phone: Not on file     Gets together: Not on file     Attends Anglican service: Not on file     Active member of club or organization: Not on file     Attends meetings of clubs or organizations: Not on file     Relationship status: Not on file     Intimate partner violence     Fear of current or ex partner: Not on file     Emotionally abused: Not on file     Physically abused: Not on file     Forced sexual activity: Not on file   Other Topics Concern     Parent/sibling w/ CABG, MI or angioplasty before 65F 55M? Yes     Comment: mom at 62, and dad in his 50s   Social History Narrative     Not on file     Family History   Problem Relation Age of Onset     Heart Disease Mother         60's     Cardiovascular Mother         heart     Heart Disease Father         40's     Arthritis Father         RA     Other Cancer Father      Skin Cancer Father      Other - See Comments Father         \"mini stroke\"     Heart Surgery Father      Arthritis Paternal Grandmother         RA     Depression Paternal Aunt         anxiety, too     Arthritis Paternal Aunt         RA        ROS: 10 point ROS neg other than the symptoms noted above in the HPI.    Physical Exam  /80   Pulse 98   Temp 97.1  F (36.2  C) (Temporal)   Ht 5' 4\" (1.626 m)   Wt 174 lb 3.2 oz (79 kg)   LMP  (LMP Unknown)   SpO2 99%   BMI 29.90 kg/m    HEENT:  Normocephalic, atraumatic.  PERRLA.  EOM s intact.  Visual fields full to gross exam  Neck:  Supple, non-tender, without lymphadenopathy.  Heart:  No peripheral edema  Lungs:  No SOB  Abdomen:  Non-distended.   Skin:  Warm and dry.  Extremities:  No edema, cyanosis or clubbing.  Psychiatric:  No apparent distress  Musculoskeletal:  Normal bulk and tone    NEUROLOGICAL EXAMINATION:     Mental status:  Alert and Oriented x 3, speech is fluent.  Cranial nerves:  II-XII intact.   Motor:  "   Shoulder Abduction:  Right:  5/5   Left:  5/5  Biceps:                      Right:  5/5   Left:  5/5  Triceps:                     Right:  5/5   Left:  5/5  Wrist Extensors:       Right:  5/5   Left:  5/5  Wrist Flexors:           Right:  5/5   Left:  5/5  interosseus :            Right:  5/5   Left:  5/5  Hip Flexion:                Right: 5/5  Left:  5/5  Quadriceps:             Right:  5/5  Left:  5/5  Hamstrings:             Right:  5/5  Left:  5/5  Gastroc Soleus:        Right:  5/5    Tib/Ant:                      Right:  5/5    EHL:                     Right:  5/5    Sensation:  Intact  Reflexes:  Negative Babinski.  Negative Clonus.  Negative Turner's.  Coordination:  Smooth finger to nose testing.   Negative pronator drift. Left foot in boot    A/P:  60F w/ chronic back and leg pain, lumbar spondylosis    I had a discussion with the patient, reviewing the history, symptoms, and imaging  We discussed that overall, her alignment and disc height are excellent, and there is no high grade stenosis  While it's possible that the trace foraminal disc herniations could be contributing to radicular pain, she did not want to pursue any potential surgical options that wouldn't address all her axial back pain and bilateral leg pain  Discussed that I would recommend continuing injection therapy with [facility] including RFA next week  Will order her a lumbosacral corset for comfort           Again, thank you for allowing me to participate in the care of your patient.        Sincerely,        Asif Lewis MD

## 2020-10-08 NOTE — PROGRESS NOTES
I was asked by Dr. Duran to see this patient in consultation    60F w/ chronic back and leg pain, lumbar spondylosis.  Many years of chronic back pain, treated with SCS.  Also with chronic leg pain.  Recently, daily, continued aching low back pain, hip pain, and right>left thigh, leg, and groin pain.  MR Lumbar with multi-level spondylotic change, facet arthropathy, slight spondylolisthesis, and radiology notes of small right sided foraminal disc herniations at L3-4 and L5-S1.       Past Medical History:   Diagnosis Date     Anxiety      Asthma      Backache, unspecified      Esophageal reflux      Generalized anxiety disorder      Mild intermittent asthma     Asthma, allergy induced     Reflex sympathetic dystrophy of left lower extremity      Past Surgical History:   Procedure Laterality Date     COLONOSCOPY  6/29/2011    Procedure:COMBINED COLONOSCOPY, SINGLE BIOPSY/POLYPECTOMY BY BIOPSY; Surgeon:JENIFER LANDA; Location:[facility]     COLONOSCOPY  6/29/2011    Procedure:COMBINED COLONOSCOPY, REMOVE TUMOR/POLYP/LESION BY SNARE; Surgeon:JENIFER LANDA; Location:[facility]     ESOPHAGOSCOPY, GASTROSCOPY, DUODENOSCOPY (EGD), COMBINED  8/23/2011    Procedure:COMBINED ESOPHAGOSCOPY, GASTROSCOPY, DUODENOSCOPY (EGD), BIOPSY SINGLE OR MULTIPLE; ESOPHAGOGASTRODUODENOSCOPY WITH       HC INJECTION ANES AGENT AND/OR STERIOD, SCIATIC NERVE  04/16/13    [place]     HYSTERECTOMY      fibroids, no h/o previous abn paps     HYSTERECTOMY, PAP NO LONGER INDICATED       LAPAROSCOPIC CHOLECYSTECTOMY  3/19/2014    Procedure: LAPAROSCOPIC CHOLECYSTECTOMY;  Laparoscopic Cholecystectomy;  Surgeon: Marcus Griffith MD;  Location: [facility]     NERVE BLOCK SYMPATHETIC LUMBAR  08/19/2014    [facility]     OPEN REDUCTION INTERNAL FIXATION ANKLE  9/17/2011    Procedure:OPEN REDUCTION INTERNAL FIXATION ANKLE; Open Reduction Internal Fixation Left Ankle; Surgeon:ADONAY SHRESTHA; Location:[facility]     OPEN REDUCTION  INTERNAL FIXATION ANKLE  12    Left ankle.  [facility]     ZZC NONSPECIFIC PROCEDURE      Hysterectomy     Social History     Socioeconomic History     Marital status:      Spouse name: Not on file     Number of children: Not on file     Years of education: Not on file     Highest education level: Not on file   Occupational History     Not on file   Social Needs     Financial resource strain: Not on file     Food insecurity     Worry: Not on file     Inability: Not on file     Transportation needs     Medical: Not on file     Non-medical: Not on file   Tobacco Use     Smoking status: Former Smoker     Quit date: 2000     Years since quittin.7     Smokeless tobacco: Never Used   Substance and Sexual Activity     Alcohol use: Yes     Comment: rare use      Drug use: Not Currently     Comment: Medical Cannabis     Sexual activity: Yes     Partners: Male     Birth control/protection: Female Surgical     Comment: hyst   Lifestyle     Physical activity     Days per week: Not on file     Minutes per session: Not on file     Stress: Not on file   Relationships     Social connections     Talks on phone: Not on file     Gets together: Not on file     Attends [employer]ism service: Not on file     Active member of club or organization: Not on file     Attends meetings of clubs or organizations: Not on file     Relationship status: Not on file     Intimate partner violence     Fear of current or ex partner: Not on file     Emotionally abused: Not on file     Physically abused: Not on file     Forced sexual activity: Not on file   Other Topics Concern     Parent/sibling w/ CABG, MI or angioplasty before 65F 55M? Yes     Comment: mom at 62, and dad in his 50s   Social History Narrative     Not on file     Family History   Problem Relation Age of Onset     Heart Disease Mother         60's     Cardiovascular Mother         heart     Heart Disease Father         40's     Arthritis Father         RA     Other  "Cancer Father      Skin Cancer Father      Other - See Comments Father         \"mini stroke\"     Heart Surgery Father      Arthritis Paternal Grandmother         RA     Depression Paternal Aunt         anxiety, too     Arthritis Paternal Aunt         RA        ROS: 10 point ROS neg other than the symptoms noted above in the HPI.    Physical Exam  /80   Pulse 98   Temp 97.1  F (36.2  C) (Temporal)   Ht 5' 4\" (1.626 m)   Wt 174 lb 3.2 oz (79 kg)   LMP  (LMP Unknown)   SpO2 99%   BMI 29.90 kg/m    HEENT:  Normocephalic, atraumatic.  PERRLA.  EOM s intact.  Visual fields full to gross exam  Neck:  Supple, non-tender, without lymphadenopathy.  Heart:  No peripheral edema  Lungs:  No SOB  Abdomen:  Non-distended.   Skin:  Warm and dry.  Extremities:  No edema, cyanosis or clubbing.  Psychiatric:  No apparent distress  Musculoskeletal:  Normal bulk and tone    NEUROLOGICAL EXAMINATION:     Mental status:  Alert and Oriented x 3, speech is fluent.  Cranial nerves:  II-XII intact.   Motor:    Shoulder Abduction:  Right:  5/5   Left:  5/5  Biceps:                      Right:  5/5   Left:  5/5  Triceps:                     Right:  5/5   Left:  5/5  Wrist Extensors:       Right:  5/5   Left:  5/5  Wrist Flexors:           Right:  5/5   Left:  5/5  interosseus :            Right:  5/5   Left:  5/5  Hip Flexion:                Right: 5/5  Left:  5/5  Quadriceps:             Right:  5/5  Left:  5/5  Hamstrings:             Right:  5/5  Left:  5/5  Gastroc Soleus:        Right:  5/5    Tib/Ant:                      Right:  5/5    EHL:                     Right:  5/5    Sensation:  Intact  Reflexes:  Negative Babinski.  Negative Clonus.  Negative Turner's.  Coordination:  Smooth finger to nose testing.   Negative pronator drift. Left foot in boot    A/P:  60F w/ chronic back and leg pain, lumbar spondylosis    I had a discussion with the patient, reviewing the history, symptoms, and imaging  We discussed that overall, " her alignment and disc height are excellent, and there is no high grade stenosis  While it's possible that the trace foraminal disc herniations could be contributing to radicular pain, she did not want to pursue any potential surgical options that wouldn't address all her axial back pain and bilateral leg pain  Discussed that I would recommend continuing injection therapy with [facility] including RFA next week  Will order her a lumbosacral corset for comfort

## 2020-10-19 ENCOUNTER — TRANSFERRED RECORDS (OUTPATIENT)
Dept: HEALTH INFORMATION MANAGEMENT | Facility: CLINIC | Age: 60
End: 2020-10-19

## 2020-10-20 ENCOUNTER — VIRTUAL VISIT (OUTPATIENT)
Dept: PSYCHOLOGY | Facility: OTHER | Age: 60
End: 2020-10-20
Payer: COMMERCIAL

## 2020-10-20 DIAGNOSIS — F33.2 SEVERE RECURRENT MAJOR DEPRESSION WITHOUT PSYCHOTIC FEATURES (H): Primary | ICD-10-CM

## 2020-10-20 DIAGNOSIS — F41.1 GENERALIZED ANXIETY DISORDER: ICD-10-CM

## 2020-10-20 DIAGNOSIS — F43.10 POST TRAUMATIC STRESS DISORDER (PTSD): ICD-10-CM

## 2020-10-20 PROCEDURE — 90834 PSYTX W PT 45 MINUTES: CPT | Mod: 95 | Performed by: COUNSELOR
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
## 2020-11-14 ENCOUNTER — HEALTH MAINTENANCE LETTER (OUTPATIENT)
Age: 60
End: 2020-11-14

## 2020-11-17 ENCOUNTER — VIRTUAL VISIT (OUTPATIENT)
Dept: PSYCHOLOGY | Facility: OTHER | Age: 60
End: 2020-11-17
Payer: COMMERCIAL

## 2020-11-17 DIAGNOSIS — F41.1 GENERALIZED ANXIETY DISORDER: ICD-10-CM

## 2020-11-17 DIAGNOSIS — F43.10 POST TRAUMATIC STRESS DISORDER (PTSD): ICD-10-CM

## 2020-11-17 DIAGNOSIS — N95.1 MENOPAUSAL SYNDROME (HOT FLASHES): ICD-10-CM

## 2020-11-17 DIAGNOSIS — F33.2 SEVERE RECURRENT MAJOR DEPRESSION WITHOUT PSYCHOTIC FEATURES (H): Primary | ICD-10-CM

## 2020-11-17 PROCEDURE — 90834 PSYTX W PT 45 MINUTES: CPT | Mod: 95 | Performed by: COUNSELOR
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
## 2020-11-19 ENCOUNTER — MYC MEDICAL ADVICE (OUTPATIENT)
Dept: FAMILY MEDICINE | Facility: OTHER | Age: 60
End: 2020-11-19

## 2020-11-19 ENCOUNTER — VIRTUAL VISIT (OUTPATIENT)
Dept: FAMILY MEDICINE | Facility: OTHER | Age: 60
End: 2020-11-19
Payer: COMMERCIAL

## 2020-11-19 DIAGNOSIS — R53.83 MALAISE AND FATIGUE: Primary | ICD-10-CM

## 2020-11-19 DIAGNOSIS — R53.81 MALAISE AND FATIGUE: Primary | ICD-10-CM

## 2020-11-19 DIAGNOSIS — R52 GENERALIZED BODY ACHES: ICD-10-CM

## 2020-11-19 DIAGNOSIS — Z20.822 EXPOSURE TO COVID-19 VIRUS: ICD-10-CM

## 2020-11-19 DIAGNOSIS — R43.2 LOSS OF TASTE: ICD-10-CM

## 2020-11-19 PROCEDURE — 99214 OFFICE O/P EST MOD 30 MIN: CPT | Mod: 95 | Performed by: PHYSICIAN ASSISTANT

## 2020-11-19 RX ORDER — DULOXETIN HYDROCHLORIDE 60 MG/1
CAPSULE, DELAYED RELEASE ORAL
COMMUNITY
Start: 2020-10-19 | End: 2020-11-25

## 2020-11-19 RX ORDER — LINACLOTIDE 145 UG/1
CAPSULE, GELATIN COATED ORAL
COMMUNITY
Start: 2020-10-16 | End: 2021-11-24

## 2020-11-19 RX ORDER — ESTRADIOL 0.04 MG/D
PATCH TRANSDERMAL
Qty: 12 PATCH | Refills: 1 | Status: SHIPPED | OUTPATIENT
Start: 2020-11-19 | End: 2021-07-20

## 2020-11-19 NOTE — TELEPHONE ENCOUNTER
MAD[place]: would you like to complete a telephone or maybe have her do an E-visit and order a covid test?    Thoughts?    Jeremias Díaz, AASHISHN, RN, PHN

## 2020-11-19 NOTE — PROGRESS NOTES
"Linda Walsh is a 60 year old female who is being evaluated via a billable telephone visit.      The patient has been notified of following:     \"This telephone visit will be conducted via a call between you and your physician/provider. We have found that certain health care needs can be provided without the need for a physical exam.  This service lets us provide the care you need with a short phone conversation.  If a prescription is necessary we can send it directly to your pharmacy.  If lab work is needed we can place an order for that and you can then stop by our lab to have the test done at a later time.    Telephone visits are billed at different rates depending on your insurance coverage. During this emergency period, for some insurers they may be billed the same as an in-person visit.  Please reach out to your insurance provider with any questions.    If during the course of the call the physician/provider feels a telephone visit is not appropriate, you will not be charged for this service.\"    Patient has given verbal consent for Telephone visit?  Yes    What phone number would you like to be contacted at? [phone]    How would you like to obtain your AVS? Dona Huang     Linda Walsh is a 60 year old female who presents via phone visit today for the following health issues:    HPI       Concern for COVID-19  About how many days ago did these symptoms start? 3 days   Is this your first visit for this illness? Yes  In the 14 days before your symptoms started, have you had close contact with someone with COVID-19 (Coronavirus)? Yes, I have been in contact with someone who has COVID-19/Coronavirus (confirmed by lab test).  Do you have a fever or chills? Yes, I felt feverish or had chills  Are you having new or worsening difficulty breathing? Yes   Please describe what kind of difficulty you are having breathing:Mild dyspnea (decreased exercise tolerance, able to do ADLs without " difficulty)  Do you have new or worsening cough? Yes, it's a dry cough.   Have you had any new or unexplained body aches? YES    Have you experienced any of the following NEW symptoms?    Headache: YES    Sore throat: YES    Loss of taste or smell: YES    Chest pain: No    Diarrhea: No    Rash: No  What treatments have you tried? Zicam 3x daily, vicks, increase in fluids,   Who do you live with?  and dogs  Are you, or a household member, a healthcare worker or a ? No  Do you live in a nursing home, group home, or shelter? No  Do you have a way to get food/medications if quarantined? Yes, I have a friend or family member who can help me.    Review of Systems   Constitutional, HEENT, cardiovascular, pulmonary, GI, , musculoskeletal, neuro, skin, endocrine and psych systems are negative, except as otherwise noted.       Objective          Vitals:  No vitals were obtained today due to virtual visit.    alert, mild distress and fatigued  PSYCH: Alert and oriented times 3; coherent speech, normal   rate and volume, able to articulate logical thoughts, able   to abstract reason, no tangential thoughts, no hallucinations   or delusions  Her affect is flat  RESP: No cough, no audible wheezing, able to talk in full sentences  Remainder of exam unable to be completed due to telephone visits    No results found for this or any previous visit (from the past 24 hour(s)).        Assessment/Plan:    Assessment & Plan     Linda was seen today for covid concern.    Diagnoses and all orders for this visit:    Malaise and fatigue  -     Symptomatic COVID-19 Virus (Coronavirus) by PCR; Future    Generalized body aches  -     Symptomatic COVID-19 Virus (Coronavirus) by PCR; Future    Loss of taste  -     Symptomatic COVID-19 Virus (Coronavirus) by PCR; Future    Exposure to COVID-19 virus  -     Symptomatic COVID-19 Virus (Coronavirus) by PCR; Future         BMI:   Estimated body mass index is 29.9 kg/m  as  "calculated from the following:    Height as of 10/8/20: 1.626 m (5' 4\").    Weight as of 10/8/20: 79 kg (174 lb 3.2 oz).          She has had to recent exposures the same person who has tested positive for COVID-19 without any type of personal protective equipment in place.  Recommended that she treat her current upper respiratory infection as COVID-19 until we can prove that it would be negative.  Advise if she becomes short of breath she needs to be seen in an emergency room.  No further medication or treatment until we can prove necessity.    No follow-ups on file.    Delmar Mclean PA-C  [facility]    Phone call duration:  7 minutes              "

## 2020-11-20 ENCOUNTER — TRANSFERRED RECORDS (OUTPATIENT)
Dept: HEALTH INFORMATION MANAGEMENT | Facility: CLINIC | Age: 60
End: 2020-11-20

## 2020-11-23 DIAGNOSIS — R53.81 MALAISE AND FATIGUE: ICD-10-CM

## 2020-11-23 DIAGNOSIS — R53.83 MALAISE AND FATIGUE: ICD-10-CM

## 2020-11-23 DIAGNOSIS — R43.2 LOSS OF TASTE: ICD-10-CM

## 2020-11-23 DIAGNOSIS — Z20.822 EXPOSURE TO COVID-19 VIRUS: ICD-10-CM

## 2020-11-23 DIAGNOSIS — R52 GENERALIZED BODY ACHES: ICD-10-CM

## 2020-11-23 PROCEDURE — U0003 INFECTIOUS AGENT DETECTION BY NUCLEIC ACID (DNA OR RNA); SEVERE ACUTE RESPIRATORY SYNDROME CORONAVIRUS 2 (SARS-COV-2) (CORONAVIRUS DISEASE [COVID-19]), AMPLIFIED PROBE TECHNIQUE, MAKING USE OF HIGH THROUGHPUT TECHNOLOGIES AS DESCRIBED BY CMS-2020-01-R: HCPCS | Performed by: PHYSICIAN ASSISTANT

## 2020-11-24 ENCOUNTER — MYC MEDICAL ADVICE (OUTPATIENT)
Dept: FAMILY MEDICINE | Facility: OTHER | Age: 60
End: 2020-11-24

## 2020-11-24 LAB
SARS-COV-2 RNA SPEC QL NAA+PROBE: NOT DETECTED
SPECIMEN SOURCE: NORMAL

## 2020-11-25 ENCOUNTER — OFFICE VISIT (OUTPATIENT)
Dept: FAMILY MEDICINE | Facility: OTHER | Age: 60
End: 2020-11-25
Payer: COMMERCIAL

## 2020-11-25 VITALS
SYSTOLIC BLOOD PRESSURE: 110 MMHG | TEMPERATURE: 98.4 F | DIASTOLIC BLOOD PRESSURE: 76 MMHG | OXYGEN SATURATION: 97 % | HEART RATE: 64 BPM

## 2020-11-25 DIAGNOSIS — J01.40 ACUTE PANSINUSITIS, RECURRENCE NOT SPECIFIED: Primary | ICD-10-CM

## 2020-11-25 DIAGNOSIS — Z23 NEED FOR PROPHYLACTIC VACCINATION AND INOCULATION AGAINST INFLUENZA: ICD-10-CM

## 2020-11-25 DIAGNOSIS — R09.81 SINUS CONGESTION: ICD-10-CM

## 2020-11-25 DIAGNOSIS — Z23 NEED FOR VACCINATION: ICD-10-CM

## 2020-11-25 PROCEDURE — 90682 RIV4 VACC RECOMBINANT DNA IM: CPT | Performed by: FAMILY MEDICINE

## 2020-11-25 PROCEDURE — 90472 IMMUNIZATION ADMIN EACH ADD: CPT | Performed by: FAMILY MEDICINE

## 2020-11-25 PROCEDURE — 90732 PPSV23 VACC 2 YRS+ SUBQ/IM: CPT | Performed by: FAMILY MEDICINE

## 2020-11-25 PROCEDURE — 90471 IMMUNIZATION ADMIN: CPT | Performed by: FAMILY MEDICINE

## 2020-11-25 PROCEDURE — 99214 OFFICE O/P EST MOD 30 MIN: CPT | Mod: 25 | Performed by: FAMILY MEDICINE

## 2020-11-25 RX ORDER — DOXYCYCLINE 100 MG/1
100 CAPSULE ORAL 2 TIMES DAILY
Qty: 20 CAPSULE | Refills: 0 | Status: SHIPPED | OUTPATIENT
Start: 2020-11-25 | End: 2022-10-03

## 2020-11-25 RX ORDER — DULOXETIN HYDROCHLORIDE 60 MG/1
60 CAPSULE, DELAYED RELEASE ORAL DAILY
COMMUNITY
Start: 2020-11-25 | End: 2023-09-27

## 2020-11-25 RX ORDER — FLUTICASONE PROPIONATE 50 MCG
2 SPRAY, SUSPENSION (ML) NASAL DAILY
Qty: 16 G | Refills: 3 | Status: SHIPPED | OUTPATIENT
Start: 2020-11-25 | End: 2022-09-23

## 2020-11-25 NOTE — TELEPHONE ENCOUNTER
LM for the patient to return call to the clinic.   Please transfer to any triage RN.   Responded to mychart message.     BACKGROUND:   Patient was seen on 11/19/20 for malaise and fatigue with Delmar Kapoor. COVID test was ordered and no medication was ordered at time of visit.   Had a COVID test resulted on 11/23/20, which was negative.     Richard Garcia RN  November 25, 2020

## 2020-11-25 NOTE — TELEPHONE ENCOUNTER
"Shortness of breath with activities   per patient fees like bronchitis, states \"I get this every year\"   yellow/green in the AM of phlegm  Denies active shortness of breath, is speaking in full sentences.   Denies fever or chest pain.    RN advised in person PUI visit, to assess lungs and needs. Huddled with DJ and appt approved for this AM.    Left message for patient with appt details, ok per chart      Stephanie Marquez RN      "

## 2020-11-25 NOTE — PATIENT INSTRUCTIONS
Thank you for visiting [facility]    Take the antibiotics until they're gone.  If you'd like, can try the Flonase for a few days first as if this is viral you may not need antibiotics.      OK to use other otc medications as needed.    Contact us or return if questions or concerns.     Have a nice day!    Dr. Boyce     No follow-ups on file.      If you need medication refills, please contact your pharmacy 3 days before your prescriptions runs out or download the [facility] david for your smart phone.   If you are out of refills, your pharmacy will contact contact the clinic.                                     [employer] Inchart assistance [phone]

## 2020-11-25 NOTE — PROGRESS NOTES
Subjective     Linda Walsh is a 60 year old female who presents to clinic today for the following health issues:    HPI         Acute Illness  Acute illness concerns: Cough/Sinus   Onset/Duration: 2 weeks ago   Symptoms:  Fever: no  Chills/Sweats: YES  Headache (location?): YES  Sinus Pressure: YES  Conjunctivitis:  no  Ear Pain: no  Rhinorrhea: no  Congestion: YES  Sore Throat: no  Cough: YES-barking  Wheeze: no  Decreased Appetite: YES  Nausea: YES  Vomiting: no  Diarrhea: no  Dysuria/Freq.: no  Dysuria or Hematuria: no  Fatigue/Achiness: YES  Sick/Strep Exposure: no  Therapies tried and outcome: DayQuil and lots other patient said. No names she said.     Pt has symptoms typical of her sinus infection.  Also has a croupy cough.      Has taken dayquil/nyquil, some supplements/vitamins, hot packs on face, decongestant with guafenesin.  Symptoms have been going on for over 2 weeks.      No sob, fatigue.  Normal smell/taste for sinus symptoms.  Had a negative COVID test, results yet.      Review of Systems   Constitutional, HEENT, cardiovascular, pulmonary, gi and gu systems are negative, except as otherwise noted.      Objective    /76   Pulse 64   Temp 98.4  F (36.9  C)   LMP  (LMP Unknown)   SpO2 97%   There is no height or weight on file to calculate BMI.  Physical Exam   GENERAL: mildly ill appearing  EYES: Eyes grossly normal to inspection, PERRL and conjunctivae and sclerae normal  HENT: normal cephalic/atraumatic, ear canals and TM's normal, nose and mouth without ulcers or lesions, oropharynx clear, oral mucous membranes moist and sinuses: maxillary, frontal tenderness on both sides  NECK: no adenopathy, no asymmetry, masses, or scars and thyroid normal to palpation  RESP: lungs clear to auscultation - no rales, rhonchi or wheezes  CV: regular rate and rhythm, normal S1 S2, no S3 or S4, no murmur, click or rub, no peripheral edema and peripheral pulses strong  ABDOMEN: soft, nontender, no  hepatosplenomegaly, no masses and bowel sounds normal  MS: no gross musculoskeletal defects noted, no edema    No results found for any visits on 11/25/20.  Orders Only on 11/23/2020   Component Date Value Ref Range Status     COVID-19 Virus PCR to [facility] - So* 11/23/2020 Nasopharyngeal   Final     COVID-19 Virus PCR to [facility] - Re* 11/23/2020 Not Detected   Final    Comment: Collection of multiple specimens from the same patient may be necessary to   detect the virus. The possibility of a false negative should be considered if   the patient's recent exposure or clinical presentation suggests 2019 nCOV   infection and diagnostic tests for other causes of illness are negative.   Repeat testing may be considered in this setting.  Patient sample was heat inactivated and amplified using the HDPCR SARS-CoV-2   assay ([employer] The HDPCRTM SARS-CoV-2 assay is a reverse   transcription real-time polymerase chain reaction (qRT-PCR) test intended for   the qualitative detection of nucleic acid  from SARS-CoV-2 in human nasopharyngeal swabs, oropharyngeal swabs, anterior   nasal swabs, mid-turbinate nasal swabs as well as nasal aspirate, nasal wash,   and bronchoalveolar lavage (BAL) specimens from individuals who are suspected   of COVID-19 by their healthcare provider.  A negative result does not rule out the presence of real-time PCR inhibitors   in the sp                           ecimen or COVID-19 RNA in concentrations below the limit of detection   of the assay. The possibility of a false negative should be considered if the   patients recent exposure or clinical presentation suggests COVID-19.   Additional testing or repeat testing requires consultation with the   laboratory.  Nasopharyngeal specimen is the preferred choice for swab-based SARS CoV2   testing. When collection of a nasopharyngeal swab is not possible the   following are acceptable alternatives:  an oropharyngeal (OP) specimen collected by a  "healthcare professional, or a   nasal mid-turbinate (NMT) swab collected by a healthcare professional or by   onsite self-collection (using a flocked tapered swab), or an anterior nares   specimen collected by a healthcare professional or by onsite self-collection   (using a round foam swab). (Centers for Disease Control)  Testing performed by [facility] ([facility]) [address] [place] [address]  The test performance characteristics were determined by [facility]. It has not been   cleared or approved by the FDA.  The laboratory is regulated under the Clinical Laboratory Improvement   Amendments of 1988 (CLIA-88) as qualified to perform high-complexity testing.   This test is used for clinical purposes. It should not be regarded as   investigational or for research.               Assessment & Plan       ICD-10-CM    1. Acute pansinusitis, recurrence not specified  J01.40 doxycycline hyclate (VIBRAMYCIN) 100 MG capsule   2. Sinus congestion  R09.81 fluticasone (FLONASE) 50 MCG/ACT nasal spray     doxycycline hyclate (VIBRAMYCIN) 100 MG capsule   3. Need for vaccination  Z23 PPSV23, IM/SUBQ (2+ YRS) - [id number]   4. Need for prophylactic vaccination and inoculation against influenza  Z23 INFLUENZA QUAD, RECOMBINANT, P-FREE (RIV4) ([facility]) [[id number]]      1, 2.  Discussed symptomatic treatment with Flonase.  If this is viral, this should be adequate.  Still, patient has had symptoms for over 2 weeks, so we will prescribe a 10-day course of doxycycline given her penicillin allergy.  Follow-up if not improving.  3, 4.  Immunized.    Portions of this note were completed using Dragon dictation software.  Although reviewed, there may be typographical and other inadvertent errors that remain.              BMI:   Estimated body mass index is 29.9 kg/m  as calculated from the following:    Height as of 10/8/20: 1.626 m (5' 4\").    " Weight as of 10/8/20: 79 kg (174 lb 3.2 oz).   Weight management plan: Discussed healthy diet and exercise guidelines         Patient Instructions   Thank you for visiting [facility]    Take the antibiotics until they're gone.  If you'd like, can try the Flonase for a few days first as if this is viral you may not need antibiotics.      OK to use other otc medications as needed.    Contact us or return if questions or concerns.     Have a nice day!    Dr. Boyce     No follow-ups on file.      If you need medication refills, please contact your pharmacy 3 days before your prescriptions runs out or download the [facility] david for your smart phone.   If you are out of refills, your pharmacy will contact contact the clinic.                                     Mych[facility] assistance [phone]                       Return in about 2 weeks (around 12/9/2020), or if symptoms worsen or fail to improve.    Emmanuel Boyce MD, MD  [facility]

## 2020-12-09 ENCOUNTER — VIRTUAL VISIT (OUTPATIENT)
Dept: PSYCHOLOGY | Facility: OTHER | Age: 60
End: 2020-12-09
Payer: COMMERCIAL

## 2020-12-09 DIAGNOSIS — F33.2 SEVERE RECURRENT MAJOR DEPRESSION WITHOUT PSYCHOTIC FEATURES (H): Primary | ICD-10-CM

## 2020-12-09 DIAGNOSIS — F41.1 GENERALIZED ANXIETY DISORDER: ICD-10-CM

## 2020-12-09 DIAGNOSIS — F43.10 POST TRAUMATIC STRESS DISORDER (PTSD): ICD-10-CM

## 2020-12-09 PROCEDURE — 90834 PSYTX W PT 45 MINUTES: CPT | Mod: 95 | Performed by: COUNSELOR
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
## 2020-12-21 ENCOUNTER — TRANSFERRED RECORDS (OUTPATIENT)
Dept: HEALTH INFORMATION MANAGEMENT | Facility: CLINIC | Age: 60
End: 2020-12-21

## 2020-12-28 ENCOUNTER — E-VISIT (OUTPATIENT)
Dept: FAMILY MEDICINE | Facility: OTHER | Age: 60
End: 2020-12-28
Payer: COMMERCIAL

## 2020-12-28 DIAGNOSIS — J32.8 OTHER CHRONIC SINUSITIS: Primary | ICD-10-CM

## 2020-12-28 PROCEDURE — 99421 OL DIG E/M SVC 5-10 MIN: CPT | Performed by: PHYSICIAN ASSISTANT

## 2020-12-29 RX ORDER — AZITHROMYCIN 250 MG/1
TABLET, FILM COATED ORAL
Qty: 12 TABLET | Refills: 0 | Status: SHIPPED | OUTPATIENT
Start: 2020-12-29 | End: 2021-09-21

## 2020-12-29 NOTE — PATIENT INSTRUCTIONS
Dear Linda Walsh    After reviewing your responses, I've been able to diagnose you with?Chronic sinusitis.?     Based on your responses and diagnosis, I have prescribed Zithromax to treat your symptoms. I have sent this to your pharmacy.?     It is also important to stay well hydrated, get lots of rest and take over-the-counter decongestants,?tylenol?or ibuprofen if you?are able to?take those medications per your primary care provider to help relieve discomfort.?     It is important that you take?all of?your prescribed medication even if your symptoms are improving after a few doses.? Taking?all of?your medicine helps prevent the symptoms from returning.?     If your symptoms worsen, you develop severe headache, vomiting, high fever (>102), or are not improving in 7 days, please contact your primary care provider for an appointment or visit any of our convenient Walk-in Care or Urgent Care Centers to be seen which can be found on our website?here.?     Thanks again for choosing?us?as your health care partner,?   ?  Delmar Mclean PA-C

## 2021-01-08 ENCOUNTER — VIRTUAL VISIT (OUTPATIENT)
Dept: PSYCHOLOGY | Facility: CLINIC | Age: 61
End: 2021-01-08
Payer: COMMERCIAL

## 2021-01-08 DIAGNOSIS — F33.2 SEVERE RECURRENT MAJOR DEPRESSION WITHOUT PSYCHOTIC FEATURES (H): Primary | ICD-10-CM

## 2021-01-08 DIAGNOSIS — F41.1 GENERALIZED ANXIETY DISORDER: ICD-10-CM

## 2021-01-08 DIAGNOSIS — F43.10 POST TRAUMATIC STRESS DISORDER (PTSD): ICD-10-CM

## 2021-01-08 PROCEDURE — 90834 PSYTX W PT 45 MINUTES: CPT | Mod: 95 | Performed by: COUNSELOR
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
## 2021-01-22 ENCOUNTER — MYC MEDICAL ADVICE (OUTPATIENT)
Dept: FAMILY MEDICINE | Facility: OTHER | Age: 61
End: 2021-01-22

## 2021-01-25 ENCOUNTER — TRANSFERRED RECORDS (OUTPATIENT)
Dept: HEALTH INFORMATION MANAGEMENT | Facility: CLINIC | Age: 61
End: 2021-01-25

## 2021-02-01 ENCOUNTER — VIRTUAL VISIT (OUTPATIENT)
Dept: PSYCHOLOGY | Facility: CLINIC | Age: 61
End: 2021-02-01
Payer: COMMERCIAL

## 2021-02-01 DIAGNOSIS — F41.1 GENERALIZED ANXIETY DISORDER: ICD-10-CM

## 2021-02-01 DIAGNOSIS — F33.2 SEVERE RECURRENT MAJOR DEPRESSION WITHOUT PSYCHOTIC FEATURES (H): Primary | ICD-10-CM

## 2021-02-01 DIAGNOSIS — F43.10 POST TRAUMATIC STRESS DISORDER (PTSD): ICD-10-CM

## 2021-02-01 PROCEDURE — 90834 PSYTX W PT 45 MINUTES: CPT | Mod: 95 | Performed by: COUNSELOR
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
## 2021-02-18 ENCOUNTER — MYC MEDICAL ADVICE (OUTPATIENT)
Dept: FAMILY MEDICINE | Facility: OTHER | Age: 61
End: 2021-02-18

## 2021-02-22 ENCOUNTER — MYC MEDICAL ADVICE (OUTPATIENT)
Dept: FAMILY MEDICINE | Facility: OTHER | Age: 61
End: 2021-02-22

## 2021-02-22 NOTE — PROGRESS NOTES
Assessment & Plan     Dysfunction of right eustachian tube  Recommend treatment with decongestants if she tolerates them and continuation of her nasal steroid.  No signs of any ceruminosis or infection today.  She should stop putting oils and other products in her ear as it would not be of benefit       Depression Screening Follow Up    PHQ 2/24/2021   PHQ-9 Total Score 19   Q9: Thoughts of better off dead/self-harm past 2 weeks Several days   F/U: Thoughts of suicide or self-harm No   F/U: Self harm-plan -   F/U: Self-harm action -   F/U: Safety concerns No   PHQ-9 External Data -     Last PHQ-9 2/24/2021   1.  Little interest or pleasure in doing things 3   2.  Feeling down, depressed, or hopeless 3   3.  Trouble falling or staying asleep, or sleeping too much 2   4.  Feeling tired or having little energy 2   5.  Poor appetite or overeating 2   6.  Feeling bad about yourself 3   7.  Trouble concentrating 2   8.  Moving slowly or restless 1   Q9: Thoughts of better off dead/self-harm past 2 weeks 1   PHQ-9 Total Score 19   Difficulty at work, home, or with people -   In the past two weeks have you had thoughts of suicide or self harm? No   Do you have concerns about your personal safety or the safety of others? No   In the past 2 weeks have you thought about a plan or had intention to harm yourself? -   In the past 2 weeks have you acted on these thoughts in any way? -         No plans or intention of harming self, she continues to take her medication as prescribed by another provider and follows up with counseling routinely.      Follow Up      Follow Up Actions Taken  Referred patient back to mental health provider    Discussed the following ways the patient can remain in a safe environment:        No follow-ups on file.    Jonna Gaitan PA-C  [facility] Hernandez is a 60 year old who presents for the following health issues     History of Present Illness       She eats  "2-3 servings of fruits and vegetables daily.She consumes 0 sweetened beverage(s) daily.She exercises with enough effort to increase her heart rate 9 or less minutes per day.  She exercises with enough effort to increase her heart rate 3 or less days per week. She is missing 2 dose(s) of medications per week.  She is not taking prescribed medications regularly due to remembering to take.         Concern - right ear pain  Onset: 2 weeks  Description: feels full   Intensity: mild  Progression of Symptoms:  same  Accompanying Signs & Symptoms: discomfort and harder to hear out of that ear, sounds like gravel per patient crackles and pops.  She now has some mild pain with it.  She is not had any fevers or chills, no illnesses.  She has had some congestion from her allergies and \"sinus issues\" which are typical for her.  She feels like she may have some wax in her ear though she never has before.  Previous history of similar problem: no  Precipitating factors:        Worsened by: nothing  Alleviating factors:        Improved by: nothing  Therapies tried and outcome: used ear drops and tried to clean it out with a q-tip  Answers for HPI/ROS submitted by the patient on 2/24/2021   Chronic problems general questions HPI Form  If you checked off any problems, how difficult have these problems made it for you to do your work, take care of things at home, or get along with other people?: Very difficult  PHQ9 TOTAL SCORE: 19  MARLIN 7 TOTAL SCORE: 16        Review of Systems   As documented above       Objective    /82   Pulse 86   Temp 98.1  F (36.7  C) (Temporal)   Resp 14   LMP  (LMP Unknown)   SpO2 97%   There is no height or weight on file to calculate BMI.  Physical Exam   GENERAL: healthy, alert and no distress  HENT: normal cephalic/atraumatic, right ear: TM is retracted, left ear: normal: no effusions, no erythema, normal landmarks, nose and mouth without ulcers or lesions, oropharynx clear and oral mucous " membranes moist  NECK: no adenopathy, no asymmetry, masses, or scars and thyroid normal to palpation  MS: Wearing cast boot for her chronic pain

## 2021-02-24 ENCOUNTER — OFFICE VISIT (OUTPATIENT)
Dept: FAMILY MEDICINE | Facility: OTHER | Age: 61
End: 2021-02-24
Payer: COMMERCIAL

## 2021-02-24 VITALS
HEART RATE: 86 BPM | SYSTOLIC BLOOD PRESSURE: 122 MMHG | RESPIRATION RATE: 14 BRPM | DIASTOLIC BLOOD PRESSURE: 82 MMHG | OXYGEN SATURATION: 97 % | TEMPERATURE: 98.1 F

## 2021-02-24 DIAGNOSIS — H69.91 DYSFUNCTION OF RIGHT EUSTACHIAN TUBE: Primary | ICD-10-CM

## 2021-02-24 PROCEDURE — 99213 OFFICE O/P EST LOW 20 MIN: CPT | Performed by: PHYSICIAN ASSISTANT

## 2021-02-24 ASSESSMENT — ANXIETY QUESTIONNAIRES
GAD7 TOTAL SCORE: 16
3. WORRYING TOO MUCH ABOUT DIFFERENT THINGS: NEARLY EVERY DAY
GAD7 TOTAL SCORE: 16
4. TROUBLE RELAXING: MORE THAN HALF THE DAYS
GAD7 TOTAL SCORE: 16
6. BECOMING EASILY ANNOYED OR IRRITABLE: MORE THAN HALF THE DAYS
2. NOT BEING ABLE TO STOP OR CONTROL WORRYING: NEARLY EVERY DAY
7. FEELING AFRAID AS IF SOMETHING AWFUL MIGHT HAPPEN: MORE THAN HALF THE DAYS
1. FEELING NERVOUS, ANXIOUS, OR ON EDGE: NEARLY EVERY DAY
7. FEELING AFRAID AS IF SOMETHING AWFUL MIGHT HAPPEN: MORE THAN HALF THE DAYS
5. BEING SO RESTLESS THAT IT IS HARD TO SIT STILL: SEVERAL DAYS

## 2021-02-24 ASSESSMENT — PATIENT HEALTH QUESTIONNAIRE - PHQ9
SUM OF ALL RESPONSES TO PHQ QUESTIONS 1-9: 19
SUM OF ALL RESPONSES TO PHQ QUESTIONS 1-9: 19
10. IF YOU CHECKED OFF ANY PROBLEMS, HOW DIFFICULT HAVE THESE PROBLEMS MADE IT FOR YOU TO DO YOUR WORK, TAKE CARE OF THINGS AT HOME, OR GET ALONG WITH OTHER PEOPLE: VERY DIFFICULT

## 2021-02-25 ENCOUNTER — TRANSFERRED RECORDS (OUTPATIENT)
Dept: HEALTH INFORMATION MANAGEMENT | Facility: CLINIC | Age: 61
End: 2021-02-25

## 2021-02-25 ASSESSMENT — PATIENT HEALTH QUESTIONNAIRE - PHQ9: SUM OF ALL RESPONSES TO PHQ QUESTIONS 1-9: 19

## 2021-02-25 ASSESSMENT — ANXIETY QUESTIONNAIRES: GAD7 TOTAL SCORE: 16

## 2021-02-25 ASSESSMENT — ASTHMA QUESTIONNAIRES: ACT_TOTALSCORE: 22

## 2021-03-01 ENCOUNTER — VIRTUAL VISIT (OUTPATIENT)
Dept: PSYCHOLOGY | Facility: CLINIC | Age: 61
End: 2021-03-01
Payer: COMMERCIAL

## 2021-03-01 DIAGNOSIS — F43.10 POST TRAUMATIC STRESS DISORDER (PTSD): ICD-10-CM

## 2021-03-01 DIAGNOSIS — F41.1 GENERALIZED ANXIETY DISORDER: ICD-10-CM

## 2021-03-01 DIAGNOSIS — F33.2 SEVERE RECURRENT MAJOR DEPRESSION WITHOUT PSYCHOTIC FEATURES (H): Primary | ICD-10-CM

## 2021-03-01 PROCEDURE — 90834 PSYTX W PT 45 MINUTES: CPT | Mod: 95 | Performed by: COUNSELOR
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
## 2021-03-23 ENCOUNTER — TRANSFERRED RECORDS (OUTPATIENT)
Dept: HEALTH INFORMATION MANAGEMENT | Facility: CLINIC | Age: 61
End: 2021-03-23

## 2021-03-29 ENCOUNTER — VIRTUAL VISIT (OUTPATIENT)
Dept: PSYCHOLOGY | Facility: CLINIC | Age: 61
End: 2021-03-29
Payer: COMMERCIAL

## 2021-03-29 DIAGNOSIS — F33.2 SEVERE RECURRENT MAJOR DEPRESSION WITHOUT PSYCHOTIC FEATURES (H): Primary | ICD-10-CM

## 2021-03-29 DIAGNOSIS — F41.1 GENERALIZED ANXIETY DISORDER: ICD-10-CM

## 2021-03-29 DIAGNOSIS — F43.10 POST TRAUMATIC STRESS DISORDER (PTSD): ICD-10-CM

## 2021-03-29 PROCEDURE — 90832 PSYTX W PT 30 MINUTES: CPT | Mod: 95 | Performed by: COUNSELOR
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
## 2021-04-03 ENCOUNTER — HEALTH MAINTENANCE LETTER (OUTPATIENT)
Age: 61
End: 2021-04-03

## 2021-04-12 ENCOUNTER — VIRTUAL VISIT (OUTPATIENT)
Dept: PSYCHOLOGY | Facility: CLINIC | Age: 61
End: 2021-04-12
Payer: COMMERCIAL

## 2021-04-12 DIAGNOSIS — F33.2 SEVERE RECURRENT MAJOR DEPRESSION WITHOUT PSYCHOTIC FEATURES (H): Primary | ICD-10-CM

## 2021-04-12 DIAGNOSIS — F41.1 GENERALIZED ANXIETY DISORDER: ICD-10-CM

## 2021-04-12 DIAGNOSIS — F43.10 POST TRAUMATIC STRESS DISORDER (PTSD): ICD-10-CM

## 2021-04-12 PROCEDURE — 90834 PSYTX W PT 45 MINUTES: CPT | Mod: 95 | Performed by: COUNSELOR
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
## 2021-04-28 ENCOUNTER — TRANSFERRED RECORDS (OUTPATIENT)
Dept: HEALTH INFORMATION MANAGEMENT | Facility: CLINIC | Age: 61
End: 2021-04-28

## 2021-05-14 ENCOUNTER — VIRTUAL VISIT (OUTPATIENT)
Dept: PSYCHOLOGY | Facility: CLINIC | Age: 61
End: 2021-05-14
Payer: COMMERCIAL

## 2021-05-14 DIAGNOSIS — F41.1 GENERALIZED ANXIETY DISORDER: ICD-10-CM

## 2021-05-14 DIAGNOSIS — F43.10 POST TRAUMATIC STRESS DISORDER (PTSD): ICD-10-CM

## 2021-05-14 DIAGNOSIS — F33.2 SEVERE RECURRENT MAJOR DEPRESSION WITHOUT PSYCHOTIC FEATURES (H): Primary | ICD-10-CM

## 2021-05-14 PROCEDURE — 90834 PSYTX W PT 45 MINUTES: CPT | Mod: 95 | Performed by: COUNSELOR
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
## 2021-06-11 ENCOUNTER — VIRTUAL VISIT (OUTPATIENT)
Dept: PSYCHOLOGY | Facility: CLINIC | Age: 61
End: 2021-06-11
Payer: COMMERCIAL

## 2021-06-11 DIAGNOSIS — F43.10 POST TRAUMATIC STRESS DISORDER (PTSD): ICD-10-CM

## 2021-06-11 DIAGNOSIS — F41.1 GENERALIZED ANXIETY DISORDER: ICD-10-CM

## 2021-06-11 DIAGNOSIS — F33.2 SEVERE RECURRENT MAJOR DEPRESSION WITHOUT PSYCHOTIC FEATURES (H): Primary | ICD-10-CM

## 2021-06-11 PROCEDURE — 90834 PSYTX W PT 45 MINUTES: CPT | Mod: 95 | Performed by: COUNSELOR
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
## 2021-06-30 ENCOUNTER — TRANSFERRED RECORDS (OUTPATIENT)
Dept: HEALTH INFORMATION MANAGEMENT | Facility: CLINIC | Age: 61
End: 2021-06-30

## 2021-07-05 ENCOUNTER — OFFICE VISIT (OUTPATIENT)
Dept: ORTHOPEDICS | Facility: CLINIC | Age: 61
End: 2021-07-05
Payer: COMMERCIAL

## 2021-07-05 ENCOUNTER — ANCILLARY PROCEDURE (OUTPATIENT)
Dept: GENERAL RADIOLOGY | Facility: CLINIC | Age: 61
End: 2021-07-05
Attending: ORTHOPAEDIC SURGERY
Payer: COMMERCIAL

## 2021-07-05 VITALS
WEIGHT: 163 LBS | BODY MASS INDEX: 27.83 KG/M2 | DIASTOLIC BLOOD PRESSURE: 82 MMHG | HEIGHT: 64 IN | SYSTOLIC BLOOD PRESSURE: 120 MMHG

## 2021-07-05 DIAGNOSIS — M25.551 RIGHT HIP PAIN: ICD-10-CM

## 2021-07-05 DIAGNOSIS — M16.11 PRIMARY OSTEOARTHRITIS OF RIGHT HIP: Primary | ICD-10-CM

## 2021-07-05 PROCEDURE — 99204 OFFICE O/P NEW MOD 45 MIN: CPT | Performed by: ORTHOPAEDIC SURGERY

## 2021-07-05 PROCEDURE — 73502 X-RAY EXAM HIP UNI 2-3 VIEWS: CPT | Mod: TC | Performed by: RADIOLOGY

## 2021-07-05 ASSESSMENT — PAIN SCALES - GENERAL: PAINLEVEL: EXTREME PAIN (9)

## 2021-07-05 ASSESSMENT — MIFFLIN-ST. JEOR: SCORE: 1294.36

## 2021-07-05 NOTE — PROGRESS NOTES
ORTHOPEDIC CONSULT      Chief Complaint: Linda Walsh is a 60 year old female who is being seen for   Chief Complaint   Patient presents with     Musculoskeletal Problem     right hip pain     Consult       History of Present Illness:   Presents with right hip pain.  Describes it is groin lateral posterior into the thigh and occasionally down the leg.  She is had this on and off for a year but is becoming much more constant.  Much worse over last 3 months.  She is attempted office-based bursal type injections with no improvement.  She is also had intra-articular steroid injections with she reports 40 and 60% pain relief.  Pain causes her limp.  She has pain at night.  Pain that wakes her up.  She is also attempted ice and heat as well as some exercises and anti-inflammatories.  Sees eye spine.  She has a fentanyl Duragesic patch 25 mcg as well as oxycodone 5 mg 4 times a day.  She is also on Neurontin and ibuprofen.      Patient's past medical, surgical, social and family histories reviewed.     Past Medical History:   Diagnosis Date     Anxiety      Asthma      Backache, unspecified      Esophageal reflux      Generalized anxiety disorder      Mild intermittent asthma     Asthma, allergy induced     Reflex sympathetic dystrophy of left lower extremity        Past Surgical History:   Procedure Laterality Date     COLONOSCOPY  6/29/2011    Procedure:COMBINED COLONOSCOPY, SINGLE BIOPSY/POLYPECTOMY BY BIOPSY; Surgeon:JENIFER LANDA; Location:[facility]     COLONOSCOPY  6/29/2011    Procedure:COMBINED COLONOSCOPY, REMOVE TUMOR/POLYP/LESION BY SNARE; Surgeon:JENIFER LANDA; Location:[facility]     ESOPHAGOSCOPY, GASTROSCOPY, DUODENOSCOPY (EGD), COMBINED  8/23/2011    Procedure:COMBINED ESOPHAGOSCOPY, GASTROSCOPY, DUODENOSCOPY (EGD), BIOPSY SINGLE OR MULTIPLE; ESOPHAGOGASTRODUODENOSCOPY WITH       HC INJECTION ANES AGENT AND/OR STERIOD, SCIATIC NERVE  04/16/13    [facility]     HYSTERECTOMY      fibroids, no h/o  previous abn paps     HYSTERECTOMY, PAP NO LONGER INDICATED       LAPAROSCOPIC CHOLECYSTECTOMY  3/19/2014    Procedure: LAPAROSCOPIC CHOLECYSTECTOMY;  Laparoscopic Cholecystectomy;  Surgeon: Marcus Griffith MD;  Location: [facility]     NERVE BLOCK SYMPATHETIC LUMBAR  08/19/2014    [facility]     OPEN REDUCTION INTERNAL FIXATION ANKLE  9/17/2011    Procedure:OPEN REDUCTION INTERNAL FIXATION ANKLE; Open Reduction Internal Fixation Left Ankle; Surgeon:ADONAY SHRESTHA; Location:[facility]     OPEN REDUCTION INTERNAL FIXATION ANKLE  6/6/12    Left ankle.  [facility] NONSPECIFIC PROCEDURE      Hysterectomy       Medications:  albuterol (PROAIR HFA/PROVENTIL HFA/VENTOLIN HFA) 108 (90 Base) MCG/ACT inhaler, Inhale 2 puffs into the lungs every 6 hours as needed for shortness of breath / dyspnea or wheezing  azithromycin (ZITHROMAX) 250 MG tablet, Two tablets first day, then one tablet daily for four days wait 5 days and repeat  DULoxetine (CYMBALTA) 60 MG capsule, Take 1 capsule (60 mg) by mouth daily  esomeprazole (NEXIUM) 40 MG DR capsule, TAKE 1 CAPSULE BY MOUTH DAILY EVERY MORNING,  30 TO 60 MINUTES BEFORE A MEAL.  estradiol (CLIMARA) 0.0375 MG/24HR weekly patch, APPLY ONE PATCH ONTO THE SKIN ONCE WEEKLY (REMOVE OLD PATCH)  fentaNYL (DURAGESIC) 25 mcg/hr 72 hr patch, Place 1 patch onto the skin every 72 hours remove old patch.  fluticasone (FLONASE) 50 MCG/ACT nasal spray, Spray 2 sprays into both nostrils daily  gabapentin (NEURONTIN) 300 MG capsule, Take 900 mg by mouth 3 times daily   ipratropium - albuterol 0.5 mg/2.5 mg/3 mL (DUONEB) 0.5-2.5 (3) MG/3ML nebulization, Take 1 vial (3 mLs) by nebulization every 6 hours as needed for shortness of breath / dyspnea  LINZESS 145 MCG capsule,   ondansetron (ZOFRAN-ODT) 4 MG ODT tab, DISSOLVE ONE TABLET BY MOUTH EVERY 8 HOURS AS NEEDED FOR NAUSEA  order for DME, Equipment being ordered: home cervical traction unit  order for DME, Equipment being  "ordered: Nebulizer  oxyCODONE (ROXICODONE) 5 MG tablet, Take 1 tablet (5 mg) by mouth every 6 hours as needed for pain  polyethylene glycol (MIRALAX) powder, Take 51 g (3 capfuls) by mouth 2 times daily  traZODone (DESYREL) 50 MG tablet, Take 3 tablets (150 mg) by mouth At Bedtime    No current facility-administered medications on file prior to visit.       Allergies   Allergen Reactions     Penicillins Hives       Social History     Occupational History     Not on file   Tobacco Use     Smoking status: Light Tobacco Smoker     Last attempt to quit: 2000     Years since quittin.5     Smokeless tobacco: Never Used   Substance and Sexual Activity     Alcohol use: Yes     Comment: rare use      Drug use: Not Currently     Comment: Medical Cannabis     Sexual activity: Yes     Partners: Male     Birth control/protection: Female Surgical     Comment: hyst       Family History   Problem Relation Age of Onset     Heart Disease Mother         60's     Cardiovascular Mother         heart     Heart Disease Father         40's     Arthritis Father         RA     Other Cancer Father      Skin Cancer Father      Other - See Comments Father         \"mini stroke\"     Heart Surgery Father      Arthritis Paternal Grandmother         RA     Diabetes Son      Coronary Artery Disease Son      Depression Paternal Aunt         anxiety, too     Arthritis Paternal Aunt         RA       REVIEW OF SYSTEMS  10 point review systems performed otherwise negative as noted as per history of present illness.    Physical Exam:  Vitals: /82   Ht 1.626 m (5' 4\")   Wt 73.9 kg (163 lb)   LMP  (LMP Unknown)   BMI 27.98 kg/m    BMI= Body mass index is 27.98 kg/m .  Constitutional: healthy, alert and no acute distress   Psychiatric: mentation appears normal and affect normal/bright  NEURO: no focal deficits  RESP: Normal with easy respirations and no use of accessory muscles to breathe, no audible wheezing or retractions  CV: RLE: no " edema         Regular rate and rhythm by palpation  SKIN: No erythema, rashes, excoriation, or breakdown. No evidence of infection.   JOINT/EXTREMITIES:right hip: Active flexion limited to approximately 100 degrees.  Passively tolerates approximate 110 with a soft endpoint but limited secondary pain.  Any attempts at internal rotation recreates her pain.  Negative straight leg.  Generalized discomfort along the lateral aspect of her hip with palpation.  There is no focal atrophy.  No evidence infection.  No induration.     GAIT: not tested     Diagnostic Modalities:  right hip X-ray: No fractures or dislocations.  There is joint space narrowing throughout the right hip joint.  Associated with this is subchondral sclerosis and cystic changes with some slight irregularity along the superior aspect of the femoral head.  Also noted probable pain implant along the right hip area.  Independent visualization of the images was performed.      Impression: right hip primary osteoarthritis    Plan:  All of the above pertinent physical exam and imaging modalities findings was reviewed with Linda.    We discussed her treatment options.  Radiographs and clinical exam are consistent with right hip osteoarthritis.  She does have multiple areas of chronic pain no.  She has been dealing with a contralateral ankle injury for 8 years requiring a fracture boot.  She has a chronic pain pump.  She takes narcotics.  I discussed proceeding with right hip replacement.  However I did discuss some of her pain being generated or not fully consistent with the osteoarthritis which she understands.  I also expressed my concern regarding her current pain regimen.  She will need to speak with I spine and have a plan for postoperative pain control before considering this.  She also reports her last injection was approximately 1 month ago into the right hip joint.  Need 90 days post injection before considering replacement so we will schedule  after September.    The remainder the risk benefits complications reviewed.  Postoperative timeframe for recovery discussed once reviewed she is opted proceed.  We will plan to see her 1 week prior.  Aspirin for DVT prophylaxis.  Next day discharge.  We will need the plan for pain control before proceeding with the surgery.  We also discussed smoking.  She will stop today.    Return to clinic 14 days post-operatively. , or sooner as needed for changes.  Re-x-ray on return: Yes.    Napoleon Stewart D.O.

## 2021-07-05 NOTE — LETTER
7/5/2021         RE: Linda Walsh  [address]  [place] [address]        Dear Colleague,    Thank you for referring your patient, Linda Walsh, to the [facility]. Please see a copy of my visit note below.    ORTHOPEDIC CONSULT      Chief Complaint: Linda Walsh is a 60 year old female who is being seen for   Chief Complaint   Patient presents with     Musculoskeletal Problem     right hip pain     Consult       History of Present Illness:   Presents with right hip pain.  Describes it is groin lateral posterior into the thigh and occasionally down the leg.  She is had this on and off for a year but is becoming much more constant.  Much worse over last 3 months.  She is attempted office-based bursal type injections with no improvement.  She is also had intra-articular steroid injections with she reports 40 and 60% pain relief.  Pain causes her limp.  She has pain at night.  Pain that wakes her up.  She is also attempted ice and heat as well as some exercises and anti-inflammatories.  Sees eye spine.  She has a fentanyl Duragesic patch 25 mcg as well as oxycodone 5 mg 4 times a day.  She is also on Neurontin and ibuprofen.      Patient's past medical, surgical, social and family histories reviewed.     Past Medical History:   Diagnosis Date     Anxiety      Asthma      Backache, unspecified      Esophageal reflux      Generalized anxiety disorder      Mild intermittent asthma     Asthma, allergy induced     Reflex sympathetic dystrophy of left lower extremity        Past Surgical History:   Procedure Laterality Date     COLONOSCOPY  6/29/2011    Procedure:COMBINED COLONOSCOPY, SINGLE BIOPSY/POLYPECTOMY BY BIOPSY; Surgeon:JENIFER LANDA; Location:[facility]     COLONOSCOPY  6/29/2011    Procedure:COMBINED COLONOSCOPY, REMOVE TUMOR/POLYP/LESION BY SNARE; Surgeon:JENIFER LANDA; Location[facility]     ESOPHAGOSCOPY, GASTROSCOPY, DUODENOSCOPY (EGD), COMBINED  8/23/2011     Procedure:COMBINED ESOPHAGOSCOPY, GASTROSCOPY, DUODENOSCOPY (EGD), BIOPSY SINGLE OR MULTIPLE; ESOPHAGOGASTRODUODENOSCOPY WITH       HC INJECTION ANES AGENT AND/OR STERIOD, SCIATIC NERVE  04/16/13    [facility]     HYSTERECTOMY      fibroids, no h/o previous abn paps     HYSTERECTOMY, PAP NO LONGER INDICATED       LAPAROSCOPIC CHOLECYSTECTOMY  3/19/2014    Procedure: LAPAROSCOPIC CHOLECYSTECTOMY;  Laparoscopic Cholecystectomy;  Surgeon: Marcus Griffith MD;  Location: [facility]     NERVE BLOCK SYMPATHETIC LUMBAR  08/19/2014    [facility]     OPEN REDUCTION INTERNAL FIXATION ANKLE  9/17/2011    Procedure:OPEN REDUCTION INTERNAL FIXATION ANKLE; Open Reduction Internal Fixation Left Ankle; Surgeon:ADONAY SHRESTHA; Location:[facility]     OPEN REDUCTION INTERNAL FIXATION ANKLE  6/6/12    Left ankle.  [facility]     ZZC NONSPECIFIC PROCEDURE      Hysterectomy       Medications:  albuterol (PROAIR HFA/PROVENTIL HFA/VENTOLIN HFA) 108 (90 Base) MCG/ACT inhaler, Inhale 2 puffs into the lungs every 6 hours as needed for shortness of breath / dyspnea or wheezing  azithromycin (ZITHROMAX) 250 MG tablet, Two tablets first day, then one tablet daily for four days wait 5 days and repeat  DULoxetine (CYMBALTA) 60 MG capsule, Take 1 capsule (60 mg) by mouth daily  esomeprazole (NEXIUM) 40 MG DR capsule, TAKE 1 CAPSULE BY MOUTH DAILY EVERY MORNING,  30 TO 60 MINUTES BEFORE A MEAL.  estradiol (CLIMARA) 0.0375 MG/24HR weekly patch, APPLY ONE PATCH ONTO THE SKIN ONCE WEEKLY (REMOVE OLD PATCH)  fentaNYL (DURAGESIC) 25 mcg/hr 72 hr patch, Place 1 patch onto the skin every 72 hours remove old patch.  fluticasone (FLONASE) 50 MCG/ACT nasal spray, Spray 2 sprays into both nostrils daily  gabapentin (NEURONTIN) 300 MG capsule, Take 900 mg by mouth 3 times daily   ipratropium - albuterol 0.5 mg/2.5 mg/3 mL (DUONEB) 0.5-2.5 (3) MG/3ML nebulization, Take 1 vial (3 mLs) by nebulization every 6 hours as needed for shortness of  "breath / dyspnea  LINZESS 145 MCG capsule,   ondansetron (ZOFRAN-ODT) 4 MG ODT tab, DISSOLVE ONE TABLET BY MOUTH EVERY 8 HOURS AS NEEDED FOR NAUSEA  order for DME, Equipment being ordered: home cervical traction unit  order for DME, Equipment being ordered: Nebulizer  oxyCODONE (ROXICODONE) 5 MG tablet, Take 1 tablet (5 mg) by mouth every 6 hours as needed for pain  polyethylene glycol (MIRALAX) powder, Take 51 g (3 capfuls) by mouth 2 times daily  traZODone (DESYREL) 50 MG tablet, Take 3 tablets (150 mg) by mouth At Bedtime    No current facility-administered medications on file prior to visit.       Allergies   Allergen Reactions     Penicillins Hives       Social History     Occupational History     Not on file   Tobacco Use     Smoking status: Light Tobacco Smoker     Last attempt to quit: 2000     Years since quittin.5     Smokeless tobacco: Never Used   Substance and Sexual Activity     Alcohol use: Yes     Comment: rare use      Drug use: Not Currently     Comment: Medical Cannabis     Sexual activity: Yes     Partners: Male     Birth control/protection: Female Surgical     Comment: hyst       Family History   Problem Relation Age of Onset     Heart Disease Mother         60's     Cardiovascular Mother         heart     Heart Disease Father         40's     Arthritis Father         RA     Other Cancer Father      Skin Cancer Father      Other - See Comments Father         \"mini stroke\"     Heart Surgery Father      Arthritis Paternal Grandmother         RA     Diabetes Son      Coronary Artery Disease Son      Depression Paternal Aunt         anxiety, too     Arthritis Paternal Aunt         RA       REVIEW OF SYSTEMS  10 point review systems performed otherwise negative as noted as per history of present illness.    Physical Exam:  Vitals: /82   Ht 1.626 m (5' 4\")   Wt 73.9 kg (163 lb)   LMP  (LMP Unknown)   BMI 27.98 kg/m    BMI= Body mass index is 27.98 kg/m .  Constitutional: healthy, " alert and no acute distress   Psychiatric: mentation appears normal and affect normal/bright  NEURO: no focal deficits  RESP: Normal with easy respirations and no use of accessory muscles to breathe, no audible wheezing or retractions  CV: RLE: no edema         Regular rate and rhythm by palpation  SKIN: No erythema, rashes, excoriation, or breakdown. No evidence of infection.   JOINT/EXTREMITIES:right hip: Active flexion limited to approximately 100 degrees.  Passively tolerates approximate 110 with a soft endpoint but limited secondary pain.  Any attempts at internal rotation recreates her pain.  Negative straight leg.  Generalized discomfort along the lateral aspect of her hip with palpation.  There is no focal atrophy.  No evidence infection.  No induration.     GAIT: not tested     Diagnostic Modalities:  right hip X-ray: No fractures or dislocations.  There is joint space narrowing throughout the right hip joint.  Associated with this is subchondral sclerosis and cystic changes with some slight irregularity along the superior aspect of the femoral head.  Also noted probable pain implant along the right hip area.  Independent visualization of the images was performed.      Impression: right hip primary osteoarthritis    Plan:  All of the above pertinent physical exam and imaging modalities findings was reviewed with Linda.    We discussed her treatment options.  Radiographs and clinical exam are consistent with right hip osteoarthritis.  She does have multiple areas of chronic pain no.  She has been dealing with a contralateral ankle injury for 8 years requiring a fracture boot.  She has a chronic pain pump.  She takes narcotics.  I discussed proceeding with right hip replacement.  However I did discuss some of her pain being generated or not fully consistent with the osteoarthritis which she understands.  I also expressed my concern regarding her current pain regimen.  She will need to speak with I spine  and have a plan for postoperative pain control before considering this.  She also reports her last injection was approximately 1 month ago into the right hip joint.  Need 90 days post injection before considering replacement so we will schedule after September.    The remainder the risk benefits complications reviewed.  Postoperative timeframe for recovery discussed once reviewed she is opted proceed.  We will plan to see her 1 week prior.  Aspirin for DVT prophylaxis.  Next day discharge.  We will need the plan for pain control before proceeding with the surgery.  We also discussed smoking.  She will stop today.    Return to clinic 14 days post-operatively. , or sooner as needed for changes.  Re-x-ray on return: Yes.    Napoleon Stewart D.O.      Again, thank you for allowing me to participate in the care of your patient.        Sincerely,        Velasquez Stewart, DO

## 2021-07-06 ENCOUNTER — VIRTUAL VISIT (OUTPATIENT)
Dept: PSYCHOLOGY | Facility: CLINIC | Age: 61
End: 2021-07-06
Payer: COMMERCIAL

## 2021-07-06 DIAGNOSIS — F33.2 SEVERE RECURRENT MAJOR DEPRESSION WITHOUT PSYCHOTIC FEATURES (H): Primary | ICD-10-CM

## 2021-07-06 DIAGNOSIS — F41.1 GENERALIZED ANXIETY DISORDER: ICD-10-CM

## 2021-07-06 DIAGNOSIS — F43.10 POST TRAUMATIC STRESS DISORDER (PTSD): ICD-10-CM

## 2021-07-06 PROCEDURE — 90834 PSYTX W PT 45 MINUTES: CPT | Mod: 95 | Performed by: COUNSELOR
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
## 2021-07-09 ENCOUNTER — TELEPHONE (OUTPATIENT)
Dept: ORTHOPEDICS | Facility: OTHER | Age: 61
End: 2021-07-09

## 2021-07-09 DIAGNOSIS — Z01.818 PREOP GENERAL PHYSICAL EXAM: Primary | ICD-10-CM

## 2021-07-12 NOTE — TELEPHONE ENCOUNTER
Spoke to patient, she is waiting to see [facility], and meeting with Dr. Stewart to discuss further. She has my # and will call when ready to schedule surgery

## 2021-07-14 ENCOUNTER — TELEPHONE (OUTPATIENT)
Dept: ORTHOPEDICS | Facility: CLINIC | Age: 61
End: 2021-07-14

## 2021-07-14 ENCOUNTER — OFFICE VISIT (OUTPATIENT)
Dept: ORTHOPEDICS | Facility: CLINIC | Age: 61
End: 2021-07-14
Payer: COMMERCIAL

## 2021-07-14 VITALS
WEIGHT: 163 LBS | SYSTOLIC BLOOD PRESSURE: 120 MMHG | BODY MASS INDEX: 27.83 KG/M2 | HEIGHT: 64 IN | DIASTOLIC BLOOD PRESSURE: 76 MMHG

## 2021-07-14 DIAGNOSIS — M16.11 PRIMARY OSTEOARTHRITIS OF RIGHT HIP: Primary | ICD-10-CM

## 2021-07-14 PROCEDURE — 99207 PR NO CHARGE LOS: CPT | Performed by: ORTHOPAEDIC SURGERY

## 2021-07-14 ASSESSMENT — PAIN SCALES - GENERAL: PAINLEVEL: EXTREME PAIN (9)

## 2021-07-14 ASSESSMENT — MIFFLIN-ST. JEOR: SCORE: 1294.36

## 2021-07-14 NOTE — PROGRESS NOTES
Office Visit-Follow up    Chief Complaint: Linda Walsh is a 60 year old female who is being seen for   Chief Complaint   Patient presents with     RECHECK     right hip primary osteoarthritis       History of Present Illness:   Today's visit:  Returns to discuss her right hip.    July 5, 2021 visit:  Presents with right hip pain.  Describes it is groin lateral posterior into the thigh and occasionally down the leg.  She is had this on and off for a year but is becoming much more constant.  Much worse over last 3 months.  She is attempted office-based bursal type injections with no improvement.  She is also had intra-articular steroid injections with she reports 40 and 60% pain relief.  Pain causes her limp.  She has pain at night.  Pain that wakes her up.  She is also attempted ice and heat as well as some exercises and anti-inflammatories.  Sees eye spine.  She has a fentanyl Duragesic patch 25 mcg as well as oxycodone 5 mg 4 times a day.  She is also on Neurontin and ibuprofen.        Impression: right hip primary osteoarthritis    Plan:  All of the above pertinent physical exam and imaging modalities findings was reviewed with Linda.    Apparently on last visit her father was in the hospital and she reported she really is having difficulty concentrating during the visit.  She returns today to discuss her issues.  She is doing with a chronic left ankle issue with requiring a fracture boot.  She is concerned about gait mechanics.  She is concerned about complex regional pain syndrome.  Once we reviewed all this I have recommended to avoid any hip replacement at this point.  She should seek evaluation for her left chronic ankle issues.        Return to clinic PRN, or sooner as needed for changes.  Re-x-ray on return: No    Napoleon Stewart D.O.

## 2021-07-14 NOTE — TELEPHONE ENCOUNTER
I called the patient back this afternoon to discuss. I told her that Dr. Hogan could give her a second opinion. She would need to call and make an appointment. I told her to be sure that she knows who did her surgery, where and when. She will gather the information and call back and make an appointment.    Tracey Valentine, ATC

## 2021-07-14 NOTE — LETTER
7/14/2021         RE: Linda Walsh  [address]  [facility] [address]        Dear Colleague,    Thank you for referring your patient, Linda Walsh, to the [facility]. Please see a copy of my visit note below.    Office Visit-Follow up    Chief Complaint: Linda Walsh is a 60 year old female who is being seen for   Chief Complaint   Patient presents with     RECHECK     right hip primary osteoarthritis       History of Present Illness:   Today's visit:  Returns to discuss her right hip.    July 5, 2021 visit:  Presents with right hip pain.  Describes it is groin lateral posterior into the thigh and occasionally down the leg.  She is had this on and off for a year but is becoming much more constant.  Much worse over last 3 months.  She is attempted office-based bursal type injections with no improvement.  She is also had intra-articular steroid injections with she reports 40 and 60% pain relief.  Pain causes her limp.  She has pain at night.  Pain that wakes her up.  She is also attempted ice and heat as well as some exercises and anti-inflammatories.  Sees eye spine.  She has a fentanyl Duragesic patch 25 mcg as well as oxycodone 5 mg 4 times a day.  She is also on Neurontin and ibuprofen.        Impression: right hip primary osteoarthritis    Plan:  All of the above pertinent physical exam and imaging modalities findings was reviewed with Linda.    Apparently on last visit her father was in the hospital and she reported she really is having difficulty concentrating during the visit.  She returns today to discuss her issues.  She is doing with a chronic left ankle issue with requiring a fracture boot.  She is concerned about gait mechanics.  She is concerned about complex regional pain syndrome.  Once we reviewed all this I have recommended to avoid any hip replacement at this point.  She should seek evaluation for her left chronic ankle issues.        Return to  clinic PRN, or sooner as needed for changes.  Re-x-ray on return: No    Napoleon Stewart D.O.            Again, thank you for allowing me to participate in the care of your patient.        Sincerely,        Velasquez Stewart, DO
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
## 2021-07-19 DIAGNOSIS — N95.1 MENOPAUSAL SYNDROME (HOT FLASHES): ICD-10-CM

## 2021-07-19 DIAGNOSIS — M25.572 PAIN IN JOINT, ANKLE AND FOOT, LEFT: Primary | ICD-10-CM

## 2021-07-20 RX ORDER — ESTRADIOL 0.04 MG/D
PATCH TRANSDERMAL
Qty: 12 PATCH | Refills: 1 | Status: SHIPPED | OUTPATIENT
Start: 2021-07-20 | End: 2022-05-24

## 2021-07-20 NOTE — TELEPHONE ENCOUNTER
Prescription approved per [facility] Refill Protocol.    Quita Montana RN on 7/20/2021 at 2:47 PM

## 2021-07-27 ENCOUNTER — MYC MEDICAL ADVICE (OUTPATIENT)
Dept: FAMILY MEDICINE | Facility: OTHER | Age: 61
End: 2021-07-27

## 2021-07-27 DIAGNOSIS — R11.0 NAUSEA: ICD-10-CM

## 2021-07-27 DIAGNOSIS — K21.9 GASTROESOPHAGEAL REFLUX DISEASE WITHOUT ESOPHAGITIS: ICD-10-CM

## 2021-07-27 NOTE — TELEPHONE ENCOUNTER
Pending Prescriptions:                       Disp   Refills    esomeprazole (NEXIUM) 40 MG DR capsule     90 cap*0        Sig: Take 30-60 minutes before eating.    ondansetron (ZOFRAN-ODT) 4 MG ODT tab      20 tab*1        Sig: DISSOLVE ONE TABLET BY MOUTH EVERY 8 HOURS AS NEEDED           FOR NAUSEA    Routing refill request to provider for review/approval because:  A break in medication

## 2021-07-27 NOTE — TELEPHONE ENCOUNTER
Replied via [employer]. Routing to refill pool.    BENI Russo/[facility]

## 2021-07-28 ENCOUNTER — MYC MEDICAL ADVICE (OUTPATIENT)
Dept: FAMILY MEDICINE | Facility: OTHER | Age: 61
End: 2021-07-28

## 2021-07-29 ENCOUNTER — TELEPHONE (OUTPATIENT)
Dept: FAMILY MEDICINE | Facility: OTHER | Age: 61
End: 2021-07-29

## 2021-07-29 RX ORDER — ONDANSETRON 4 MG/1
TABLET, ORALLY DISINTEGRATING ORAL
Qty: 20 TABLET | Refills: 1 | Status: ON HOLD | OUTPATIENT
Start: 2021-07-29 | End: 2021-10-05

## 2021-07-29 RX ORDER — ESOMEPRAZOLE MAGNESIUM 40 MG/1
CAPSULE, DELAYED RELEASE ORAL
Qty: 90 CAPSULE | Refills: 0 | Status: SHIPPED | OUTPATIENT
Start: 2021-07-29 | End: 2021-10-14

## 2021-07-29 NOTE — TELEPHONE ENCOUNTER
Prior Authorization Retail Medication Request    Medication/Dose: esomeprazole (NEXIUM) 40 MG DR capsule  ICD code (if different than what is on RX):    Previously Tried and Failed:    Rationale:      Insurance Name:    Insurance ID:        Pharmacy Information (if different than what is on RX)  Name:    Phone:

## 2021-07-29 NOTE — TELEPHONE ENCOUNTER
Central Prior Authorization Team  Phone: [phone]    PA Initiation    Medication: esomeprazole (NEXIUM) 40 MG DR capsule  Insurance Company: [employer] [place] - Phone [phone] Fax [phone]  Pharmacy Filling the Rx: [facility] #[id number] [place], [place] - [address] [facility] [address]  Filling Pharmacy Phone: [phone]  Filling Pharmacy Fax:    Start Date: 7/29/2021

## 2021-07-29 NOTE — TELEPHONE ENCOUNTER
Prior Authorization Approval    Authorization Effective Date: 7/29/2021  Authorization Expiration Date: 7/29/2022  Medication: esomeprazole (NEXIUM) 40 MG DR capsule- APPROVED   Approved Dose/Quantity:   Reference #:     Insurance Company: [employer] [place] - Phone [phone] Fax [phone]  Expected CoPay:       CoPay Card Available:      Foundation Assistance Needed:    Which Pharmacy is filling the prescription (Not needed for infusion/clinic administered): [id number] [place], [place] - [address] AT [facility] [address]  Pharmacy Notified: Yes  Patient Notified:  **Instructed pharmacy to notify patient when script is ready to /ship.**

## 2021-07-30 ENCOUNTER — MYC MEDICAL ADVICE (OUTPATIENT)
Dept: FAMILY MEDICINE | Facility: OTHER | Age: 61
End: 2021-07-30

## 2021-08-02 ENCOUNTER — MYC MEDICAL ADVICE (OUTPATIENT)
Dept: ORTHOPEDICS | Facility: CLINIC | Age: 61
End: 2021-08-02

## 2021-08-02 NOTE — TELEPHONE ENCOUNTER
She should continue to follow with Dr. Hogan.  After her underlying foot/ankle issues are managed and gait mechanics corrected she may have no need for hip replacement.      HERMELINDO Boswell, CNP  Orthopedic Surgery

## 2021-08-02 NOTE — TELEPHONE ENCOUNTER
Please assist with scheduling a COVID vaccine.   AASHISH BansalN, RN  [place]  August 2, 2021

## 2021-08-02 NOTE — TELEPHONE ENCOUNTER
Sent Pt. A Zkatter message with the information that [facility] replied.     Also told Pt. To call or send another message is she has anymore concerns or questions.     Maude Araiza on 8/2/2021 at 1:41 PM

## 2021-08-05 ENCOUNTER — IMMUNIZATION (OUTPATIENT)
Dept: PEDIATRICS | Facility: OTHER | Age: 61
End: 2021-08-05
Payer: COMMERCIAL

## 2021-08-05 PROCEDURE — 0001A PR COVID VAC PFIZER DIL RECON 30 MCG/0.3 ML IM: CPT

## 2021-08-05 PROCEDURE — 91300 PR COVID VAC PFIZER DIL RECON 30 MCG/0.3 ML IM: CPT

## 2021-08-21 ASSESSMENT — ENCOUNTER SYMPTOMS
PANIC: 0
DEPRESSION: 1
ARTHRALGIAS: 1
DECREASED CONCENTRATION: 1
BACK PAIN: 1
NECK PAIN: 1
MUSCLE WEAKNESS: 0
NERVOUS/ANXIOUS: 1
MYALGIAS: 1
STIFFNESS: 1
MUSCLE CRAMPS: 1
JOINT SWELLING: 0
INSOMNIA: 1

## 2021-08-23 ENCOUNTER — VIRTUAL VISIT (OUTPATIENT)
Dept: PSYCHOLOGY | Facility: CLINIC | Age: 61
End: 2021-08-23
Payer: COMMERCIAL

## 2021-08-23 DIAGNOSIS — F41.1 GENERALIZED ANXIETY DISORDER: ICD-10-CM

## 2021-08-23 DIAGNOSIS — F33.2 SEVERE RECURRENT MAJOR DEPRESSION WITHOUT PSYCHOTIC FEATURES (H): Primary | ICD-10-CM

## 2021-08-23 DIAGNOSIS — F43.10 POST TRAUMATIC STRESS DISORDER (PTSD): ICD-10-CM

## 2021-08-23 PROCEDURE — 90834 PSYTX W PT 45 MINUTES: CPT | Mod: 95 | Performed by: COUNSELOR
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
## 2021-08-24 ENCOUNTER — OFFICE VISIT (OUTPATIENT)
Dept: ORTHOPEDICS | Facility: CLINIC | Age: 61
End: 2021-08-24
Payer: COMMERCIAL

## 2021-08-24 ENCOUNTER — ANCILLARY PROCEDURE (OUTPATIENT)
Dept: GENERAL RADIOLOGY | Facility: CLINIC | Age: 61
End: 2021-08-24
Attending: ORTHOPAEDIC SURGERY
Payer: COMMERCIAL

## 2021-08-24 VITALS — BODY MASS INDEX: 28.17 KG/M2 | HEIGHT: 64 IN | WEIGHT: 165 LBS

## 2021-08-24 DIAGNOSIS — M25.572 PAIN IN JOINT, ANKLE AND FOOT, LEFT: Primary | ICD-10-CM

## 2021-08-24 DIAGNOSIS — M25.572 PAIN IN JOINT, ANKLE AND FOOT, LEFT: ICD-10-CM

## 2021-08-24 PROCEDURE — 73610 X-RAY EXAM OF ANKLE: CPT | Mod: LT | Performed by: RADIOLOGY

## 2021-08-24 PROCEDURE — 99213 OFFICE O/P EST LOW 20 MIN: CPT | Performed by: ORTHOPAEDIC SURGERY

## 2021-08-24 ASSESSMENT — MIFFLIN-ST. JEOR: SCORE: 1298.44

## 2021-08-24 NOTE — LETTER
8/24/2021         RE: Linda Walsh  [address]  [facility] [place] [address]        Dear Colleague,    Thank you for referring your patient, Linda Walsh, to the [facility] [place]. Please see a copy of my visit note below.    CHIEF COMPLAINT:  Left ankle pain.    HISTORY OF PRESENT ILLNESS:  Mrs. Walsh is a 61-year-old female who presents today for evaluation of her left ankle.  The patient reports to have sustained an ankle fracture, which was fixated by an outside provider, which required subsequent surgery secondary to a delayed union of the fibula, which eventually required an ankle fusion.  The ankle fusion took place in 2013.  Since then, she reports to have pain and discomfort and to be unable to walk.  She has filed for Social Security Disability.  Reports to have used a CAM Walker for long walking since the ankle surgery in 2013.  Reports to be on Social Security Disability and to be very limited to the point that she only enjoys sitting outside for recreational activities.    Presents today for discussion of treatment options, she has also been involved in a pain clinic in order to manage her pain, which apparently has not been successful either.      She reports to have pain in subtalar joint as well as to have some sensation of needles and burning through the plantar aspect of the foot.    SUBJECTIVE:  The patient presents today for discussion of treatment options.    PAST MEDICAL HISTORY:  Quite extensive which was reviewed today as well past surgical history.    DRUG ALLERGIES:  Penicillin.    CURRENT MEDICATIONS:       1. Please refer to encounter form.    Of note, the patient reports to have resumed smoking and to smoke approximately 1 pack every 3 days for the past 2 years.    PHYSICAL EXAMINATION:  On today's visit, she presents as a pleasant female, in no apparent distress with a height of 5 feet 4 inches and a weight of 165 pounds.  Denies to have any  constitutional symptoms.    On today's visit, she presents with a fairly well aligned left ankle however there is pain with all of the ankle and subtalar joint, which is quite drastic.  There are no atrophic changes to the ankle or the foot.  Forefoot exam is unremarkable.  The patient does not carry any signs compatible with CRPS.  There are no signs of infection or inflammation.  There are well-healed surgical incisions.    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  She also presented with some prominent hardware along the most posterior medial portion of the tibia, which could have some implications of having some irritation to the neurovascular bundle.    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    PLAN:  Discussed with the patient that at this point, I would like to proceed with a CT scan as a way to better understand the anatomy of her hindfoot region.  Based on those findings further recommendations will be given to the patient.  In the meantime, she has no activity restrictions.  Questions were answered.    TT:  30 minutes.  CT:  20 minutes.      Salas Hogan MD

## 2021-08-24 NOTE — PROGRESS NOTES
CHIEF COMPLAINT:  Left ankle pain.    HISTORY OF PRESENT ILLNESS:  Mrs. Walsh is a 61-year-old female who presents today for evaluation of her left ankle.  The patient reports to have sustained an ankle fracture, which was fixated by an outside provider, which required subsequent surgery secondary to a delayed union of the fibula, which eventually required an ankle fusion.  The ankle fusion took place in 2013.  Since then, she reports to have pain and discomfort and to be unable to walk.  She has filed for Social Security Disability.  Reports to have used a CAM Walker for long walking since the ankle surgery in 2013.  Reports to be on Social Security Disability and to be very limited to the point that she only enjoys sitting outside for recreational activities.    Presents today for discussion of treatment options, she has also been involved in a pain clinic in order to manage her pain, which apparently has not been successful either.      She reports to have pain in subtalar joint as well as to have some sensation of needles and burning through the plantar aspect of the foot.    SUBJECTIVE:  The patient presents today for discussion of treatment options.    PAST MEDICAL HISTORY:  Quite extensive which was reviewed today as well past surgical history.    DRUG ALLERGIES:  Penicillin.    CURRENT MEDICATIONS:       1. Please refer to encounter form.    Of note, the patient reports to have resumed smoking and to smoke approximately 1 pack every 3 days for the past 2 years.    PHYSICAL EXAMINATION:  On today's visit, she presents as a pleasant female, in no apparent distress with a height of 5 feet 4 inches and a weight of 165 pounds.  Denies to have any constitutional symptoms.    On today's visit, she presents with a fairly well aligned left ankle however there is pain with all of the ankle and subtalar joint, which is quite drastic.  There are no atrophic changes to the ankle or the foot.  Forefoot exam is  unremarkable.  The patient does not carry any signs compatible with CRPS.  There are no signs of infection or inflammation.  There are well-healed surgical incisions.    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  She also presented with some prominent hardware along the most posterior medial portion of the tibia, which could have some implications of having some irritation to the neurovascular bundle.    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    PLAN:  Discussed with the patient that at this point, I would like to proceed with a CT scan as a way to better understand the anatomy of her hindfoot region.  Based on those findings further recommendations will be given to the patient.  In the meantime, she has no activity restrictions.  Questions were answered.    TT:  30 minutes.  CT:  20 minutes.

## 2021-08-24 NOTE — NURSING NOTE
"Reason For Visit:   Chief Complaint   Patient presents with     Consult     Left ankle pain // Hx of multiple surgeries on ankle        Ht 1.626 m (5' 4\")   Wt 74.8 kg (165 lb)   LMP  (LMP Unknown)   BMI 28.32 kg/m      Pain Assessment  Patient Currently in Pain: Yes  0-10 Pain Scale: 9 (severe foot pain with walking, patient severely favors right foot during weight bearing activity)    Velasquez Castaneda, EMT    "

## 2021-08-25 PROBLEM — M16.11 PRIMARY OSTEOARTHRITIS OF RIGHT HIP: Status: ACTIVE | Noted: 2021-08-25

## 2021-08-25 NOTE — TELEPHONE ENCOUNTER
Type of surgery: right hip reaplcement  Location of surgery: [facility]  Date and time of surgery: 10/5  Surgeon: audie  Pre-Op Appt Date: 9/21  Post-Op Appt Date: 10/20   Packet sent out: Yes  Pre-cert/Authorization completed:  Not Applicable  Date: na

## 2021-08-26 ENCOUNTER — IMMUNIZATION (OUTPATIENT)
Dept: PEDIATRICS | Facility: OTHER | Age: 61
End: 2021-08-26
Attending: FAMILY MEDICINE
Payer: COMMERCIAL

## 2021-08-26 PROCEDURE — 0002A PR COVID VAC PFIZER DIL RECON 30 MCG/0.3 ML IM: CPT

## 2021-08-26 PROCEDURE — 91300 PR COVID VAC PFIZER DIL RECON 30 MCG/0.3 ML IM: CPT

## 2021-08-31 ENCOUNTER — HOSPITAL ENCOUNTER (OUTPATIENT)
Dept: CT IMAGING | Facility: CLINIC | Age: 61
Discharge: HOME | End: 2021-08-31
Attending: ORTHOPAEDIC SURGERY | Admitting: ORTHOPAEDIC SURGERY
Payer: COMMERCIAL

## 2021-08-31 DIAGNOSIS — M25.572 PAIN IN JOINT, ANKLE AND FOOT, LEFT: ICD-10-CM

## 2021-08-31 PROCEDURE — 73700 CT LOWER EXTREMITY W/O DYE: CPT | Mod: LT

## 2021-09-01 DIAGNOSIS — M19.072: Primary | ICD-10-CM

## 2021-09-05 DIAGNOSIS — Z11.59 ENCOUNTER FOR SCREENING FOR OTHER VIRAL DISEASES: ICD-10-CM

## 2021-09-06 ENCOUNTER — MYC MEDICAL ADVICE (OUTPATIENT)
Dept: ORTHOPEDICS | Facility: CLINIC | Age: 61
End: 2021-09-06

## 2021-09-12 ENCOUNTER — HEALTH MAINTENANCE LETTER (OUTPATIENT)
Age: 61
End: 2021-09-12

## 2021-09-13 ENCOUNTER — VIRTUAL VISIT (OUTPATIENT)
Dept: PSYCHOLOGY | Facility: CLINIC | Age: 61
End: 2021-09-13
Payer: COMMERCIAL

## 2021-09-13 DIAGNOSIS — F33.2 SEVERE RECURRENT MAJOR DEPRESSION WITHOUT PSYCHOTIC FEATURES (H): Primary | ICD-10-CM

## 2021-09-13 DIAGNOSIS — F41.1 GENERALIZED ANXIETY DISORDER: ICD-10-CM

## 2021-09-13 DIAGNOSIS — F43.10 POST TRAUMATIC STRESS DISORDER (PTSD): ICD-10-CM

## 2021-09-13 PROCEDURE — 90834 PSYTX W PT 45 MINUTES: CPT | Mod: 95 | Performed by: COUNSELOR
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
## 2021-09-21 ENCOUNTER — OFFICE VISIT (OUTPATIENT)
Dept: FAMILY MEDICINE | Facility: OTHER | Age: 61
End: 2021-09-21
Payer: COMMERCIAL

## 2021-09-21 ENCOUNTER — MYC MEDICAL ADVICE (OUTPATIENT)
Dept: FAMILY MEDICINE | Facility: OTHER | Age: 61
End: 2021-09-21

## 2021-09-21 VITALS
RESPIRATION RATE: 18 BRPM | TEMPERATURE: 97.4 F | WEIGHT: 170.6 LBS | HEIGHT: 64 IN | OXYGEN SATURATION: 97 % | SYSTOLIC BLOOD PRESSURE: 122 MMHG | DIASTOLIC BLOOD PRESSURE: 82 MMHG | BODY MASS INDEX: 29.12 KG/M2 | HEART RATE: 69 BPM

## 2021-09-21 DIAGNOSIS — G90.522 REFLEX SYMPATHETIC DYSTROPHY OF LEFT LOWER EXTREMITY: ICD-10-CM

## 2021-09-21 DIAGNOSIS — F41.1 GENERALIZED ANXIETY DISORDER: ICD-10-CM

## 2021-09-21 DIAGNOSIS — M16.11 PRIMARY OSTEOARTHRITIS OF RIGHT HIP: ICD-10-CM

## 2021-09-21 DIAGNOSIS — F32.1 MAJOR DEPRESSIVE DISORDER, SINGLE EPISODE, MODERATE (H): ICD-10-CM

## 2021-09-21 DIAGNOSIS — E78.5 HYPERLIPIDEMIA, UNSPECIFIED HYPERLIPIDEMIA TYPE: ICD-10-CM

## 2021-09-21 DIAGNOSIS — F11.90 CHRONIC, CONTINUOUS USE OF OPIOIDS: ICD-10-CM

## 2021-09-21 DIAGNOSIS — Z01.818 PREOP GENERAL PHYSICAL EXAM: Primary | ICD-10-CM

## 2021-09-21 DIAGNOSIS — K21.9 GASTROESOPHAGEAL REFLUX DISEASE WITHOUT ESOPHAGITIS: ICD-10-CM

## 2021-09-21 DIAGNOSIS — J45.20 MILD INTERMITTENT ASTHMA WITHOUT COMPLICATION: ICD-10-CM

## 2021-09-21 PROBLEM — Z79.899 MEDICAL CANNABIS USE: Status: RESOLVED | Noted: 2017-05-08 | Resolved: 2021-09-21

## 2021-09-21 LAB
ALBUMIN SERPL-MCNC: 3.9 G/DL (ref 3.4–5)
ALP SERPL-CCNC: 58 U/L (ref 40–150)
ALT SERPL W P-5'-P-CCNC: 22 U/L (ref 0–50)
ANION GAP SERPL CALCULATED.3IONS-SCNC: 3 MMOL/L (ref 3–14)
AST SERPL W P-5'-P-CCNC: 16 U/L (ref 0–45)
BILIRUB SERPL-MCNC: 1 MG/DL (ref 0.2–1.3)
BUN SERPL-MCNC: 9 MG/DL (ref 7–30)
CALCIUM SERPL-MCNC: 9 MG/DL (ref 8.5–10.1)
CHLORIDE BLD-SCNC: 106 MMOL/L (ref 94–109)
CO2 SERPL-SCNC: 28 MMOL/L (ref 20–32)
CREAT SERPL-MCNC: 1.03 MG/DL (ref 0.52–1.04)
GFR SERPL CREATININE-BSD FRML MDRD: 59 ML/MIN/1.73M2
GLUCOSE BLD-MCNC: 109 MG/DL (ref 70–99)
POTASSIUM BLD-SCNC: 4.2 MMOL/L (ref 3.4–5.3)
PROT SERPL-MCNC: 7.3 G/DL (ref 6.8–8.8)
SODIUM SERPL-SCNC: 137 MMOL/L (ref 133–144)

## 2021-09-21 PROCEDURE — 36415 COLL VENOUS BLD VENIPUNCTURE: CPT | Performed by: PHYSICIAN ASSISTANT

## 2021-09-21 PROCEDURE — 80053 COMPREHEN METABOLIC PANEL: CPT | Performed by: PHYSICIAN ASSISTANT

## 2021-09-21 PROCEDURE — 99214 OFFICE O/P EST MOD 30 MIN: CPT | Performed by: PHYSICIAN ASSISTANT

## 2021-09-21 ASSESSMENT — PATIENT HEALTH QUESTIONNAIRE - PHQ9: SUM OF ALL RESPONSES TO PHQ QUESTIONS 1-9: 7

## 2021-09-21 ASSESSMENT — MIFFLIN-ST. JEOR: SCORE: 1324.09

## 2021-09-21 ASSESSMENT — PAIN SCALES - GENERAL: PAINLEVEL: EXTREME PAIN (9)

## 2021-09-21 NOTE — PATIENT INSTRUCTIONS

## 2021-09-21 NOTE — LETTER
My Asthma Action Plan    Name: Linda Walsh   YOB: 1960  Date: 9/21/2021   My doctor: Jonna Gaitan PA-C   My clinic: [facility]        My Rescue Medicine:   Albuterol inhaler (Proair/Ventolin/Proventil HFA)  2-4 puffs EVERY 4 HOURS as needed. Use a spacer if recommended by your provider.   My Asthma Severity:   Intermittent / Exercise Induced  Know your asthma triggers:              GREEN ZONE   Good Control    I feel good    No cough or wheeze    Can work, sleep and play without asthma symptoms       Take your asthma control medicine every day.     1. If exercise triggers your asthma, take your rescue medication    15 minutes before exercise or sports, and    During exercise if you have asthma symptoms  2. Spacer to use with inhaler: If you have a spacer, make sure to use it with your inhaler             YELLOW ZONE Getting Worse  I have ANY of these:    I do not feel good    Cough or wheeze    Chest feels tight    Wake up at night   1. Keep taking your Green Zone medications  2. Start taking your rescue medicine:    every 20 minutes for up to 1 hour. Then every 4 hours for 24-48 hours.  3. If you stay in the Yellow Zone for more than 12-24 hours, contact your doctor.  4. If you do not return to the Green Zone in 12-24 hours or you get worse, start taking your oral steroid medicine if prescribed by your provider.           RED ZONE Medical Alert - Get Help  I have ANY of these:    I feel awful    Medicine is not helping    Breathing getting harder    Trouble walking or talking    Nose opens wide to breathe       1. Take your rescue medicine NOW  2. If your provider has prescribed an oral steroid medicine, start taking it NOW  3. Call your doctor NOW  4. If you are still in the Red Zone after 20 minutes and you have not reached your doctor:    Take your rescue medicine again and    Call 911 or go to the emergency room right away    See your regular doctor within 2 weeks  of an Emergency Room or Urgent Care visit for follow-up treatment.          Annual Reminders:  Meet with Asthma Educator,  Flu Shot in the Fall, consider Pneumonia Vaccination for patients with asthma (aged 19 and older).    Pharmacy:    [facility]  [place], [place] - [address]  [facility] - [place], [place] - [address]  [facility] [place], [place] - [address] [id number] - [place], [place] - [address] [facility] [address] [facility] [address]    Electronically signed by Jonna Gaitan PA-C   Date: 09/21/21                    Asthma Triggers  How To Control Things That Make Your Asthma Worse    Triggers are things that make your asthma worse.  Look at the list below to help you find your triggers and   what you can do about them. You can help prevent asthma flare-ups by staying away from your triggers.      Trigger                                                          What you can do   Cigarette Smoke  Tobacco smoke can make asthma worse. Do not allow smoking in your home, car or around you.  Be sure no one smokes at a child s day care or school.  If you smoke, ask your health care provider for ways to help you quit.  Ask family members to quit too.  Ask your health care provider for a referral to Quit Plan to help you quit smoking, or call 0-222-591-PLAN.     Colds, Flu, Bronchitis  These are common triggers of asthma. Wash your hands often.  Don t touch your eyes, nose or mouth.  Get a flu shot every year.     Dust Mites  These are tiny bugs that live in cloth or carpet. They are too small to see. Wash sheets and blankets in hot water every week.   Encase pillows and mattress in dust mite proof covers.  Avoid having carpet if you can. If you have carpet, vacuum weekly.   Use a dust mask and HEPA vacuum.   Pollen and Outdoor Mold  Some people are allergic to trees, grass, or weed pollen, or molds. Try to keep your windows closed.  Limit time out  doors when pollen count is high.   Ask you health care provider about taking medicine during allergy season.     Animal Dander  Some people are allergic to skin flakes, urine or saliva from pets with fur or feathers. Keep pets with fur or feathers out of your home.    If you can t keep the pet outdoors, then keep the pet out of your bedroom.  Keep the bedroom door closed.  Keep pets off cloth furniture and away from stuffed toys.     Mice, Rats, and Cockroaches  Some people are allergic to the waste from these pests.   Cover food and garbage.  Clean up spills and food crumbs.  Store grease in the refrigerator.   Keep food out of the bedroom.   Indoor Mold  This can be a trigger if your home has high moisture. Fix leaking faucets, pipes, or other sources of water.   Clean moldy surfaces.  Dehumidify basement if it is damp and smelly.   Smoke, Strong Odors, and Sprays  These can reduce air quality. Stay away from strong odors and sprays, such as perfume, powder, hair spray, paints, smoke incense, paint, cleaning products, candles and new carpet.   Exercise or Sports  Some people with asthma have this trigger. Be active!  Ask your doctor about taking medicine before sports or exercise to prevent symptoms.    Warm up for 5-10 minutes before and after sports or exercise.     Other Triggers of Asthma  Cold air:  Cover your nose and mouth with a scarf.  Sometimes laughing or crying can be a trigger.  Some medicines and food can trigger asthma.

## 2021-09-22 ASSESSMENT — ASTHMA QUESTIONNAIRES: ACT_TOTALSCORE: 23

## 2021-09-29 ENCOUNTER — OFFICE VISIT (OUTPATIENT)
Dept: ORTHOPEDICS | Facility: CLINIC | Age: 61
End: 2021-09-29
Payer: COMMERCIAL

## 2021-09-29 VITALS
HEIGHT: 64 IN | SYSTOLIC BLOOD PRESSURE: 114 MMHG | DIASTOLIC BLOOD PRESSURE: 78 MMHG | WEIGHT: 170.9 LBS | BODY MASS INDEX: 29.18 KG/M2

## 2021-09-29 DIAGNOSIS — M16.11 PRIMARY OSTEOARTHRITIS OF RIGHT HIP: Primary | ICD-10-CM

## 2021-09-29 DIAGNOSIS — Z01.818 PREOP GENERAL PHYSICAL EXAM: ICD-10-CM

## 2021-09-29 DIAGNOSIS — Z98.890 PONV (POSTOPERATIVE NAUSEA AND VOMITING): ICD-10-CM

## 2021-09-29 DIAGNOSIS — R11.2 PONV (POSTOPERATIVE NAUSEA AND VOMITING): ICD-10-CM

## 2021-09-29 LAB
BASOPHILS # BLD AUTO: 0.1 10E3/UL (ref 0–0.2)
BASOPHILS NFR BLD AUTO: 1 %
EOSINOPHIL # BLD AUTO: 0.1 10E3/UL (ref 0–0.7)
EOSINOPHIL NFR BLD AUTO: 1 %
ERYTHROCYTE [DISTWIDTH] IN BLOOD BY AUTOMATED COUNT: 11.7 % (ref 10–15)
HCT VFR BLD AUTO: 44.2 % (ref 35–47)
HGB BLD-MCNC: 15.1 G/DL (ref 11.7–15.7)
IMM GRANULOCYTES # BLD: 0 10E3/UL
IMM GRANULOCYTES NFR BLD: 0 %
LYMPHOCYTES # BLD AUTO: 1.9 10E3/UL (ref 0.8–5.3)
LYMPHOCYTES NFR BLD AUTO: 27 %
MCH RBC QN AUTO: 29.7 PG (ref 26.5–33)
MCHC RBC AUTO-ENTMCNC: 34.2 G/DL (ref 31.5–36.5)
MCV RBC AUTO: 87 FL (ref 78–100)
MONOCYTES # BLD AUTO: 0.4 10E3/UL (ref 0–1.3)
MONOCYTES NFR BLD AUTO: 5 %
NEUTROPHILS # BLD AUTO: 4.8 10E3/UL (ref 1.6–8.3)
NEUTROPHILS NFR BLD AUTO: 66 %
NRBC # BLD AUTO: 0 10E3/UL
NRBC BLD AUTO-RTO: 0 /100
PLATELET # BLD AUTO: 267 10E3/UL (ref 150–450)
RBC # BLD AUTO: 5.09 10E6/UL (ref 3.8–5.2)
WBC # BLD AUTO: 7.2 10E3/UL (ref 4–11)

## 2021-09-29 PROCEDURE — 85025 COMPLETE CBC W/AUTO DIFF WBC: CPT | Performed by: NURSE PRACTITIONER

## 2021-09-29 PROCEDURE — 99207 PR PREOP VISIT IN GLOBAL PKG: CPT | Performed by: NURSE PRACTITIONER

## 2021-09-29 PROCEDURE — 36415 COLL VENOUS BLD VENIPUNCTURE: CPT | Performed by: NURSE PRACTITIONER

## 2021-09-29 RX ORDER — SCOLOPAMINE TRANSDERMAL SYSTEM 1 MG/1
1 PATCH, EXTENDED RELEASE TRANSDERMAL
Qty: 1 PATCH | Refills: 0 | Status: SHIPPED | OUTPATIENT
Start: 2021-09-29 | End: 2021-11-24

## 2021-09-29 ASSESSMENT — PAIN SCALES - GENERAL: PAINLEVEL: EXTREME PAIN (9)

## 2021-09-29 ASSESSMENT — MIFFLIN-ST. JEOR: SCORE: 1325.52

## 2021-09-29 NOTE — LETTER
"    9/29/2021         RE: Linda Walsh  [address] [place] [address]        Dear Colleague,    Thank you for referring your patient, Linda Walsh, to the [facility]. Please see a copy of my visit note below.    Patient presents for 1 week visit prior to right total hip replacement.  Reviewed h&p and labs. Will need CBC today.     Due to chronic back pain takes up to 2 oxycodone a day and uses fentanyl patches. She states has cut back dramatically and now some days takes no oxycodone.  She uses 1 fentanyl patch a week on average.    Discussed challenge with post-op pain management following surgery.  She understands and due to chronic narcotic use may experience more pain than average.     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 Seeing podiatry. She reports does not need the cam book for walking short distances and has been practicing without the boot with a walker with bearing weight on the left side and this is going well.    Understands mobility  May be a greater challenge than usual with the left ankle issues.     She reports after not smoking for 30 years, did start smoking again. Reports on average \"1/3 cigarette every 3 days\".      Understands increased risks with smoking.     Anticipate POD1 discharge, patient knows will need help at home.     Hx of PONV - plan for scop patch the AM of surgery.  rx placed. Patient will  friday afternoon if surgery has not been cancelled.     HERMELINDO Boswell, CNP  Orthopedic Surgery          Again, thank you for allowing me to participate in the care of your patient.        Sincerely,        HERMELINDO Coredro CNP    "

## 2021-09-29 NOTE — LETTER
[address] [address]  Phone: [phone]  Fax: [phone]    September 29, 2021        Linda Walsh  [address] [facility] [facility] [place] [address]          To whom it may concern:    RE: Linda Walsh    Patient was seen and treated today at our clinic.  No work currently.  Will re-evaluate in 4 weeks.     Please contact me for questions or concerns.      Sincerely,          HERMELINDO Boswell, CNP  Orthopedic Surgery

## 2021-09-29 NOTE — PROGRESS NOTES
"Patient presents for 1 week visit prior to right total hip replacement.  Reviewed h&p and labs. Will need CBC today.     Due to chronic back pain takes up to 2 oxycodone a day and uses fentanyl patches. She states has cut back dramatically and now some days takes no oxycodone.  She uses 1 fentanyl patch a week on average.    Discussed challenge with post-op pain management following surgery.  She understands and due to chronic narcotic use may experience more pain than average.     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 Seeing podiatry. She reports does not need the cam book for walking short distances and has been practicing without the boot with a walker with bearing weight on the left side and this is going well.    Understands mobility  May be a greater challenge than usual with the left ankle issues.     She reports after not smoking for 30 years, did start smoking again. Reports on average \"1/3 cigarette every 3 days\".      Understands increased risks with smoking.     Anticipate POD1 discharge, patient knows will need help at home.     Hx of PONV - plan for scop patch the AM of surgery.  rx placed. Patient will  friday afternoon if surgery has not been cancelled.     HERMELINDO Boswell, CNP  Orthopedic Surgery      "

## 2021-10-01 ENCOUNTER — PATIENT OUTREACH (OUTPATIENT)
Dept: CARE COORDINATION | Facility: CLINIC | Age: 61
End: 2021-10-01

## 2021-10-01 NOTE — PROGRESS NOTES
Clinic Care Coordination Contact  [facility]/Voicemail    Attempting to reach patient to assess for any potential post surgery needs. Pt has planned right total hip replacement surgery on 10/5/21. Notes indicate pt has support/help at home. Plans to discharge to home post surgery.     Clinical Data: Care Coordinator Outreach  Outreach attempted x 1.  Left message on patient's voicemail with call back information and requested return call.  Plan: Care Coordinator will try to reach patient again in 1-2 business days.      SUZIE Sandhu/NICKI Care Coordination Supervisor   [facility] [place] - Care Coordination   10/1/2021 4:08 PM  [phone]

## 2021-10-02 ENCOUNTER — LAB (OUTPATIENT)
Dept: LAB | Facility: CLINIC | Age: 61
End: 2021-10-02
Attending: ORTHOPAEDIC SURGERY
Payer: COMMERCIAL

## 2021-10-02 DIAGNOSIS — Z11.59 ENCOUNTER FOR SCREENING FOR OTHER VIRAL DISEASES: ICD-10-CM

## 2021-10-02 PROCEDURE — U0005 INFEC AGEN DETEC AMPLI PROBE: HCPCS

## 2021-10-02 PROCEDURE — U0003 INFECTIOUS AGENT DETECTION BY NUCLEIC ACID (DNA OR RNA); SEVERE ACUTE RESPIRATORY SYNDROME CORONAVIRUS 2 (SARS-COV-2) (CORONAVIRUS DISEASE [COVID-19]), AMPLIFIED PROBE TECHNIQUE, MAKING USE OF HIGH THROUGHPUT TECHNOLOGIES AS DESCRIBED BY CMS-2020-01-R: HCPCS

## 2021-10-03 LAB — SARS-COV-2 RNA RESP QL NAA+PROBE: NEGATIVE

## 2021-10-05 ENCOUNTER — HOSPITAL ENCOUNTER (OUTPATIENT)
Facility: CLINIC | Age: 61
Discharge: HOME | End: 2021-10-06
Attending: ORTHOPAEDIC SURGERY | Admitting: ORTHOPAEDIC SURGERY
Payer: COMMERCIAL

## 2021-10-05 ENCOUNTER — APPOINTMENT (OUTPATIENT)
Dept: PHYSICAL THERAPY | Facility: CLINIC | Age: 61
End: 2021-10-05
Attending: NURSE PRACTITIONER
Payer: COMMERCIAL

## 2021-10-05 ENCOUNTER — ANESTHESIA EVENT (OUTPATIENT)
Dept: SURGERY | Facility: CLINIC | Age: 61
End: 2021-10-05
Payer: COMMERCIAL

## 2021-10-05 ENCOUNTER — ANESTHESIA (OUTPATIENT)
Dept: SURGERY | Facility: CLINIC | Age: 61
End: 2021-10-05
Payer: COMMERCIAL

## 2021-10-05 ENCOUNTER — APPOINTMENT (OUTPATIENT)
Dept: GENERAL RADIOLOGY | Facility: CLINIC | Age: 61
End: 2021-10-05
Attending: NURSE PRACTITIONER
Payer: COMMERCIAL

## 2021-10-05 DIAGNOSIS — R11.0 NAUSEA: ICD-10-CM

## 2021-10-05 DIAGNOSIS — M16.11 PRIMARY OSTEOARTHRITIS OF RIGHT HIP: ICD-10-CM

## 2021-10-05 DIAGNOSIS — Z96.641 STATUS POST TOTAL REPLACEMENT OF RIGHT HIP: Primary | ICD-10-CM

## 2021-10-05 DIAGNOSIS — Z96.643 S/P BILATERAL REVISION OF TOTAL HIP REPLACEMENT: ICD-10-CM

## 2021-10-05 PROBLEM — Z96.649 S/P TOTAL HIP ARTHROPLASTY: Status: ACTIVE | Noted: 2021-10-05

## 2021-10-05 PROCEDURE — 97140 MANUAL THERAPY 1/> REGIONS: CPT | Mod: GP | Performed by: PHYSICAL THERAPIST

## 2021-10-05 PROCEDURE — 250N000013 HC RX MED GY IP 250 OP 250 PS 637: Performed by: NURSE PRACTITIONER

## 2021-10-05 PROCEDURE — 999N000063 XR PELVIS AND HIP RIGHT 2 VIEWS

## 2021-10-05 PROCEDURE — 97530 THERAPEUTIC ACTIVITIES: CPT | Mod: GP | Performed by: PHYSICAL THERAPIST

## 2021-10-05 PROCEDURE — C1776 JOINT DEVICE (IMPLANTABLE): HCPCS | Performed by: ORTHOPAEDIC SURGERY

## 2021-10-05 PROCEDURE — 97162 PT EVAL MOD COMPLEX 30 MIN: CPT | Mod: GP | Performed by: PHYSICAL THERAPIST

## 2021-10-05 PROCEDURE — 258N000003 HC RX IP 258 OP 636: Performed by: ORTHOPAEDIC SURGERY

## 2021-10-05 PROCEDURE — 250N000009 HC RX 250: Performed by: NURSE ANESTHETIST, CERTIFIED REGISTERED

## 2021-10-05 PROCEDURE — 250N000011 HC RX IP 250 OP 636: Performed by: NURSE ANESTHETIST, CERTIFIED REGISTERED

## 2021-10-05 PROCEDURE — 27130 TOTAL HIP ARTHROPLASTY: CPT | Mod: RT | Performed by: ORTHOPAEDIC SURGERY

## 2021-10-05 PROCEDURE — 250N000011 HC RX IP 250 OP 636: Performed by: ORTHOPAEDIC SURGERY

## 2021-10-05 PROCEDURE — 272N000001 HC OR GENERAL SUPPLY STERILE: Performed by: ORTHOPAEDIC SURGERY

## 2021-10-05 PROCEDURE — 258N000003 HC RX IP 258 OP 636: Performed by: NURSE PRACTITIONER

## 2021-10-05 PROCEDURE — 250N000013 HC RX MED GY IP 250 OP 250 PS 637: Performed by: ORTHOPAEDIC SURGERY

## 2021-10-05 PROCEDURE — 710N000010 HC RECOVERY PHASE 1, LEVEL 2, PER MIN: Performed by: ORTHOPAEDIC SURGERY

## 2021-10-05 PROCEDURE — 250N000009 HC RX 250: Performed by: ORTHOPAEDIC SURGERY

## 2021-10-05 PROCEDURE — 370N000017 HC ANESTHESIA TECHNICAL FEE, PER MIN: Performed by: ORTHOPAEDIC SURGERY

## 2021-10-05 PROCEDURE — 250N000011 HC RX IP 250 OP 636: Performed by: NURSE PRACTITIONER

## 2021-10-05 PROCEDURE — 999N000141 HC STATISTIC PRE-PROCEDURE NURSING ASSESSMENT: Performed by: ORTHOPAEDIC SURGERY

## 2021-10-05 PROCEDURE — 360N000077 HC SURGERY LEVEL 4, PER MIN: Performed by: ORTHOPAEDIC SURGERY

## 2021-10-05 PROCEDURE — 27130 TOTAL HIP ARTHROPLASTY: CPT | Mod: AS | Performed by: NURSE PRACTITIONER

## 2021-10-05 PROCEDURE — 258N000003 HC RX IP 258 OP 636: Performed by: NURSE ANESTHETIST, CERTIFIED REGISTERED

## 2021-10-05 PROCEDURE — 97110 THERAPEUTIC EXERCISES: CPT | Mod: GP | Performed by: PHYSICAL THERAPIST

## 2021-10-05 DEVICE — IMPLANTABLE DEVICE: Type: IMPLANTABLE DEVICE | Site: HIP | Status: FUNCTIONAL

## 2021-10-05 RX ORDER — FENTANYL 25 UG/1
25 PATCH TRANSDERMAL
Status: DISCONTINUED | OUTPATIENT
Start: 2021-10-05 | End: 2021-10-06 | Stop reason: HOSPADM

## 2021-10-05 RX ORDER — PROPOFOL 10 MG/ML
INJECTION, EMULSION INTRAVENOUS CONTINUOUS PRN
Status: DISCONTINUED | OUTPATIENT
Start: 2021-10-05 | End: 2021-10-05

## 2021-10-05 RX ORDER — AMOXICILLIN 250 MG
1 CAPSULE ORAL 2 TIMES DAILY
Status: DISCONTINUED | OUTPATIENT
Start: 2021-10-05 | End: 2021-10-06 | Stop reason: HOSPADM

## 2021-10-05 RX ORDER — SODIUM CHLORIDE, SODIUM LACTATE, POTASSIUM CHLORIDE, CALCIUM CHLORIDE 600; 310; 30; 20 MG/100ML; MG/100ML; MG/100ML; MG/100ML
INJECTION, SOLUTION INTRAVENOUS CONTINUOUS
Status: DISCONTINUED | OUTPATIENT
Start: 2021-10-05 | End: 2021-10-06 | Stop reason: HOSPADM

## 2021-10-05 RX ORDER — OXYCODONE HYDROCHLORIDE 5 MG/1
5-10 TABLET ORAL
Qty: 40 TABLET | Refills: 0 | Status: SHIPPED | OUTPATIENT
Start: 2021-10-05 | End: 2021-10-06

## 2021-10-05 RX ORDER — HYDROMORPHONE HCL IN WATER/PF 6 MG/30 ML
0.4 PATIENT CONTROLLED ANALGESIA SYRINGE INTRAVENOUS
Status: DISCONTINUED | OUTPATIENT
Start: 2021-10-05 | End: 2021-10-06 | Stop reason: HOSPADM

## 2021-10-05 RX ORDER — SODIUM CHLORIDE, SODIUM LACTATE, POTASSIUM CHLORIDE, CALCIUM CHLORIDE 600; 310; 30; 20 MG/100ML; MG/100ML; MG/100ML; MG/100ML
INJECTION, SOLUTION INTRAVENOUS CONTINUOUS
Status: DISCONTINUED | OUTPATIENT
Start: 2021-10-05 | End: 2021-10-05 | Stop reason: HOSPADM

## 2021-10-05 RX ORDER — BISACODYL 10 MG
10 SUPPOSITORY, RECTAL RECTAL DAILY
Qty: 12 SUPPOSITORY | Refills: 0 | Status: SHIPPED | OUTPATIENT
Start: 2021-10-05 | End: 2021-12-03

## 2021-10-05 RX ORDER — CLINDAMYCIN PHOSPHATE 600 MG/50ML
600 INJECTION, SOLUTION INTRAVENOUS EVERY 8 HOURS
Status: COMPLETED | OUTPATIENT
Start: 2021-10-05 | End: 2021-10-06

## 2021-10-05 RX ORDER — TRAZODONE HYDROCHLORIDE 150 MG/1
150 TABLET ORAL AT BEDTIME
Status: DISCONTINUED | OUTPATIENT
Start: 2021-10-05 | End: 2021-10-06 | Stop reason: HOSPADM

## 2021-10-05 RX ORDER — LIDOCAINE HYDROCHLORIDE 20 MG/ML
INJECTION, SOLUTION INFILTRATION; PERINEURAL PRN
Status: DISCONTINUED | OUTPATIENT
Start: 2021-10-05 | End: 2021-10-05

## 2021-10-05 RX ORDER — LIDOCAINE 40 MG/G
CREAM TOPICAL
Status: DISCONTINUED | OUTPATIENT
Start: 2021-10-05 | End: 2021-10-06 | Stop reason: HOSPADM

## 2021-10-05 RX ORDER — SCOLOPAMINE TRANSDERMAL SYSTEM 1 MG/1
1 PATCH, EXTENDED RELEASE TRANSDERMAL
Status: DISCONTINUED | OUTPATIENT
Start: 2021-10-08 | End: 2021-10-06 | Stop reason: HOSPADM

## 2021-10-05 RX ORDER — POLYETHYLENE GLYCOL 3350 17 G/17G
1 POWDER, FOR SOLUTION ORAL DAILY
Qty: 7 PACKET | Refills: 0 | Status: SHIPPED | OUTPATIENT
Start: 2021-10-05 | End: 2021-10-05

## 2021-10-05 RX ORDER — FENTANYL CITRATE 50 UG/ML
INJECTION, SOLUTION INTRAMUSCULAR; INTRAVENOUS PRN
Status: DISCONTINUED | OUTPATIENT
Start: 2021-10-05 | End: 2021-10-05

## 2021-10-05 RX ORDER — DEXAMETHASONE SODIUM PHOSPHATE 10 MG/ML
INJECTION, SOLUTION INTRAMUSCULAR; INTRAVENOUS PRN
Status: DISCONTINUED | OUTPATIENT
Start: 2021-10-05 | End: 2021-10-05

## 2021-10-05 RX ORDER — NALOXONE HYDROCHLORIDE 0.4 MG/ML
0.4 INJECTION, SOLUTION INTRAMUSCULAR; INTRAVENOUS; SUBCUTANEOUS
Status: DISCONTINUED | OUTPATIENT
Start: 2021-10-05 | End: 2021-10-06 | Stop reason: HOSPADM

## 2021-10-05 RX ORDER — DULOXETIN HYDROCHLORIDE 30 MG/1
60 CAPSULE, DELAYED RELEASE ORAL DAILY
Status: DISCONTINUED | OUTPATIENT
Start: 2021-10-05 | End: 2021-10-06 | Stop reason: HOSPADM

## 2021-10-05 RX ORDER — HYDROMORPHONE HYDROCHLORIDE 1 MG/ML
0.5 INJECTION, SOLUTION INTRAMUSCULAR; INTRAVENOUS; SUBCUTANEOUS EVERY 5 MIN PRN
Status: DISCONTINUED | OUTPATIENT
Start: 2021-10-05 | End: 2021-10-05 | Stop reason: HOSPADM

## 2021-10-05 RX ORDER — ALBUTEROL SULFATE 90 UG/1
2 AEROSOL, METERED RESPIRATORY (INHALATION) EVERY 6 HOURS PRN
Status: DISCONTINUED | OUTPATIENT
Start: 2021-10-05 | End: 2021-10-06 | Stop reason: HOSPADM

## 2021-10-05 RX ORDER — GABAPENTIN 300 MG/1
900 CAPSULE ORAL 3 TIMES DAILY
Status: DISCONTINUED | OUTPATIENT
Start: 2021-10-05 | End: 2021-10-06 | Stop reason: HOSPADM

## 2021-10-05 RX ORDER — CLINDAMYCIN PHOSPHATE 900 MG/50ML
900 INJECTION, SOLUTION INTRAVENOUS SEE ADMIN INSTRUCTIONS
Status: DISCONTINUED | OUTPATIENT
Start: 2021-10-05 | End: 2021-10-05 | Stop reason: HOSPADM

## 2021-10-05 RX ORDER — AMOXICILLIN 250 MG
1-2 CAPSULE ORAL 2 TIMES DAILY
Qty: 30 TABLET | Refills: 0 | Status: SHIPPED | OUTPATIENT
Start: 2021-10-05 | End: 2021-10-05

## 2021-10-05 RX ORDER — OXYCODONE HYDROCHLORIDE 5 MG/1
5-10 TABLET ORAL
Status: DISCONTINUED | OUTPATIENT
Start: 2021-10-05 | End: 2021-10-06

## 2021-10-05 RX ORDER — BUPIVACAINE HYDROCHLORIDE 7.5 MG/ML
INJECTION, SOLUTION INTRASPINAL PRN
Status: DISCONTINUED | OUTPATIENT
Start: 2021-10-05 | End: 2021-10-05

## 2021-10-05 RX ORDER — NALOXONE HYDROCHLORIDE 0.4 MG/ML
0.2 INJECTION, SOLUTION INTRAMUSCULAR; INTRAVENOUS; SUBCUTANEOUS
Status: DISCONTINUED | OUTPATIENT
Start: 2021-10-05 | End: 2021-10-06 | Stop reason: HOSPADM

## 2021-10-05 RX ORDER — ACETAMINOPHEN 325 MG/1
975 TABLET ORAL EVERY 8 HOURS
Status: DISCONTINUED | OUTPATIENT
Start: 2021-10-05 | End: 2021-10-06 | Stop reason: HOSPADM

## 2021-10-05 RX ORDER — FENTANYL CITRATE-0.9 % NACL/PF 10 MCG/ML
PLASTIC BAG, INJECTION (ML) INTRAVENOUS CONTINUOUS PRN
Status: DISCONTINUED | OUTPATIENT
Start: 2021-10-05 | End: 2021-10-05

## 2021-10-05 RX ORDER — FAMOTIDINE 20 MG/1
20 TABLET, FILM COATED ORAL 2 TIMES DAILY
Status: DISCONTINUED | OUTPATIENT
Start: 2021-10-05 | End: 2021-10-06 | Stop reason: HOSPADM

## 2021-10-05 RX ORDER — PROPOFOL 10 MG/ML
INJECTION, EMULSION INTRAVENOUS PRN
Status: DISCONTINUED | OUTPATIENT
Start: 2021-10-05 | End: 2021-10-05

## 2021-10-05 RX ORDER — ONDANSETRON 2 MG/ML
4 INJECTION INTRAMUSCULAR; INTRAVENOUS EVERY 30 MIN PRN
Status: DISCONTINUED | OUTPATIENT
Start: 2021-10-05 | End: 2021-10-05 | Stop reason: HOSPADM

## 2021-10-05 RX ORDER — ONDANSETRON 4 MG/1
4 TABLET, ORALLY DISINTEGRATING ORAL EVERY 8 HOURS PRN
Qty: 20 TABLET | Refills: 1 | Status: SHIPPED | OUTPATIENT
Start: 2021-10-05 | End: 2021-11-24

## 2021-10-05 RX ORDER — SCOLOPAMINE TRANSDERMAL SYSTEM 1 MG/1
1 PATCH, EXTENDED RELEASE TRANSDERMAL
Status: DISCONTINUED | OUTPATIENT
Start: 2021-10-05 | End: 2021-10-05

## 2021-10-05 RX ORDER — HYDROXYZINE HYDROCHLORIDE 25 MG/1
25 TABLET, FILM COATED ORAL EVERY 6 HOURS PRN
Status: DISCONTINUED | OUTPATIENT
Start: 2021-10-05 | End: 2021-10-06 | Stop reason: HOSPADM

## 2021-10-05 RX ORDER — TRANEXAMIC ACID 650 MG/1
1950 TABLET ORAL ONCE
Status: COMPLETED | OUTPATIENT
Start: 2021-10-05 | End: 2021-10-05

## 2021-10-05 RX ORDER — CLINDAMYCIN PHOSPHATE 900 MG/50ML
900 INJECTION, SOLUTION INTRAVENOUS
Status: COMPLETED | OUTPATIENT
Start: 2021-10-05 | End: 2021-10-05

## 2021-10-05 RX ORDER — ONDANSETRON 2 MG/ML
INJECTION INTRAMUSCULAR; INTRAVENOUS PRN
Status: DISCONTINUED | OUTPATIENT
Start: 2021-10-05 | End: 2021-10-05

## 2021-10-05 RX ORDER — ONDANSETRON 4 MG/1
4 TABLET, ORALLY DISINTEGRATING ORAL EVERY 6 HOURS PRN
Status: DISCONTINUED | OUTPATIENT
Start: 2021-10-05 | End: 2021-10-06 | Stop reason: HOSPADM

## 2021-10-05 RX ORDER — ONDANSETRON 2 MG/ML
4 INJECTION INTRAMUSCULAR; INTRAVENOUS EVERY 6 HOURS PRN
Status: DISCONTINUED | OUTPATIENT
Start: 2021-10-05 | End: 2021-10-06 | Stop reason: HOSPADM

## 2021-10-05 RX ORDER — POLYETHYLENE GLYCOL 3350 17 G/17G
17 POWDER, FOR SOLUTION ORAL DAILY
Status: DISCONTINUED | OUTPATIENT
Start: 2021-10-06 | End: 2021-10-06 | Stop reason: HOSPADM

## 2021-10-05 RX ORDER — HYDROMORPHONE HCL IN WATER/PF 6 MG/30 ML
0.2 PATIENT CONTROLLED ANALGESIA SYRINGE INTRAVENOUS
Status: DISCONTINUED | OUTPATIENT
Start: 2021-10-05 | End: 2021-10-06 | Stop reason: HOSPADM

## 2021-10-05 RX ORDER — DIMENHYDRINATE 50 MG/ML
25 INJECTION, SOLUTION INTRAMUSCULAR; INTRAVENOUS
Status: DISCONTINUED | OUTPATIENT
Start: 2021-10-05 | End: 2021-10-05 | Stop reason: HOSPADM

## 2021-10-05 RX ORDER — OXYCODONE HYDROCHLORIDE 5 MG/1
10 TABLET ORAL EVERY 4 HOURS PRN
Status: DISCONTINUED | OUTPATIENT
Start: 2021-10-05 | End: 2021-10-05 | Stop reason: HOSPADM

## 2021-10-05 RX ORDER — ACETAMINOPHEN 325 MG/1
975 TABLET ORAL EVERY 8 HOURS PRN
Qty: 100 TABLET | Refills: 0 | Status: SHIPPED | OUTPATIENT
Start: 2021-10-05 | End: 2021-12-01

## 2021-10-05 RX ORDER — PROCHLORPERAZINE MALEATE 5 MG
10 TABLET ORAL EVERY 6 HOURS PRN
Status: DISCONTINUED | OUTPATIENT
Start: 2021-10-05 | End: 2021-10-06 | Stop reason: HOSPADM

## 2021-10-05 RX ORDER — ONDANSETRON 4 MG/1
4 TABLET, ORALLY DISINTEGRATING ORAL EVERY 30 MIN PRN
Status: DISCONTINUED | OUTPATIENT
Start: 2021-10-05 | End: 2021-10-05 | Stop reason: HOSPADM

## 2021-10-05 RX ORDER — LIDOCAINE 40 MG/G
CREAM TOPICAL
Status: DISCONTINUED | OUTPATIENT
Start: 2021-10-05 | End: 2021-10-05 | Stop reason: HOSPADM

## 2021-10-05 RX ORDER — HYDROXYZINE HYDROCHLORIDE 25 MG/1
25 TABLET, FILM COATED ORAL EVERY 6 HOURS PRN
Qty: 30 TABLET | Refills: 0 | Status: SHIPPED | OUTPATIENT
Start: 2021-10-05 | End: 2021-10-20

## 2021-10-05 RX ORDER — OXYCODONE HYDROCHLORIDE 5 MG/1
5 TABLET ORAL EVERY 6 HOURS PRN
Qty: 6 TABLET | Refills: 0 | COMMUNITY
Start: 2021-10-13 | End: 2021-11-24

## 2021-10-05 RX ORDER — FENTANYL CITRATE 50 UG/ML
50 INJECTION, SOLUTION INTRAMUSCULAR; INTRAVENOUS EVERY 5 MIN PRN
Status: DISCONTINUED | OUTPATIENT
Start: 2021-10-05 | End: 2021-10-05 | Stop reason: HOSPADM

## 2021-10-05 RX ADMIN — ASPIRIN 325 MG: 325 TABLET ORAL at 10:53

## 2021-10-05 RX ADMIN — OXYCODONE HYDROCHLORIDE 10 MG: 5 TABLET ORAL at 21:35

## 2021-10-05 RX ADMIN — OXYCODONE HYDROCHLORIDE 10 MG: 5 TABLET ORAL at 15:59

## 2021-10-05 RX ADMIN — HYDROMORPHONE HYDROCHLORIDE 0.4 MG: 0.2 INJECTION, SOLUTION INTRAMUSCULAR; INTRAVENOUS; SUBCUTANEOUS at 15:58

## 2021-10-05 RX ADMIN — CLINDAMYCIN IN 5 PERCENT DEXTROSE 600 MG: 12 INJECTION, SOLUTION INTRAVENOUS at 14:53

## 2021-10-05 RX ADMIN — SODIUM CHLORIDE, POTASSIUM CHLORIDE, SODIUM LACTATE AND CALCIUM CHLORIDE: 600; 310; 30; 20 INJECTION, SOLUTION INTRAVENOUS at 09:15

## 2021-10-05 RX ADMIN — HYDROXYZINE HYDROCHLORIDE 25 MG: 25 TABLET ORAL at 14:44

## 2021-10-05 RX ADMIN — TRANEXAMIC ACID 1950 MG: 650 TABLET ORAL at 06:08

## 2021-10-05 RX ADMIN — CLINDAMYCIN IN 5 PERCENT DEXTROSE 600 MG: 12 INJECTION, SOLUTION INTRAVENOUS at 23:44

## 2021-10-05 RX ADMIN — PHENYLEPHRINE HYDROCHLORIDE 100 MCG: 10 INJECTION INTRAVENOUS at 07:53

## 2021-10-05 RX ADMIN — ASPIRIN 325 MG: 325 TABLET ORAL at 21:04

## 2021-10-05 RX ADMIN — FAMOTIDINE 20 MG: 20 TABLET, FILM COATED ORAL at 21:04

## 2021-10-05 RX ADMIN — SODIUM CHLORIDE, POTASSIUM CHLORIDE, SODIUM LACTATE AND CALCIUM CHLORIDE: 600; 310; 30; 20 INJECTION, SOLUTION INTRAVENOUS at 10:55

## 2021-10-05 RX ADMIN — FENTANYL CITRATE 20 MCG: 50 INJECTION, SOLUTION INTRAMUSCULAR; INTRAVENOUS at 07:31

## 2021-10-05 RX ADMIN — ONDANSETRON 4 MG: 2 INJECTION INTRAMUSCULAR; INTRAVENOUS at 08:09

## 2021-10-05 RX ADMIN — SODIUM CHLORIDE, POTASSIUM CHLORIDE, SODIUM LACTATE AND CALCIUM CHLORIDE: 600; 310; 30; 20 INJECTION, SOLUTION INTRAVENOUS at 06:39

## 2021-10-05 RX ADMIN — CLINDAMYCIN PHOSPHATE 900 MG: 900 INJECTION, SOLUTION INTRAVENOUS at 06:45

## 2021-10-05 RX ADMIN — MIDAZOLAM 2 MG: 1 INJECTION INTRAMUSCULAR; INTRAVENOUS at 07:25

## 2021-10-05 RX ADMIN — OXYCODONE HYDROCHLORIDE 10 MG: 5 TABLET ORAL at 18:33

## 2021-10-05 RX ADMIN — DOCUSATE SODIUM AND SENNOSIDES 1 TABLET: 8.6; 5 TABLET ORAL at 10:54

## 2021-10-05 RX ADMIN — HYDROXYZINE HYDROCHLORIDE 25 MG: 25 TABLET ORAL at 21:04

## 2021-10-05 RX ADMIN — BUPIVACAINE HYDROCHLORIDE IN DEXTROSE 1.8 ML: 7.5 INJECTION, SOLUTION SUBARACHNOID at 07:31

## 2021-10-05 RX ADMIN — MIDAZOLAM 2 MG: 1 INJECTION INTRAMUSCULAR; INTRAVENOUS at 07:21

## 2021-10-05 RX ADMIN — GABAPENTIN 900 MG: 300 CAPSULE ORAL at 14:44

## 2021-10-05 RX ADMIN — OXYCODONE HYDROCHLORIDE 5 MG: 5 TABLET ORAL at 12:06

## 2021-10-05 RX ADMIN — FAMOTIDINE 20 MG: 20 TABLET, FILM COATED ORAL at 10:53

## 2021-10-05 RX ADMIN — GABAPENTIN 900 MG: 300 CAPSULE ORAL at 10:53

## 2021-10-05 RX ADMIN — OXYCODONE HYDROCHLORIDE 5 MG: 5 TABLET ORAL at 12:58

## 2021-10-05 RX ADMIN — TRAZODONE HYDROCHLORIDE 150 MG: 150 TABLET ORAL at 21:04

## 2021-10-05 RX ADMIN — FENTANYL 1 PATCH: 25 PATCH, EXTENDED RELEASE TRANSDERMAL at 18:41

## 2021-10-05 RX ADMIN — CLINDAMYCIN PHOSPHATE 900 MG: 900 INJECTION, SOLUTION INTRAVENOUS at 06:47

## 2021-10-05 RX ADMIN — ACETAMINOPHEN 975 MG: 325 TABLET, FILM COATED ORAL at 10:54

## 2021-10-05 RX ADMIN — HYDROMORPHONE HYDROCHLORIDE 0.2 MG: 0.2 INJECTION, SOLUTION INTRAMUSCULAR; INTRAVENOUS; SUBCUTANEOUS at 12:59

## 2021-10-05 RX ADMIN — LIDOCAINE HYDROCHLORIDE 40 MG: 20 INJECTION, SOLUTION INFILTRATION; PERINEURAL at 07:35

## 2021-10-05 RX ADMIN — DULOXETINE HYDROCHLORIDE 60 MG: 30 CAPSULE, DELAYED RELEASE PELLETS ORAL at 10:53

## 2021-10-05 RX ADMIN — Medication 30 MCG/MIN: at 07:53

## 2021-10-05 RX ADMIN — ACETAMINOPHEN 975 MG: 325 TABLET, FILM COATED ORAL at 18:34

## 2021-10-05 RX ADMIN — GABAPENTIN 900 MG: 300 CAPSULE ORAL at 21:04

## 2021-10-05 RX ADMIN — LIDOCAINE HYDROCHLORIDE 1 ML: 10 INJECTION, SOLUTION EPIDURAL; INFILTRATION; INTRACAUDAL; PERINEURAL at 06:40

## 2021-10-05 RX ADMIN — PHENYLEPHRINE HYDROCHLORIDE 100 MCG: 10 INJECTION INTRAVENOUS at 08:13

## 2021-10-05 RX ADMIN — PROPOFOL 100 MCG/KG/MIN: 10 INJECTION, EMULSION INTRAVENOUS at 07:36

## 2021-10-05 RX ADMIN — DOCUSATE SODIUM AND SENNOSIDES 1 TABLET: 8.6; 5 TABLET ORAL at 21:04

## 2021-10-05 RX ADMIN — DEXAMETHASONE SODIUM PHOSPHATE 10 MG: 10 INJECTION, SOLUTION INTRAMUSCULAR; INTRAVENOUS at 07:54

## 2021-10-05 RX ADMIN — PROPOFOL 50 MG: 10 INJECTION, EMULSION INTRAVENOUS at 07:36

## 2021-10-05 ASSESSMENT — LIFESTYLE VARIABLES: TOBACCO_USE: 1

## 2021-10-05 NOTE — INTERVAL H&P NOTE
This H&P has been reviewed and there are no clinically significant changes in the patient s condition.  The patient was evaluated by myself as well as Jonna NOVAK prior to surgery. The Patient is approved for the surgery and the stated surgical procedure is still clinically indicated.

## 2021-10-05 NOTE — OP NOTE
Procedure Date: 10/05/2021    PREOPERATIVE DIAGNOSIS:  Right hip primary osteoarthritis.    POSTOPERATIVE DIAGNOSIS:  Right hip primary osteoarthritis.    PROCEDURE:  Right total hip arthroplasty.    SURGEON:  Velasquez Stewart DO    FIRST ASSISTANT:  Bubba Mcqueen, nurse practitioner (utilized throughout the procedure, assisting with leg positioning, assistance with broach and final prosthesis placement, and he provided skin closure).    ANESTHESIA:  Spinal with IV sedation.    COMPLICATIONS:  None.    ESTIMATED BLOOD LOSS:  Less than 50 mL    FLUIDS:  1 liter crystalloids.    COUNTS:  Correct.    DISPOSITION:  To PACU in stable condition.    GROSS FINDINGS:  There was bone-on-bone wear.  The superior aspect of the hip joint with complete loss of the articular cartilage.    IMPLANTS:  Includes DePuy size 46 mm acetabular shell [place] with Gription, a neutral AltrX polyethylene acetabular liner, a size 10 standard collared Corail femoral stem with a +1.5, 28 mm metal head.    INDICATIONS FOR PROCEDURE:  This is a 61-year-old female with complaints of hip pain.  It has been progressive over a year.  Coming much more constant over the last 5 months.  She has attempted intraarticular steroid injection, rest, activity modifications and narcotics, anti-inflammatories with no improvement.  Her radiographs are clinically correlated.  Given her continued pain refractory to conservative care impacting the quality of her life, we discussed proceeding with right hip replacement.  Prior to hip replacement she was evaluated for her ankle as she has been dealing with chronic ankle related issues.  It was felt that her ankle was stable for the hip replacement, it would not slow down in recovery.    ATTENDING ATTESTATION PROCEDURE:  She was seen preoperatively.  Again, informed consent was verified, appropriate site was marked, and she was wheeled to the operative suite #1.  Transferred to the OR table without any issues.  When deemed  appropriate by Anesthesia, she was positioned in lateral decubitus position.  An axillary roll was placed.  All bony prominences were padded and she was secured to the table.  The hip was then sterilely prepped and draped in normal manner.  Prior to incision, a timeout was performed.  Again, the appropriate patient, surgery and extremity was verified and antibiotics had been administered.  Posterior approach was utilized.  The skin was sharply incised.  The subcutaneous tissue, split in line.  Electrocautery was used for hemostasis.  The fascia was then visualized and split in line.  A Charnley retractor was placed.  The hip was gently internally rotated.  The bursal tissue excised, the short external rotators, piriformis and abductors identified.  Protecting the piriformis, releasing the short external rotators just into the quadratus.  This was tagged for later repair.  Here, the hip was dislocated with no issues.  A Cobra retractor was placed around the femoral neck.  Osteotomy of the neck was performed based on preoperative templating.  This was removed with no issues.  The leg was repositioned an anterior retractor was placed along the anterior lip of the acetabulum.  The inferior capsule was split the labrum tissue excised.  A posterior paddle retractor was placed and adequate visualization was found.  The hip was then sequentially reamed.  At size 45, there was a circumferential bleeding bed of bone and had medialized to the inner wall.  With this complete, the area was irrigated.  The acetabular shell was then placed.  It was placed based on preoperative templating, intraoperative anatomy and an insertion guide.  There was an excellent press fit.  The acetabular liner was then locked into position and confirmed.  The leg was repositioned with a femoral neck elevator.  A box osteotome was used to enter the proximal femur very carefully.  This was followed with a canal finder.  The hip was then sequentially  broached.  At size 10, there was rotational fit and stability.  Trialling a variety of head and neck lengths, opting for the +1.5 which reproduced leg length and stability.  At this time, the broach was dislocated.  The final prosthesis was placed.  There was an excellent press fit.  Again, I opted for the +1.5, which was stable through the full arc of motion and including the position of sleep.  At this time, a 3-minute dilute Betadine lavage was performed.  This was followed with pulse lavage.  The short external rotators were repaired back to greater trochanter and using Ethibond followed with 0 Vicryl fascial closure, 2-0 subcutaneous and a running Monocryl suture and skin glue.  A sterile bandage was applied.  Subsequently transferred to PACU in stable condition.  She will be brought in hospital for orthopedic care, DVT prophylaxis, pain management, physical therapy.    Velasquez Stewart DO        D: 10/05/2021   T: 10/05/2021   MT: [id number]    Name:     KELLY COSTELLO  MRN:      [id number]        Account:        [id number]   :      1960           Procedure Date: 10/05/2021     Document: [id number]

## 2021-10-05 NOTE — ANESTHESIA CARE TRANSFER NOTE
Patient: Linda Walsh    Procedure: Procedure(s):  Right total hip replacement       Diagnosis: Primary osteoarthritis of right hip [M16.11]  Diagnosis Additional Information: No value filed.    Anesthesia Type:   Spinal     Note:    Oropharynx: oropharynx clear of all foreign objects and spontaneously breathing  Level of Consciousness: drowsy  Oxygen Supplementation: face mask    Independent Airway: airway patency satisfactory and stable  Dentition: dentition unchanged  Vital Signs Stable: post-procedure vital signs reviewed and stable  Report to RN Given: handoff report given  Patient transferred to: PACU    Handoff Report: Identifed the Patient, Identified the Reponsible Provider, Reviewed the pertinent medical history, Discussed the surgical course, Reviewed Intra-OP anesthesia mangement and issues during anesthesia, Set expectations for post-procedure period and Allowed opportunity for questions and acknowledgement of understanding      Vitals:  Vitals Value Taken Time   BP     Temp     Pulse 66 10/05/21 0922   Resp 12 10/05/21 0922   SpO2 100 % 10/05/21 0922   Vitals shown include unvalidated device data.    Electronically Signed By: HERMELINDO Waldron CRNA  October 5, 2021  9:22 AM

## 2021-10-05 NOTE — PROGRESS NOTES
"/56 (BP Location: Left arm)   Pulse 82   Temp 98.4  F (36.9  C) (Oral)   Resp 18   Ht 1.626 m (5' 4\")   LMP  (LMP Unknown)   SpO2 96%   BMI 29.33 kg/m      Patient vital signs are at baseline: Yes  Patient able to ambulate as they were prior to admission or with assist devices provided by therapies during their stay:  Yes  Patient MUST void prior to discharge:  Yes  Patient able to tolerate oral intake:  Yes  Pain has adequate pain control using Oral analgesics:  No,  Reason:  Chronic pain and pain management not controlled with oral Oxy and Atarax. IV Dilaudid 0.4mg given with slight improvement. Fentanyl patch added and placed to right chest.    Dressing remains CDI. Lungs clear. Voided x2. Walked in room with walker. Tolerating diet without nausea.     "

## 2021-10-05 NOTE — DISCHARGE INSTRUCTIONS
DISCHARGE INSTRUCTIONS FOR PATIENT   SCOPOLAMINE TRANSDERMAL PATCH  You may leave the patch on behind your ear for three days, but NO LONGER. You may have withdrawal symptoms (nausea, vomiting, headache, dizziness) if used longer.  When you remove the patch, you may wash and dry your hands thoroughly and before touching your eyes, as pupil may dilate.  Discard patch (away from children and pets).  You may develop some urinary hesitancy or urine retention.      Total Knee Replacement Discharge Instructions                                     [phone]  Bone and Joint Service Line for issues or concerns    For oxycodone your prescription will be 1-3 tablets every 3 hours. Do not take all three at once. Instead take 1 or 2 tablets, give it time, and if needed take the third tablet.     General Care:  After surgery you may feel tired/sleepy. This is normal. If you have any question along the way please contact the office. If you feel it is an issue cannot wait for normal office hours, contact the 24 hour bone and joint line at [phone]. You should not drive or operate machines/equipment until released by your physician to do so.     Wound Care:  Keep incision covered with hospital dressing (Aquacel) for one week. It is okay to shower with this dressing on. However, do not submerge your dressing and incision in water for roughly 2 weeks. After one week remove this dressing and then keep it clean, dry, and covered. If this dressing become looses or falls off it is ok to cover with gauze and tape.  Wash the incision gently with warm soapy water after removing your hospital dressing and lightly pat dry.       Diet:  Start with non-alcoholic liquids at first, particularly water or sports drinks after surgery. Progress to bland foods such as crackers and bread and finally to your normal diet if you have no problems. Avoid alcohol when taking narcotic pain medications.      Pain control:  Take your pain medications as  prescribed. These medications may make you sleepy. Do not drive, operate equipment, or drink alcohol when taking these.  You may take Tylenol (Generic name is acetaminophen) as directed on the bottle for additional relief or in place of the prescribed pain medications as your pain gets better. Do not take any other NSAIDs (Motrin, Ibuprofen, Aleve, Naproxen) while taking the blood thinner. If the medications cause a reaction such as nausea or skin rash, stop taking them and contact your doctor. Please plan accordingly, pain medications will not be re-filled on the weekends or at night. Call the office during the day if you need more medications.    Blood thinner:  It is very important to take it as prescribed. It is a medication to help prevent blood clots in your legs or lungs. No medication is perfect, so if you notice a sudden onset of pain/swelling in your calf area call your doctor. If you notice a sudden onset of troubles breathing and/or chest pain call 911.     Swelling (edema) control:  Preventing swelling (edema) in your legs after surgery is very important. It is helpful for achieving optimal range of motion as well as preventing blood clots.  It starts with simple things such as elevation of your legs and icing. Elevate your legs above your heart.    Do this for 20 minutes every couple hours the first few days after surgery. We also recommend DAX hose (compression hose) to be worn. Wear on both legs during the day. You may remove at night. Wear these until directed to stop.      Icing:  It is common for some swelling, aching and stiffness to occur for up to 6-9 months after a knee replacement. If you knee swells, get off your feet, elevate your leg and apply some ice packs. Apply for 20 minutes at a time. For the first 1-2 weeks apply ice 2-3 x day or more after therapy.    Walker/crutches:  Use a walker/crutches when you go home. You will transition to the use of a cane and finally to no additional  support.     Braces:  You will go home with a knee immobilizer. You can take it off when you are lying or sitting down. Wear it when you are walking. This immobilizer can come off after you are doing a straight leg raise. Your physician and or physical therapist will help to determine when to stop wearing it.     Physical Therapy:  The success of your knee replacement is based on doing physical therapy. You will have some pain and discomfort along the way. If you feel your pain is limiting your progress make sure to take some pain medication prior to your therapy session. If you pain medications are not working talk to your surgeon.   For the first 2 weeks after going home you will have in-home physical therapy. The goal is to work on range of motion. While it is important to working on bending your knee, it is equally important to make sure you knee comes out all the way straight. To assist in this do not place any bumps, pillows and or blankets under your knee when you are lying down. You should have an outpatient physical therapy appointment scheduled for about 2 weeks after surgery.     Activity:  Unless otherwise instructed, you can weight bear as tolerated. While laying or sitting down you should straighten your knee all the way out and then gently work on bending the knee back. Do not worry at first if your knee feels stiff and will not bend like normal, this will get better. Never put a pillow or bump under you knee, instead put a pillow under your ankle so your knee will straighten out all the way.     Normal findings after surgery:  Numbness and tenderness around the incisions and to the outside of the incision is normal.  You may have bruising around the incisions and down the lower leg.   Your knee will be swollen for months after surgery. It will feel  tight  to move.   Low grade fevers less than 100.5 degrees Fahrenheit are normal.   You may have some minor swelling in the leg/calf area.  You will have  some increased pain after your therapy sessions.     When to call the Office:  Temperature greater than 101.5 degrees Fahreheit.  Fever, chills, and increasing pain in the knee.  Excessive drainage from the incisions that include bright red blood.  Drainage from the incisions sites that appear yellow, pus-like, or foul smelling.  Increasing pain the knee not relieved by the prescribed pain medications or ice.  Persistent nausea or vomiting not helped by the Zofran.  Increased pain or swelling in your calf area (in back above your ankle) that wasn t there when in the hospital.  Any other effects you feel are significant.  Call 911 if you experience any chest pain and/or shortness or breath.

## 2021-10-05 NOTE — ANESTHESIA PREPROCEDURE EVALUATION
Anesthesia Pre-Procedure Evaluation    Patient: Linda Walsh   MRN: [id number] : 1960        Preoperative Diagnosis: Primary osteoarthritis of right hip [M16.11]    Procedure : Procedure(s):  Right total hip replacement          Past Medical History:   Diagnosis Date     Anxiety      Asthma      Backache, unspecified      Esophageal reflux      Generalized anxiety disorder      Medical cannabis use 2017    used very short time and stopped     Mild intermittent asthma     Asthma, allergy induced     PONV (postoperative nausea and vomiting)      Reflex sympathetic dystrophy of left lower extremity       Past Surgical History:   Procedure Laterality Date     COLONOSCOPY  2011    Procedure:COMBINED COLONOSCOPY, SINGLE BIOPSY/POLYPECTOMY BY BIOPSY; Surgeon:JENIFER LANDA; Location:[facility]     COLONOSCOPY  2011    Procedure:COMBINED COLONOSCOPY, REMOVE TUMOR/POLYP/LESION BY SNARE; Surgeon:JENIFER LANDA; Location:[facility]     ESOPHAGOSCOPY, GASTROSCOPY, DUODENOSCOPY (EGD), COMBINED  2011    Procedure:COMBINED ESOPHAGOSCOPY, GASTROSCOPY, DUODENOSCOPY (EGD), BIOPSY SINGLE OR MULTIPLE; ESOPHAGOGASTRODUODENOSCOPY WITH       HC INJECTION ANES AGENT AND/OR STERIOD, SCIATIC NERVE  13    [facility]     HYSTERECTOMY      fibroids, no h/o previous abn paps     HYSTERECTOMY, PAP NO LONGER INDICATED       LAPAROSCOPIC CHOLECYSTECTOMY  3/19/2014    Procedure: LAPAROSCOPIC CHOLECYSTECTOMY;  Laparoscopic Cholecystectomy;  Surgeon: Marcus Griffith MD;  Location: [facility]     NERVE BLOCK SYMPATHETIC LUMBAR  2014    [facility]     OPEN REDUCTION INTERNAL FIXATION ANKLE  2011    Procedure:OPEN REDUCTION INTERNAL FIXATION ANKLE; Open Reduction Internal Fixation Left Ankle; Surgeon:ADONAY SHRESTHA; Location:[facility]     OPEN REDUCTION INTERNAL FIXATION ANKLE  12    Left ankle.  [facility]     ZZ NONSPECIFIC PROCEDURE      Hysterectomy      Allergies    Allergen Reactions     Penicillins Hives      Social History     Tobacco Use     Smoking status: Light Tobacco Smoker     Last attempt to quit: 2000     Years since quittin.7     Smokeless tobacco: Never Used   Substance Use Topics     Alcohol use: Yes     Comment: rare use       Wt Readings from Last 1 Encounters:   21 77.5 kg (170 lb 14.4 oz)        Anesthesia Evaluation   Pt has had prior anesthetic. Type: General and MAC.    History of anesthetic complications  - PONV.      ROS/MED HX  ENT/Pulmonary:     (+) tobacco use, Current use, asthma     Neurologic:  - neg neurologic ROS     Cardiovascular:     (+) Dyslipidemia -----Previous cardiac testing   Echo: Date: Results:    Stress Test: Date: Results:    ECG Reviewed: Date:  Results:  Sinus Rhythm   Low voltage -possible pulmonary disease.     Cath: Date: Results:      METS/Exercise Tolerance:     Hematologic:  - neg hematologic  ROS     Musculoskeletal: Comment: Spinal cord stimulator [facility]. [employer]    CLBP and cervicalgia  (+) arthritis,     GI/Hepatic:     (+) GERD,     Renal/Genitourinary:  - neg Renal ROS     Endo:  - neg endo ROS     Psychiatric/Substance Use:     (+) psychiatric history anxiety and depression H/O chronic opiod use .     Infectious Disease:  - neg infectious disease ROS     Malignancy:  - neg malignancy ROS     Other:      (+) , H/O Chronic Pain,        Physical Exam    Airway  airway exam normal      Mallampati: II   TM distance: > 3 FB   Neck ROM: full   Mouth opening: > 3 cm    Respiratory Devices and Support         Dental  no notable dental history         Cardiovascular   cardiovascular exam normal       Rhythm and rate: regular and normal     Pulmonary   pulmonary exam normal        breath sounds clear to auscultation           OUTSIDE LABS:  CBC:   Lab Results   Component Value Date    WBC 7.2 2021    WBC 7.0 09/10/2019    HGB 15.1 2021    HGB 15.0 09/10/2019    HCT 44.2  09/29/2021    HCT 44.3 09/10/2019     09/29/2021     09/10/2019     BMP:   Lab Results   Component Value Date     09/21/2021     09/10/2019    POTASSIUM 4.2 09/21/2021    POTASSIUM 4.2 09/10/2019    CHLORIDE 106 09/21/2021    CHLORIDE 109 09/10/2019    CO2 28 09/21/2021    CO2 22 09/10/2019    BUN 9 09/21/2021    BUN 9 09/10/2019    CR 1.03 09/21/2021    CR 0.80 09/10/2019     (H) 09/21/2021    GLC 93 09/10/2019     COAGS: No results found for: PTT, INR, FIBR  POC: No results found for: BGM, HCG, HCGS  HEPATIC:   Lab Results   Component Value Date    ALBUMIN 3.9 09/21/2021    PROTTOTAL 7.3 09/21/2021    ALT 22 09/21/2021    AST 16 09/21/2021    ALKPHOS 58 09/21/2021    BILITOTAL 1.0 09/21/2021     OTHER:   Lab Results   Component Value Date    CHRIS 9.0 09/21/2021    LIPASE 82 09/10/2019    AMYLASE 63 04/03/2007    TSH 2.05 04/24/2019    CRP <2.9 11/29/2015    SED 8 09/09/2011       Anesthesia Plan    ASA Status:  2   NPO Status:  NPO Appropriate    Anesthesia Type: Spinal.   Induction: N/a.   Maintenance: TIVA.        Consents    Anesthesia Plan(s) and associated risks, benefits, and realistic alternatives discussed. Questions answered and patient/representative(s) expressed understanding.     - Discussed with:  Patient      - Extended Intubation/Ventilatory Support Discussed: No.      - Patient is DNR/DNI Status: No    Use of blood products discussed: Yes.     - Discussed with: Patient.     - Consented: consented to blood products            Reason for refusal: other.     Postoperative Care    Pain management: IV analgesics, Multi-modal analgesia, Neuraxial analgesia, Oral pain medications.   PONV prophylaxis: Ondansetron (or other 5HT-3), Dexamethasone or Solumedrol, Background Propofol Infusion, Scopolamine patch     Comments:    The risks and benefits of anesthesia, and the alternatives where applicable, have been discussed with the patient, and they wish to proceed.             Melia Mixon, APRN CRNA

## 2021-10-05 NOTE — PROGRESS NOTES
Spoke with nursing. Patient still reporting severe pain despite 10mg oxycodone q3 hours, gabapentin, cymbalta, tylenol, atarax. Has been receiving IV dilaudid. No reports of sedation or hypotension with the medications.        Plan:  Re-order home fentanyl patches as she has had no issues with sedation or other adverse effects from the narcotics.    Will continue to monitor pain.  Patient does understand only partial pain relief is the expectation given her history.  This was discussed both prior to surgery and while seeing the patient today on the floor.  Depending on home patient does, will consider increasing oxycodone to 10-15 mg q3 if no evidence of adverse effects or side effects from the current regimen.    Attempt to wean from IV narcotics and transition to oral by tomorrow AM.  Tomorrow AM IV narcotics will be discontinued.      HERMELINDO Boswell, CNP  Orthopedic Surgery

## 2021-10-05 NOTE — BRIEF OP NOTE
[facility] Brief Operative Note    Pre-operative diagnosis: Primary osteoarthritis of right hip    Post-operative diagnosis: Same   Procedure: Procedure(s):  Right total hip replacement   Surgeon:  Anesthesia: Velasquez Stewart DO  Spinal   Assistant(s): Bubba Mcqueen APRN, CNP, DNP (A advanced practice provider was necessary for his expertise, exposure and surgical assistance throughout the case.)   Estimated blood loss:  Tourniquet time/pressure:  Urine output:  Fluids: Less than 50 ml  0 minutes at 0 mmHg  na  1000 ml Crystalloids   Specimens: None   Findings: right hip primary osteoarthritis [id number]      Napoleon Stewart D.O.      Implants:   Implant Name Type Inv. Item Serial No.  Lot No. LRB No. Used Action   Gripton Acetabular shell [employer]   Depuy [id number] Right 1 Implanted   Altrex Polyethelyne Acetabular Liner    Depuy [id number] Right 1 Implanted   IMP STEM FEM HIP DEPUY CORAIL TPR SZ 10 STD OFFSET [id number] - [id number] Total Joint Component/Insert IMP STEM FEM HIP DEPUY CORAIL TPR SZ 10 STD OFFSET [id number]  [employer] [facility]- [id number] Right 1 Implanted   Femoral Head Metallic Hardware/Mathews   Depuy [id number] Right 1 Implanted

## 2021-10-05 NOTE — PROGRESS NOTES
"/58   Pulse 65   Temp 97.7  F (36.5  C) (Oral)   Resp 18   Ht 1.626 m (5' 4\")   LMP  (LMP Unknown)   SpO2 95%   BMI 29.33 kg/m      Patient vital signs are at baseline: Yes  Patient able to ambulate as they were prior to admission or with assist devices provided by therapies during their stay:  Yes, transferred to bedside commode  Patient MUST void prior to discharge:  Yes  Patient able to tolerate oral intake:  Yes  Pain has adequate pain control using Oral analgesics:  No,  Reason:  Chronic pain, given Oxy 10mg, Dilaudid 0.2mg and Atarax for pain. Not much improvement noted. Ice to R Hip.    "

## 2021-10-05 NOTE — PROGRESS NOTES
10/05/21 1400   Quick Adds   Type of Visit Initial PT Evaluation       Present no   Living Environment   People in home spouse   Current Living Arrangements house  ([facility])   Home Accessibility stairs to enter home  (stair chair to access lower level. )   Number of Stairs, Main Entrance 2   Stair Railings, Main Entrance railings on both sides of stairs   Transportation Anticipated family or friend will provide  ([place])   Living Environment Comments  able to assist for 4 days starting tomorrow, otherwise is available in the evenings. She also has family nearby that can assist. adjustable bed. walk in shower.    Self-Care   Usual Activity Tolerance good   Current Activity Tolerance moderate   Regular Exercise No   Equipment Currently Used at Home shower chair;walker, rolling;cane, straight  (4WW and 2WW. walking boot. stair chair.)   Disability/Function   Hearing Difficulty or Deaf no   Wear Glasses or Blind yes   Vision Management glasses   Concentrating, Remembering or Making Decisions Difficulty no   Difficulty Communicating no   Difficulty Eating/Swallowing no   Walking or Climbing Stairs Difficulty yes   Walking or Climbing Stairs ambulation difficulty, requires equipment;stair climbing difficulty, requires equipment;transferring difficulty, requires equipment   Mobility Management walking boot on LLE due to OA subtalar joint   Dressing/Bathing Difficulty no   Toileting issues no   Doing Errands Independently Difficulty (such as shopping) no   Fall history within last six months yes   Number of times patient has fallen within last six months 10   General Information   Onset of Illness/Injury or Date of Surgery 10/05/21   Referring Physician Dr. Stewart   Patient/Family Therapy Goals Statement (PT) home with family with home health PT   Pertinent History of Current Problem (include personal factors and/or comorbidities that impact the POC) Patient is a 61 year old female,  day 0 status post right total hip arthroplasty by Dr. Stewart, <50mL EBL, spinal with IV sedation by Dr. Stewart. Patient with a previous medical history of LLE CRPS and RSD, depression, anxiety, hyperlipidemia, GERD, insomnia, headaches, left ankle subtalar osteoarthritis (wearing a walking boot prior to surgery with decreased weight bearing tolerance), current smoker, asthma and spinal cord stimulator.    Existing Precautions/Restrictions fall;no hip IR;no hip ADD past midline;90 degree hip flexion;no pivoting or twisting;weight bearing   Weight-Bearing Status - LUE full weight-bearing   Weight-Bearing Status - RUE full weight-bearing   Weight-Bearing Status - LLE full weight-bearing   Weight-Bearing Status - RLE weight-bearing as tolerated   General Observations PT orders: eval and treat post operative hip. Activity orders: ambulate, up in chair, WBAT, posterior hip precautions, ice, incentive spirometer   Cognition   Orientation Status (Cognition) oriented x 4   Affect/Mental Status (Cognition) WFL   Follows Commands (Cognition) WFL   Pain Assessment   Patient Currently in Pain Yes, see Vital Sign flowsheet  (10/10)   Integumentary/Edema   Integumentary/Edema Comments post opertive dressing CDI   Posture    Posture Not impaired   Range of Motion (ROM)   ROM Comment right hip flexion to 20 degrees with pain   Strength   Strength Comments able to complete LAQ and good SAQ with pain. bilat UE 4+/5   Bed Mobility   Bed Mobility scooting/bridging;supine-sit;sit-supine   Scooting/Bridging Bertie (Bed Mobility) independent   Supine-Sit Bertie (Bed Mobility) independent   Sit-Supine Bertie (Bed Mobility) independent   Impairments Contributing to Impaired Bed Mobility pain   Transfers   Transfers sit-stand transfer   Impairments Contributing to Impaired Transfers pain   Sit-Stand Transfer   Sit-Stand Bertie (Transfers) verbal cues;supervision   Assistive Device (Sit-Stand Transfers) walker,  front-wheeled  (with LLE CAM boot)   Gait/Stairs (Locomotion)   [place] Level (Gait) verbal cues;supervision   Assistive Device (Gait) walker, front-wheeled  (with CAM boot)   Distance in Feet (Required for LE Total Joints) 35'   Pattern (Gait) step-to   Deviations/Abnormal Patterns (Gait) weight shifting decreased;stride length decreased;gait speed decreased;antalgic;bilateral deviations   Bilateral Gait Deviations heel strike decreased   Balance   Balance Comments no LOB, moves slowly with moderate UE support though walker, suspect high level balance impaired given post operative status and medical history    Sensory Examination   Sensory Perception patient reports no sensory changes   Coordination   Coordination no deficits were identified   Muscle Tone   Muscle Tone no deficits were identified   Clinical Impression   Criteria for Skilled Therapeutic Intervention yes, treatment indicated   PT Diagnosis (PT) right hip stiffness, muscle weakness, impaired gait, impaired balance   Influenced by the following impairments day 0 status post right total hip arthroplasty   Functional limitations due to impairments pain, supervision for safe mobility, use of walker   Clinical Presentation Evolving/Changing   Clinical Presentation Rationale 11/10 pain with activity, baseline pain, post operative status, clinical judgement   Clinical Decision Making (Complexity) moderate complexity   Therapy Frequency (PT) 2x/day   Predicted Duration of Therapy Intervention (days/wks) 2 days   Planned Therapy Interventions (PT) bed mobility training;gait training;home exercise program;ROM (range of motion);strengthening;home program guidelines   Anticipated Equipment Needs at Discharge (PT)   (walker from home)   Risk & Benefits of therapy have been explained evaluation/treatment results reviewed;participants included;patient   Clinical Impression Comments Patient presents with the above impairments, she would benefit from continued PT  to address post operative impairments and progress her towards her desired level of functional mobility and safety. Given patient's baseline pain and poor LLE anticipate functional mobility to be a challange as well as pain control, however patient opening discussed these barriers and is aware that she will have pain and it will be cumbersome. She would benefit from HHPT at baseline due to lack of transportation to outpatient therapy as well as significant pain with activity.   PT Discharge Planning    PT Discharge Recommendation (DC Rec) home with home care physical therapy;home with assist   PT Rationale for DC Rec Patient is from home with her , 2 stairs to enter with chair lift within however able to live on one level. She has a walk in shower and an adjustable bed. She has a walker and cane available for use at discharge. her  will provide care for 4 days and then returns to work, however is able to assist in the morning and evening with her needs. Anticipate patient to do well with this discharge plan. Given patient's baseline pain and poor LLE anticipate functional mobility to be a challange as well as pain control, however patient opening discussed these barriers and is aware that she will have pain and it will be cumbersome. She would benefit from HHPT at baseline due to lack of transportation to outpatient therapy as well as significant pain with activity.    PT Brief overview of current status  HEP well tolerated with increased pain. Supine to/from sitting EOB VC and supervision. Supervision sit to/from stand with walker and ambulation 35' with walker.   Total Evaluation Time   Total Evaluation Time (Minutes) 15     Thank you for your referral.  Charlene Lucio, PT, DPT, ATC, LAT    [facility]  O: [phone]  E: [email or web address]

## 2021-10-05 NOTE — PROGRESS NOTES
Clinic Care Coordination Contact  [facility]/Voicemail     CC attempted to reach pt again on 10/4/21. See call initiation tab for time.      Clinical Data: Care Coordinator Outreach  Outreach attempted x 2.  Left message on patient's voicemail with call back information and requested return call.  Plan: Care Coordinator will do no further outreaches at this time.      SUZIE Sandhu/NICKI Care Coordination Supervisor  [facility] - Care Coordination   10/5/2021 8:28 AM  [phone]

## 2021-10-05 NOTE — ANESTHESIA PROCEDURE NOTES
Intrathecal Procedure Note    Pre-Procedure   Staff -        CRNA: Melia Mixon APRN CRNA       Performed By: CRNA       Location: OR       Procedure Start/Stop Times: 10/5/2021 7:26 AM and 10/5/2021 7:30 AM       Pre-Anesthestic Checklist: patient identified, IV checked, risks and benefits discussed, informed consent, monitors and equipment checked and pre-op evaluation  Timeout:       Correct Patient: Yes        Correct Procedure: Yes        Correct Site: Yes        Correct Position: Yes   Procedure Documentation  Procedure: intrathecal       Diagnosis: RIght total hip arthroplasty       Patient Position: sitting       Patient Prep/Sterile Barriers: sterile gloves, mask, patient draped       Skin prep: Betadine       Insertion Site: L3-4. (midline approach).       Needle Gauge: 25.        Needle Length (Inches): 3.5        Spinal Needle Type: Samantha tip       Introducer used       Introducer: 20 G       # of attempts: 1 and  # of redirects:  0    Assessment/Narrative         Paresthesias: No.       Sensory Level: T8       CSF fluid: clear.      Opening pressure was cmH2O while  Sitting.       Comments:  Spinal inserted without any apparent complications.

## 2021-10-05 NOTE — PROGRESS NOTES
"Saw and examined patient.  POD0 s/p total hip replacement, right  Per patient doing well. States moderate to severe; working on pain control. Has just now resumed cymbalta, gabapentin, oxycodone, tylenol. States the spinal has completely worn off.   No current nausea toleraing oral intact, will be advancing diet as tolerated.  Has not voided yet, DTV. Has not stood at bedside.  Patient has 24/7 help available upon discharge.  No current concerns except with pain control. Answered all questions.     /58   Pulse 65   Temp 97.7  F (36.5  C) (Oral)   Resp 18   Ht 1.626 m (5' 4\")   LMP  (LMP Unknown)   SpO2 95%   BMI 29.33 kg/m      Alert and orient x 4, sitting up in bed in NAD.   Dressing CDI.   Sensation, motor intact to foot.  Toes well perfused. D.p. Pulse 2+    Plan: will see again tomorrow Am.   Plan for discharge tomorrow afternoon pending progress.    Regarding pain control. Has long hx of chronic pain, CRPS, normally 0-2 oxycodone a day and fentanyl patches that she uses infrequent. Takes cymbalta and neurotin (900mg TID) as well.      Discussed with patient both prior to surgery and again today that anticipated pain control will be difficult and these first two weeks will be a struggle with pain.   .   Will start with 5-10 mg of oxycodone q3 hours along with tylenol, atarax along with resuming gabapentin and Cymbalta.  Want to ensure no issues with oversedation, respiratory depression or other problems with this combination. Will monitor for now, and check in later tonight.  She currently has no fentanyl patch on and could consider that as her next step.      Also per the discussion prior to surgery with the patient with Dr. Stewart she plans on refilling and transitioning back with her pain clinic 1 week post-op.          HERMELINDO Boswell, CNP  Orthopedic Surgery      "

## 2021-10-05 NOTE — OR NURSING
Transfer from  PACU to Room 272  Transferred to bed via glyder Mat    S: 62 y/o female  S/P right total hip replacement       Anesthesia Type:  Spinal       Surgeon:  Dr. Stewart       Allergies:  See Medication Reconciliation Record       DNR: No    B:  Pertinent Medical History:   Past Medical History:   Diagnosis Date     Anxiety      Asthma      Backache, unspecified      Esophageal reflux      Generalized anxiety disorder      Medical cannabis use 05/08/2017    used very short time and stopped     Mild intermittent asthma     Asthma, allergy induced     PONV (postoperative nausea and vomiting)      Reflex sympathetic dystrophy of left lower extremity              Surgical History:    Past Surgical History:   Procedure Laterality Date     COLONOSCOPY  6/29/2011    Procedure:COMBINED COLONOSCOPY, SINGLE BIOPSY/POLYPECTOMY BY BIOPSY; Surgeon:JENIFER LANDA; Location:[facility]     COLONOSCOPY  6/29/2011    Procedure:COMBINED COLONOSCOPY, REMOVE TUMOR/POLYP/LESION BY SNARE; Surgeon:JENIFER LANDA; Location:[facility]     ESOPHAGOSCOPY, GASTROSCOPY, DUODENOSCOPY (EGD), COMBINED  8/23/2011    Procedure:COMBINED ESOPHAGOSCOPY, GASTROSCOPY, DUODENOSCOPY (EGD), BIOPSY SINGLE OR MULTIPLE; ESOPHAGOGASTRODUODENOSCOPY WITH       HC INJECTION ANES AGENT AND/OR STERIOD, SCIATIC NERVE  04/16/13    [facility]     HYSTERECTOMY      fibroids, no h/o previous abn paps     HYSTERECTOMY, PAP NO LONGER INDICATED       LAPAROSCOPIC CHOLECYSTECTOMY  3/19/2014    Procedure: LAPAROSCOPIC CHOLECYSTECTOMY;  Laparoscopic Cholecystectomy;  Surgeon: Marcus Griffith MD;  Location: [facility]     NERVE BLOCK SYMPATHETIC LUMBAR  08/19/2014    [facility]     OPEN REDUCTION INTERNAL FIXATION ANKLE  9/17/2011    Procedure:OPEN REDUCTION INTERNAL FIXATION ANKLE; Open Reduction Internal Fixation Left Ankle; Surgeon:ADONAY SHRESTHA; Location:[facility]     OPEN REDUCTION INTERNAL FIXATION ANKLE  6/6/12    Left ankle.  [facility]      ZZC NONSPECIFIC PROCEDURE      Hysterectomy       A:          IVF:  1100ml        UOP:  No urge        NPO:  ___Yes _x__No         Vomiting:  ___Yes __x_No         Drainage: none noted        Skin Integrity: See flowsheets        RFO: ___Yes__x_No (identify item if present)        SSI Patient?  ___Yes__x_No         Brace/sling/equipment:  ___Yes_x__No         See PACU record for ongoing assessment, vital signs and pain assessment.    R: Post-Op vitals and assessments as ordered/indicated per patient's condition.       Follow Post-Op orders and notify Physician prn.       Continue to involve patient/family in plan of care and discharge planning.       Reinforce Pre-Operative education.       Implement skin safety interventions as appropriate.    Telephone report given to BENI [facility]

## 2021-10-06 ENCOUNTER — APPOINTMENT (OUTPATIENT)
Dept: PHYSICAL THERAPY | Facility: CLINIC | Age: 61
End: 2021-10-06
Attending: ORTHOPAEDIC SURGERY
Payer: COMMERCIAL

## 2021-10-06 VITALS
BODY MASS INDEX: 29.33 KG/M2 | SYSTOLIC BLOOD PRESSURE: 88 MMHG | HEIGHT: 64 IN | RESPIRATION RATE: 18 BRPM | DIASTOLIC BLOOD PRESSURE: 51 MMHG | OXYGEN SATURATION: 94 % | HEART RATE: 65 BPM | TEMPERATURE: 97.1 F

## 2021-10-06 LAB
HGB BLD-MCNC: 13 G/DL (ref 11.7–15.7)
HOLD SPECIMEN: NORMAL

## 2021-10-06 PROCEDURE — 97110 THERAPEUTIC EXERCISES: CPT | Mod: GP | Performed by: PHYSICAL THERAPIST

## 2021-10-06 PROCEDURE — 250N000013 HC RX MED GY IP 250 OP 250 PS 637: Performed by: NURSE PRACTITIONER

## 2021-10-06 PROCEDURE — 250N000009 HC RX 250: Performed by: NURSE PRACTITIONER

## 2021-10-06 PROCEDURE — 36415 COLL VENOUS BLD VENIPUNCTURE: CPT | Performed by: NURSE PRACTITIONER

## 2021-10-06 PROCEDURE — 85018 HEMOGLOBIN: CPT | Performed by: NURSE PRACTITIONER

## 2021-10-06 PROCEDURE — 97116 GAIT TRAINING THERAPY: CPT | Mod: GP | Performed by: PHYSICAL THERAPIST

## 2021-10-06 PROCEDURE — 97530 THERAPEUTIC ACTIVITIES: CPT | Mod: GP | Performed by: PHYSICAL THERAPIST

## 2021-10-06 RX ORDER — OXYCODONE HYDROCHLORIDE 5 MG/1
10-15 TABLET ORAL
Status: DISCONTINUED | OUTPATIENT
Start: 2021-10-06 | End: 2021-10-06 | Stop reason: HOSPADM

## 2021-10-06 RX ORDER — OXYCODONE HYDROCHLORIDE 5 MG/1
5-15 TABLET ORAL
Qty: 40 TABLET | Refills: 0 | Status: SHIPPED | OUTPATIENT
Start: 2021-10-06

## 2021-10-06 RX ADMIN — DULOXETINE HYDROCHLORIDE 60 MG: 30 CAPSULE, DELAYED RELEASE PELLETS ORAL at 08:58

## 2021-10-06 RX ADMIN — GABAPENTIN 900 MG: 300 CAPSULE ORAL at 08:58

## 2021-10-06 RX ADMIN — FAMOTIDINE 20 MG: 10 INJECTION, SOLUTION INTRAVENOUS at 08:58

## 2021-10-06 RX ADMIN — DOCUSATE SODIUM AND SENNOSIDES 1 TABLET: 8.6; 5 TABLET ORAL at 08:58

## 2021-10-06 RX ADMIN — OXYCODONE HYDROCHLORIDE 15 MG: 5 TABLET ORAL at 07:47

## 2021-10-06 RX ADMIN — ACETAMINOPHEN 975 MG: 325 TABLET, FILM COATED ORAL at 04:48

## 2021-10-06 RX ADMIN — ASPIRIN 325 MG: 325 TABLET ORAL at 08:58

## 2021-10-06 RX ADMIN — OXYCODONE HYDROCHLORIDE 10 MG: 5 TABLET ORAL at 00:49

## 2021-10-06 RX ADMIN — OXYCODONE HYDROCHLORIDE 10 MG: 5 TABLET ORAL at 04:48

## 2021-10-06 ASSESSMENT — ACTIVITIES OF DAILY LIVING (ADL): DEPENDENT_IADLS:: CLEANING;COOKING;SHOPPING;MEAL PREPARATION

## 2021-10-06 NOTE — PLAN OF CARE
Temp: 97.3  F (36.3  C) Temp src: Oral BP: 108/47 Pulse: 60   Resp: 19 SpO2: 94 % O2 Device: None (Room air)     Neuro: A/O x 4  Pulm: lung sounds clear  CV: apical pulse regular  GI: bowel sounds normoactive x4, denies N&V  : adequate urine output  Skin: incision to right hip, dressing CDI  Lines/Tubes/Drains: peripheral IV x1  Drips: saline locked    ADLs: SBA with gait belt and walker, up to bathroom x2  Pain: Oxycodone given Q3 hours, atarax given x 1 with adequate pain control, Fentanyl patch in place  PRNs: None     Plan: monitor pain and encourage ambulation

## 2021-10-06 NOTE — DISCHARGE SUMMARY
"[facility] Discharge Summary     Linda Walsh MRN# [id number]   YOB: 1960 Age: 61 year old     Date of Admission:  10/5/2021  Date of Discharge:  10/6/2021  Admitting Physician:  Velasquez Stewart DO  Discharge Physician:  HERMELINDO Cordero CNP  Discharging Service:  Orthopedics     Primary Provider: Jonna Gaitan          Admission Diagnoses:   Primary osteoarthritis of right hip [M16.11]  S/P total hip arthroplasty [Z96.649]          Discharge Diagnosis:     Principal Problem:    Primary osteoarthritis of right hip  Active Problems:    S/P total hip arthroplasty               Discharge Disposition:     Discharged to home           Condition on Discharge:     Discharge condition: Stable   Discharge vitals: Blood pressure 108/47, pulse 60, temperature 97.3  F (36.3  C), resp. rate 19, height 1.626 m (5' 4\"), SpO2 94 %, not currently breastfeeding.   Code status on discharge: Full Code           Procedures / Labs / Imaging:   No other procedures performed during this admission          Medications Prior to Admission:     Medications Prior to Admission   Medication Sig Dispense Refill Last Dose     albuterol (PROAIR HFA/PROVENTIL HFA/VENTOLIN HFA) 108 (90 Base) MCG/ACT inhaler Inhale 2 puffs into the lungs every 6 hours as needed for shortness of breath / dyspnea or wheezing 18 g 3 More than a month at Unknown time     DULoxetine (CYMBALTA) 60 MG capsule Take 1 capsule (60 mg) by mouth daily   10/4/2021 at 1000     esomeprazole (NEXIUM) 40 MG DR capsule TAKE 1 CAPSULE BY MOUTH DAILY EVERY MORNING,  30 TO 60 MINUTES BEFORE A MEAL. 90 capsule 0 Past Week at Unknown time     estradiol (CLIMARA) 0.0375 MG/24HR weekly patch APPLY ONE PATCH ONTO THE SKIN ONCE WEEKLY (REMOVE OLD PATCH) 12 patch 1      fentaNYL (DURAGESIC) 25 mcg/hr 72 hr patch Place 1 patch onto the skin every 72 hours remove old patch. 1 patch 0      fluticasone (FLONASE) 50 MCG/ACT nasal spray Spray 2 sprays into " both nostrils daily 16 g 3 Past Week at Unknown time     gabapentin (NEURONTIN) 300 MG capsule Take 900 mg by mouth 3 times daily   3 10/4/2021 at 2100     ipratropium - albuterol 0.5 mg/2.5 mg/3 mL (DUONEB) 0.5-2.5 (3) MG/3ML nebulization Take 1 vial (3 mLs) by nebulization every 6 hours as needed for shortness of breath / dyspnea 1 Box prn More than a month at Unknown time     LINZESS 145 MCG capsule    Past Week at Unknown time     order for DME Equipment being ordered: home cervical traction unit 1 Device 0      order for DME Equipment being ordered: Nebulizer 1 Device 0      polyethylene glycol (MIRALAX) powder Take 51 g (3 capfuls) by mouth 2 times daily 3 Bottle 3 More than a month at Unknown time     scopolamine (TRANSDERM) 1 MG/3DAYS 72 hr patch Place 1 patch onto the skin every 72 hours 1 patch 0 10/5/2021 at 0530     traZODone (DESYREL) 50 MG tablet Take 3 tablets (150 mg) by mouth At Bedtime 15 tablet 5 10/4/2021 at 2100     [DISCONTINUED] ondansetron (ZOFRAN-ODT) 4 MG ODT tab DISSOLVE ONE TABLET BY MOUTH EVERY 8 HOURS AS NEEDED FOR NAUSEA 20 tablet 1      [DISCONTINUED] oxyCODONE (ROXICODONE) 5 MG tablet Take 1 tablet (5 mg) by mouth every 6 hours as needed for pain 6 tablet 0 10/5/2021 at 0200             Discharge Medications:     Current Discharge Medication List      START taking these medications    Details   acetaminophen (TYLENOL) 325 MG tablet Take 3 tablets (975 mg) by mouth every 8 hours as needed for other (mild pain)  Qty: 100 tablet, Refills: 0    Associated Diagnoses: Status post total replacement of right hip      aspirin (ASA) 325 MG EC tablet Take 1 tablet (325 mg) by mouth 2 times daily  Qty: 84 tablet, Refills: 0    Associated Diagnoses: Status post total replacement of right hip      bisacodyl (DULCOLAX) 10 MG suppository Place 1 suppository (10 mg) rectally daily Discontinue if having bowel movements.  Qty: 12 suppository, Refills: 0    Associated Diagnoses: Status post total  replacement of right hip      hydrOXYzine (ATARAX) 25 MG tablet Take 1 tablet (25 mg) by mouth every 6 hours as needed for itching or anxiety (with pain, moderate pain)  Qty: 30 tablet, Refills: 0    Associated Diagnoses: Status post total replacement of right hip         CONTINUE these medications which have CHANGED    Details   ondansetron (ZOFRAN-ODT) 4 MG ODT tab Take 1 tablet (4 mg) by mouth every 8 hours as needed for nausea or vomiting DISSOLVE ONE TABLET BY MOUTH EVERY 8 HOURS AS NEEDED FOR NAUSEA  Qty: 20 tablet, Refills: 1    Associated Diagnoses: Nausea      !! oxyCODONE (ROXICODONE) 5 MG tablet Take 1-2 tablets (5-10 mg) by mouth every 3 hours as needed for pain (Moderate to Severe)  Qty: 40 tablet, Refills: 0    Associated Diagnoses: Status post total replacement of right hip      !! oxyCODONE (ROXICODONE) 5 MG tablet Take 1 tablet (5 mg) by mouth every 6 hours as needed for pain  Qty: 6 tablet, Refills: 0       !! - Potential duplicate medications found. Please discuss with provider.      CONTINUE these medications which have NOT CHANGED    Details   albuterol (PROAIR HFA/PROVENTIL HFA/VENTOLIN HFA) 108 (90 Base) MCG/ACT inhaler Inhale 2 puffs into the lungs every 6 hours as needed for shortness of breath / dyspnea or wheezing  Qty: 18 g, Refills: 3    Associated Diagnoses: Moderate persistent asthma without complication      DULoxetine (CYMBALTA) 60 MG capsule Take 1 capsule (60 mg) by mouth daily      esomeprazole (NEXIUM) 40 MG DR capsule TAKE 1 CAPSULE BY MOUTH DAILY EVERY MORNING,  30 TO 60 MINUTES BEFORE A MEAL.  Qty: 90 capsule, Refills: 0    Associated Diagnoses: Gastroesophageal reflux disease without esophagitis      estradiol (CLIMARA) 0.0375 MG/24HR weekly patch APPLY ONE PATCH ONTO THE SKIN ONCE WEEKLY (REMOVE OLD PATCH)  Qty: 12 patch, Refills: 1    Associated Diagnoses: Menopausal syndrome (hot flashes)      fentaNYL (DURAGESIC) 25 mcg/hr 72 hr patch Place 1 patch onto the skin every 72  hours remove old patch.  Qty: 1 patch, Refills: 0      fluticasone (FLONASE) 50 MCG/ACT nasal spray Spray 2 sprays into both nostrils daily  Qty: 16 g, Refills: 3    Associated Diagnoses: Sinus congestion      gabapentin (NEURONTIN) 300 MG capsule Take 900 mg by mouth 3 times daily   Refills: 3      ipratropium - albuterol 0.5 mg/2.5 mg/3 mL (DUONEB) 0.5-2.5 (3) MG/3ML nebulization Take 1 vial (3 mLs) by nebulization every 6 hours as needed for shortness of breath / dyspnea  Qty: 1 Box, Refills: prn    Associated Diagnoses: Moderate persistent asthma without complication      LINZESS 145 MCG capsule       !! order for DME Equipment being ordered: home cervical traction unit  Qty: 1 Device, Refills: 0    Associated Diagnoses: Cervicalgia      !! order for DME Equipment being ordered: Nebulizer  Qty: 1 Device, Refills: 0    Associated Diagnoses: Asthma exacerbation      polyethylene glycol (MIRALAX) powder Take 51 g (3 capfuls) by mouth 2 times daily  Qty: 3 Bottle, Refills: 3    Associated Diagnoses: Constipation, unspecified constipation type      scopolamine (TRANSDERM) 1 MG/3DAYS 72 hr patch Place 1 patch onto the skin every 72 hours  Qty: 1 patch, Refills: 0    Associated Diagnoses: PONV (postoperative nausea and vomiting)      traZODone (DESYREL) 50 MG tablet Take 3 tablets (150 mg) by mouth At Bedtime  Qty: 15 tablet, Refills: 5    Associated Diagnoses: Insomnia, unspecified type       !! - Potential duplicate medications found. Please discuss with provider.                Consultations:     No consultations were requested during this admission             Brief History of Illness:   See operative report.          Hospital Course:     Patient admitted after routine elective surgery.  Patient discharge POD1 without incident.  By day of discharge discharge: Did well. Per patient felt good.    Continued to improve.  Pain was adequately controlled with oral medications. She does have long hx of opioid use managed  by [facility] clinic for chronic pain, and does have suspected tolerance. By discharge patient had resumed all chronic pain control medications and had an increase in oxycodone along with atarax and tylenol and this did keep pain tolerable and was not causing any side effects.    Patient will be resuming with her pain clinic on 10-12. We will manage pain medications until that visit,.   She had  No fevers.   denied N/v. tolerated oral intake.   voiding adequate.  Per patient PT went well. Patient expressed desire to discharge this day.  Has family at home to help.  No concerns, questions, or new issues.  Therapy oked for discharge home with family support.      Asa for 6 weeks for DVT prevention  2 weeks in home physical therapy followed by outpatient ,physical therapy                 Significant Results:     None             Pending Results:     None           Discharge Instructions and Follow-Up:     Discharge diet: Regular   Discharge activity: Activity as tolerated   Discharge follow-up: 14 days    Effected Extremity Right leg       Weight Bearing status Weight bearing as tolerated with posterior hip precautions x 6 weeks       Lines and drains: None    Wound care: Keep incision covered with hospital dressing (Aquacel) for one week. It is okay to shower with this dressing on. However, do not submerge your dressing and incision in water for roughly 2 weeks. After one week remove this dressing and then keep it clean, dry, and covered. If this dressing become looses or falls off it is ok to cover with gauze and tape.  Wash the incision gently with warm soapy water after removing your hospital dressing and lightly pat dry.            HERMELINDO Boswell, CNP  Orthopedic Surgery

## 2021-10-06 NOTE — CONSULTS
Care Management Initial Consult    General Information  Assessment completed with: Patient, Spouse or significant other,    Type of CM/SW Visit: Initial Assessment    Primary Care Provider verified and updated as needed: Yes   Readmission within the last 30 days:        Reason for Consult: discharge planning  Advance Care Planning:    Has no scanned ACP documents        Communication Assessment  Patient's communication style: spoken language (English or Bilingual)    Hearing Difficulty or Deaf: no   Wear Glasses or Blind: yes    Cognitive  Cognitive/Neuro/Behavioral: WDL                      Living Environment:   People in home: spouse  Thaddeus- Spouse   Current living Arrangements: house ([facility])      Able to return to prior arrangements: yes       Family/Social Support:  Care provided by: self, spouse/significant other  Provides care for: no one  Marital Status:   , Children  Thaddeus        Description of Support System: Supportive, Involved    Support Assessment: Adequate family and caregiver support, Adequate social supports    Current Resources:   Patient receiving home care services: No     Community Resources: None  Equipment currently used at home: shower chair, walker, rolling, cane, straight (4WW and 2WW. walking boot. stair chair.)  Supplies currently used at home:      Employment/Financial:  Employment Status:          Financial Concerns: No concerns identified           Lifestyle & Psychosocial Needs:  Social Determinants of Health     Tobacco Use: High Risk     Smoking Tobacco Use: Light Tobacco Smoker     Smokeless Tobacco Use: Never Used   Alcohol Use:      Frequency of Alcohol Consumption:      Average Number of Drinks:      Frequency of Binge Drinking:    Financial Resource Strain:      Difficulty of Paying Living Expenses:    Food Insecurity:      Worried About Running Out of Food in the Last Year:      Ran Out of Food in the Last Year:    Transportation Needs:      Lack of Transportation  (Medical):      Lack of Transportation (Non-Medical):    Physical Activity:      Days of Exercise per Week:      Minutes of Exercise per Session:    Stress:      Feeling of Stress :    Social Connections:      Frequency of Communication with Friends and Family:      Frequency of Social Gatherings with Friends and Family:      Attends Taoist Services:      Active Member of Clubs or Organizations:      Attends Club or Organization Meetings:      Marital Status:    Intimate Partner Violence:      Fear of Current or Ex-Partner:      Emotionally Abused:      Physically Abused:      Sexually Abused:    Depression: Not at risk     PHQ-2 Score: 2   Housing Stability:      Unable to Pay for Housing in the Last Year:      Number of Places Lived in the Last Year:      Unstable Housing in the Last Year:        Functional Status:  Prior to admission patient needed assistance:   Dependent ADLs:: Dressing ( helps depending on clothing item)  Dependent IADLs:: Cleaning, Cooking, Shopping, Meal Preparation       Mental Health Status:  Mental Health Status: Past Concern       Chemical Dependency Status:  Chemical Dependency Status: No Current Concerns             Values/Beliefs:  Spiritual, Cultural Beliefs, Taoist Practices, Values that affect care:            Values/Beliefs Comment: Unknown     Additional Information:  Care Management consulted to discuss home care and provide choices.  Writer visited with patient and her Thaddeus.  Discussed recommendation for home PT services.  Patient in agreement.  Pt was provided with Medicare certified home care list. Pt chooses to use: [facility] (Phone: [phone]) (as she stated her mother used them before and she had very good care with them.)  Writer has sent the referral.      At baseline, patient reports that she has chronic back pain, so her  helps with cleaning, some cooking when she is not able, and laundry.  He also helps her sometimes  "with dressing, \"depending on the outfit\".  Spouse is able to continue providing assistance at home.      No other discharge needs have been indicated at this time.  Spouse to transport patient home today.        Kasey Alicea SEPIDEH  [facility]   [phone]     "

## 2021-10-06 NOTE — PROGRESS NOTES
S-(situation): Patient discharged to home via private car with     B-(background): R Hip surgery    A-(assessment): pain controlled, VSS    R-(recommendations): Discharge instructions reviewed with patient and . Listed belongings gathered and returned to patient.          Discharge Nursing Criteria:     Care Plan and Patient education resolved: Yes    New Medications- pt has been educated about purpose and side effects: Yes    Vaccines  Influenza status verified at discharge:  Yes      MISC  Prescriptions if needed, hard copies sent with patient  Yes  Home medications returned to patient: NA  Medication Bin checked and emptied on discharge Yes  Patient reports post-discharge pain management plan is effective: Yes    TKA/JERROD ONLY:   Discharged with DAX Stockings Yes  DAX stocking Education Completed Yes

## 2021-10-06 NOTE — PROGRESS NOTES
[facility]  Orthopedics Progress Note           Assessment and Plan:    Assessment:   Post-operative day #1 Procedure(s):  Right total hip replacement   hx of CRPS and  Chronic pain - on chronic opioids with opioid tolerance.         Plan:   Encourage IS  Start or continue physical therapy  Activity as tolerated  Weight-bear as tolerated with posterior hip precautions  Discharge from hospital today.  Pain control measures  Advance diet as tolerated  Routine wound care: just prior to discharge RN to replace bulky dressing with sterile aquacell with sterile procedure, place DAX stocking after dressing change.  DVT Prophylaxis: Aspirin , SCD's, Compression Hose  No acute medical issues.            Interval History:   Doing well.  Continues to improve.  Pain is  Better controlled today. Discussed pain at length.  She feels overall the pain is getting better but is still severe. She has had no issues with the narcotics given and current regimen. She understands will not be able to get her total pain relief, and partial pain relief will be the goal. Given her hx of opioid use for chronic pain and tolerance will increase oxycodone to 10-15 mgs. Discussed with patient though to continue to take the 10mg at a time, then if a third is needed to take the extra 5mg but to space this out.  She is content with this pain management plan and she understands will not be able to increase any of the medications further.  She is tolerating oral intake, voiding, and ambulating and did work with ,physical therapy yesterday and will again this AM.  Reinforced hip precautions.  She has help at home, and is meeting criteria for discharge. She is agreeable to discharge today.             Review of Systems:    The patient denies any chest pain, shortness of breath, excessive pain, fever, chills, purulent drainage from the wound, nausea or vomiting.               Physical Exam:   General: awake, alert, appropriate and in no  "acute distress  Blood pressure 108/47, pulse 60, temperature 97.3  F (36.3  C), resp. rate 19, height 1.626 m (5' 4\"), SpO2 94 %, not currently breastfeeding.  Right hip:  Dressing clean, dry and intact. Surrounding skin intact, no breakdown. Calf is soft, nontender with no significant swelling. Distal neurovascular grossly intact.  Compartments soft and non-tender.  2+ distal pulse, sensation intact to foot, able to df/pf against resistance. Brisk cap refill.            Data:     Results for orders placed or performed during the hospital encounter of 10/05/21 (from the past 24 hour(s))   XR Pelvis and Hip Right 2 Views    Narrative    XR PELVIS AND RIGHT HIP TWO VIEWS   10/5/2021 10:07 AM     HISTORY: Status post total hip replacement      Impression    IMPRESSION: Unremarkable right hip arthroplasty placement.    CLEMENTINE ISAACS MD         SYSTEM ID:  [id number]   Hemoglobin   Result Value Ref Range    Hemoglobin 13.0 11.7 - 15.7 g/dL     *Note: Due to a large number of results and/or encounters for the requested time period, some results have not been displayed. A complete set of results can be found in Results Review.     HERMELINDO Boswell, CNP  Orthopedic Surgery      "

## 2021-10-06 NOTE — PLAN OF CARE
Temp: 97.3  F (36.3  C) Temp src: Oral BP: 108/47 Pulse: 60   Resp: 19 SpO2: 94 % O2 Device: None (Room air)     Patient vital signs are at baseline: Yes  Patient able to ambulate as they were prior to admission or with assist devices provided by therapies during their stay:  Yes  Patient MUST void prior to discharge:  Yes  Patient able to tolerate oral intake:  Yes  Pain has adequate pain control using Oral analgesics:  Yes    Oxycodone 10mg and Atarax given x1 for pain. On fentanyl patch. Denies N&V, up to bathroom SBA with gait belt and walker, adequate UOP, dressing CDI to right hip, ice applied    Will continue to monitor

## 2021-10-06 NOTE — PLAN OF CARE
Physical Therapy Discharge Summary    Reason for therapy discharge:    Discharged to home with home therapy.    Progress towards therapy goal(s). See goals on Care Plan in [facility] electronic health record for goal details.  Goals met    Therapy recommendation(s):    Continued therapy is recommended.  Rationale/Recommendations:   . Patient would benefit from continued skilled therapeutic intervention in order to progress them towards a higher level of function in accordance with their surgeon's protocol.      Thank you for your referral.  Charlene Lucio, PT, DPT, ATC, [facility]  O: [phone]  E: [email or web address]

## 2021-10-06 NOTE — PROGRESS NOTES
Patient vital signs are at baseline: Yes  Patient able to ambulate as they were prior to admission or with assist devices provided by therapies during their stay:  Yes  Patient MUST void prior to discharge:  Yes  Patient able to tolerate oral intake:  Yes  Pain has adequate pain control using Oral analgesics:  Yes     Oxycodone 10mg x1, scheduled tylenol given with adequate pain control. Patient sleeping well, capno in place.

## 2021-10-07 ENCOUNTER — PATIENT OUTREACH (OUTPATIENT)
Dept: FAMILY MEDICINE | Facility: CLINIC | Age: 61
End: 2021-10-07
Payer: COMMERCIAL

## 2021-10-07 NOTE — PROGRESS NOTES
Clinic Care Coordination Contact  Community Health Worker Initial Outreach            Patient accepts CC: No, The patient declined care coordination . Patient will be sent Care Coordination introduction letter for future reference.     [facility] Community Health Worker spoke with the patient today at the request of the PCP to discuss possible Clinic Care Coordination enrollment. The service was described to the patient and immediate needs were discussed. The patient declined enrollment at this time. The PCP is encouraged to refer in the future if the patient's needs change.      The patient stated that she is hanging in and doing okay. She didn't have any questions or concerns that she wanted to discuss with the RN CC at this time.     Carmencita Fan  Community Health Worker   [facility]  Care Coordination  Fatoumata Pruett Rogers, [place], [facility]  [email or web address]   Office: [phone]

## 2021-10-08 NOTE — ANESTHESIA POSTPROCEDURE EVALUATION
Patient: Linda Walsh    Procedure: Procedure(s):  Right total hip replacement       Diagnosis:Primary osteoarthritis of right hip [M16.11]  Diagnosis Additional Information: No value filed.    Anesthesia Type:  Spinal    Note:  Disposition: Inpatient   Postop Pain Control: Uneventful            Sign Out: Well controlled pain   PONV: No   Neuro/Psych: Uneventful            Sign Out: Acceptable/Baseline neuro status   Airway/Respiratory: Uneventful            Sign Out: Acceptable/Baseline resp. status   CV/Hemodynamics: Uneventful            Sign Out: Acceptable CV status   Other NRE: NONE   DID A NON-ROUTINE EVENT OCCUR? No    Event details/Postop Comments:  Pt was happy with anesthesia care.  No complications.  I will follow up with the pt if needed.           Last vitals:  Vitals Value Taken Time   /65 10/05/21 1010   Temp 96.8  F (36  C) 10/05/21 1015   Pulse 59 10/05/21 1015   Resp 8 10/05/21 1015   SpO2 99 % 10/05/21 1015       Electronically Signed By: HERMELINDO Sweeney CRNA  October 8, 2021  8:51 AM

## 2021-10-12 DIAGNOSIS — K21.9 GASTROESOPHAGEAL REFLUX DISEASE WITHOUT ESOPHAGITIS: ICD-10-CM

## 2021-10-14 RX ORDER — ESOMEPRAZOLE MAGNESIUM 40 MG/1
CAPSULE, DELAYED RELEASE ORAL
Qty: 90 CAPSULE | Refills: 1 | Status: SHIPPED | OUTPATIENT
Start: 2021-10-14 | End: 2022-06-08

## 2021-10-20 ENCOUNTER — ANCILLARY PROCEDURE (OUTPATIENT)
Dept: GENERAL RADIOLOGY | Facility: CLINIC | Age: 61
End: 2021-10-20
Attending: PHYSICIAN ASSISTANT
Payer: COMMERCIAL

## 2021-10-20 ENCOUNTER — MYC MEDICAL ADVICE (OUTPATIENT)
Dept: ORTHOPEDICS | Facility: CLINIC | Age: 61
End: 2021-10-20

## 2021-10-20 ENCOUNTER — OFFICE VISIT (OUTPATIENT)
Dept: ORTHOPEDICS | Facility: CLINIC | Age: 61
End: 2021-10-20
Payer: COMMERCIAL

## 2021-10-20 VITALS — HEIGHT: 64 IN | BODY MASS INDEX: 29.18 KG/M2 | TEMPERATURE: 97.3 F | WEIGHT: 170.9 LBS

## 2021-10-20 DIAGNOSIS — Z53.9 DIAGNOSIS NOT YET DEFINED: Primary | ICD-10-CM

## 2021-10-20 DIAGNOSIS — Z96.641 STATUS POST TOTAL REPLACEMENT OF RIGHT HIP: ICD-10-CM

## 2021-10-20 DIAGNOSIS — Z48.89 AFTERCARE FOLLOWING SURGERY: ICD-10-CM

## 2021-10-20 DIAGNOSIS — Z96.641 STATUS POST TOTAL REPLACEMENT OF RIGHT HIP: Primary | ICD-10-CM

## 2021-10-20 PROCEDURE — G0180 MD CERTIFICATION HHA PATIENT: HCPCS | Performed by: ORTHOPAEDIC SURGERY

## 2021-10-20 PROCEDURE — 99024 POSTOP FOLLOW-UP VISIT: CPT | Performed by: PHYSICIAN ASSISTANT

## 2021-10-20 PROCEDURE — 73502 X-RAY EXAM HIP UNI 2-3 VIEWS: CPT | Mod: TC | Performed by: RADIOLOGY

## 2021-10-20 RX ORDER — HYDROXYZINE HYDROCHLORIDE 25 MG/1
25 TABLET, FILM COATED ORAL EVERY 6 HOURS PRN
Qty: 30 TABLET | Refills: 0 | Status: SHIPPED | OUTPATIENT
Start: 2021-10-20 | End: 2021-11-15

## 2021-10-20 ASSESSMENT — PAIN SCALES - GENERAL: PAINLEVEL: NO PAIN (0)

## 2021-10-20 ASSESSMENT — MIFFLIN-ST. JEOR: SCORE: 1325.2

## 2021-10-20 NOTE — PROGRESS NOTES
Orthopedic Clinic Post-Operative Note    CHIEF COMPLAINT:   Chief Complaint   Patient presents with     Surgical Followup     DOS: 10/5/2021~Right total hip arthroplasty~15 days postop       HISTORY OF PRESENT ILLNESS  Location: Right hip  Rating of Pain: Minimal  Pain is better with: Rest and ice  Pain improving overall: Yes.  Patient states that her pain she had prior to surgery is completely gone and she just has some stiffness now.  Associated Features: Denies numbness or tingling or shooting burning or electric pain.  Denies any problems with her incision such as fevers chills or drainage.  Patient concerns: None.  Patient is doing well and progressing well.  Additional History: Patient is doing in-home physical therapy currently.  She is doing her home exercises several times a day and doing laps around her living room.  Patient has outpatient physical therapy set up for 10/26/2021.  She is taking her aspirin.  She is following her posterior hip precautions.    Patient's past medical, surgical, social and family histories reviewed.     Past Medical History:   Diagnosis Date     Anxiety      Asthma      Backache, unspecified      Esophageal reflux      Generalized anxiety disorder      Medical cannabis use 05/08/2017    used very short time and stopped     Mild intermittent asthma     Asthma, allergy induced     PONV (postoperative nausea and vomiting)      Reflex sympathetic dystrophy of left lower extremity        Past Surgical History:   Procedure Laterality Date     ARTHROPLASTY HIP Right 10/5/2021    Procedure: Right total hip replacement;  Surgeon: Velasquez Stewart DO;  [facility]     COLONOSCOPY  6/29/2011    Procedure:COMBINED COLONOSCOPY, SINGLE BIOPSY/POLYPECTOMY BY BIOPSY; Surgeon:JENIFER LANDA; [facility]     COLONOSCOPY  6/29/2011    Procedure:COMBINED COLONOSCOPY, REMOVE TUMOR/POLYP/LESION BY SNARE; Surgeon:JENIFER LANDA; [facility]     ESOPHAGOSCOPY,  GASTROSCOPY, DUODENOSCOPY (EGD), COMBINED  8/23/2011    Procedure:COMBINED ESOPHAGOSCOPY, GASTROSCOPY, DUODENOSCOPY (EGD), BIOPSY SINGLE OR MULTIPLE; ESOPHAGOGASTRODUODENOSCOPY WITH       HC INJECTION ANES AGENT AND/OR STERIOD, SCIATIC NERVE  04/16/13    [facility]     HYSTERECTOMY      fibroids, no h/o previous abn paps     HYSTERECTOMY, PAP NO LONGER INDICATED       LAPAROSCOPIC CHOLECYSTECTOMY  3/19/2014    Procedure: LAPAROSCOPIC CHOLECYSTECTOMY;  Laparoscopic Cholecystectomy;  Surgeon: Marcus Griffith MD;  Location: [facility]     NERVE BLOCK SYMPATHETIC LUMBAR  08/19/2014    [facility]     OPEN REDUCTION INTERNAL FIXATION ANKLE  9/17/2011    Procedure:OPEN REDUCTION INTERNAL FIXATION ANKLE; Open Reduction Internal Fixation Left Ankle; Surgeon:ADONAY SHRESTHA; Location:[facility]     OPEN REDUCTION INTERNAL FIXATION ANKLE  6/6/12    Left ankle.  [facility]     ZZC NONSPECIFIC PROCEDURE      Hysterectomy       Medications:  acetaminophen (TYLENOL) 325 MG tablet, Take 3 tablets (975 mg) by mouth every 8 hours as needed for other (mild pain)  albuterol (PROAIR HFA/PROVENTIL HFA/VENTOLIN HFA) 108 (90 Base) MCG/ACT inhaler, Inhale 2 puffs into the lungs every 6 hours as needed for shortness of breath / dyspnea or wheezing  aspirin (ASA) 325 MG EC tablet, Take 1 tablet (325 mg) by mouth 2 times daily  bisacodyl (DULCOLAX) 10 MG suppository, Place 1 suppository (10 mg) rectally daily Discontinue if having bowel movements.  DULoxetine (CYMBALTA) 60 MG capsule, Take 1 capsule (60 mg) by mouth daily  esomeprazole (NEXIUM) 40 MG DR capsule, TAKE 1 CAPSULE BY MOUTH EVERY MORNING 30 TO 60 MINUTES BEFORE A MEAL  estradiol (CLIMARA) 0.0375 MG/24HR weekly patch, APPLY ONE PATCH ONTO THE SKIN ONCE WEEKLY (REMOVE OLD PATCH)  fentaNYL (DURAGESIC) 25 mcg/hr 72 hr patch, Place 1 patch onto the skin every 72 hours remove old patch.  fluticasone (FLONASE) 50 MCG/ACT nasal spray, Spray 2 sprays into both nostrils  daily  hydrOXYzine (ATARAX) 25 MG tablet, Take 1 tablet (25 mg) by mouth every 6 hours as needed for itching or anxiety (with pain, moderate pain)  ipratropium - albuterol 0.5 mg/2.5 mg/3 mL (DUONEB) 0.5-2.5 (3) MG/3ML nebulization, Take 1 vial (3 mLs) by nebulization every 6 hours as needed for shortness of breath / dyspnea  LINZESS 145 MCG capsule,   order for DME, Equipment being ordered: home cervical traction unit  order for DME, Equipment being ordered: Nebulizer  oxyCODONE (ROXICODONE) 5 MG tablet, Take 1-3 tablets (5-15 mg) by mouth every 3 hours as needed for pain (Moderate to Severe)  oxyCODONE (ROXICODONE) 5 MG tablet, Take 1 tablet (5 mg) by mouth every 6 hours as needed for pain  polyethylene glycol (MIRALAX) powder, Take 51 g (3 capfuls) by mouth 2 times daily  traZODone (DESYREL) 50 MG tablet, Take 3 tablets (150 mg) by mouth At Bedtime  gabapentin (NEURONTIN) 300 MG capsule, Take 900 mg by mouth 3 times daily  (Patient not taking: Reported on 10/20/2021)  ondansetron (ZOFRAN-ODT) 4 MG ODT tab, Take 1 tablet (4 mg) by mouth every 8 hours as needed for nausea or vomiting DISSOLVE ONE TABLET BY MOUTH EVERY 8 HOURS AS NEEDED FOR NAUSEA (Patient not taking: Reported on 10/20/2021)  scopolamine (TRANSDERM) 1 MG/3DAYS 72 hr patch, Place 1 patch onto the skin every 72 hours (Patient not taking: Reported on 10/20/2021)    No current facility-administered medications on file prior to visit.      Allergies   Allergen Reactions     Penicillins Hives       Social History     Occupational History     Not on file   Tobacco Use     Smoking status: Former Smoker     Quit date: 2021     Years since quittin.0     Smokeless tobacco: Never Used   Vaping Use     Vaping Use: Never used   Substance and Sexual Activity     Alcohol use: Yes     Comment: rare use      Drug use: Not Currently     Comment: Medical Cannabis     Sexual activity: Yes     Partners: Male     Birth control/protection: Female Surgical      "Comment: ramila       Family History   Problem Relation Age of Onset     Heart Disease Mother         60's     Cardiovascular Mother         heart     Heart Disease Father         40's     Arthritis Father         RA     Other Cancer Father      Skin Cancer Father      Other - See Comments Father         \"mini stroke\"     Heart Surgery Father      Arthritis Paternal Grandmother         RA     Diabetes Son      Coronary Artery Disease Son      Depression Paternal Aunt         anxiety, too     Arthritis Paternal Aunt         RA       REVIEW OF SYSTEMS  General: negative for, night sweats, dizziness, fatigue  Resp: No shortness of breath and no cough  CV: negative for chest pain, syncope or near-syncope  GI: negative for nausea, vomiting and diarrhea  : negative for dysuria and hematuria  Musculoskeletal: as above  Neurologic: negative for syncope   Hematologic: negative for bleeding disorder    Physical Exam:  Vitals: Temp 97.3  F (36.3  C) (Temporal)   Ht 1.626 m (5' 4\")   Wt 77.5 kg (170 lb 14.4 oz)   LMP  (LMP Unknown)   BMI 29.33 kg/m    BMI= Body mass index is 29.33 kg/m .  Constitutional: healthy, alert and no acute distress   Psychiatric: mentation appears normal and affect normal/bright  NEURO: no focal deficits, CMS intact right lower extremity   RESP: Normal with easy respirations and no use of accessory muscles to breathe, no audible wheezing or retractions  CV: Calf soft and nontender to palpation, leg warm   SKIN: Incision is well-healed and some of the glue is still on the incision.  Very faint amount erythema around the incision which appears to be skin irritation.  No ecchymosis around the incision no drainage and minimal swelling.  Rest of the right hip with no erythema, rashes, excoriation, or breakdown. No evidence of infection.   MUSCULOSKELETAL:    INSPECTION of right hip: No gross deformities, erythema, edema, ecchymosis, atrophy or fasciculations.     PALPATION: no tenderness over the " lateral hip and greater trochanteric region, thigh or lower leg.  No increased warmth around the incisional area.    ROM: Very gentle passive: Flexion to 135 degrees, internal rotation to 25 degrees, external rotation to 45 degrees.  All range of motion without catching locking or pain.  Patient has some anxiety with external and internal.     STRENGTH: 5 out of 5 hip flexion, quad and hamstring     SPECIAL TEST: None  GAIT: Slight right antalgic gait.  Lymph: no palpable lymph nodes    Diagnostic Modalities:  Recent Results (from the past 744 hour(s))   XR Pelvis and Hip Right 2 Views    Narrative    XR PELVIS AND RIGHT HIP TWO VIEWS   10/5/2021 10:07 AM     HISTORY: Status post total hip replacement      Impression    IMPRESSION: Unremarkable right hip arthroplasty placement.    CLEMENTINE ISAACS MD         SYSTEM ID:  [id number]     X-rays done today: Good prosthesis placement no prosthesis failure no fracture dislocation or tumor.  X-rays show good alignment and look to be nearly identical to the postop x-rays done on 10/5/2021.  One possible critique could be that the acetabulum is slightly vertical however this to my eyes is unchanged from postoperative x-rays to today.    Independent visualization of the images was performed.      Impression: 1.  Status post right total hip arthroplasty    FOCUSED PLAN:   Patient is very happy with her surgery and feels that the pain is gone and now she just has stiffness. Okay to start massaging incision in 1 week.  PT: Continue in-home physical therapy and transition to outpatient physical therapy which starts on 10/26/2021.. Anticoagulation: She is to continue her aspirin 325 twice daily until a total of 6 weeks after surgery.  Also continue posterior hip precautions for a full 6 weeks after surgery.  No medication refill needed today.  Follow-up with Dr. Stewart in 4 weeks which would be 6 weeks status post surgery.     Re-x-ray on return: No      This note was dictated with  [facility].    Alpesh Mcguire PA-C

## 2021-10-20 NOTE — LETTER
10/20/2021         RE: Linda Walsh  [address]  [place] [address]        Dear Colleague,    Thank you for referring your patient, Linda Walsh, to the [facility]. Please see a copy of my visit note below.    Orthopedic Clinic Post-Operative Note    CHIEF COMPLAINT:   Chief Complaint   Patient presents with     Surgical Followup     DOS: 10/5/2021~Right total hip arthroplasty~15 days postop       HISTORY OF PRESENT ILLNESS  Location: Right hip  Rating of Pain: Minimal  Pain is better with: Rest and ice  Pain improving overall: Yes.  Patient states that her pain she had prior to surgery is completely gone and she just has some stiffness now.  Associated Features: Denies numbness or tingling or shooting burning or electric pain.  Denies any problems with her incision such as fevers chills or drainage.  Patient concerns: None.  Patient is doing well and progressing well.  Additional History: Patient is doing in-home physical therapy currently.  She is doing her home exercises several times a day and doing laps around her living room.  Patient has outpatient physical therapy set up for 10/26/2021.  She is taking her aspirin.  She is following her posterior hip precautions.    Patient's past medical, surgical, social and family histories reviewed.     Past Medical History:   Diagnosis Date     Anxiety      Asthma      Backache, unspecified      Esophageal reflux      Generalized anxiety disorder      Medical cannabis use 05/08/2017    used very short time and stopped     Mild intermittent asthma     Asthma, allergy induced     PONV (postoperative nausea and vomiting)      Reflex sympathetic dystrophy of left lower extremity        Past Surgical History:   Procedure Laterality Date     ARTHROPLASTY HIP Right 10/5/2021    Procedure: Right total hip replacement;  Surgeon: Velasquez Stewart DO;  [facility]     COLONOSCOPY  6/29/2011    Procedure:COMBINED  COLONOSCOPY, SINGLE BIOPSY/POLYPECTOMY BY BIOPSY; Surgeon:JENIFER LANDA; [facility]     COLONOSCOPY  6/29/2011    Procedure:COMBINED COLONOSCOPY, REMOVE TUMOR/POLYP/LESION BY SNARE; Surgeon:JENIFER LANDA; [facility]     ESOPHAGOSCOPY, GASTROSCOPY, DUODENOSCOPY (EGD), COMBINED  8/23/2011    Procedure:COMBINED ESOPHAGOSCOPY, GASTROSCOPY, DUODENOSCOPY (EGD), BIOPSY SINGLE OR MULTIPLE; ESOPHAGOGASTRODUODENOSCOPY WITH       HC INJECTION ANES AGENT AND/OR STERIOD, SCIATIC NERVE  04/16/13    [facility]     HYSTERECTOMY      fibroids, no h/o previous abn paps     HYSTERECTOMY, PAP NO LONGER INDICATED       LAPAROSCOPIC CHOLECYSTECTOMY  3/19/2014    Procedure: LAPAROSCOPIC CHOLECYSTECTOMY;  Laparoscopic Cholecystectomy;  Surgeon: Marcus Griffith MD;  [facility]: [facility]     NERVE BLOCK SYMPATHETIC LUMBAR  08/19/2014    [facility]     OPEN REDUCTION INTERNAL FIXATION ANKLE  9/17/2011    Procedure:OPEN REDUCTION INTERNAL FIXATION ANKLE; Open Reduction Internal Fixation Left Ankle; Surgeon:ADONAY SHRESTHA; [facility]     OPEN REDUCTION INTERNAL FIXATION ANKLE  6/6/12    Left ankle.  [facility]     ZZC NONSPECIFIC PROCEDURE      Hysterectomy       Medications:  acetaminophen (TYLENOL) 325 MG tablet, Take 3 tablets (975 mg) by mouth every 8 hours as needed for other (mild pain)  albuterol (PROAIR HFA/PROVENTIL HFA/VENTOLIN HFA) 108 (90 Base) MCG/ACT inhaler, Inhale 2 puffs into the lungs every 6 hours as needed for shortness of breath / dyspnea or wheezing  aspirin (ASA) 325 MG EC tablet, Take 1 tablet (325 mg) by mouth 2 times daily  bisacodyl (DULCOLAX) 10 MG suppository, Place 1 suppository (10 mg) rectally daily Discontinue if having bowel movements.  DULoxetine (CYMBALTA) 60 MG capsule, Take 1 capsule (60 mg) by mouth daily  esomeprazole (NEXIUM) 40 MG DR capsule, TAKE 1 CAPSULE BY MOUTH EVERY MORNING 30 TO 60 MINUTES BEFORE A MEAL  estradiol (CLIMARA) 0.0375 MG/24HR  weekly patch, APPLY ONE PATCH ONTO THE SKIN ONCE WEEKLY (REMOVE OLD PATCH)  fentaNYL (DURAGESIC) 25 mcg/hr 72 hr patch, Place 1 patch onto the skin every 72 hours remove old patch.  fluticasone (FLONASE) 50 MCG/ACT nasal spray, Spray 2 sprays into both nostrils daily  hydrOXYzine (ATARAX) 25 MG tablet, Take 1 tablet (25 mg) by mouth every 6 hours as needed for itching or anxiety (with pain, moderate pain)  ipratropium - albuterol 0.5 mg/2.5 mg/3 mL (DUONEB) 0.5-2.5 (3) MG/3ML nebulization, Take 1 vial (3 mLs) by nebulization every 6 hours as needed for shortness of breath / dyspnea  LINZESS 145 MCG capsule,   order for DME, Equipment being ordered: home cervical traction unit  order for DME, Equipment being ordered: Nebulizer  oxyCODONE (ROXICODONE) 5 MG tablet, Take 1-3 tablets (5-15 mg) by mouth every 3 hours as needed for pain (Moderate to Severe)  oxyCODONE (ROXICODONE) 5 MG tablet, Take 1 tablet (5 mg) by mouth every 6 hours as needed for pain  polyethylene glycol (MIRALAX) powder, Take 51 g (3 capfuls) by mouth 2 times daily  traZODone (DESYREL) 50 MG tablet, Take 3 tablets (150 mg) by mouth At Bedtime  gabapentin (NEURONTIN) 300 MG capsule, Take 900 mg by mouth 3 times daily  (Patient not taking: Reported on 10/20/2021)  ondansetron (ZOFRAN-ODT) 4 MG ODT tab, Take 1 tablet (4 mg) by mouth every 8 hours as needed for nausea or vomiting DISSOLVE ONE TABLET BY MOUTH EVERY 8 HOURS AS NEEDED FOR NAUSEA (Patient not taking: Reported on 10/20/2021)  scopolamine (TRANSDERM) 1 MG/3DAYS 72 hr patch, Place 1 patch onto the skin every 72 hours (Patient not taking: Reported on 10/20/2021)    No current facility-administered medications on file prior to visit.      Allergies   Allergen Reactions     Penicillins Hives       Social History     Occupational History     Not on file   Tobacco Use     Smoking status: Former Smoker     Quit date: 2021     Years since quittin.0     Smokeless tobacco: Never Used   Vaping  "Use     Vaping Use: Never used   Substance and Sexual Activity     Alcohol use: Yes     Comment: rare use      Drug use: Not Currently     Comment: Medical Cannabis     Sexual activity: Yes     Partners: Male     Birth control/protection: Female Surgical     Comment: hyst       Family History   Problem Relation Age of Onset     Heart Disease Mother         60's     Cardiovascular Mother         heart     Heart Disease Father         40's     Arthritis Father         RA     Other Cancer Father      Skin Cancer Father      Other - See Comments Father         \"mini stroke\"     Heart Surgery Father      Arthritis Paternal Grandmother         RA     Diabetes Son      Coronary Artery Disease Son      Depression Paternal Aunt         anxiety, too     Arthritis Paternal Aunt         RA       REVIEW OF SYSTEMS  General: negative for, night sweats, dizziness, fatigue  Resp: No shortness of breath and no cough  CV: negative for chest pain, syncope or near-syncope  GI: negative for nausea, vomiting and diarrhea  : negative for dysuria and hematuria  Musculoskeletal: as above  Neurologic: negative for syncope   Hematologic: negative for bleeding disorder    Physical Exam:  Vitals: Temp 97.3  F (36.3  C) (Temporal)   Ht 1.626 m (5' 4\")   Wt 77.5 kg (170 lb 14.4 oz)   LMP  (LMP Unknown)   BMI 29.33 kg/m    BMI= Body mass index is 29.33 kg/m .  Constitutional: healthy, alert and no acute distress   Psychiatric: mentation appears normal and affect normal/bright  NEURO: no focal deficits, CMS intact right lower extremity   RESP: Normal with easy respirations and no use of accessory muscles to breathe, no audible wheezing or retractions  CV: Calf soft and nontender to palpation, leg warm   SKIN: Incision is well-healed and some of the glue is still on the incision.  Very faint amount erythema around the incision which appears to be skin irritation.  No ecchymosis around the incision no drainage and minimal swelling.  Rest of the " right hip with no erythema, rashes, excoriation, or breakdown. No evidence of infection.   MUSCULOSKELETAL:    INSPECTION of right hip: No gross deformities, erythema, edema, ecchymosis, atrophy or fasciculations.     PALPATION: no tenderness over the lateral hip and greater trochanteric region, thigh or lower leg.  No increased warmth around the incisional area.    ROM: Very gentle passive: Flexion to 135 degrees, internal rotation to 25 degrees, external rotation to 45 degrees.  All range of motion without catching locking or pain.  Patient has some anxiety with external and internal.     STRENGTH: 5 out of 5 hip flexion, quad and hamstring     SPECIAL TEST: None  GAIT: Slight right antalgic gait.  Lymph: no palpable lymph nodes    Diagnostic Modalities:  Recent Results (from the past 744 hour(s))   XR Pelvis and Hip Right 2 Views    Narrative    XR PELVIS AND RIGHT HIP TWO VIEWS   10/5/2021 10:07 AM     HISTORY: Status post total hip replacement      Impression    IMPRESSION: Unremarkable right hip arthroplasty placement.    CLEMENTINE ISAACS MD         SYSTEM ID:  [id number]     X-rays done today: Good prosthesis placement no prosthesis failure no fracture dislocation or tumor.  X-rays show good alignment and look to be nearly identical to the postop x-rays done on 10/5/2021.    Independent visualization of the images was performed.      Impression: 1.  Status post right total hip arthroplasty    FOCUSED PLAN:   Patient is very happy with her surgery and feels that the pain is gone and now she just has stiffness. Okay to start massaging incision in 1 week.  PT: Continue in-home physical therapy and transition to outpatient physical therapy which starts on 10/26/2021.. Anticoagulation: She is to continue her aspirin 325 twice daily until a total of 6 weeks after surgery.  Also continue posterior hip precautions for a full 6 weeks after surgery.  No medication refill needed today.  Follow-up with Dr. Stewart in 4 weeks  which would be 6 weeks status post surgery.     Re-x-ray on return: No      This note was dictated with [employer] [facility].    Alpesh Mcguire PA-C              Again, thank you for allowing me to participate in the care of your patient.        Sincerely,        Alpesh Mcguire PA-C

## 2021-10-20 NOTE — TELEPHONE ENCOUNTER
"Atarax  Last Written Prescription Date: 10/5/2021  Last Fill Quantity: 30,  # refills: 0   Last office visit: No previous visit found with prescribing provider:     Future Office Visit:      Requested Prescriptions   Pending Prescriptions Disp Refills     hydrOXYzine (ATARAX) 25 MG tablet 30 tablet 0     Sig: Take 1 tablet (25 mg) by mouth every 6 hours as needed for itching or anxiety (with pain, moderate pain)       Antihistamines Protocol Passed - 10/20/2021 11:50 AM        Passed - Recent (12 mo) or future (30 days) visit within the authorizing provider's specialty     Patient has had an office visit with the authorizing provider or a provider within the authorizing providers department within the previous 12 mos or has a future within next 30 days. See \"Patient Info\" tab in inbasket, or \"Choose Columns\" in Meds & Orders section of the refill encounter.              Passed - Patient is age 3 or older     Apply age and/or weight-based dosing for peds patients age 3 and older.    Forward request to provider for patients under the age of 3.          Passed - Medication is active on med list                 Aleja Rodríguez RN on 10/20/2021 at 1:24 PM    "
Statement Selected
Statement Selected

## 2021-10-26 ENCOUNTER — HOSPITAL ENCOUNTER (OUTPATIENT)
Dept: PHYSICAL THERAPY | Facility: CLINIC | Age: 61
Setting detail: THERAPIES SERIES
End: 2021-10-26
Attending: NURSE PRACTITIONER
Payer: COMMERCIAL

## 2021-10-26 DIAGNOSIS — Z96.641 STATUS POST TOTAL REPLACEMENT OF RIGHT HIP: ICD-10-CM

## 2021-10-26 PROCEDURE — 97161 PT EVAL LOW COMPLEX 20 MIN: CPT | Mod: GP | Performed by: PHYSICAL THERAPIST

## 2021-10-26 PROCEDURE — 97110 THERAPEUTIC EXERCISES: CPT | Mod: GP | Performed by: PHYSICAL THERAPIST

## 2021-10-26 NOTE — PROGRESS NOTES
10/26/21 1400   General Information   Type of Visit Initial OP Ortho PT Evaluation   Start of Care Date 10/26/21   Referring Physician HERMELINDO Kessler CNP   Patient/Family Goals Statement get rid of the walker, better mobility   Orders Evaluate and Treat   Date of Order 10/05/21   Certification Required? No   Medical Diagnosis Status post total replacement of right hip Z96.641    Surgical/Medical history reviewed Yes   Precautions/Limitations right hip precautions   Weight-Bearing Status - RLE weight-bearing as tolerated   Body Part(s)   Body Part(s) Hip   Presentation and Etiology   Pertinent history of current problem (include personal factors and/or comorbidities that impact the POC) Pt underwent R JERROD on 10/5/21 and home on 10/6/21. Had home PT 2x per week x 2 weeks. Pt on disability from previous L ankle surgeries and chronic LBP. Takes tylenol and pain meds for R hip. Pt wearing L ankle boot x 10 years, will try some L ankle injections eventually by Dr Hogan. Doing ex daily. Pt notes a 10 year hx of R hip prior. Pt with CRPS, sees pain .   Impairments A. Pain;B. Decreased WB tolerance;D. Decreased ROM;F. Decreased strength and endurance;E. Decreased flexibility;G. Impaired balance;H. Impaired gait   Functional Limitations perform activities of daily living;perform required work activities;perform desired leisure / sports activities   Symptom Location R hip, also chronic LBP and L ankle pain   How/Where did it occur From Degenerative Joint Disease   Onset date of current episode/exacerbation 10/05/21   Chronicity Chronic   Pain rating (0-10 point scale) Best (/10);Worst (/10)   Best (/10) 6/10 R hip   Worst (/10) 10/10 after surgery   Pain quality A. Sharp;B. Dull;C. Aching   Frequency of pain/symptoms A. Constant   Pain/symptoms are: Worse in the morning   Pain/symptoms exacerbated by A. Sitting;B. Walking;G. Certain positions;I. Bending;J. ADL;K. Home tasks;L. Work tasks;M. Other   Pain  exacerbation comment standing, lying on side   Pain/symptoms eased by C. Rest;E. Changing positions;I. OTC medication(s);H. Cold   Progression of symptoms since onset: Improved   Prior Level of Function   Prior Level of Function-Mobility ind but wears L ankle walking boot   Prior Level of Function-ADLs ind   Current Level of Function   Current Community Support Family/friend caregiver   Patient role/employment history G. Disabled   Living environment House/townhome   Home/community accessibility renting a townhome, everything on main floor   Fall Risk Screen   Fall screen completed by PT   Have you fallen 2 or more times in the past year? No   Have you fallen and had an injury in the past year? No   Is patient a fall risk? No   Abuse Screen (yes response referral indicated)   Feels Unsafe at Home or Work/School no   Feels Threatened by Someone no   Does Anyone Try to Keep You From Having Contact with Others or Doing Things Outside Your Home? no   Physical Signs of Abuse Present no   Functional Scales   Functional Scales Other   Other Scales  9/100 LEFS   Hip Objective Findings   Side (if bilateral, select both right and left) Right;Left   Observation pain behaviors demonstrated, wearing L ankle walking boot   Integumentary  healing well over R lat hip incision area, no concerns. Pt has L quad atrophy compared to R due to chronic L LE issues   Posture leans to L in sitting   Gait/Locomotion using 4WW, R LE WBAT, L walking boot, lateral lurch present, trunk flexed   Hip ROM Comments AROM R hip flexion with heel slide 40 degrees, abduction 10 degrees   Hip/Knee Strength Comments fair R QS, GS, HS   Planned Therapy Interventions   Planned Therapy Interventions ROM;strengthening;gait training;balance training;neuromuscular re-education;stretching;transfer training   Planned Therapy Interventions Comment manual therapy if needed   Planned Modality Interventions   Planned Modality Interventions Electrical stimulation    Planned Modality Interventions Comments if needed   Clinical Impression   Criteria for Skilled Therapeutic Interventions Met yes, treatment indicated   PT Diagnosis R hip pain, decreased ROM, weakness, altered gait and decreased functional level s/p R JERROD on 10/5/21   Influenced by the following impairments pain, ROM, weakness, balance   Functional limitations due to impairments adl's, walking, transfers, see LEFS   Clinical Presentation Stable/Uncomplicated   Clinical Presentation Rationale clinical judgement   Clinical Decision Making (Complexity) Low complexity   Therapy Frequency 2 times/Week   Predicted Duration of Therapy Intervention (days/wks) 12 weeks, decrease as able   Risk & Benefits of therapy have been explained Yes   Patient, Family & other staff in agreement with plan of care Yes   Education Assessment   Preferred Learning Style Listening   Barriers to Learning No barriers   ORTHO GOALS   PT Ortho Eval Goals 1;2   Ortho Goal 1   Goal Identifier ambulation   Goal Description pt will decrease R hip pain and improve ROM and strength and carry over to improved ambulation so pt can do household ambulation without 4WW   Target Date 11/23/21   Ortho Goal 2   Goal Identifier function   Goal Description pt will note a decrease in R hip pain, improve ROM and strength and carry over to improved functional level as measured by LEFS score increase from 9/80 to 20/80 or better   Target Date 12/07/21   Total Evaluation Time   PT Eval, Low Complexity Minutes ([id number]) 20

## 2021-11-03 ENCOUNTER — HOSPITAL ENCOUNTER (OUTPATIENT)
Dept: PHYSICAL THERAPY | Facility: CLINIC | Age: 61
Setting detail: THERAPIES SERIES
End: 2021-11-03
Attending: NURSE PRACTITIONER
Payer: COMMERCIAL

## 2021-11-03 PROCEDURE — 97110 THERAPEUTIC EXERCISES: CPT | Mod: GP | Performed by: PHYSICAL THERAPIST

## 2021-11-08 ENCOUNTER — HOSPITAL ENCOUNTER (OUTPATIENT)
Dept: PHYSICAL THERAPY | Facility: CLINIC | Age: 61
Setting detail: THERAPIES SERIES
End: 2021-11-08
Attending: NURSE PRACTITIONER
Payer: COMMERCIAL

## 2021-11-08 PROCEDURE — 97530 THERAPEUTIC ACTIVITIES: CPT | Mod: GP | Performed by: PHYSICAL THERAPIST

## 2021-11-08 PROCEDURE — 97110 THERAPEUTIC EXERCISES: CPT | Mod: GP | Performed by: PHYSICAL THERAPIST

## 2021-11-11 ENCOUNTER — TELEPHONE (OUTPATIENT)
Dept: FAMILY MEDICINE | Facility: OTHER | Age: 61
End: 2021-11-11
Payer: COMMERCIAL

## 2021-11-11 DIAGNOSIS — F11.90 CHRONIC, CONTINUOUS USE OF OPIOIDS: ICD-10-CM

## 2021-11-11 DIAGNOSIS — K59.09 CHRONIC CONSTIPATION: Primary | ICD-10-CM

## 2021-11-11 NOTE — TELEPHONE ENCOUNTER
Prior Authorization Retail Medication Request    Medication/Dose: LINZESS 145 MCG capsule  ICD code (if different than what is on RX):    Previously Tried and Failed:    Rationale:      Insurance Name:    Insurance ID:        Pharmacy Information (if different than what is on RX)  Name:    Phone:

## 2021-11-11 NOTE — TELEPHONE ENCOUNTER
Central Prior Authorization Team   Phone: [phone]    PA Initiation    Medication: LINZESS 145 MCG capsule  Insurance Company: [employer] [place] - Phone [phone] Fax [phone]  Pharmacy Filling the Rx: [id number] - [place], [place] - [address] AT [facility] [address]  Filling Pharmacy Phone: [phone]  Filling Pharmacy Fax:    Start Date: 11/11/2021

## 2021-11-12 NOTE — TELEPHONE ENCOUNTER
PRIOR AUTHORIZATION DENIED    Medication: LINZESS 145 MCG capsule    Denial Date: 11/12/2021    Denial Rational: Patient must have a history of trial & failure to the formulary alternative(s) or have a contraindication or intolerance to the formulary alternatives: lactulose solution and Trulance tabs        Appeal Information:

## 2021-11-14 NOTE — TELEPHONE ENCOUNTER
Please notify patient that her PA for this medication was denied, it appears that it is prescribed by specialty. She needs to contact her specialist regarding the appeal process   Jonna Gaitan PA-C

## 2021-11-15 ENCOUNTER — MYC REFILL (OUTPATIENT)
Dept: ORTHOPEDICS | Facility: CLINIC | Age: 61
End: 2021-11-15
Payer: COMMERCIAL

## 2021-11-15 DIAGNOSIS — Z96.641 STATUS POST TOTAL REPLACEMENT OF RIGHT HIP: ICD-10-CM

## 2021-11-15 RX ORDER — HYDROXYZINE HYDROCHLORIDE 25 MG/1
25 TABLET, FILM COATED ORAL EVERY 6 HOURS PRN
Qty: 30 TABLET | Refills: 0 | Status: SHIPPED | OUTPATIENT
Start: 2021-11-15 | End: 2021-11-15

## 2021-11-15 NOTE — TELEPHONE ENCOUNTER
"  Future Office Visit:   Next 5 appointments (look out 90 days)    Nov 24, 2021  4:30 PM  Return Visit with Velasquez Stewart DO  [facility] ([facility] ) [address] [place] [id number] [phone]           Requested Prescriptions   Pending Prescriptions Disp Refills     hydrOXYzine (ATARAX) 25 MG tablet 30 tablet 0     Sig: Take 1 tablet (25 mg) by mouth every 6 hours as needed for itching or anxiety (with pain, moderate pain)       Antihistamines Protocol Passed - 11/15/2021  1:49 PM        Passed - Recent (12 mo) or future (30 days) visit within the authorizing provider's specialty     Patient has had an office visit with the authorizing provider or a provider within the authorizing providers department within the previous 12 mos or has a future within next 30 days. See \"Patient Info\" tab in inbasket, or \"Choose Columns\" in Meds & Orders section of the refill encounter.              Passed - Patient is age 3 or older     Apply age and/or weight-based dosing for peds patients age 3 and older.    Forward request to provider for patients under the age of 3.          Passed - Medication is active on med list             Approved per protocol.  Just under 6 weeks post op, has follow up on 11/24.     Alessandra YOUSIF Lead RN, BSN. . .  11/15/2021, 3:15 PM    "

## 2021-11-16 RX ORDER — HYDROXYZINE HYDROCHLORIDE 25 MG/1
25 TABLET, FILM COATED ORAL EVERY 6 HOURS PRN
Qty: 30 TABLET | Refills: 0 | Status: SHIPPED | OUTPATIENT
Start: 2021-11-16 | End: 2022-06-08

## 2021-11-16 NOTE — TELEPHONE ENCOUNTER
"  Future Office Visit:   Next 5 appointments (look out 90 days)    Nov 24, 2021  4:30 PM  Return Visit with Velasquez Stewart DO  [facility] ([facility] ) [address] [place] [id number] [phone]           Requested Prescriptions   Pending Prescriptions Disp Refills     hydrOXYzine (ATARAX) 25 MG tablet 30 tablet 0     Sig: Take 1 tablet (25 mg) by mouth every 6 hours as needed for itching or anxiety (with pain, moderate pain)       Antihistamines Protocol Passed - 11/15/2021  5:30 PM        Passed - Recent (12 mo) or future (30 days) visit within the authorizing provider's specialty     Patient has had an office visit with the authorizing provider or a provider within the authorizing providers department within the previous 12 mos or has a future within next 30 days. See \"Patient Info\" tab in inbasket, or \"Choose Columns\" in Meds & Orders section of the refill encounter.              Passed - Patient is age 3 or older     Apply age and/or weight-based dosing for peds patients age 3 and older.    Forward request to provider for patients under the age of 3.          Passed - Medication is active on med list           Medication passes refill protocol. Refill sent to pharmacy.       Chloe Shepard RN on 11/16/2021 at 9:22 AM  "

## 2021-11-17 ENCOUNTER — HOSPITAL ENCOUNTER (OUTPATIENT)
Dept: PHYSICAL THERAPY | Facility: CLINIC | Age: 61
Setting detail: THERAPIES SERIES
End: 2021-11-17
Attending: NURSE PRACTITIONER
Payer: COMMERCIAL

## 2021-11-17 PROCEDURE — 97530 THERAPEUTIC ACTIVITIES: CPT | Mod: GP | Performed by: PHYSICAL THERAPIST

## 2021-11-17 PROCEDURE — 97110 THERAPEUTIC EXERCISES: CPT | Mod: GP | Performed by: PHYSICAL THERAPIST

## 2021-11-19 ENCOUNTER — VIRTUAL VISIT (OUTPATIENT)
Dept: PSYCHOLOGY | Facility: CLINIC | Age: 61
End: 2021-11-19
Payer: COMMERCIAL

## 2021-11-19 DIAGNOSIS — F33.2 SEVERE RECURRENT MAJOR DEPRESSION WITHOUT PSYCHOTIC FEATURES (H): Primary | ICD-10-CM

## 2021-11-19 DIAGNOSIS — F43.10 POST TRAUMATIC STRESS DISORDER (PTSD): ICD-10-CM

## 2021-11-19 DIAGNOSIS — F41.1 GENERALIZED ANXIETY DISORDER: ICD-10-CM

## 2021-11-19 PROCEDURE — 90834 PSYTX W PT 45 MINUTES: CPT | Mod: 95 | Performed by: COUNSELOR
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
## 2021-11-23 ENCOUNTER — HOSPITAL ENCOUNTER (OUTPATIENT)
Dept: PHYSICAL THERAPY | Facility: CLINIC | Age: 61
Setting detail: THERAPIES SERIES
End: 2021-11-23
Attending: NURSE PRACTITIONER
Payer: COMMERCIAL

## 2021-11-23 PROCEDURE — 97110 THERAPEUTIC EXERCISES: CPT | Mod: GP | Performed by: PHYSICAL THERAPIST

## 2021-11-23 PROCEDURE — 97530 THERAPEUTIC ACTIVITIES: CPT | Mod: GP | Performed by: PHYSICAL THERAPIST

## 2021-11-23 NOTE — PROGRESS NOTES
[facility] Rehabilitation Service    Outpatient Physical Therapy Progress Note/Discharge Summary  Patient: Linda Walsh  : 1960    Beginning/End Dates of Reporting Period:  10/26/21 to 21 (pt seen for 5 PT visits in that time)    Referring Provider: HERMELINDO Kessler CNP    Therapy Diagnosis: R hip pain, decreased ROM, weakness, altered gait and decreased functional level s/p R JERROD on 10/5/21     Client Self Report: [facility] a lot of walking, L ankle limits her sometimes more than R hip, also has LBP. Pt getting more active, even walked at [employer], caused increase in R hip, L ankle and LBP. R hip better than 1 month ago. Pt has to also manage CRPS. Pleased with R JERROD results so far.Has questions about using st bike at home.    Objective Measurements:21  Objective Measure: Gait (at [facility] on 10/26/21 4WW, R LE WBAT, L ankle walking boot)  Details: no assistive device today  Objective Measure: LEFS ( at [facility])  Details:  on 21     Goals:  Goal Identifier ambulation   Goal Description pt will decrease R hip pain and improve ROM and strength and carry over to improved ambulation so pt can do household ambulation without 4WW   Target Date 21   Date Met  21   Progress (detail required for progress note): not using assistive device in the home     Goal Identifier function   Goal Description pt will note a decrease in R hip pain, improve ROM and strength and carry over to improved functional level as measured by LEFS score increase from 80 to 20/80 or better   Target Date 21   Date Met  21   Progress (detail required for progress note):      Goal Identifier Exercise   Goal Description once precautions lifting pt will improve R hip ROM so she can use her stationary bike at home that she purchased to help with weight  loss   Target Date 12/21/21   Date Met      Progress (detail required for progress note):         Plan:  Continue therapy per current plan of care. See pt 1x per week, progress as able.    Discharge:  No    Addendum: Above note will be discharge summary as no further PT has been scheduled since 11/23/21 visit. PT has not been notified of any problems or questions since last visit.

## 2021-11-24 ENCOUNTER — OFFICE VISIT (OUTPATIENT)
Dept: ORTHOPEDICS | Facility: CLINIC | Age: 61
End: 2021-11-24
Payer: COMMERCIAL

## 2021-11-24 VITALS
WEIGHT: 174 LBS | DIASTOLIC BLOOD PRESSURE: 80 MMHG | BODY MASS INDEX: 29.71 KG/M2 | HEIGHT: 64 IN | SYSTOLIC BLOOD PRESSURE: 110 MMHG

## 2021-11-24 DIAGNOSIS — Z96.641 STATUS POST TOTAL REPLACEMENT OF RIGHT HIP: Primary | ICD-10-CM

## 2021-11-24 PROCEDURE — 99024 POSTOP FOLLOW-UP VISIT: CPT | Performed by: ORTHOPAEDIC SURGERY

## 2021-11-24 ASSESSMENT — MIFFLIN-ST. JEOR: SCORE: 1339.26

## 2021-11-24 ASSESSMENT — PAIN SCALES - GENERAL: PAINLEVEL: NO PAIN (0)

## 2021-11-24 NOTE — PROGRESS NOTES
Orthopedic Clinic Post-Operative Note    CHIEF COMPLAINT:   Chief Complaint   Patient presents with     RECHECK     right hip follow up     Surgical Followup     DOS: 10/5/2021~Right total hip arthroplasty~7 weeks postop       HISTORY OF PRESENT ILLNESS  Extremely happy.  Minimal pain.  Attending therapy  Patient's past medical, surgical, social and family histories reviewed.     Past Medical History:   Diagnosis Date     Anxiety      Asthma      Backache, unspecified      Esophageal reflux      Generalized anxiety disorder      Medical cannabis use 05/08/2017    used very short time and stopped     Mild intermittent asthma     Asthma, allergy induced     PONV (postoperative nausea and vomiting)      Reflex sympathetic dystrophy of left lower extremity        Past Surgical History:   Procedure Laterality Date     ARTHROPLASTY HIP Right 10/5/2021    Procedure: Right total hip replacement;  Surgeon: Velasquez Stewart DO;  Location: [facility] OR     COLONOSCOPY  6/29/2011    Procedure:COMBINED COLONOSCOPY, SINGLE BIOPSY/POLYPECTOMY BY BIOPSY; Surgeon:JENIFER LANDA; Location:[facility] GI     COLONOSCOPY  6/29/2011    Procedure:COMBINED COLONOSCOPY, REMOVE TUMOR/POLYP/LESION BY SNARE; Surgeon:JENIFER LANDA; Location:[facility]     ESOPHAGOSCOPY, GASTROSCOPY, DUODENOSCOPY (EGD), COMBINED  8/23/2011    Procedure:COMBINED ESOPHAGOSCOPY, GASTROSCOPY, DUODENOSCOPY (EGD), BIOPSY SINGLE OR MULTIPLE; ESOPHAGOGASTRODUODENOSCOPY WITH       HC INJECTION ANES AGENT AND/OR STERIOD, SCIATIC NERVE  04/16/13    [facility]     HYSTERECTOMY      fibroids, no h/o previous abn paps     HYSTERECTOMY, PAP NO LONGER INDICATED       LAPAROSCOPIC CHOLECYSTECTOMY  3/19/2014    Procedure: LAPAROSCOPIC CHOLECYSTECTOMY;  Laparoscopic Cholecystectomy;  Surgeon: Marcus Griffith MD;  Location: [facility] OR     NERVE BLOCK SYMPATHETIC LUMBAR  08/19/2014    [facility]     OPEN REDUCTION INTERNAL FIXATION ANKLE  9/17/2011     Procedure:OPEN REDUCTION INTERNAL FIXATION ANKLE; Open Reduction Internal Fixation Left Ankle; Surgeon:ADONAY SHRESTHA; Location:[facility] OR     OPEN REDUCTION INTERNAL FIXATION ANKLE  6/6/12    Left ankle.  [facility] NONSPECIFIC PROCEDURE      Hysterectomy       Medications:  acetaminophen (TYLENOL) 325 MG tablet, Take 3 tablets (975 mg) by mouth every 8 hours as needed for other (mild pain)  albuterol (PROAIR HFA/PROVENTIL HFA/VENTOLIN HFA) 108 (90 Base) MCG/ACT inhaler, Inhale 2 puffs into the lungs every 6 hours as needed for shortness of breath / dyspnea or wheezing  bisacodyl (DULCOLAX) 10 MG suppository, Place 1 suppository (10 mg) rectally daily Discontinue if having bowel movements.  esomeprazole (NEXIUM) 40 MG DR capsule, TAKE 1 CAPSULE BY MOUTH EVERY MORNING 30 TO 60 MINUTES BEFORE A MEAL  fentaNYL (DURAGESIC) 25 mcg/hr 72 hr patch, Place 1 patch onto the skin every 72 hours remove old patch.  fluticasone (FLONASE) 50 MCG/ACT nasal spray, Spray 2 sprays into both nostrils daily  gabapentin (NEURONTIN) 300 MG capsule, Take 900 mg by mouth 3 times daily   hydrOXYzine (ATARAX) 25 MG tablet, Take 1 tablet (25 mg) by mouth every 6 hours as needed for itching or anxiety (with pain, moderate pain)  order for DME, Equipment being ordered: home cervical traction unit  order for DME, Equipment being ordered: Nebulizer  oxyCODONE (ROXICODONE) 5 MG tablet, Take 1-3 tablets (5-15 mg) by mouth every 3 hours as needed for pain (Moderate to Severe)  polyethylene glycol (MIRALAX) powder, Take 51 g (3 capfuls) by mouth 2 times daily  traZODone (DESYREL) 50 MG tablet, Take 3 tablets (150 mg) by mouth At Bedtime  DULoxetine (CYMBALTA) 60 MG capsule, Take 1 capsule (60 mg) by mouth daily (Patient not taking: Reported on 11/24/2021)  estradiol (CLIMARA) 0.0375 MG/24HR weekly patch, APPLY ONE PATCH ONTO THE SKIN ONCE WEEKLY (REMOVE OLD PATCH) (Patient not taking: Reported on 11/24/2021)  ipratropium - albuterol 0.5  "mg/2.5 mg/3 mL (DUONEB) 0.5-2.5 (3) MG/3ML nebulization, Take 1 vial (3 mLs) by nebulization every 6 hours as needed for shortness of breath / dyspnea (Patient not taking: Reported on 2021)    No current facility-administered medications on file prior to visit.      Allergies   Allergen Reactions     Penicillins Hives       Social History     Occupational History     Not on file   Tobacco Use     Smoking status: Former Smoker     Quit date: 2021     Years since quittin.1     Smokeless tobacco: Never Used   Vaping Use     Vaping Use: Never used   Substance and Sexual Activity     Alcohol use: Yes     Comment: rare use      Drug use: Not Currently     Comment: Medical Cannabis     Sexual activity: Yes     Partners: Male     Birth control/protection: Female Surgical     Comment: hyst       Family History   Problem Relation Age of Onset     Heart Disease Mother         60's     Cardiovascular Mother         heart     Heart Disease Father         40's     Arthritis Father         RA     Other Cancer Father      Skin Cancer Father      Other - See Comments Father         \"mini stroke\"     Heart Surgery Father      Arthritis Paternal Grandmother         RA     Diabetes Son      Coronary Artery Disease Son      Depression Paternal Aunt         anxiety, too     Arthritis Paternal Aunt         RA       Physical Exam:  Vitals: /80   Ht 1.626 m (5' 4\")   Wt 78.9 kg (174 lb)   LMP  (LMP Unknown)   BMI 29.87 kg/m    BMI= Body mass index is 29.87 kg/m .  Constitutional: healthy, alert and no acute distress   Psychiatric: mentation appears normal and affect normal/bright  NEURO: no focal deficits  SKIN: .well healed, no erythema, no incision breakdown and no drainage.  JOINT/EXTREMITIES: Full motion.  No evidence of instability.  Distal neurovascular intact.  No peripheral edema  GAIT: not tested     Diagnostic Modalities:  Previous imaging reviewed.  Independent visualization of the images was " performed.      Impression:   Chief Complaint   Patient presents with     RECHECK     right hip follow up     Surgical Followup     DOS: 10/5/2021~Right total hip arthroplasty~7 weeks postop   Doing well.    Plan:   Clinically doing quite well.  She is very happy.  Preoperative pain is gone.  We did discuss her x-rays.  Poor x-ray view but the cup is in a vertical placement.  I discussed this with her.  I discussed the potential for instability.  I reviewed ways to protect her self.  She understands.  Continue activity she tolerates.  Plan to see her back in 6 weeks  Return to clinic 6, week(s), or sooner as needed for changes.    Re-x-ray on return: Yes.    Napoleon Stewart D.O.

## 2021-11-24 NOTE — LETTER
11/24/2021         RE: Linda Walsh  [address]  [place] [address]        Dear Colleague,    Thank you for referring your patient, Linda Walsh, to the [facility]. Please see a copy of my visit note below.    Orthopedic Clinic Post-Operative Note    CHIEF COMPLAINT:   Chief Complaint   Patient presents with     RECHECK     right hip follow up     Surgical Followup     DOS: 10/5/2021~Right total hip arthroplasty~7 weeks postop       HISTORY OF PRESENT ILLNESS  Extremely happy.  Minimal pain.  Attending therapy  Patient's past medical, surgical, social and family histories reviewed.     Past Medical History:   Diagnosis Date     Anxiety      Asthma      Backache, unspecified      Esophageal reflux      Generalized anxiety disorder      Medical cannabis use 05/08/2017    used very short time and stopped     Mild intermittent asthma     Asthma, allergy induced     PONV (postoperative nausea and vomiting)      Reflex sympathetic dystrophy of left lower extremity        Past Surgical History:   Procedure Laterality Date     ARTHROPLASTY HIP Right 10/5/2021    Procedure: Right total hip replacement;  Surgeon: Velasquez Stewart DO;  Location: [facility]     COLONOSCOPY  6/29/2011    Procedure:COMBINED COLONOSCOPY, SINGLE BIOPSY/POLYPECTOMY BY BIOPSY; Surgeon:JENIFER LANDA; Location:[facility]     COLONOSCOPY  6/29/2011    Procedure:COMBINED COLONOSCOPY, REMOVE TUMOR/POLYP/LESION BY SNARE; Surgeon:JENIFER LANDA; Location:[facility]     ESOPHAGOSCOPY, GASTROSCOPY, DUODENOSCOPY (EGD), COMBINED  8/23/2011    Procedure:COMBINED ESOPHAGOSCOPY, GASTROSCOPY, DUODENOSCOPY (EGD), BIOPSY SINGLE OR MULTIPLE; ESOPHAGOGASTRODUODENOSCOPY WITH       HC INJECTION ANES AGENT AND/OR STERIOD, SCIATIC NERVE  04/16/13    [facility]     HYSTERECTOMY      fibroids, no h/o previous abn paps     HYSTERECTOMY, PAP NO LONGER INDICATED       LAPAROSCOPIC CHOLECYSTECTOMY  3/19/2014    Procedure:  LAPAROSCOPIC CHOLECYSTECTOMY;  Laparoscopic Cholecystectomy;  Surgeon: Marcus Griffith MD;  Location: [facility]     NERVE BLOCK SYMPATHETIC LUMBAR  08/19/2014    [facility]     OPEN REDUCTION INTERNAL FIXATION ANKLE  9/17/2011    Procedure:OPEN REDUCTION INTERNAL FIXATION ANKLE; Open Reduction Internal Fixation Left Ankle; Surgeon:ADONAY SHRESTHA; Location:[facility]     OPEN REDUCTION INTERNAL FIXATION ANKLE  6/6/12    Left ankle.  [facility] NONSPECIFIC PROCEDURE      Hysterectomy       Medications:  acetaminophen (TYLENOL) 325 MG tablet, Take 3 tablets (975 mg) by mouth every 8 hours as needed for other (mild pain)  albuterol (PROAIR HFA/PROVENTIL HFA/VENTOLIN HFA) 108 (90 Base) MCG/ACT inhaler, Inhale 2 puffs into the lungs every 6 hours as needed for shortness of breath / dyspnea or wheezing  bisacodyl (DULCOLAX) 10 MG suppository, Place 1 suppository (10 mg) rectally daily Discontinue if having bowel movements.  esomeprazole (NEXIUM) 40 MG DR capsule, TAKE 1 CAPSULE BY MOUTH EVERY MORNING 30 TO 60 MINUTES BEFORE A MEAL  fentaNYL (DURAGESIC) 25 mcg/hr 72 hr patch, Place 1 patch onto the skin every 72 hours remove old patch.  fluticasone (FLONASE) 50 MCG/ACT nasal spray, Spray 2 sprays into both nostrils daily  gabapentin (NEURONTIN) 300 MG capsule, Take 900 mg by mouth 3 times daily   hydrOXYzine (ATARAX) 25 MG tablet, Take 1 tablet (25 mg) by mouth every 6 hours as needed for itching or anxiety (with pain, moderate pain)  order for DME, Equipment being ordered: home cervical traction unit  order for DME, Equipment being ordered: Nebulizer  oxyCODONE (ROXICODONE) 5 MG tablet, Take 1-3 tablets (5-15 mg) by mouth every 3 hours as needed for pain (Moderate to Severe)  polyethylene glycol (MIRALAX) powder, Take 51 g (3 capfuls) by mouth 2 times daily  traZODone (DESYREL) 50 MG tablet, Take 3 tablets (150 mg) by mouth At Bedtime  DULoxetine (CYMBALTA) 60 MG capsule, Take 1 capsule (60 mg)  "by mouth daily (Patient not taking: Reported on 2021)  estradiol (CLIMARA) 0.0375 MG/24HR weekly patch, APPLY ONE PATCH ONTO THE SKIN ONCE WEEKLY (REMOVE OLD PATCH) (Patient not taking: Reported on 2021)  ipratropium - albuterol 0.5 mg/2.5 mg/3 mL (DUONEB) 0.5-2.5 (3) MG/3ML nebulization, Take 1 vial (3 mLs) by nebulization every 6 hours as needed for shortness of breath / dyspnea (Patient not taking: Reported on 2021)    No current facility-administered medications on file prior to visit.      Allergies   Allergen Reactions     Penicillins Hives       Social History     Occupational History     Not on file   Tobacco Use     Smoking status: Former Smoker     Quit date: 2021     Years since quittin.1     Smokeless tobacco: Never Used   Vaping Use     Vaping Use: Never used   Substance and Sexual Activity     Alcohol use: Yes     Comment: rare use      Drug use: Not Currently     Comment: Medical Cannabis     Sexual activity: Yes     Partners: Male     Birth control/protection: Female Surgical     Comment: hyst       Family History   Problem Relation Age of Onset     Heart Disease Mother         60's     Cardiovascular Mother         heart     Heart Disease Father         40's     Arthritis Father         RA     Other Cancer Father      Skin Cancer Father      Other - See Comments Father         \"mini stroke\"     Heart Surgery Father      Arthritis Paternal Grandmother         RA     Diabetes Son      Coronary Artery Disease Son      Depression Paternal Aunt         anxiety, too     Arthritis Paternal Aunt         RA       Physical Exam:  Vitals: /80   Ht 1.626 m (5' 4\")   Wt 78.9 kg (174 lb)   LMP  (LMP Unknown)   BMI 29.87 kg/m    BMI= Body mass index is 29.87 kg/m .  Constitutional: healthy, alert and no acute distress   Psychiatric: mentation appears normal and affect normal/bright  NEURO: no focal deficits  SKIN: .well healed, no erythema, no incision breakdown and no " drainage.  JOINT/EXTREMITIES: Full motion.  No evidence of instability.  Distal neurovascular intact.  No peripheral edema  GAIT: not tested     Diagnostic Modalities:  Previous imaging reviewed.  Independent visualization of the images was performed.      Impression:   Chief Complaint   Patient presents with     RECHECK     right hip follow up     Surgical Followup     DOS: 10/5/2021~Right total hip arthroplasty~7 weeks postop   Doing well.    Plan:   Clinically doing quite well.  She is very happy.  Preoperative pain is gone.  We did discuss her x-rays.  Poor x-ray view but the cup is in a vertical placement.  I discussed this with her.  I discussed the potential for instability.  I reviewed ways to protect her self.  She understands.  Continue activity she tolerates.  Plan to see her back in 6 weeks  Return to clinic 6, week(s), or sooner as needed for changes.    Re-x-ray on return: Yes.    Napoleon Stewart D.O.      Again, thank you for allowing me to participate in the care of your patient.        Sincerely,        Velasquez Stewart, DO

## 2021-12-01 ENCOUNTER — TELEPHONE (OUTPATIENT)
Dept: FAMILY MEDICINE | Facility: OTHER | Age: 61
End: 2021-12-01

## 2021-12-01 ENCOUNTER — VIRTUAL VISIT (OUTPATIENT)
Dept: FAMILY MEDICINE | Facility: OTHER | Age: 61
End: 2021-12-01
Payer: COMMERCIAL

## 2021-12-01 DIAGNOSIS — K58.1 IRRITABLE BOWEL SYNDROME WITH CONSTIPATION: ICD-10-CM

## 2021-12-01 DIAGNOSIS — Z12.11 SCREEN FOR COLON CANCER: Primary | ICD-10-CM

## 2021-12-01 DIAGNOSIS — K59.09 CHRONIC CONSTIPATION: ICD-10-CM

## 2021-12-01 PROCEDURE — 99213 OFFICE O/P EST LOW 20 MIN: CPT | Mod: 95 | Performed by: PHYSICIAN ASSISTANT

## 2021-12-01 RX ORDER — PLECANATIDE 3 MG/1
3 TABLET ORAL DAILY
Qty: 30 TABLET | Refills: 1 | Status: SHIPPED | OUTPATIENT
Start: 2021-12-01 | End: 2022-02-10

## 2021-12-01 NOTE — TELEPHONE ENCOUNTER
Prior Authorization Retail Medication Request    Medication/Dose: plecanatide (TRULANCE) 3 MG tablet  ICD code (if different than what is on RX):    Previously Tried and Failed:    Rationale:      Insurance Name:  [facility]  Insurance ID:  [id number]      Pharmacy Information (if different than what is on RX)  Name:  [facility] #[id number]   Phone:  [phone]

## 2021-12-01 NOTE — TELEPHONE ENCOUNTER
PA Initiation    Medication: plecanatide (TRULANCE) 3 MG tablet  Insurance Company: [employer] [place] - Phone [phone] Fax [phone]  Pharmacy Filling the Rx: [facility] #[id number] - [place], [place] - [address] AT [facility] [address]  Filling Pharmacy Phone: [phone]  Filling Pharmacy Fax: [phone]  Start Date: 12/1/2021

## 2021-12-01 NOTE — PROGRESS NOTES
"Mary is a 61 year old who is being evaluated via a billable video visit.      How would you like to obtain your AVS? MyChart  If the video visit is dropped, the invitation should be resent by: [phone]  Will anyone else be joining your video visit? No      Video Start Time: 10:14 AM    Assessment & Plan     Screen for colon cancer-     Irritable bowel syndrome with constipation  Patient has done previously very well on Linzess that was no longer covered by her insurance.  We will try to see if Trulance is covered.  She also uses chronic narcotics and it has an off label indication for narcotic induced constipation as well if she fails on this medication hopefully her PA for Linzess will be  - plecanatide (TRULANCE) 3 MG tablet; Take 1 tablet (3 mg) by mouth daily    Chronic constipation  As above  - plecanatide (TRULANCE) 3 MG tablet; Take 1 tablet (3 mg) by mouth daily             BMI:   Estimated body mass index is 29.87 kg/m  as calculated from the following:    Height as of 11/24/21: 1.626 m (5' 4\").    Weight as of 11/24/21: 78.9 kg (174 lb).   Not discussed at virtual visit        Return in about 1 month (around 1/1/2022), or if symptoms worsen or fail to improve.    Jonna Gaitan PA-C  [facility]   Mary is a 61 year old who presents for the following health issues     HPI     Chronic constipation problem.  Medication no longer covered by insurance, she has done very well for many years on Linzess.  She is on chronic narcotics as well.  This [facility].  [facility] had been prescribing her Linzess but even with a PA it is not being covered.  They recommended we see if Trulance will be covered.  She is been treating her constipation with suppositories, enemas, Colace, MiraLAX and she is struggling to have routine bowel movements.  She is frustrated because Linzess worked wonderfully without any side effects      How many servings of fruits and vegetables do " you eat daily?  0-1    On average, how many sweetened beverages do you drink each day (Examples: soda, juice, sweet tea, etc.  Do NOT count diet or artificially sweetened beverages)?   0    How many days per week do you exercise enough to make your heart beat faster? 3 or less    How many minutes a day do you exercise enough to make your heart beat faster? 9 or less    How many days per week do you miss taking your medication? 0        Review of Systems   As documented above       Objective           Vitals:  No vitals were obtained today due to virtual visit.    Physical Exam   GENERAL: Healthy, alert and no distress  EYES: Eyes grossly normal to inspection.  No discharge or erythema, or obvious scleral/conjunctival abnormalities.  RESP: No audible wheeze, cough, or visible cyanosis.  No visible retractions or increased work of breathing.    SKIN: Visible skin clear. No significant rash, abnormal pigmentation or lesions.  NEURO: Cranial nerves grossly intact.  Mentation and speech appropriate for age.  PSYCH: Mentation appears normal, affect normal/bright, judgement and insight intact, normal speech and appearance well-groomed.    Admission on 10/05/2021, Discharged on 10/06/2021   Component Date Value Ref Range Status     Hemoglobin 10/06/2021 13.0  11.7 - 15.7 g/dL Final     Hold Specimen 10/06/2021 [facility]   Final               Video-Visit Details    Type of service:  Video Visit    Video End Time:10:25 AM    Originating Location (pt. Location): Home    Distant Location (provider location):  [facility]     Platform used for Video Visit: [employer]

## 2021-12-02 ENCOUNTER — VIRTUAL VISIT (OUTPATIENT)
Dept: PSYCHOLOGY | Facility: CLINIC | Age: 61
End: 2021-12-02
Payer: COMMERCIAL

## 2021-12-02 ENCOUNTER — MYC MEDICAL ADVICE (OUTPATIENT)
Dept: FAMILY MEDICINE | Facility: OTHER | Age: 61
End: 2021-12-02
Payer: COMMERCIAL

## 2021-12-02 DIAGNOSIS — Z96.641 STATUS POST TOTAL REPLACEMENT OF RIGHT HIP: ICD-10-CM

## 2021-12-02 DIAGNOSIS — F41.1 GENERALIZED ANXIETY DISORDER: ICD-10-CM

## 2021-12-02 DIAGNOSIS — F33.2 SEVERE RECURRENT MAJOR DEPRESSION WITHOUT PSYCHOTIC FEATURES (H): Primary | ICD-10-CM

## 2021-12-02 DIAGNOSIS — F43.10 POST TRAUMATIC STRESS DISORDER (PTSD): ICD-10-CM

## 2021-12-02 PROCEDURE — 90834 PSYTX W PT 45 MINUTES: CPT | Mod: 95 | Performed by: COUNSELOR

## 2021-12-02 NOTE — TELEPHONE ENCOUNTER
Pending Prescriptions:                       Disp   Refills    bisacodyl (DULCOLAX) 10 MG suppository    12 sup*0            Sig: Place 1 suppository (10 mg) rectally daily           Discontinue if having bowel movements.    Last prescribe in Oct,  Was to stop if having BMs.  Please advise on refill

## 2021-12-02 NOTE — TELEPHONE ENCOUNTER
Prior Authorization Approval    Authorization Effective Date: 12/1/2021  Authorization Expiration Date: 12/1/2022  Medication: plecanatide (TRULANCE) 3 MG tablet  Approved Dose/Quantity:   Reference #: [id number]   Insurance Company: [facility] - Phone [phone] Fax [phone]  Which Pharmacy is filling the prescription (Not needed for infusion/clinic administered): [facility] #[id number] [id number] AT [facility] [address]  Pharmacy Notified: Yes  Patient Notified: Yes  **Instructed pharmacy to notify patient when script is ready to /ship.**
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
## 2021-12-03 RX ORDER — BISACODYL 10 MG
10 SUPPOSITORY, RECTAL RECTAL DAILY
Qty: 12 SUPPOSITORY | Refills: 0 | Status: SHIPPED | OUTPATIENT
Start: 2021-12-03 | End: 2022-02-09

## 2021-12-09 ENCOUNTER — E-VISIT (OUTPATIENT)
Dept: FAMILY MEDICINE | Facility: OTHER | Age: 61
End: 2021-12-09
Payer: COMMERCIAL

## 2021-12-09 ENCOUNTER — MYC MEDICAL ADVICE (OUTPATIENT)
Dept: FAMILY MEDICINE | Facility: OTHER | Age: 61
End: 2021-12-09
Payer: COMMERCIAL

## 2021-12-09 DIAGNOSIS — J01.00 ACUTE NON-RECURRENT MAXILLARY SINUSITIS: Primary | ICD-10-CM

## 2021-12-09 PROCEDURE — 99421 OL DIG E/M SVC 5-10 MIN: CPT | Performed by: PHYSICIAN ASSISTANT

## 2021-12-10 RX ORDER — DOXYCYCLINE 100 MG/1
100 CAPSULE ORAL 2 TIMES DAILY
Qty: 20 CAPSULE | Refills: 0 | Status: SHIPPED | OUTPATIENT
Start: 2021-12-10 | End: 2022-01-03

## 2021-12-10 NOTE — PATIENT INSTRUCTIONS
Dear Linda Walsh    It is also important to stay well hydrated, get lots of rest and take over-the-counter decongestants,?tylenol?or ibuprofen if you?are able to?take those medications per your primary care provider to help relieve discomfort.?     It is important that you take?all of?your prescribed medication even if your symptoms are improving after a few doses.? Taking?all of?your medicine helps prevent the symptoms from returning.?     If your symptoms worsen, you develop severe headache, vomiting, high fever (>102), or are not improving in 7 days, please contact your primary care provider for an appointment or visit any of our convenient Walk-in Care or Urgent Care Centers to be seen which can be found on our website?here.?     Thanks again for choosing?us?as your health care partner,?   ?  Jonna Gaitan PA-C?

## 2021-12-27 ENCOUNTER — MYC MEDICAL ADVICE (OUTPATIENT)
Dept: FAMILY MEDICINE | Facility: OTHER | Age: 61
End: 2021-12-27
Payer: COMMERCIAL

## 2021-12-27 DIAGNOSIS — K59.09 CHRONIC CONSTIPATION: ICD-10-CM

## 2021-12-28 NOTE — TELEPHONE ENCOUNTER
Medication Appeal Initiation    We have initiated an appeal for the requested medication:  Medication: LINZESS 145 MCG capsule  Appeal Start Date:  12/28/2021  Insurance Company: [employer] [place] - Phone [phone] Fax [phone]  Comments:   Pt has now tried and failed Trulance, submitted new request via CMM, new Key is [id number]

## 2021-12-28 NOTE — TELEPHONE ENCOUNTER
PCP-ok for PA?     Margi Wilkins, BSN, RN, PHN  Registered Nurse-Clinic Triage  [facility]  12/28/2021 at 1:51 PM

## 2021-12-28 NOTE — TELEPHONE ENCOUNTER
Yes, please send to PA pool, she has now tried and failed this med, hopefully they will now cover her linzess that she has done well on in the past.  Jonna Gaitan PA-C

## 2021-12-30 NOTE — TELEPHONE ENCOUNTER
Received a Coverage Exception form from [facility], filled it out and faxed it along with chart notes to [phone]

## 2021-12-31 NOTE — TELEPHONE ENCOUNTER
PRIOR AUTHORIZATION DENIED    Medication: LINZESS 145 MCG capsule    Denial Date: 11/12/2021    Denial Rational: Patient must have a history of trial & failure to ONE more formulary alternative or have a contraindication or intolerance to the formulary alternative:  Lactulose solution         Appeal Information:

## 2021-12-31 NOTE — TELEPHONE ENCOUNTER
She has tried and failed Trulance, was not included on the prior authorization that was sent in?  It was prescribed for her December 1.  Is there a length of time she needs to try this medication?  Jonna Gaitan PA-C

## 2022-01-02 ENCOUNTER — HEALTH MAINTENANCE LETTER (OUTPATIENT)
Age: 62
End: 2022-01-02

## 2022-01-03 ENCOUNTER — ANCILLARY PROCEDURE (OUTPATIENT)
Dept: GENERAL RADIOLOGY | Facility: CLINIC | Age: 62
End: 2022-01-03
Attending: ORTHOPAEDIC SURGERY
Payer: COMMERCIAL

## 2022-01-03 ENCOUNTER — OFFICE VISIT (OUTPATIENT)
Dept: ORTHOPEDICS | Facility: CLINIC | Age: 62
End: 2022-01-03
Payer: COMMERCIAL

## 2022-01-03 VITALS
DIASTOLIC BLOOD PRESSURE: 66 MMHG | WEIGHT: 175.3 LBS | BODY MASS INDEX: 29.93 KG/M2 | HEIGHT: 64 IN | SYSTOLIC BLOOD PRESSURE: 120 MMHG

## 2022-01-03 DIAGNOSIS — Z96.641 STATUS POST TOTAL REPLACEMENT OF RIGHT HIP: Primary | ICD-10-CM

## 2022-01-03 DIAGNOSIS — Z96.641 STATUS POST TOTAL REPLACEMENT OF RIGHT HIP: ICD-10-CM

## 2022-01-03 PROCEDURE — 73502 X-RAY EXAM HIP UNI 2-3 VIEWS: CPT | Mod: TC | Performed by: RADIOLOGY

## 2022-01-03 PROCEDURE — 99024 POSTOP FOLLOW-UP VISIT: CPT | Performed by: NURSE PRACTITIONER

## 2022-01-03 RX ORDER — CLINDAMYCIN HCL 300 MG
600 CAPSULE ORAL ONCE
Qty: 2 CAPSULE | Refills: 0 | Status: SHIPPED | OUTPATIENT
Start: 2022-01-03 | End: 2022-01-03

## 2022-01-03 ASSESSMENT — MIFFLIN-ST. JEOR: SCORE: 1345.16

## 2022-01-03 NOTE — PROGRESS NOTES
Orthopedic Clinic Post-Operative Note    CHIEF COMPLAINT:   Chief Complaint   Patient presents with     RECHECK     right hip follow up     Surgical Followup     DOS: 10/5/2021~Right total hip arthroplasty~13 weeks postop       HISTORY OF PRESENT ILLNESS  Returns to clinic. Doing very well. Occasional stiffness, aching, or sorenss discomfort but otherwise pain free. Reports slowly increasing activity and this is going well. States happy with outcomes. Much better than prior to surgery. No other concerns.     Patient's past medical, surgical, social and family histories reviewed.     Past Medical History:   Diagnosis Date     Anxiety      Asthma      Backache, unspecified      Esophageal reflux      Generalized anxiety disorder      Medical cannabis use 05/08/2017    used very short time and stopped     Mild intermittent asthma     Asthma, allergy induced     PONV (postoperative nausea and vomiting)      Reflex sympathetic dystrophy of left lower extremity        Past Surgical History:   Procedure Laterality Date     ARTHROPLASTY HIP Right 10/5/2021    Procedure: Right total hip replacement;  Surgeon: Velasquez Stewart DO;  Location: [facility] OR     COLONOSCOPY  6/29/2011    Procedure:COMBINED COLONOSCOPY, SINGLE BIOPSY/POLYPECTOMY BY BIOPSY; Surgeon:JENIFER LANDA; Location:[facility] GI     COLONOSCOPY  6/29/2011    Procedure:COMBINED COLONOSCOPY, REMOVE TUMOR/POLYP/LESION BY SNARE; Surgeon:JENIFER LANDA; Location:[facility]     ESOPHAGOSCOPY, GASTROSCOPY, DUODENOSCOPY (EGD), COMBINED  8/23/2011    Procedure:COMBINED ESOPHAGOSCOPY, GASTROSCOPY, DUODENOSCOPY (EGD), BIOPSY SINGLE OR MULTIPLE; ESOPHAGOGASTRODUODENOSCOPY WITH       HC INJECTION ANES AGENT AND/OR STERIOD, SCIATIC NERVE  04/16/13    [facility]     HYSTERECTOMY      fibroids, no h/o previous abn paps     HYSTERECTOMY, PAP NO LONGER INDICATED       LAPAROSCOPIC CHOLECYSTECTOMY  3/19/2014    Procedure: LAPAROSCOPIC CHOLECYSTECTOMY;  Laparoscopic  Cholecystectomy;  Surgeon: Marcus Griffith MD;  Location: [facility] OR     NERVE BLOCK SYMPATHETIC LUMBAR  08/19/2014    [facility]     OPEN REDUCTION INTERNAL FIXATION ANKLE  9/17/2011    Procedure:OPEN REDUCTION INTERNAL FIXATION ANKLE; Open Reduction Internal Fixation Left Ankle; Surgeon:ADONAY SHRESTHA; Location:[facility] OR     OPEN REDUCTION INTERNAL FIXATION ANKLE  6/6/12    Left ankle.  [facility] NONSPECIFIC PROCEDURE      Hysterectomy       Medications:  albuterol (PROAIR HFA/PROVENTIL HFA/VENTOLIN HFA) 108 (90 Base) MCG/ACT inhaler, Inhale 2 puffs into the lungs every 6 hours as needed for shortness of breath / dyspnea or wheezing  DULoxetine (CYMBALTA) 60 MG capsule, Take 60 mg by mouth daily   esomeprazole (NEXIUM) 40 MG DR capsule, TAKE 1 CAPSULE BY MOUTH EVERY MORNING 30 TO 60 MINUTES BEFORE A MEAL  estradiol (CLIMARA) 0.0375 MG/24HR weekly patch, APPLY ONE PATCH ONTO THE SKIN ONCE WEEKLY (REMOVE OLD PATCH)  fentaNYL (DURAGESIC) 25 mcg/hr 72 hr patch, Place 1 patch onto the skin every 72 hours remove old patch.  fluticasone (FLONASE) 50 MCG/ACT nasal spray, Spray 2 sprays into both nostrils daily  gabapentin (NEURONTIN) 300 MG capsule, Take 900 mg by mouth 3 times daily   hydrOXYzine (ATARAX) 25 MG tablet, Take 1 tablet (25 mg) by mouth every 6 hours as needed for itching or anxiety (with pain, moderate pain)  order for DME, Equipment being ordered: home cervical traction unit  order for DME, Equipment being ordered: Nebulizer  oxyCODONE (ROXICODONE) 5 MG tablet, Take 1-3 tablets (5-15 mg) by mouth every 3 hours as needed for pain (Moderate to Severe)  plecanatide (TRULANCE) 3 MG tablet, Take 1 tablet (3 mg) by mouth daily  polyethylene glycol (MIRALAX) powder, Take 51 g (3 capfuls) by mouth 2 times daily  traZODone (DESYREL) 50 MG tablet, Take 3 tablets (150 mg) by mouth At Bedtime  bisacodyl (DULCOLAX) 10 MG suppository, Place 1 suppository (10 mg) rectally daily Discontinue if  "having bowel movements. (Patient not taking: Reported on 1/3/2022)  ipratropium - albuterol 0.5 mg/2.5 mg/3 mL (DUONEB) 0.5-2.5 (3) MG/3ML nebulization, Take 1 vial (3 mLs) by nebulization every 6 hours as needed for shortness of breath / dyspnea (Patient not taking: Reported on 1/3/2022)    No current facility-administered medications on file prior to visit.      Allergies   Allergen Reactions     Penicillins Hives       Social History     Occupational History     Not on file   Tobacco Use     Smoking status: Former Smoker     Quit date: 2021     Years since quittin.3     Smokeless tobacco: Never Used   Vaping Use     Vaping Use: Never used   Substance and Sexual Activity     Alcohol use: Yes     Comment: rare use      Drug use: Not Currently     Comment: Medical Cannabis     Sexual activity: Yes     Partners: Male     Birth control/protection: Female Surgical     Comment: hyst       Family History   Problem Relation Age of Onset     Heart Disease Mother         60's     Cardiovascular Mother         heart     Heart Disease Father         40's     Arthritis Father         RA     Other Cancer Father      Skin Cancer Father      Other - See Comments Father         \"mini stroke\"     Heart Surgery Father      Arthritis Paternal Grandmother         RA     Diabetes Son      Coronary Artery Disease Son      Depression Paternal Aunt         anxiety, too     Arthritis Paternal Aunt         RA       Physical Exam:  Vitals: /66   Ht 1.626 m (5' 4\")   Wt 79.5 kg (175 lb 4.8 oz)   LMP  (LMP Unknown)   BMI 30.09 kg/m    BMI= Body mass index is 30.09 kg/m .  Constitutional: healthy, alert and no acute distress   Psychiatric: mentation appears normal and affect normal/bright  NEURO: no focal deficits  JOINT/EXTREMITIES: right hip: full motion without pain including IR/ER.  Quick sit to stand without pain. Good quad tone. Distal neurovascular grossly intact.   GAIT: non-antalgic    Diagnostic Modalities:  right " hip X-ray: Total hip arthroplasty in place.  No obvious position change or loosening.  Vertical cup placement.  Independent visualization of the images was performed.      Impression:   Chief Complaint   Patient presents with     RECHECK     right hip follow up     Surgical Followup     DOS: 10/5/2021~Right total hip arthroplasty~13 weeks postop   Doing very well    Plan:   Discussed with patient. There is a vertically placed cup. However she is doing very well it. Much better than prior to surgery and very happy with outcomes and progress.  Recommended no restrictions, full activity continue with HEP.     She does have a dental cleaning coming up. Sent Rx. Recommended antibiotics until 1 year post-op    Discussed will see her routinely in about 1-2 years post-op; recommended get seen if starting to have any symptoms or with any concerns.     Return to clinic 1-2 years post-op, or sooner as needed for changes.    Re-x-ray on return: Yes.    HERMELINDO Boswell, CNP  Orthopedic Surgery

## 2022-01-03 NOTE — LETTER
1/3/2022         RE: Linda Walsh  [address]  [place] [address]        Dear Colleague,    Thank you for referring your patient, Linda Walsh, to the [facility]. Please see a copy of my visit note below.    Orthopedic Clinic Post-Operative Note    CHIEF COMPLAINT:   Chief Complaint   Patient presents with     RECHECK     right hip follow up     Surgical Followup     DOS: 10/5/2021~Right total hip arthroplasty~13 weeks postop       HISTORY OF PRESENT ILLNESS  Returns to clinic. Doing very well. Occasional stiffness, aching, or sorenss discomfort but otherwise pain free. Reports slowly increasing activity and this is going well. States happy with outcomes. Much better than prior to surgery. No other concerns.     Patient's past medical, surgical, social and family histories reviewed.     Past Medical History:   Diagnosis Date     Anxiety      Asthma      Backache, unspecified      Esophageal reflux      Generalized anxiety disorder      Medical cannabis use 05/08/2017    used very short time and stopped     Mild intermittent asthma     Asthma, allergy induced     PONV (postoperative nausea and vomiting)      Reflex sympathetic dystrophy of left lower extremity        Past Surgical History:   Procedure Laterality Date     ARTHROPLASTY HIP Right 10/5/2021    Procedure: Right total hip replacement;  Surgeon: Velasquez Stewart DO;  Location: [facility]     COLONOSCOPY  6/29/2011    Procedure:COMBINED COLONOSCOPY, SINGLE BIOPSY/POLYPECTOMY BY BIOPSY; Surgeon:JENIFER LANDA; Location:[facility]     COLONOSCOPY  6/29/2011    Procedure:COMBINED COLONOSCOPY, REMOVE TUMOR/POLYP/LESION BY SNARE; Surgeon:JENIFER LANDA; Location:[facility]     ESOPHAGOSCOPY, GASTROSCOPY, DUODENOSCOPY (EGD), COMBINED  8/23/2011    Procedure:COMBINED ESOPHAGOSCOPY, GASTROSCOPY, DUODENOSCOPY (EGD), BIOPSY SINGLE OR MULTIPLE; ESOPHAGOGASTRODUODENOSCOPY WITH       HC INJECTION ANES AGENT AND/OR  STERIOD, SCIATIC NERVE  04/16/13    [facility]     HYSTERECTOMY      fibroids, no h/o previous abn paps     HYSTERECTOMY, PAP NO LONGER INDICATED       LAPAROSCOPIC CHOLECYSTECTOMY  3/19/2014    Procedure: LAPAROSCOPIC CHOLECYSTECTOMY;  Laparoscopic Cholecystectomy;  Surgeon: Marcus Griffith MD;  Location: [facility]     NERVE BLOCK SYMPATHETIC LUMBAR  08/19/2014    [facility]     OPEN REDUCTION INTERNAL FIXATION ANKLE  9/17/2011    Procedure:OPEN REDUCTION INTERNAL FIXATION ANKLE; Open Reduction Internal Fixation Left Ankle; Surgeon:ADONAY SHRESTHA; Location:[facility]     OPEN REDUCTION INTERNAL FIXATION ANKLE  6/6/12    Left ankle.  [facility]     ZZC NONSPECIFIC PROCEDURE      Hysterectomy       Medications:  albuterol (PROAIR HFA/PROVENTIL HFA/VENTOLIN HFA) 108 (90 Base) MCG/ACT inhaler, Inhale 2 puffs into the lungs every 6 hours as needed for shortness of breath / dyspnea or wheezing  DULoxetine (CYMBALTA) 60 MG capsule, Take 60 mg by mouth daily   esomeprazole (NEXIUM) 40 MG DR capsule, TAKE 1 CAPSULE BY MOUTH EVERY MORNING 30 TO 60 MINUTES BEFORE A MEAL  estradiol (CLIMARA) 0.0375 MG/24HR weekly patch, APPLY ONE PATCH ONTO THE SKIN ONCE WEEKLY (REMOVE OLD PATCH)  fentaNYL (DURAGESIC) 25 mcg/hr 72 hr patch, Place 1 patch onto the skin every 72 hours remove old patch.  fluticasone (FLONASE) 50 MCG/ACT nasal spray, Spray 2 sprays into both nostrils daily  gabapentin (NEURONTIN) 300 MG capsule, Take 900 mg by mouth 3 times daily   hydrOXYzine (ATARAX) 25 MG tablet, Take 1 tablet (25 mg) by mouth every 6 hours as needed for itching or anxiety (with pain, moderate pain)  order for DME, Equipment being ordered: home cervical traction unit  order for DME, Equipment being ordered: Nebulizer  oxyCODONE (ROXICODONE) 5 MG tablet, Take 1-3 tablets (5-15 mg) by mouth every 3 hours as needed for pain (Moderate to Severe)  plecanatide (TRULANCE) 3 MG tablet, Take 1 tablet (3 mg) by mouth  "daily  polyethylene glycol (MIRALAX) powder, Take 51 g (3 capfuls) by mouth 2 times daily  traZODone (DESYREL) 50 MG tablet, Take 3 tablets (150 mg) by mouth At Bedtime  bisacodyl (DULCOLAX) 10 MG suppository, Place 1 suppository (10 mg) rectally daily Discontinue if having bowel movements. (Patient not taking: Reported on 1/3/2022)  ipratropium - albuterol 0.5 mg/2.5 mg/3 mL (DUONEB) 0.5-2.5 (3) MG/3ML nebulization, Take 1 vial (3 mLs) by nebulization every 6 hours as needed for shortness of breath / dyspnea (Patient not taking: Reported on 1/3/2022)    No current facility-administered medications on file prior to visit.      Allergies   Allergen Reactions     Penicillins Hives       Social History     Occupational History     Not on file   Tobacco Use     Smoking status: Former Smoker     Quit date: 2021     Years since quittin.3     Smokeless tobacco: Never Used   Vaping Use     Vaping Use: Never used   Substance and Sexual Activity     Alcohol use: Yes     Comment: rare use      Drug use: Not Currently     Comment: Medical Cannabis     Sexual activity: Yes     Partners: Male     Birth control/protection: Female Surgical     Comment: hyst       Family History   Problem Relation Age of Onset     Heart Disease Mother         60's     Cardiovascular Mother         heart     Heart Disease Father         40's     Arthritis Father         RA     Other Cancer Father      Skin Cancer Father      Other - See Comments Father         \"mini stroke\"     Heart Surgery Father      Arthritis Paternal Grandmother         RA     Diabetes Son      Coronary Artery Disease Son      Depression Paternal Aunt         anxiety, too     Arthritis Paternal Aunt         RA       Physical Exam:  Vitals: /66   Ht 1.626 m (5' 4\")   Wt 79.5 kg (175 lb 4.8 oz)   LMP  (LMP Unknown)   BMI 30.09 kg/m    BMI= Body mass index is 30.09 kg/m .  Constitutional: healthy, alert and no acute distress   Psychiatric: mentation appears " normal and affect normal/bright  NEURO: no focal deficits  JOINT/EXTREMITIES: right hip: full motion without pain including IR/ER.  Quick sit to stand without pain. Good quad tone. Distal neurovascular grossly intact.   GAIT: non-antalgic    Diagnostic Modalities:  right hip X-ray: Total hip arthroplasty in place.  No obvious position change or loosening.  Vertical cup placement.  Independent visualization of the images was performed.      Impression:   Chief Complaint   Patient presents with     RECHECK     right hip follow up     Surgical Followup     DOS: 10/5/2021~Right total hip arthroplasty~13 weeks postop   Doing very well    Plan:   Discussed with patient. There is a vertically placed cup. However she is doing very well it. Much better than prior to surgery and very happy with outcomes and progress.  Recommended no restrictions, full activity continue with HEP.     She does have a dental cleaning coming up. Sent Rx. Recommended antibiotics until 1 year post-op    Discussed will see her routinely in about 1-2 years post-op; recommended get seen if starting to have any symptoms or with any concerns.     Return to clinic 1-2 years post-op, or sooner as needed for changes.    Re-x-ray on return: Yes.    HERMELINDO Boswell, CNP  Orthopedic Surgery          Again, thank you for allowing me to participate in the care of your patient.        Sincerely,        Velasquez Stewart, DO

## 2022-01-04 NOTE — TELEPHONE ENCOUNTER
Please call pt her PA was denied again, now they want her to use lactulose solution. Has she used this before?  I can send it to her pharmacy if she is interested.  Jonna Gaitan PA-C

## 2022-01-06 ENCOUNTER — VIRTUAL VISIT (OUTPATIENT)
Dept: PSYCHOLOGY | Facility: CLINIC | Age: 62
End: 2022-01-06
Payer: COMMERCIAL

## 2022-01-06 DIAGNOSIS — F41.1 GENERALIZED ANXIETY DISORDER: ICD-10-CM

## 2022-01-06 DIAGNOSIS — F43.10 POST TRAUMATIC STRESS DISORDER (PTSD): ICD-10-CM

## 2022-01-06 DIAGNOSIS — F33.2 SEVERE RECURRENT MAJOR DEPRESSION WITHOUT PSYCHOTIC FEATURES (H): Primary | ICD-10-CM

## 2022-01-06 PROCEDURE — 90834 PSYTX W PT 45 MINUTES: CPT | Mod: 95 | Performed by: COUNSELOR

## 2022-01-06 RX ORDER — LACTULOSE 10 G/10G
10-20 SOLUTION ORAL 2 TIMES DAILY
Qty: 120 PACKET | Refills: 1 | Status: SHIPPED | OUTPATIENT
Start: 2022-01-06 | End: 2022-01-10
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
## 2022-01-10 RX ORDER — LACTULOSE 10 G/10G
10-20 SOLUTION ORAL 2 TIMES DAILY
Qty: 120 PACKET | Refills: 1 | Status: SHIPPED | OUTPATIENT
Start: 2022-01-10 | End: 2022-02-10 | Stop reason: SINTOL

## 2022-01-10 NOTE — TELEPHONE ENCOUNTER
Responded to mychart.   Patient stated insurance recommended another medication. Sent mychart to verify. Please route to PCP once we figure out what medication that is.   Richard ZENDEJASN, RN, PHN  January 10, 2022

## 2022-01-13 ENCOUNTER — TELEPHONE (OUTPATIENT)
Dept: FAMILY MEDICINE | Facility: OTHER | Age: 62
End: 2022-01-13
Payer: COMMERCIAL

## 2022-01-13 DIAGNOSIS — K59.09 CHRONIC CONSTIPATION: Primary | ICD-10-CM

## 2022-01-13 NOTE — TELEPHONE ENCOUNTER
Prior Authorization Retail Medication Request    Medication/Dose: Kristalose 10 GM packets   ICD code (if different than what is on RX):    Previously Tried and Failed:   Rationale:     Insurance Name:   Insurance ID:        Pharmacy Information (if different than what is on RX)  Name:    Phone:

## 2022-01-17 NOTE — TELEPHONE ENCOUNTER
PA Initiation    Medication: Kristalose 10 GM packets   Insurance Company:    Pharmacy Filling the Rx: [id number] - [place], [place] - [address] AT [facility] [address]  Filling Pharmacy Phone: [phone]  Filling Pharmacy Fax:    Start Date: 1/17/2022

## 2022-01-17 NOTE — TELEPHONE ENCOUNTER
PRIOR AUTHORIZATION DENIED    Medication: Kristalose 10 GM packets     Denial Date: 1/17/2022    Denial Rational: PATIENT MUST HAVE TRIED AND FAILED TWO FORMULARY DRUGS:  LACTULOSE SOLUTION AND TRULANCE TABLETS ARE SOME FORMULARY OPTIONS.      Appeal Information: This medication was denied. If the provider would like to appeal, please provide a letter of medical necessity and route back to the team. Otherwise you can close the encounter. Thank you, [facility] Team

## 2022-01-18 RX ORDER — LACTULOSE 10 G/15ML
10-20 SOLUTION ORAL 2 TIMES DAILY
Qty: 236 ML | Refills: 3 | Status: SHIPPED | OUTPATIENT
Start: 2022-01-18 | End: 2022-02-10 | Stop reason: SINTOL

## 2022-01-24 DIAGNOSIS — K58.1 IRRITABLE BOWEL SYNDROME WITH CONSTIPATION: ICD-10-CM

## 2022-01-24 DIAGNOSIS — R11.0 NAUSEA: Primary | ICD-10-CM

## 2022-01-24 DIAGNOSIS — K59.09 CHRONIC CONSTIPATION: ICD-10-CM

## 2022-01-25 DIAGNOSIS — K59.09 CHRONIC CONSTIPATION: ICD-10-CM

## 2022-01-25 DIAGNOSIS — K58.1 IRRITABLE BOWEL SYNDROME WITH CONSTIPATION: ICD-10-CM

## 2022-01-25 RX ORDER — PLECANATIDE 3 MG/1
TABLET ORAL
Qty: 30 TABLET | Refills: 1 | OUTPATIENT
Start: 2022-01-25

## 2022-01-25 RX ORDER — ONDANSETRON 4 MG/1
TABLET, ORALLY DISINTEGRATING ORAL
Qty: 20 TABLET | Refills: 0 | Status: SHIPPED | OUTPATIENT
Start: 2022-01-25 | End: 2023-10-23

## 2022-01-25 NOTE — TELEPHONE ENCOUNTER
Pending Prescriptions:                       Disp   Refills    TRULANCE 3 MG tablet [Pharmacy Med Name: T*30 tab*1        Sig: TAKE 1 TABLET BY MOUTH DAILY        Routing refill request to provider for review/approval because:  Drug not on the [facility] refill protocol   Requesting additional refills to be on file.     AASHISH BansalN, RN, PHN  [place]  January 25, 2022

## 2022-01-25 NOTE — TELEPHONE ENCOUNTER
Pending Prescriptions:                       Disp   Refills    TRULANCE 3 MG tablet [Pharmacy Med Name: T*30 tab*1        Sig: TAKE 1 TABLET BY MOUTH DAILY    ondansetron (ZOFRAN-ODT) 4 MG ODT tab [Pha*20 tab*         Sig: DISSOLVE 1 TABLET ON TONGUE EVERY 8 HOURS AS NEEDED           FOR NAUSEA    Routing refill request to provider for review/approval because:  Drug not active on patient's medication list  A break in medication

## 2022-01-25 NOTE — TELEPHONE ENCOUNTER
Trulance would not be covered until she has failed lactulose solution which I just prescribed for her.  Jonna Gaitan PA-C

## 2022-01-26 NOTE — TELEPHONE ENCOUNTER
Please call pt- I keep getting refill requests for her trulance, headache she tried the lactulose solution yet for her constipation?  If so, is it working?  Jonna Gaitan PA-C

## 2022-02-03 ENCOUNTER — MYC MEDICAL ADVICE (OUTPATIENT)
Dept: FAMILY MEDICINE | Facility: OTHER | Age: 62
End: 2022-02-03

## 2022-02-03 ENCOUNTER — IMMUNIZATION (OUTPATIENT)
Dept: FAMILY MEDICINE | Facility: CLINIC | Age: 62
End: 2022-02-03
Payer: MEDICAID

## 2022-02-03 DIAGNOSIS — F11.90 CHRONIC, CONTINUOUS USE OF OPIOIDS: ICD-10-CM

## 2022-02-03 DIAGNOSIS — K59.09 CHRONIC CONSTIPATION: ICD-10-CM

## 2022-02-03 DIAGNOSIS — K59.03 CONSTIPATION DUE TO OPIOID THERAPY: ICD-10-CM

## 2022-02-03 DIAGNOSIS — Z96.641 STATUS POST TOTAL REPLACEMENT OF RIGHT HIP: ICD-10-CM

## 2022-02-03 DIAGNOSIS — T40.2X5A CONSTIPATION DUE TO OPIOID THERAPY: ICD-10-CM

## 2022-02-03 DIAGNOSIS — K59.00 CONSTIPATION, UNSPECIFIED CONSTIPATION TYPE: ICD-10-CM

## 2022-02-03 DIAGNOSIS — K58.1 IRRITABLE BOWEL SYNDROME WITH CONSTIPATION: Primary | ICD-10-CM

## 2022-02-03 PROCEDURE — 0054A COVID-19,PF,PFIZER (12+ YRS): CPT

## 2022-02-03 PROCEDURE — 91305 COVID-19,PF,PFIZER (12+ YRS): CPT

## 2022-02-09 RX ORDER — PLECANATIDE 3 MG/1
TABLET ORAL
Qty: 30 TABLET | Refills: 1 | OUTPATIENT
Start: 2022-02-09

## 2022-02-10 ENCOUNTER — TELEPHONE (OUTPATIENT)
Dept: FAMILY MEDICINE | Facility: OTHER | Age: 62
End: 2022-02-10

## 2022-02-10 RX ORDER — BISACODYL 10 MG
10 SUPPOSITORY, RECTAL RECTAL DAILY
Qty: 12 SUPPOSITORY | Refills: 1 | Status: SHIPPED | OUTPATIENT
Start: 2022-02-10 | End: 2022-07-07

## 2022-02-10 NOTE — TELEPHONE ENCOUNTER
Routing to PCP to refill suppository. RN unable to refill as this has been discontinued off her medication list.     Vero Otero BSN, RN

## 2022-02-10 NOTE — TELEPHONE ENCOUNTER
Please watch for fax--I called [employer] this morning to do a formulary exception for her Linzess- they are faxing a form to be completed.  Please forward it on to the PA pool ASAP.  She has tried and failed trulance- med failed to produce a BM even with use of stool softener, suppository and enema along with trulance.  She tried lactulose solutio- this made her nausea and gave her a HEADACHE and it also did not work. They will fax form within 10 min to the fax # [phone]  Jonna Gaitan PA-C

## 2022-02-10 NOTE — TELEPHONE ENCOUNTER
PA Initiation    Medication: linaclotide (LINZESS) 145 MCG capsule  Insurance Company: [employer] [place] - Phone [phone] Fax [phone]  Pharmacy Filling the Rx: [id number] - [place], [place] - [address] AT [facility] [address]  Filling Pharmacy Phone: [phone]  Filling Pharmacy Fax: [phone]  Start Date: 2/10/2022

## 2022-02-11 NOTE — TELEPHONE ENCOUNTER
Prior Authorization Approval    Authorization Effective Date: 2/10/2022  Authorization Expiration Date: 2/10/2023  Medication: linaclotide (LINZESS) 145 MCG capsule - APPROVED  Approved Dose/Quantity: 30 for 30 days  Reference #: [id number]   Insurance Company: [employer] [place] - Phone [phone] Fax [phone]  Which Pharmacy is filling the prescription (Not needed for infusion/clinic administered): [id number] [address] [facility] [address]  Pharmacy Notified: Yes  Patient Notified: Yes Pharmacy will notify patient once order is ready.

## 2022-02-22 ENCOUNTER — MYC MEDICAL ADVICE (OUTPATIENT)
Dept: FAMILY MEDICINE | Facility: OTHER | Age: 62
End: 2022-02-22

## 2022-03-03 ENCOUNTER — VIRTUAL VISIT (OUTPATIENT)
Dept: PSYCHOLOGY | Facility: CLINIC | Age: 62
End: 2022-03-03

## 2022-03-03 DIAGNOSIS — F41.1 GENERALIZED ANXIETY DISORDER: ICD-10-CM

## 2022-03-03 DIAGNOSIS — F43.10 POST TRAUMATIC STRESS DISORDER (PTSD): ICD-10-CM

## 2022-03-03 DIAGNOSIS — F33.2 SEVERE RECURRENT MAJOR DEPRESSION WITHOUT PSYCHOTIC FEATURES (H): Primary | ICD-10-CM

## 2022-03-03 PROCEDURE — 90834 PSYTX W PT 45 MINUTES: CPT | Mod: 95 | Performed by: COUNSELOR
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
## 2022-03-22 ENCOUNTER — TRANSFERRED RECORDS (OUTPATIENT)
Dept: HEALTH INFORMATION MANAGEMENT | Facility: CLINIC | Age: 62
End: 2022-03-22

## 2022-03-31 ENCOUNTER — VIRTUAL VISIT (OUTPATIENT)
Dept: PSYCHOLOGY | Facility: CLINIC | Age: 62
End: 2022-03-31

## 2022-03-31 DIAGNOSIS — F43.10 POST TRAUMATIC STRESS DISORDER (PTSD): ICD-10-CM

## 2022-03-31 DIAGNOSIS — F41.1 GENERALIZED ANXIETY DISORDER: ICD-10-CM

## 2022-03-31 DIAGNOSIS — F33.2 SEVERE RECURRENT MAJOR DEPRESSION WITHOUT PSYCHOTIC FEATURES (H): Primary | ICD-10-CM

## 2022-03-31 PROCEDURE — 90834 PSYTX W PT 45 MINUTES: CPT | Mod: 95 | Performed by: COUNSELOR
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
## 2022-04-24 ENCOUNTER — HEALTH MAINTENANCE LETTER (OUTPATIENT)
Age: 62
End: 2022-04-24

## 2022-04-25 ENCOUNTER — VIRTUAL VISIT (OUTPATIENT)
Dept: PSYCHOLOGY | Facility: CLINIC | Age: 62
End: 2022-04-25
Payer: COMMERCIAL

## 2022-04-25 DIAGNOSIS — F43.10 POST TRAUMATIC STRESS DISORDER (PTSD): ICD-10-CM

## 2022-04-25 DIAGNOSIS — F41.1 GENERALIZED ANXIETY DISORDER: ICD-10-CM

## 2022-04-25 DIAGNOSIS — F33.2 SEVERE RECURRENT MAJOR DEPRESSION WITHOUT PSYCHOTIC FEATURES (H): Primary | ICD-10-CM

## 2022-04-25 PROCEDURE — 90834 PSYTX W PT 45 MINUTES: CPT | Mod: 95 | Performed by: COUNSELOR
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
## 2022-04-28 ENCOUNTER — MYC MEDICAL ADVICE (OUTPATIENT)
Dept: FAMILY MEDICINE | Facility: OTHER | Age: 62
End: 2022-04-28
Payer: COMMERCIAL

## 2022-04-28 DIAGNOSIS — F41.1 GENERALIZED ANXIETY DISORDER: Primary | ICD-10-CM

## 2022-04-28 RX ORDER — HYDROXYZINE HYDROCHLORIDE 10 MG/1
10 TABLET, FILM COATED ORAL EVERY 4 HOURS PRN
Qty: 30 TABLET | Refills: 1 | Status: SHIPPED | OUTPATIENT
Start: 2022-04-28 | End: 2022-06-08

## 2022-05-06 ENCOUNTER — TRANSFERRED RECORDS (OUTPATIENT)
Dept: HEALTH INFORMATION MANAGEMENT | Facility: CLINIC | Age: 62
End: 2022-05-06
Payer: COMMERCIAL

## 2022-05-06 LAB — PHQ9 SCORE: 18

## 2022-05-22 DIAGNOSIS — Z12.31 ENCOUNTER FOR SCREENING MAMMOGRAM FOR BREAST CANCER: Primary | ICD-10-CM

## 2022-05-22 DIAGNOSIS — N95.1 MENOPAUSAL SYNDROME (HOT FLASHES): ICD-10-CM

## 2022-05-24 ENCOUNTER — VIRTUAL VISIT (OUTPATIENT)
Dept: PSYCHOLOGY | Facility: CLINIC | Age: 62
End: 2022-05-24
Payer: COMMERCIAL

## 2022-05-24 DIAGNOSIS — F33.2 SEVERE RECURRENT MAJOR DEPRESSION WITHOUT PSYCHOTIC FEATURES (H): Primary | ICD-10-CM

## 2022-05-24 DIAGNOSIS — F43.10 POST TRAUMATIC STRESS DISORDER (PTSD): ICD-10-CM

## 2022-05-24 DIAGNOSIS — F41.1 GENERALIZED ANXIETY DISORDER: ICD-10-CM

## 2022-05-24 PROCEDURE — 90834 PSYTX W PT 45 MINUTES: CPT | Mod: 95 | Performed by: COUNSELOR

## 2022-05-24 RX ORDER — ESTRADIOL 0.04 MG/D
PATCH TRANSDERMAL
Qty: 4 PATCH | Refills: 0 | Status: SHIPPED | OUTPATIENT
Start: 2022-05-24 | End: 2022-06-08

## 2022-05-24 NOTE — TELEPHONE ENCOUNTER
Pending Prescriptions:                       Disp   Refills    estradiol (CLIMARA) 0.0375 MG/24HR weekly *16 pat*         Sig: TAKE 1 PATCH ON SKIN ONCE WEEKLY    Routing refill request to provider for review/approval because:   Hormone Replacement Therapy Failed 05/24/2022 01:34 PM   Protocol Details  Patient has mammogram in past 2 years on file if age 50-75
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
## 2022-05-31 ENCOUNTER — TRANSFERRED RECORDS (OUTPATIENT)
Dept: HEALTH INFORMATION MANAGEMENT | Facility: CLINIC | Age: 62
End: 2022-05-31
Payer: COMMERCIAL

## 2022-06-08 ENCOUNTER — OFFICE VISIT (OUTPATIENT)
Dept: FAMILY MEDICINE | Facility: CLINIC | Age: 62
End: 2022-06-08
Payer: COMMERCIAL

## 2022-06-08 VITALS
SYSTOLIC BLOOD PRESSURE: 122 MMHG | DIASTOLIC BLOOD PRESSURE: 76 MMHG | RESPIRATION RATE: 14 BRPM | BODY MASS INDEX: 31.07 KG/M2 | TEMPERATURE: 98.3 F | HEART RATE: 83 BPM | OXYGEN SATURATION: 97 % | WEIGHT: 181 LBS

## 2022-06-08 DIAGNOSIS — Z12.11 SPECIAL SCREENING FOR MALIGNANT NEOPLASMS, COLON: ICD-10-CM

## 2022-06-08 DIAGNOSIS — R63.5 WEIGHT GAIN: ICD-10-CM

## 2022-06-08 DIAGNOSIS — L85.3 DRY SKIN: ICD-10-CM

## 2022-06-08 DIAGNOSIS — K21.9 GASTROESOPHAGEAL REFLUX DISEASE WITHOUT ESOPHAGITIS: ICD-10-CM

## 2022-06-08 DIAGNOSIS — N95.1 MENOPAUSAL SYNDROME (HOT FLASHES): ICD-10-CM

## 2022-06-08 DIAGNOSIS — Z12.11 SCREEN FOR COLON CANCER: ICD-10-CM

## 2022-06-08 DIAGNOSIS — Z12.31 ENCOUNTER FOR SCREENING MAMMOGRAM FOR BREAST CANCER: ICD-10-CM

## 2022-06-08 DIAGNOSIS — R53.83 FATIGUE, UNSPECIFIED TYPE: ICD-10-CM

## 2022-06-08 DIAGNOSIS — F41.1 GENERALIZED ANXIETY DISORDER: ICD-10-CM

## 2022-06-08 DIAGNOSIS — Z13.1 SCREENING FOR DIABETES MELLITUS: ICD-10-CM

## 2022-06-08 DIAGNOSIS — F32.1 MAJOR DEPRESSIVE DISORDER, SINGLE EPISODE, MODERATE (H): ICD-10-CM

## 2022-06-08 LAB
HBA1C MFR BLD: 5.3 % (ref 0–5.6)
TSH SERPL DL<=0.005 MIU/L-ACNC: 2.05 MU/L (ref 0.4–4)

## 2022-06-08 PROCEDURE — 84443 ASSAY THYROID STIM HORMONE: CPT | Performed by: PHYSICIAN ASSISTANT

## 2022-06-08 PROCEDURE — 82306 VITAMIN D 25 HYDROXY: CPT | Performed by: PHYSICIAN ASSISTANT

## 2022-06-08 PROCEDURE — 99214 OFFICE O/P EST MOD 30 MIN: CPT | Performed by: PHYSICIAN ASSISTANT

## 2022-06-08 PROCEDURE — 36415 COLL VENOUS BLD VENIPUNCTURE: CPT | Performed by: PHYSICIAN ASSISTANT

## 2022-06-08 PROCEDURE — 83036 HEMOGLOBIN GLYCOSYLATED A1C: CPT | Performed by: PHYSICIAN ASSISTANT

## 2022-06-08 RX ORDER — ESOMEPRAZOLE MAGNESIUM 40 MG/1
CAPSULE, DELAYED RELEASE ORAL
Qty: 90 CAPSULE | Refills: 3 | Status: SHIPPED | OUTPATIENT
Start: 2022-06-08 | End: 2023-06-09

## 2022-06-08 RX ORDER — ESTRADIOL 0.04 MG/D
PATCH TRANSDERMAL
Qty: 4 PATCH | Refills: 3 | Status: SHIPPED | OUTPATIENT
Start: 2022-06-08 | End: 2023-01-03

## 2022-06-08 RX ORDER — HYDROXYZINE HYDROCHLORIDE 10 MG/1
10 TABLET, FILM COATED ORAL EVERY 4 HOURS PRN
Qty: 30 TABLET | Status: SHIPPED | OUTPATIENT
Start: 2022-06-08

## 2022-06-08 ASSESSMENT — ASTHMA QUESTIONNAIRES
QUESTION_4 LAST FOUR WEEKS HOW OFTEN HAVE YOU USED YOUR RESCUE INHALER OR NEBULIZER MEDICATION (SUCH AS ALBUTEROL): NOT AT ALL
ACT_TOTALSCORE: 25
QUESTION_5 LAST FOUR WEEKS HOW WOULD YOU RATE YOUR ASTHMA CONTROL: COMPLETELY CONTROLLED
QUESTION_1 LAST FOUR WEEKS HOW MUCH OF THE TIME DID YOUR ASTHMA KEEP YOU FROM GETTING AS MUCH DONE AT WORK, SCHOOL OR AT HOME: NONE OF THE TIME
QUESTION_2 LAST FOUR WEEKS HOW OFTEN HAVE YOU HAD SHORTNESS OF BREATH: NOT AT ALL
ACT_TOTALSCORE: 25
QUESTION_3 LAST FOUR WEEKS HOW OFTEN DID YOUR ASTHMA SYMPTOMS (WHEEZING, COUGHING, SHORTNESS OF BREATH, CHEST TIGHTNESS OR PAIN) WAKE YOU UP AT NIGHT OR EARLIER THAN USUAL IN THE MORNING: NOT AT ALL

## 2022-06-08 ASSESSMENT — PAIN SCALES - GENERAL: PAINLEVEL: EXTREME PAIN (8)

## 2022-06-08 NOTE — PROGRESS NOTES
Assessment & Plan     Weight gain  She has gained 11 pounds in 6 months.  She will try to increase her exercise capacity, lift weights, continue her healthy diet but she should try to eat small amounts more frequently than she does we will rule out thyroid disease, if this is normal could consider referral to weight loss clinic  - TSH with free T4 reflex; Future  - TSH with free T4 reflex    Special screening for malignant neoplasms, colon  ordered    Encounter for screening mammogram for breast cancer  Patient will schedule  - *MA Screening Digital Bilateral; Future    Menopausal syndrome (hot flashes)  Meds refilled with the understanding that patient will complete mammogram  - estradiol (CLIMARA) 0.0375 MG/24HR weekly patch; TAKE 1 PATCH ON SKIN ONCE WEEKLY    Gastroesophageal reflux disease without esophagitis  Well-controlled continue current meds, recheck 1 year  - esomeprazole (NEXIUM) 40 MG DR capsule; TAKE 1 CAPSULE BY MOUTH EVERY MORNING 30 TO 60 MINUTES BEFORE A MEAL    Generalized anxiety disorder  Uses as needed, will refill she does not see psychiatry, pain management manages her duloxetine  - hydrOXYzine (ATARAX) 10 MG tablet; Take 1 tablet (10 mg) by mouth every 4 hours as needed for anxiety    Dry skin    Thyroid refused she already drinks plenty of water could consider omega-3 fatty acids  - TSH with free T4 reflex; Future  - TSH with free T4 reflex    Screening for diabetes mellitus    - Hemoglobin A1c; Future  - Hemoglobin A1c    Fatigue, unspecified type  We will rule out diabetes, thyroid disease and vitamin D deficiency  - Vitamin D Deficiency; Future  - Vitamin D Deficiency    Screen for colon cancer  Patient is overdue for screening will be scheduled  - Adult Gastro Ref - Procedure Only; Future    Major depressive disorder, single episode, moderate (H)  She is not suicidal no plans or intention, does not see psychiatry and manage her hydroxyzine pain clinic manages duloxetine but she does  "see counseling routinely       BMI:   Estimated body mass index is 31.07 kg/m  as calculated from the following:    Height as of 1/3/22: 1.626 m (5' 4\").    Weight as of this encounter: 82.1 kg (181 lb).   Weight management plan: Discussed healthy diet and exercise guidelines    Depression Screening Follow Up    PHQ 6/8/2022   PHQ-9 Total Score 19   Q9: Thoughts of better off dead/self-harm past 2 weeks Several days   F/U: Thoughts of suicide or self-harm No   F/U: Self harm-plan -   F/U: Self-harm action -   F/U: Safety concerns No   PHQ-9 External Data -     Last PHQ-9 6/8/2022   1.  Little interest or pleasure in doing things 3   2.  Feeling down, depressed, or hopeless 3   3.  Trouble falling or staying asleep, or sleeping too much 2   4.  Feeling tired or having little energy 2   5.  Poor appetite or overeating 2   6.  Feeling bad about yourself 3   7.  Trouble concentrating 2   8.  Moving slowly or restless 1   Q9: Thoughts of better off dead/self-harm past 2 weeks 1   PHQ-9 Total Score 19   Difficulty at work, home, or with people -   In the past two weeks have you had thoughts of suicide or self harm? No   Do you have concerns about your personal safety or the safety of others? No   In the past 2 weeks have you thought about a plan or had intention to harm yourself? -   In the past 2 weeks have you acted on these thoughts in any way? -         No flowsheet data found.      Follow Up      Follow Up Actions Taken  Crisis resource information provided in the After Visit Summary  Referred patient back to mental health provider    Discussed the following ways the patient can remain in a safe environment:        Return in about 6 months (around 12/8/2022), or if symptoms worsen or fail to improve.    Jonna Gaitan PA-C  [facility] KRISHNA Hernandez is a 61 year old who presents for the following health issues     History of Present Illness       Reason for visit:  Weight    She eats 2-3 " servings of fruits and vegetables daily.She consumes 0 sweetened beverage(s) daily.She exercises with enough effort to increase her heart rate 9 or less minutes per day.  She exercises with enough effort to increase her heart rate 3 or less days per week. She is missing 1 dose(s) of medications per week.  She is not taking prescribed medications regularly due to remembering to take.    Today's PHQ-9         PHQ-9 Total Score: 19    PHQ-9 Q9 Thoughts of better off dead/self-harm past 2 weeks :   Several days  Thoughts of suicide or self harm: (P) No  Self-harm Plan:     Self-harm Action:       Safety concerns for self or others: (P) No    How difficult have these problems made it for you to do your work, take care of things at home, or get along with other people: Very difficult     Patient reports she has had a hard time with weight gain since her hip surgery in October 2021.  She states she is physically having less pain so is more active.  She is riding an exercise bike 15 minutes every day.  She has not been doing any weight training states that she does not ride her bike she will walk her dog.  She eats very small amounts sometimes does not eat much at all due to her constipation and nausea that it causes.  When she does eat at [employer], very healthy food choices she does not drink sugary beverages does not eat junk food.  She is postmenopausal this was determined in 2017 with blood work.  She is recently been very fatigued which is not uncommon with her chronic pain but more so than usual.  She is also noticed very dry skin for past 6 months.\    She has thoughts that she would be better off dead but can never do anything to harm herself.  She has no suicidal plans or intention.  She has chronic pain from complex regional pain syndrome of the left lower extremity    Review of Systems   Constitutional, HEENT, cardiovascular, pulmonary, gi and gu systems are negative, except as otherwise noted.      Objective     /76   Pulse 83   Temp 98.3  F (36.8  C) (Temporal)   Resp 14   Wt 82.1 kg (181 lb)   LMP  (LMP Unknown)   SpO2 97%   BMI 31.07 kg/m    Body mass index is 31.07 kg/m .  Physical Exam   GENERAL: healthy, alert and no distress  NECK: no adenopathy, no asymmetry, masses, or scars and thyroid normal to palpation  RESP: lungs clear to auscultation - no rales, rhonchi or wheezes  CV: regular rate and rhythm, normal S1 S2, no S3 or S4, no murmur, click or rub, no peripheral edema and peripheral pulses strong  ABDOMEN: soft, nontender, no hepatosplenomegaly, no masses and bowel sounds normal  MS: no gross musculoskeletal defects noted, no edema  PSYCH: mentation appears normal, affect normal/bright    No results found for any visits on 06/08/22.

## 2022-06-09 LAB — DEPRECATED CALCIDIOL+CALCIFEROL SERPL-MC: 32 UG/L (ref 20–75)

## 2022-06-13 ENCOUNTER — TELEPHONE (OUTPATIENT)
Dept: GASTROENTEROLOGY | Facility: CLINIC | Age: 62
End: 2022-06-13
Payer: COMMERCIAL

## 2022-06-13 ENCOUNTER — HOSPITAL ENCOUNTER (OUTPATIENT)
Facility: CLINIC | Age: 62
End: 2022-06-13
Attending: SPECIALIST | Admitting: SPECIALIST
Payer: COMMERCIAL

## 2022-06-13 DIAGNOSIS — Z12.11 SPECIAL SCREENING FOR MALIGNANT NEOPLASMS, COLON: Primary | ICD-10-CM

## 2022-06-13 NOTE — TELEPHONE ENCOUNTER
Screening Questions  BlueKIND OF PREP RedLOCATION [review exclusion criteria] GreenSEDATION TYPE      1. Are you able to give consent for your medical care? Do you have a legal guardian or medical Power of ?  Y SELF (Sedation review/consideration needed)    2. Have you had a positive covid test in the last 90 days? N  a. If yes, what date?    3. Are you active on mychart? Y    4. What insurance is in the chart? BLUE PLUS     3.   Ordering/Referring Provider: Jonna Gaitan    4. BMI 31.07 [BMI OVER 40-EXTENDED PREP]  If greater than 40 review exclusion criteria [PAC APPT IF @ [facility]]        5.  Respiratory Screening :  [If yes to any of the following HOSPITAL setting only]     Do you use daily home oxygen? N    Do you have mod to severe Obstructive Sleep Apnea? N  [OKAY @ [facility] SH PH RI]   Do you have Pulmonary Hypertension? N     Do you have UNCONTROLLED asthma? N        6.   Have you had a heart or lung transplant? N      7.   Are you currently on dialysis? N [ If yes, G-PREP & HOSPITAL setting only]     8.   Do you have chronic kidney disease? N [ If yes, G-PREP ]    9.   Have you had a stroke or Transient ischemic attack (TIA - aka  mini stroke ) within 6 months?  N (If yes, please review exclusion criteria)    10.   In the past 6 months, have you had any heart related issues including cardiomyopathy or heart attack? N           If yes, did it require cardiac stenting or other implantable device? N      11.   Do you have any implantable devices in your body (pacemaker, defib, LVAD)? N (If yes, please review exclusion criteria)     12.   Do you take nitroglycerin? N           If yes, how often? N  (if yes, HOSPITAL setting ONLY)    13.   Are you currently taking any blood thinners? N           [IF YES, INFORM PATIENT TO FOLLOW UP W/ ORDERING PROVIDER FOR BRIDGING INSTRUCTIONS]     14.   Do you have a diagnosis of diabetes? N   [ If yes, G-PREP ]    15.   [FEMALES] Are you currently pregnant?      If yes, how many weeks?     16.   Are you taking any prescription pain medications on a routine schedule?  Y FEteynal PATCH AND OXY DAILY [ If yes, EXTENDED PREP.] [If yes, MAC]    17.   Do you have any chemical dependencies such as alcohol, street drugs, or methadone?  N [If yes, MAC]    18.   Do you have any history of post-traumatic stress syndrome, severe anxiety or history of psychosis?  ANXIETY AND PTSD  [If yes, MAC]    19.   Do you transfer independently?  Y    20.  On a regular basis do you go 3-5 days between bowel movements? Y  [ If yes, EXTENDED PREP.]    21.   Preferred LOCAL Pharmacy for Pre Prescription     [facility]      Scheduling Details      Caller : Linda Walsh  (Please ask for phone number if not scheduled by patient)    Type of Procedure Scheduled: colon  Which Colonoscopy Prep was Sent?: extended  KHORUTS CF PATIENTS & GROEN'S PATIENTS REQUIRE EXTENDED PREP  Surgeon: Nacho  Date of Procedure: 8/23.22  Location:       Sedation Type: MAC  Conscious Sedation- Needs  for 6 hours after the procedure  MAC/General-Needs  for 24 hours after procedure    Pre-op Required at [facility] [place], [place] and OR for MAC sedation: n  (advise patient they will need a pre-op prior to procedure -)      Informed patient they will need an adult  y  Cannot take any type of public or medical transportation alone    Pre-Pro[facility] or Externally: at home test    Confirmed Nurse will call to complete assessment y    Additional comments:

## 2022-06-14 ENCOUNTER — MYC MEDICAL ADVICE (OUTPATIENT)
Dept: FAMILY MEDICINE | Facility: CLINIC | Age: 62
End: 2022-06-14
Payer: COMMERCIAL

## 2022-06-14 NOTE — TELEPHONE ENCOUNTER
Patient Quality Outreach    Patient is due for the following:   Colon Cancer Screening -  Colonoscopy  Breast Cancer Screening - Mammogram  Depression  -  PHQ-9 Needed  Physical  - due now  Immunizations  -  Covid and Zoster    NEXT STEPS:   Schedule a yearly physical    Type of outreach:    Sent Cleo message.      Questions for provider review:    None     Carmencita Vela, [facility]  Chart routed to Care Team.

## 2022-06-21 NOTE — TELEPHONE ENCOUNTER
Patient Quality Outreach    Patient is due for the following:   Colon Cancer Screening -  Colonoscopy  Breast Cancer Screening - Mammogram  Depression  -  PHQ-9 Needed  Physical  - due now  Immunizations  -  Covid and Zoster    Type of outreach:    Phone, spoke to patient/parent. who stated colonoscopy and mammogram are scheduled. PT stated they would log into their My Chart for mood questionnaire and to schedule physcial when able      Questions for provider review:    None     Jing Cardenas  Encountered closed

## 2022-06-28 ENCOUNTER — TRANSFERRED RECORDS (OUTPATIENT)
Dept: HEALTH INFORMATION MANAGEMENT | Facility: CLINIC | Age: 62
End: 2022-06-28

## 2022-06-28 LAB — PHQ9 SCORE: 22

## 2022-07-06 DIAGNOSIS — Z96.641 STATUS POST TOTAL REPLACEMENT OF RIGHT HIP: ICD-10-CM

## 2022-07-07 RX ORDER — BISACODYL 10 MG
SUPPOSITORY, RECTAL RECTAL
Qty: 12 SUPPOSITORY | Refills: 1 | Status: SHIPPED | OUTPATIENT
Start: 2022-07-07 | End: 2022-10-10

## 2022-07-09 ENCOUNTER — MYC MEDICAL ADVICE (OUTPATIENT)
Dept: FAMILY MEDICINE | Facility: CLINIC | Age: 62
End: 2022-07-09

## 2022-07-11 ENCOUNTER — VIRTUAL VISIT (OUTPATIENT)
Dept: PSYCHOLOGY | Facility: CLINIC | Age: 62
End: 2022-07-11
Payer: COMMERCIAL

## 2022-07-11 DIAGNOSIS — F33.2 SEVERE RECURRENT MAJOR DEPRESSION WITHOUT PSYCHOTIC FEATURES (H): Primary | ICD-10-CM

## 2022-07-11 DIAGNOSIS — F41.1 GENERALIZED ANXIETY DISORDER: ICD-10-CM

## 2022-07-11 DIAGNOSIS — F43.10 POST TRAUMATIC STRESS DISORDER (PTSD): ICD-10-CM

## 2022-07-11 PROCEDURE — 90834 PSYTX W PT 45 MINUTES: CPT | Mod: 95 | Performed by: COUNSELOR

## 2022-07-11 NOTE — PROGRESS NOTES
"        [facility]                                     Progress Note    Patient Name: Linda Walsh  Date: 7/11/22         Service Type: Individual      Session Start Time: 1:30pm  Session End Time: 2:20pm     Session Length: 50    Session #: 76    Attendees: Client    Service Modality:  Phone Visit:      Provider verified identity through the following two step process.  Patient provided:  Patient is known previously to provider    The patient has been notified of the following:      \"We have found that certain health care needs can be provided without the need for a face to face visit.  This service lets us provide the care you need with a phone conversation.       I will have full access to your [facility] medical record during this entire phone call.   I will be taking notes for your medical record.      Since this is like an office visit, we will bill your insurance company for this service.       There are potential benefits and risks of telephone visits (e.g. limits to patient confidentiality) that differ from in-person visits.?Confidentiality still applies for telephone services, and nobody will record the visit.  It is important to be in a quiet, private space that is free of distractions (including cell phone or other devices) during the visit.??      If during the course of the call I believe a telephone visit is not appropriate, you will not be charged for this service\"     Consent has been obtained for this service by care team member: Yes     DATA  Interactive Complexity: No  Crisis: No        Progress Since Last Session (Related to Symptoms / Goals / Homework):   Symptoms: Worsening .    Homework: Completed in session      Episode of Care Goals: Minimal progress - ACTION (Actively working towards change); Intervened by reinforcing change plan / affirming steps taken     Current / Ongoing Stressors and Concerns:   The client stated her  has a blood clot. They also " lot all of his workmans comp benefits.  She stated she and her  are going to do couples counseling starting this week through [facility].      Treatment Objective(s) Addressed in This Session:   use thought-stopping strategy daily to reduce intrusive thoughts       Intervention:   Emotion Focused Therapy: active listening, validation, active reflecting    Assessments completed prior to visit:  The following assessments were completed by patient for this visit:  PHQA: No flowsheet data found.  GAD7:   MARLIN-7 SCORE 7/11/2019 11/18/2019 12/30/2019 2/26/2020 5/27/2020 7/28/2020 2/24/2021   Total Score - - - - - - -   Total Score 21 (severe anxiety) 20 (severe anxiety) 19 (severe anxiety) - - 19 (severe anxiety) 16 (severe anxiety)   Total Score 21 20 19 19 21 19 16     PROMIS 10-Global Health (all questions and answers displayed): No flowsheet data found.      ASSESSMENT: Current Emotional / Mental Status (status of significant symptoms):   Risk status (Self / Other harm or suicidal ideation)   Patient denies current fears or concerns for personal safety.   Patient denies current or recent suicidal ideation or behaviors.   Patient denies current or recent homicidal ideation or behaviors.   Patient denies current or recent self injurious behavior or ideation.   Patient denies other safety concerns.   Patient reports there has been no change in risk factors since their last session.     Patient reports there has been no change in protective factors since their last session.     Recommended that patient call 911 or go to the local ED should there be a change in any of these risk factors.     Appearance:   Appropriate    Eye Contact:   Good    Psychomotor Behavior: Normal    Attitude:   Cooperative    Orientation:   All   Speech    Rate / Production: Normal/ Responsive    Volume:  Normal    Mood:    Normal   Affect:    Appropriate    Thought Content:  Clear    Thought Form:  Coherent  Logical    Insight:    Good       Medication Review:   No changes to current psychiatric medication(s)     Medication Compliance:   Yes     Changes in Health Issues:   None reported     Chemical Use Review:   Substance Use: Chemical use reviewed, no active concerns identified      Tobacco Use: No change in amount of tobacco use since last session.  Patient declined discussion at this time    Diagnosis:  1. 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 Generalized anxiety disorder    3. Post traumatic stress disorder (PTSD)        Collateral Reports Completed:   Not Applicable    PLAN: (Patient Tasks / Therapist Tasks / Other)  Client to continue to work on stress reduction skills.         Nasrin Valladares, [facility]                                                         ______________________________________________________________________    Individual Treatment Plan    Patient's Name: Linda Walsh  YOB: 1960    Date of Creation: 7/11/22  Date Treatment Plan Last Reviewed/Revised: 7/11/22    DSM5 Diagnoses: 296.33 (F33.2) Major Depressive Disorder, Recurrent Episode, Severe _, 300.02 (F41.1) Generalized Anxiety Disorder or 309.81 (F43.10) Posttraumatic Stress Disorder (includes Posttraumatic Stress Disorder for Children 6 Years and Younger)  Without dissociative symptoms  Psychosocial / Contextual Factors: CRPS  PROMIS (reviewed every 90 days):     Referral / Collaboration:  Referral to another professional/service is not indicated at this time..    Anticipated number of session for this episode of care: on-going  Anticipation frequency of session: Monthly  Anticipated Duration of each session: 38-52 minutes/53+ minutes  Treatment plan will be reviewed in 90 days or when goals have been changed.       MeasurableTreatment Goal(s) related to diagnosis / functional impairment(s)  Goal 1: Client will decrease thoughts of wanting to be dead from 10 out of 10 opportunities to at most 9 out of 10  opportunities for at least 3 consecutive weeks as evidenced by client report and a decrease in symptom report as evidenced by PhQ9 scores and GAD7 scores.    Objective #A (Client Action)    Client will Client will look at and read safety plan at least once a day and follow safety plan when thoughts and urges come up.  Status: Continued - Date(s): 7/11/22    Intervention(s)  Therapist will teach emotional regulation skills. distress tolerance skills, interpersonal effectiveness skills, emotion regluation skills, mindfulness skills, radical acceptance. Therapist will develop safety plan with the client.     Objective #B  Client will Client will agree to call the therapist or after hours crisis line if noticing intense suicidal thoughts for skills coaching.   Status: Continued - Date(s): 7/11/22    Intervention(s)  Therapist will Therapist will be available as much as possible during work hours for the client to contact and give the client several crisis resources.         Goal 2: Client will increase the use of skills (emotion regulation, distress tolerance, communication skill) from 1 out of 10 opportunities to at least 2 out of 10 opportunities for at least 3 consecutive weeks as evidenced by client report and a decrease in symptom report as evidenced by PhQ9 scores and GAD7 scores.     Objective #A (Client Action)    Status: Continued - Date(s): 7/11/22    Client will Increase interest, engagement, and pleasure in doing things  Decrease frequency and intensity of feeling down, depressed, hopeless  Improve diet, appetite, mindful eating, and / or meal planning  Identify negative self-talk and behaviors: challenge core beliefs, myths, and actions  Improve concentration, focus, and mindfulness in daily activities   Feel less fidgety, restless or slow in daily activities / interpersonal interactions  Decrease thoughts that you'd be better off dead or of suicide / self-harm.    Intervention(s)  Therapist will teach  emotional regulation skills. distress tolerance skills, interpersonal effectiveness skills, emotion regluation skills, mindfulness skills, radical acceptance. Therapist will teach client how to ID body cues for anxiety, anxiety reduction techniques, how to ID triggers for depression and anxiety- decrease reactivity/eliminate, lifestyle changes to reduce depression and anxiety, communication skills, explore cognitive beliefs and help client to develop healthy cognitive patterns and beliefs.    Objective #B  Client will Client will learn and  practice DBT skills daily..    Status: Continued - Date(s): 7/11/22    Intervention(s)  Therapist will Therapist will Therapist will teach emotional regulation skills. distress tolerance skills, interpersonal effectiveness skills, emotion regluation skills, mindfulness skills, radical acceptance..      Goal 3: Client will decrease anxious symptoms and reactions to triggers from 9 out of 10 opportunities to at most 8 out of 10 opportunities for at least 3 consecutive weeks as evidenced by client report and a decrease in symptom report as evidenced by PhQ9 scores and GAD7 scores.    Objective #A (Client Action)    Status: Continued - Date(s): 7/11/22    Client will Client will use cognitive strategies identified in therapy to challenge anxious thoughts.    Intervention(s)  Therapist will Therapist will teach emotional recognition/identification. emotion regulation skills, coping skills, mindfulness skills.    Objective #B  Client will Client will use thought-stopping strategy daily to reduce intrusive thoughts for at least 3 consecutive weeks as evidenced by client report and a decrease in symptom report as evidenced by PhQ9 scores and GAD7 scores.      Status: Continued - Date(s): 7/11/22    Intervention(s)  Therapist will teach emotional regulation skills. distress tolerance skills, interpersonal effectiveness skills, emotion regluation skills, mindfulness skills, radical  "acceptance. Therapist will teach client how to ID body cues for anxiety, anxiety reduction techniques, how to ID triggers for depression and anxiety- decrease reactivity/eliminate, lifestyle changes to reduce depression and anxiety, communication skills, explore cognitive beliefs and help client to develop healthy cognitive patterns and beliefs.    Objective #C  Client will Client will learn 5 crisis survival skills during the Distress Tolerance Module (6-8 weeks) for at least 3 consecutive weeks as evidenced by client report and a decrease in symptom report as evidenced by PhQ9 scores and GAD7 scores.  Status: Continued - Date(s): 7/11/22    Intervention(s)  Therapist will teach emotional regulation skills. distress tolerance skills, interpersonal effectiveness skills, emotion regluation skills, mindfulness skills, radical acceptance.      Client has reviewed and agreed to the above plan.      Nasrin Valladares, [facility]        Linda Walsh     SAFETY PLAN:  Step 1: Warning signs / cues (Thoughts, images, mood, situation, behavior) that a crisis may be developing:    Thoughts: \"I don't matter\", \"People would be better off without me\", \"I'm a burden\", \"I can't do this anymore\", \"I just want this to end\" and \"Nothing makes it better\"    Images: obsessive thoughts of death or dying: car accident, using a gun and flashbacks    Thinking Processes: ruminations (can't stop thinking about my problems): court case, pain, racing thoughts, highly critical and negative thoughts: \"I can't do anything, I have to suffer everyday and go to work and other people have it so easy.\"  and paranoia: that the [employer] is watching me    Mood: worsening depression, hopelessness, helplessness, intense anger, intense worry, agitation, disinhibited (not caring about things or consequences) and mood swings    Behaviors: isolating/withdrawing , can't stop crying, not taking care of myself, not taking care of my responsibilities, sleeping " "too much and not sleeping enough    Situations: legal issues: current court case, pain, trauma , financial stress and medical condition / diagnosis: CRPS   Step 2: Coping strategies - Things I can do to take my mind off of my problems without contacting another person (relaxation technique, physical activity):    Distress Tolerance Strategies:  arts and crafts: with her residents, play with my pet , pray, change body temperature (ice pack/cold water) , paced breathing/progressive muscle relaxation and puzzles, games on ipad    Physical Activities: deep breathing    Focus on helpful thoughts:  \"Ride the wave\", think about happy memories: when the grandkids were born, going camping, when the boys were little and remind myself of what is important to me: grandkids, family, the residents  Step 3: People and social settings that provide distraction:   Name: Alpesh  Phone: [phone]   Name: Velasquez  Phone:    Name: Thaddeus  Phone:     work   Step 4: Remind myself of people and things that are important to me and worth living for:    My family, my residents    Step 5: When I am in crisis, I can ask these people to help me use my safety plan:   Name: Alpesh  Phone: [phone]   Name: Velasquez  Phone: (number in phone)   Name: Thaddeus  Phone: (number in phone)  Step 6: Making the environment safe:     be around others  Step 7: Professionals or agencies I can contact during a crisis:    [facility] Number: [phone]    Suicide Prevention Lifeline: 6-765-450-TALK (9926)    Crisis Text Line Service (available 24 hours a day, 7 days a week): Text [place] to 218055  Local Crisis Services:     Call 911 or go to my nearest emergency department.   I helped develop this safety plan and agree to use it when needed.  I have been given a copy of this plan.      Client signature _________________________________________________________________  Today s date: 7/11/22  Adapted from Safety Plan Template 2008 Griselda Perez " and Livan Cutler is reprinted with the express permission of the authors.  No portion of the Safety Plan Template may be reproduced without the express, written permission.  You can contact the authors at [email or web address] or [email or web address].

## 2022-07-11 NOTE — TELEPHONE ENCOUNTER
Responded via [employer].    Brissa Garcia RN  [facility] ~ Registered Nurse  Clinic Triage ~ [place] [facility] [place]  July 11, 2022

## 2022-07-12 NOTE — TELEPHONE ENCOUNTER
Clinical Insight message sent  Appointment scheduled   Next 5 appointments (look out 90 days)    Jul 13, 2022 10:30 AM  (Arrive by 10:10 AM)  Provider Visit with Jonna Gaitan PA-C  [facility] ([facility] [place] ) [address], [address]  [place] [address]  [phone]   Aug 10, 2022 12:30 PM  (Arrive by 12:15 PM)  Screening Mammogram with PHMA1  [facility] ([facility] ) [address] [place] [id number]  [phone]          Quita Vallejo RT (R)

## 2022-07-13 ENCOUNTER — OFFICE VISIT (OUTPATIENT)
Dept: FAMILY MEDICINE | Facility: CLINIC | Age: 62
End: 2022-07-13
Payer: COMMERCIAL

## 2022-07-13 ENCOUNTER — ANCILLARY PROCEDURE (OUTPATIENT)
Dept: GENERAL RADIOLOGY | Facility: CLINIC | Age: 62
End: 2022-07-13
Attending: PHYSICIAN ASSISTANT
Payer: COMMERCIAL

## 2022-07-13 VITALS
BODY MASS INDEX: 31.05 KG/M2 | HEART RATE: 73 BPM | SYSTOLIC BLOOD PRESSURE: 118 MMHG | DIASTOLIC BLOOD PRESSURE: 74 MMHG | OXYGEN SATURATION: 98 % | TEMPERATURE: 97.8 F | RESPIRATION RATE: 16 BRPM | HEIGHT: 64 IN

## 2022-07-13 DIAGNOSIS — M54.2 NECK PAIN: ICD-10-CM
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
## 2022-07-13 DIAGNOSIS — Z12.11 SCREEN FOR COLON CANCER: ICD-10-CM

## 2022-07-13 DIAGNOSIS — M25.512 ACUTE PAIN OF LEFT SHOULDER: Primary | ICD-10-CM

## 2022-07-13 DIAGNOSIS — Z13.220 SCREENING FOR HYPERLIPIDEMIA: ICD-10-CM

## 2022-07-13 PROCEDURE — 99214 OFFICE O/P EST MOD 30 MIN: CPT | Performed by: PHYSICIAN ASSISTANT

## 2022-07-13 PROCEDURE — 72040 X-RAY EXAM NECK SPINE 2-3 VW: CPT | Mod: TC | Performed by: RADIOLOGY

## 2022-07-13 ASSESSMENT — ANXIETY QUESTIONNAIRES
6. BECOMING EASILY ANNOYED OR IRRITABLE: NEARLY EVERY DAY
2. NOT BEING ABLE TO STOP OR CONTROL WORRYING: NEARLY EVERY DAY
1. FEELING NERVOUS, ANXIOUS, OR ON EDGE: NEARLY EVERY DAY
7. FEELING AFRAID AS IF SOMETHING AWFUL MIGHT HAPPEN: NEARLY EVERY DAY
3. WORRYING TOO MUCH ABOUT DIFFERENT THINGS: NEARLY EVERY DAY
4. TROUBLE RELAXING: NEARLY EVERY DAY
GAD7 TOTAL SCORE: 21
5. BEING SO RESTLESS THAT IT IS HARD TO SIT STILL: NEARLY EVERY DAY
8. IF YOU CHECKED OFF ANY PROBLEMS, HOW DIFFICULT HAVE THESE MADE IT FOR YOU TO DO YOUR WORK, TAKE CARE OF THINGS AT HOME, OR GET ALONG WITH OTHER PEOPLE?: VERY DIFFICULT
GAD7 TOTAL SCORE: 21
7. FEELING AFRAID AS IF SOMETHING AWFUL MIGHT HAPPEN: NEARLY EVERY DAY
GAD7 TOTAL SCORE: 21

## 2022-07-13 ASSESSMENT — PAIN SCALES - GENERAL: PAINLEVEL: SEVERE PAIN (7)

## 2022-07-13 ASSESSMENT — PATIENT HEALTH QUESTIONNAIRE - PHQ9
10. IF YOU CHECKED OFF ANY PROBLEMS, HOW DIFFICULT HAVE THESE PROBLEMS MADE IT FOR YOU TO DO YOUR WORK, TAKE CARE OF THINGS AT HOME, OR GET ALONG WITH OTHER PEOPLE: EXTREMELY DIFFICULT
SUM OF ALL RESPONSES TO PHQ QUESTIONS 1-9: 17
SUM OF ALL RESPONSES TO PHQ QUESTIONS 1-9: 17

## 2022-07-13 NOTE — PROGRESS NOTES
Assessment & Plan     Acute pain of left shoulder  She will do pendulum exercises, ice, heat and use pain meds as prescribed by [facility].  She will follow-up with spine if she is not improving, we could consider MRI of the shoulder if it is not improving and/or physical therapy  - XR Shoulder Left G/E 3 Views; Future    Neck pain  She will try lidocaine patches and try to loosen up her muscles, following up with eye spine as needed  - XR Cervical Spine 2/3 Views; Future    Fall, initial encounter    - XR Cervical Spine 2/3 Views; Future  - XR Shoulder Left G/E 3 Views; Future    Screen for colon cancer  Patient already has a scheduled          Return in about 1 month (around 8/13/2022), or if symptoms worsen or fail to improve.    Jonna Gaitan PA-C  [facility] KRISHNA Hernandez is a 61 year old, presenting for the following health issues:  Musculoskeletal Problem      Musculoskeletal Problem    History of Present Illness       Reason for visit:  I fell, shoulder, back ,neck hurt  Symptom onset:  1-3 days ago  Symptoms include:  Pain  Symptom intensity:  Severe  Symptom progression:  Staying the same  Had these symptoms before:  No  What makes it worse:  Certain movements  What makes it better:  Not laying down    She eats 4 or more servings of fruits and vegetables daily.She consumes 0 sweetened beverage(s) daily.She exercises with enough effort to increase her heart rate 10 to 19 minutes per day.  She exercises with enough effort to increase her heart rate 3 or less days per week.   She is taking medications regularly.    Today's PHQ-9         PHQ-9 Total Score: 17    PHQ-9 Q9 Thoughts of better off dead/self-harm past 2 weeks :   Not at all    How difficult have these problems made it for you to do your work, take care of things at home, or get along with other people: Extremely difficult  Today's MARLIN-7 Score: 21     Patient reports falling 5 days ago.  She was attempting to sit in her  "'s hammock type chair when she fell out of bed as it collapsed landing on her buttocks with her arms breaking her fall out to either side.  She feels that she jammed her already painful and arthritic thumbs into the floor.  In falling she felt her shoulder got jammed in her neck also became perhaps sprained.  She is most concerned with her shoulder however.  She gets a lot of pain with any range of motion.  She mainly feels pain in her anterior shoulder she has been using icy hot her fentanyl pain patches as prescribed by spine and ibuprofen.  She is alarmed because none of this has been improving her pain.  She does have lidocaine patches that she is planning to try on her neck to see if that will help.    Review of Systems   Constitutional, HEENT, cardiovascular, pulmonary, gi and gu systems are negative, except as otherwise noted.      Objective    /74   Pulse 73   Temp 97.8  F (36.6  C) (Temporal)   Resp 16   Ht 1.626 m (5' 4.02\")   LMP  (LMP Unknown)   SpO2 98%   BMI 31.05 kg/m    Body mass index is 31.05 kg/m .  Physical Exam   GENERAL: healthy, alert and no distress  NECK: no adenopathy, no asymmetry, masses, or scars, thyroid normal to palpation and range of motion is limited by pain, she is diffusely tender to palpation over her cervical vertebrae and the musculature of the trapezius and particularly at the base of her skull  MS: LUE exam shows range of motion is significantly limited by pain.  She is tender to palpation over her AC joint acromion and superior spine of the scapula, she is also very tender to palpation over the anterior subacromial bursa and into the deltoid musculature.  Range of motion of the elbow and wrists are normal.  Thumbs are without erythema or edema and her range of motion is normal.  PSYCH: mentation appears normal, affect normal/bright    Results for orders placed or performed in visit on 07/13/22   XR Shoulder Left G/E 3 Views     Status: None (Preliminary " result)    Narrative    LEFT SHOULDER THREE OR MORE VIEWS  7/13/2022 11:17 AM     HISTORY: Acute pain of left shoulder. Fall, initial encounter.    COMPARISON: None.     FINDINGS:   There is no fracture.  The humeral head is well located  within the glenoid fossa.  Glenohumeral, acromioclavicular, and  coracoclavicular spaces are well maintained.  Visualized portions of  the adjacent lung are clear. Small osteophytes are seen around the  humeral head.      Impression    IMPRESSION:  Mild degenerative changes of the glenohumeral joint. No  acute fracture or malalignment is identified. If there is clinical  concern for rotator cuff or other soft tissue pathology of the  shoulder, further evaluation with MRI may be helpful.   Results for orders placed or performed in visit on 07/13/22   XR Cervical Spine 2/3 Views     Status: None (Preliminary result)    Narrative    CERVICAL SPINE TWO TO THREE VIEWS   7/13/2022 11:19 AM     HISTORY: Neck pain. Fall, initial encounter.    COMPARISON: Cervical spine MRI dated 12/8/2008.    FINDINGS:  The cervical spine is visualized from the craniocervical  junction through the  cervical thoracic junction on the lateral view.  Previously noted reversal of the normal cervical lordosis is not seen  on today's study. There is disc height loss from C4 through C7, worst  at C5-C6 and at C6-C7. Anterior spondylotic spurring is seen at all of  these levels. Otherwise the vertebral bodies, disc spaces, alignment,  prevertebral soft tissues, odontoid process, and lateral masses of C1  are grossly within normal limits. No evidence for fracture or  malalignment is seen. Visualized portions of the lung apices are  clear.       Impression    IMPRESSION:  Multilevel degenerative changes of the cervical spine. No  acute fracture or malalignment is identified. If there is clinical  concern for disc pathology as etiology for patient's symptoms, further  evaluation with cervical spine MRI may be helpful.                     .  ..

## 2022-07-18 ENCOUNTER — TELEPHONE (OUTPATIENT)
Dept: FAMILY MEDICINE | Facility: OTHER | Age: 62
End: 2022-07-18

## 2022-07-18 NOTE — TELEPHONE ENCOUNTER
PA needed on: esomeprazole 40  Insurance: [employer]  Ins. Phone: [phone]  Patient ID: [id number]  PCN: [id number]  BIN: [id number]    Please let us know when PA is granted/denied. Thank You      [facility], [place]

## 2022-07-19 NOTE — TELEPHONE ENCOUNTER
Central Prior Authorization Team - Phone: [phone]     PA Initiation    Medication: esomeprazole (NEXIUM) 40 MG  capsule- PA INITIATED  Insurance Company:    Pharmacy Filling the Rx: [address]   Filling Pharmacy Phone: [phone]  Filling Pharmacy Fax:    Start Date: 7/19/2022

## 2022-07-20 NOTE — TELEPHONE ENCOUNTER
PRIOR AUTHORIZATION DENIED    Medication: esomeprazole (NEXIUM) 40 MG DR capsule- DENIED     Denial Date: 7/20/2022    Denial Rational: Plan does not allow more than 120 days/ year of this medication for the pt's diagnosis.        Appeal Information: If provider would like to appeal please provide a letter of medical necessity.

## 2022-07-22 ENCOUNTER — MYC MEDICAL ADVICE (OUTPATIENT)
Dept: FAMILY MEDICINE | Facility: CLINIC | Age: 62
End: 2022-07-22

## 2022-07-22 DIAGNOSIS — M25.512 ACUTE PAIN OF LEFT SHOULDER DUE TO TRAUMA: Primary | ICD-10-CM

## 2022-07-22 DIAGNOSIS — G89.11 ACUTE PAIN OF LEFT SHOULDER DUE TO TRAUMA: Primary | ICD-10-CM

## 2022-07-25 ENCOUNTER — VIRTUAL VISIT (OUTPATIENT)
Dept: PSYCHOLOGY | Facility: CLINIC | Age: 62
End: 2022-07-25
Payer: COMMERCIAL

## 2022-07-25 ENCOUNTER — TELEPHONE (OUTPATIENT)
Dept: FAMILY MEDICINE | Facility: CLINIC | Age: 62
End: 2022-07-25

## 2022-07-25 ENCOUNTER — MYC MEDICAL ADVICE (OUTPATIENT)
Dept: FAMILY MEDICINE | Facility: CLINIC | Age: 62
End: 2022-07-25

## 2022-07-25 DIAGNOSIS — F43.10 POST TRAUMATIC STRESS DISORDER (PTSD): ICD-10-CM

## 2022-07-25 DIAGNOSIS — F33.2 SEVERE RECURRENT MAJOR DEPRESSION WITHOUT PSYCHOTIC FEATURES (H): Primary | ICD-10-CM

## 2022-07-25 DIAGNOSIS — F41.1 GENERALIZED ANXIETY DISORDER: ICD-10-CM

## 2022-07-25 PROCEDURE — 90837 PSYTX W PT 60 MINUTES: CPT | Mod: 95 | Performed by: COUNSELOR

## 2022-07-25 NOTE — TELEPHONE ENCOUNTER
Prior Authorization Retail Medication Request    Medication/Dose: esomeprazole (NEXIUM) 40 MG DR capsule  ICD code (if different than what is on RX):    Previously Tried and Failed:    Rationale:      Insurance Name:  [employer]   Insurance ID:  [id number]       Pharmacy Information (if different than what is on RX)  Name: [facility]   Phone: [phone]

## 2022-07-25 NOTE — TELEPHONE ENCOUNTER
"Pharmacy states that RX sent on 6/8/22 was \"closed\"     Closed because it was transferred to [facility] in [place].     esomeprazole (NEXIUM) 40 MG DR capsule    Insurance only covers 120 day supply every 356 days without a PA with diagnosis of needed more often.     Provider do you want to change dose or start PA?    ZANE Russo, RN  [facility]  July 25, 2022    "
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
## 2022-07-26 ENCOUNTER — MYC MEDICAL ADVICE (OUTPATIENT)
Dept: FAMILY MEDICINE | Facility: CLINIC | Age: 62
End: 2022-07-26

## 2022-07-26 DIAGNOSIS — M25.512 ACUTE PAIN OF LEFT SHOULDER DUE TO TRAUMA: Primary | ICD-10-CM

## 2022-07-26 DIAGNOSIS — G89.11 ACUTE PAIN OF LEFT SHOULDER DUE TO TRAUMA: Primary | ICD-10-CM

## 2022-07-26 NOTE — TELEPHONE ENCOUNTER
PA for this medication was submitted and denied in encounter dated 07/18/2022. If you would like to appeal please provide a letter of medical necessity with clinical reason and route the original encounter back to the PA team. Thank you.

## 2022-08-03 NOTE — TELEPHONE ENCOUNTER
Pt is returning call to Lori.     Please call him at     [phone]   Summary:     Patient is due/failing the following:   AAP, COLONOSCOPY, MAMMOGRAM and PHYSICAL     Action needed:   Patient needs office visit for annual physical. Due now, please help schedule.  Patient needs referral/order: for colonoscopy or FIT test. Please ask which and can then place order.  Patient needs to complete mammogram. Order placed.        Type of outreach:    Phone, left message for patient to call back.      Questions for provider review:    AAP                                                                                                                                    Carmencita Vela CMA (AAMA)

## 2022-08-04 ENCOUNTER — HOSPITAL ENCOUNTER (OUTPATIENT)
Dept: CT IMAGING | Facility: CLINIC | Age: 62
Discharge: HOME | End: 2022-08-04
Attending: PHYSICIAN ASSISTANT | Admitting: PHYSICIAN ASSISTANT
Payer: COMMERCIAL

## 2022-08-04 DIAGNOSIS — M25.512 ACUTE PAIN OF LEFT SHOULDER DUE TO TRAUMA: ICD-10-CM

## 2022-08-04 DIAGNOSIS — G89.11 ACUTE PAIN OF LEFT SHOULDER DUE TO TRAUMA: ICD-10-CM

## 2022-08-04 PROCEDURE — 73200 CT UPPER EXTREMITY W/O DYE: CPT | Mod: LT

## 2022-08-05 ENCOUNTER — MYC MEDICAL ADVICE (OUTPATIENT)
Dept: FAMILY MEDICINE | Facility: CLINIC | Age: 62
End: 2022-08-05

## 2022-08-05 DIAGNOSIS — M54.2 NECK PAIN: Primary | ICD-10-CM
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
## 2022-08-09 ENCOUNTER — VIRTUAL VISIT (OUTPATIENT)
Dept: PSYCHOLOGY | Facility: CLINIC | Age: 62
End: 2022-08-09
Payer: COMMERCIAL

## 2022-08-09 DIAGNOSIS — F43.10 POST TRAUMATIC STRESS DISORDER (PTSD): ICD-10-CM

## 2022-08-09 DIAGNOSIS — F33.2 SEVERE RECURRENT MAJOR DEPRESSION WITHOUT PSYCHOTIC FEATURES (H): Primary | ICD-10-CM

## 2022-08-09 DIAGNOSIS — F41.1 GENERALIZED ANXIETY DISORDER: ICD-10-CM

## 2022-08-09 PROCEDURE — 90834 PSYTX W PT 45 MINUTES: CPT | Mod: 95 | Performed by: COUNSELOR

## 2022-08-09 NOTE — PROGRESS NOTES
"        [facility]                                     Progress Note    Patient Name: Linda Walsh  Date: 8/9/22         Service Type: Individual      Session Start Time: 1:00am  Session End Time: 1:50pm     Session Length: 50    Session #: 77    Attendees: Client    Service Modality:  Phone Visit:      Provider verified identity through the following two step process.  Patient provided:  Patient is known previously to provider    The patient has been notified of the following:      \"We have found that certain health care needs can be provided without the need for a face to face visit.  This service lets us provide the care you need with a phone conversation.       I will have full access to your [facility] medical record during this entire phone call.   I will be taking notes for your medical record.      Since this is like an office visit, we will bill your insurance company for this service.       There are potential benefits and risks of telephone visits (e.g. limits to patient confidentiality) that differ from in-person visits.?Confidentiality still applies for telephone services, and nobody will record the visit.  It is important to be in a quiet, private space that is free of distractions (including cell phone or other devices) during the visit.??      If during the course of the call I believe a telephone visit is not appropriate, you will not be charged for this service\"     Consent has been obtained for this service by care team member: Yes     DATA  Interactive Complexity: No  Crisis: No        Progress Since Last Session (Related to Symptoms / Goals / Homework):   Symptoms: Worsening .    Homework: Completed in session      Episode of Care Goals: Minimal progress - ACTION (Actively working towards change); Intervened by reinforcing change plan / affirming steps taken     Current / Ongoing Stressors and Concerns:   The client stated her  has been very mean and lopez " lately.      Treatment Objective(s) Addressed in This Session:   use thought-stopping strategy daily to reduce intrusive thoughts       Intervention:   Emotion Focused Therapy: validated her experiences and anger with her . Reinforced her for her skillfulness she's been trying to use with him. The client talked about how couples counseling is going as it just started. The client sees some hope there with it.     Assessments completed prior to visit:  The following assessments were completed by patient for this visit:  PHQA: No flowsheet data found.  GAD7:   MARLIN-7 SCORE 11/18/2019 12/30/2019 2/26/2020 5/27/2020 7/28/2020 2/24/2021 7/13/2022   Total Score - - - - - - -   Total Score 20 (severe anxiety) 19 (severe anxiety) - - 19 (severe anxiety) 16 (severe anxiety) 21 (severe anxiety)   Total Score 20 19 19 21 19 16 21     PROMIS 10-Global Health (all questions and answers displayed): No flowsheet data found.      ASSESSMENT: Current Emotional / Mental Status (status of significant symptoms):   Risk status (Self / Other harm or suicidal ideation)   Patient denies current fears or concerns for personal safety.   Patient denies current or recent suicidal ideation or behaviors.   Patient denies current or recent homicidal ideation or behaviors.   Patient denies current or recent self injurious behavior or ideation.   Patient denies other safety concerns.   Patient reports there has been no change in risk factors since their last session.     Patient reports there has been no change in protective factors since their last session.     Recommended that patient call 911 or go to the local ED should there be a change in any of these risk factors.     Appearance:   Appropriate    Eye Contact:   Good    Psychomotor Behavior: Normal    Attitude:   Cooperative    Orientation:   All   Speech    Rate / Production: Normal/ Responsive    Volume:  Normal    Mood:    Normal   Affect:    Appropriate    Thought Content:  Clear     Thought Form:  Coherent  Logical    Insight:    Good      Medication Review:   No changes to current psychiatric medication(s)     Medication Compliance:   Yes     Changes in Health Issues:   None reported     Chemical Use Review:   Substance Use: Chemical use reviewed, no active concerns identified      Tobacco Use: No change in amount of tobacco use since last session.  Patient declined discussion at this time    Diagnosis:  1. 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 Generalized anxiety disorder    3. Post traumatic stress disorder (PTSD)        Collateral Reports Completed:   Not Applicable    PLAN: (Patient Tasks / Therapist Tasks / Other)  Client to continue to work on stress reduction skills.         Nasrin Valladares, [facility]                                                         ______________________________________________________________________    Individual Treatment Plan    Patient's Name: Linda Walsh  YOB: 1960    Date of Creation: 7/11/22  Date Treatment Plan Last Reviewed/Revised: 7/11/22    DSM5 Diagnoses: 296.33 (F33.2) Major Depressive Disorder, Recurrent Episode, Severe _, 300.02 (F41.1) Generalized Anxiety Disorder or 309.81 (F43.10) Posttraumatic Stress Disorder (includes Posttraumatic Stress Disorder for Children 6 Years and Younger)  Without dissociative symptoms  Psychosocial / Contextual Factors: CRPS  PROMIS (reviewed every 90 days):     Referral / Collaboration:  Referral to another professional/service is not indicated at this time..    Anticipated number of session for this episode of care: on-going  Anticipation frequency of session: Monthly  Anticipated Duration of each session: 38-52 minutes/53+ minutes  Treatment plan will be reviewed in 90 days or when goals have been changed.       MeasurableTreatment Goal(s) related to diagnosis / functional impairment(s)  Goal 1: Client will decrease thoughts of wanting to be dead from 10  out of 10 opportunities to at most 9 out of 10 opportunities for at least 3 consecutive weeks as evidenced by client report and a decrease in symptom report as evidenced by PhQ9 scores and GAD7 scores.    Objective #A (Client Action)    Client will Client will look at and read safety plan at least once a day and follow safety plan when thoughts and urges come up.  Status: Continued - Date(s): 7/11/22    Intervention(s)  Therapist will teach emotional regulation skills. distress tolerance skills, interpersonal effectiveness skills, emotion regluation skills, mindfulness skills, radical acceptance. Therapist will develop safety plan with the client.     Objective #B  Client will Client will agree to call the therapist or after hours crisis line if noticing intense suicidal thoughts for skills coaching.   Status: Continued - Date(s): 7/11/22    Intervention(s)  Therapist will Therapist will be available as much as possible during work hours for the client to contact and give the client several crisis resources.         Goal 2: Client will increase the use of skills (emotion regulation, distress tolerance, communication skill) from 1 out of 10 opportunities to at least 2 out of 10 opportunities for at least 3 consecutive weeks as evidenced by client report and a decrease in symptom report as evidenced by PhQ9 scores and GAD7 scores.     Objective #A (Client Action)    Status: Continued - Date(s): 7/11/22    Client will Increase interest, engagement, and pleasure in doing things  Decrease frequency and intensity of feeling down, depressed, hopeless  Improve diet, appetite, mindful eating, and / or meal planning  Identify negative self-talk and behaviors: challenge core beliefs, myths, and actions  Improve concentration, focus, and mindfulness in daily activities   Feel less fidgety, restless or slow in daily activities / interpersonal interactions  Decrease thoughts that you'd be better off dead or of suicide /  self-harm.    Intervention(s)  Therapist will teach emotional regulation skills. distress tolerance skills, interpersonal effectiveness skills, emotion regluation skills, mindfulness skills, radical acceptance. Therapist will teach client how to ID body cues for anxiety, anxiety reduction techniques, how to ID triggers for depression and anxiety- decrease reactivity/eliminate, lifestyle changes to reduce depression and anxiety, communication skills, explore cognitive beliefs and help client to develop healthy cognitive patterns and beliefs.    Objective #B  Client will Client will learn and  practice DBT skills daily..    Status: Continued - Date(s): 7/11/22    Intervention(s)  Therapist will Therapist will Therapist will teach emotional regulation skills. distress tolerance skills, interpersonal effectiveness skills, emotion regluation skills, mindfulness skills, radical acceptance..      Goal 3: Client will decrease anxious symptoms and reactions to triggers from 9 out of 10 opportunities to at most 8 out of 10 opportunities for at least 3 consecutive weeks as evidenced by client report and a decrease in symptom report as evidenced by PhQ9 scores and GAD7 scores.    Objective #A (Client Action)    Status: Continued - Date(s): 7/11/22    Client will Client will use cognitive strategies identified in therapy to challenge anxious thoughts.    Intervention(s)  Therapist will Therapist will teach emotional recognition/identification. emotion regulation skills, coping skills, mindfulness skills.    Objective #B  Client will Client will use thought-stopping strategy daily to reduce intrusive thoughts for at least 3 consecutive weeks as evidenced by client report and a decrease in symptom report as evidenced by PhQ9 scores and GAD7 scores.      Status: Continued - Date(s): 7/11/22    Intervention(s)  Therapist will teach emotional regulation skills. distress tolerance skills, interpersonal effectiveness skills, emotion  "regluation skills, mindfulness skills, radical acceptance. Therapist will teach client how to ID body cues for anxiety, anxiety reduction techniques, how to ID triggers for depression and anxiety- decrease reactivity/eliminate, lifestyle changes to reduce depression and anxiety, communication skills, explore cognitive beliefs and help client to develop healthy cognitive patterns and beliefs.    Objective #C  Client will Client will learn 5 crisis survival skills during the Distress Tolerance Module (6-8 weeks) for at least 3 consecutive weeks as evidenced by client report and a decrease in symptom report as evidenced by PhQ9 scores and GAD7 scores.  Status: Continued - Date(s): 7/11/22    Intervention(s)  Therapist will teach emotional regulation skills. distress tolerance skills, interpersonal effectiveness skills, emotion regluation skills, mindfulness skills, radical acceptance.      Client has reviewed and agreed to the above plan.      Nasrin Valladares, [facility]        Linda Walsh     SAFETY PLAN:  Step 1: Warning signs / cues (Thoughts, images, mood, situation, behavior) that a crisis may be developing:    Thoughts: \"I don't matter\", \"People would be better off without me\", \"I'm a burden\", \"I can't do this anymore\", \"I just want this to end\" and \"Nothing makes it better\"    Images: obsessive thoughts of death or dying: car accident, using a gun and flashbacks    Thinking Processes: ruminations (can't stop thinking about my problems): court case, pain, racing thoughts, highly critical and negative thoughts: \"I can't do anything, I have to suffer everyday and go to work and other people have it so easy.\"  and paranoia: that the [employer] is watching me    Mood: worsening depression, hopelessness, helplessness, intense anger, intense worry, agitation, disinhibited (not caring about things or consequences) and mood swings    Behaviors: isolating/withdrawing , can't stop crying, not taking care of myself, " "not taking care of my responsibilities, sleeping too much and not sleeping enough    Situations: legal issues: current court case, pain, trauma , financial stress and medical condition / diagnosis: CRPS   Step 2: Coping strategies - Things I can do to take my mind off of my problems without contacting another person (relaxation technique, physical activity):    Distress Tolerance Strategies:  arts and crafts: with her residents, play with my pet , pray, change body temperature (ice pack/cold water) , paced breathing/progressive muscle relaxation and puzzles, games on ipad    Physical Activities: deep breathing    Focus on helpful thoughts:  \"Ride the wave\", think about happy memories: when the grandkids were born, going camping, when the boys were little and remind myself of what is important to me: grandkids, family, the residents  Step 3: People and social settings that provide distraction:   Name: Alpesh  Phone: [phone]   Name: Velasquez  Phone:    Name: Thaddeus  Phone:     work   Step 4: Remind myself of people and things that are important to me and worth living for:    My family, my residents    Step 5: When I am in crisis, I can ask these people to help me use my safety plan:   Name: Alpesh  Phone: [phone]   Name: Velasquez  Phone: (number in phone)   Name: Thaddeus  Phone: (number in phone)  Step 6: Making the environment safe:     be around others  Step 7: Professionals or agencies I can contact during a crisis:    [facility] Number: [phone]    Suicide Prevention Lifeline: 4-893-431-TALK (6786)    Crisis Text Line Service (available 24 hours a day, 7 days a week): Text [place] to 646990  Local Crisis Services:     Call 911 or go to my nearest emergency department.   I helped develop this safety plan and agree to use it when needed.  I have been given a copy of this plan.      Client signature _________________________________________________________________  Today s date: " 7/11/22  Adapted from Safety Plan Template 2008 Griselda Perez and Livan Cutler is reprinted with the express permission of the authors.  No portion of the Safety Plan Template may be reproduced without the express, written permission.  You can contact the authors at [email or web address] or [email or web address].

## 2022-08-10 ENCOUNTER — HOSPITAL ENCOUNTER (OUTPATIENT)
Dept: CT IMAGING | Facility: CLINIC | Age: 62
Discharge: HOME | End: 2022-08-10
Attending: PHYSICIAN ASSISTANT
Payer: COMMERCIAL

## 2022-08-10 ENCOUNTER — HOSPITAL ENCOUNTER (OUTPATIENT)
Dept: MAMMOGRAPHY | Facility: CLINIC | Age: 62
Discharge: HOME | End: 2022-08-10
Attending: PHYSICIAN ASSISTANT
Payer: COMMERCIAL

## 2022-08-10 DIAGNOSIS — M54.2 NECK PAIN: ICD-10-CM

## 2022-08-10 DIAGNOSIS — Z12.31 ENCOUNTER FOR SCREENING MAMMOGRAM FOR BREAST CANCER: ICD-10-CM
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
## 2022-08-10 PROCEDURE — 72125 CT NECK SPINE W/O DYE: CPT

## 2022-08-10 PROCEDURE — 77067 SCR MAMMO BI INCL CAD: CPT

## 2022-08-10 NOTE — PROGRESS NOTES
Appropriate assistive devices provided during their visit. No (Yes, No, N/A) na (list device)    Exam table and/or cart  placed in the lowest position. yes (Yes, No, N/A)    Brakes on tables/carts/wheelchairs used at all times. yes (Yes, No, N/A)    Non slip footwear applied. na (Yes, No, NA)    Patient was accompanied by staff throughout visit. na (Yes, No, N/A)    Equipment safety straps used. na (Yes, No, N/A)    Assist with toileting. na (Yes, No, N/A)

## 2022-08-22 ENCOUNTER — TELEPHONE (OUTPATIENT)
Dept: GASTROENTEROLOGY | Facility: CLINIC | Age: 62
End: 2022-08-22

## 2022-08-22 ENCOUNTER — TRANSFERRED RECORDS (OUTPATIENT)
Dept: HEALTH INFORMATION MANAGEMENT | Facility: CLINIC | Age: 62
End: 2022-08-22

## 2022-08-22 LAB — PHQ9 SCORE: 19

## 2022-08-22 NOTE — TELEPHONE ENCOUNTER
Caller: Mary    Procedure: Colon    Date, Location, and Surgeon of Procedure Canc[facility]    Ordering Provider:Arnie    Reason for cancel (please be detailed, any staff messages or encounters to note?):       Karla Whalen RN  P [facility]oscopy Scheduling Pool  Not sure if you got this sent already but this pt is cancelling for tomorrow.  Please remove from our schedule.  She states she will reschedule.   Thanks         Rescheduled: No     If rescheduled:    Date:    Location:    Prep Resent: (changes to prep?)   Covid Test Rescheduled:    Note any change or update to original order/sedation:

## 2022-08-25 ENCOUNTER — OFFICE VISIT (OUTPATIENT)
Dept: FAMILY MEDICINE | Facility: OTHER | Age: 62
End: 2022-08-25
Payer: COMMERCIAL

## 2022-08-25 VITALS
WEIGHT: 162 LBS | HEART RATE: 65 BPM | HEIGHT: 64 IN | OXYGEN SATURATION: 98 % | TEMPERATURE: 97.4 F | RESPIRATION RATE: 20 BRPM | SYSTOLIC BLOOD PRESSURE: 112 MMHG | DIASTOLIC BLOOD PRESSURE: 68 MMHG | BODY MASS INDEX: 27.66 KG/M2

## 2022-08-25 DIAGNOSIS — Z01.818 PREOP GENERAL PHYSICAL EXAM: Primary | ICD-10-CM

## 2022-08-25 DIAGNOSIS — Z13.220 SCREENING FOR HYPERLIPIDEMIA: ICD-10-CM

## 2022-08-25 DIAGNOSIS — E78.5 HYPERLIPIDEMIA, UNSPECIFIED HYPERLIPIDEMIA TYPE: ICD-10-CM

## 2022-08-25 DIAGNOSIS — Z87.891 PERSONAL HISTORY OF TOBACCO USE: ICD-10-CM

## 2022-08-25 DIAGNOSIS — F32.1 MAJOR DEPRESSIVE DISORDER, SINGLE EPISODE, MODERATE (H): ICD-10-CM

## 2022-08-25 DIAGNOSIS — F41.1 GENERALIZED ANXIETY DISORDER: ICD-10-CM

## 2022-08-25 DIAGNOSIS — Z12.11 SCREEN FOR COLON CANCER: ICD-10-CM

## 2022-08-25 PROCEDURE — 99213 OFFICE O/P EST LOW 20 MIN: CPT | Performed by: FAMILY MEDICINE

## 2022-08-25 PROCEDURE — G0296 VISIT TO DETERM LDCT ELIG: HCPCS | Performed by: FAMILY MEDICINE

## 2022-08-25 ASSESSMENT — PATIENT HEALTH QUESTIONNAIRE - PHQ9
10. IF YOU CHECKED OFF ANY PROBLEMS, HOW DIFFICULT HAVE THESE PROBLEMS MADE IT FOR YOU TO DO YOUR WORK, TAKE CARE OF THINGS AT HOME, OR GET ALONG WITH OTHER PEOPLE: VERY DIFFICULT
SUM OF ALL RESPONSES TO PHQ QUESTIONS 1-9: 17
SUM OF ALL RESPONSES TO PHQ QUESTIONS 1-9: 17

## 2022-08-25 ASSESSMENT — PAIN SCALES - GENERAL: PAINLEVEL: EXTREME PAIN (8)
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
## 2022-08-29 ENCOUNTER — VIRTUAL VISIT (OUTPATIENT)
Dept: PSYCHOLOGY | Facility: CLINIC | Age: 62
End: 2022-08-29
Payer: COMMERCIAL

## 2022-08-29 DIAGNOSIS — F43.10 POST TRAUMATIC STRESS DISORDER (PTSD): ICD-10-CM

## 2022-08-29 DIAGNOSIS — F33.2 SEVERE RECURRENT MAJOR DEPRESSION WITHOUT PSYCHOTIC FEATURES (H): Primary | ICD-10-CM

## 2022-08-29 DIAGNOSIS — F41.1 GENERALIZED ANXIETY DISORDER: ICD-10-CM

## 2022-08-29 PROCEDURE — 90834 PSYTX W PT 45 MINUTES: CPT | Mod: 95 | Performed by: COUNSELOR

## 2022-08-29 NOTE — PROGRESS NOTES
"        [facility]                                     Progress Note    Patient Name: Linda Walsh  Date: 8/29/22         Service Type: Individual      Session Start Time: 2:40am  Session End Time: 3:30pm     Session Length: 50    Session #: 78    Attendees: Client    Service Modality:  Phone Visit:      Provider verified identity through the following two step process.  Patient provided:  Patient is known previously to provider    The patient has been notified of the following:      \"We have found that certain health care needs can be provided without the need for a face to face visit.  This service lets us provide the care you need with a phone conversation.       I will have full access to your [facility] medical record during this entire phone call.   I will be taking notes for your medical record.      Since this is like an office visit, we will bill your insurance company for this service.       There are potential benefits and risks of telephone visits (e.g. limits to patient confidentiality) that differ from in-person visits.?Confidentiality still applies for telephone services, and nobody will record the visit.  It is important to be in a quiet, private space that is free of distractions (including cell phone or other devices) during the visit.??      If during the course of the call I believe a telephone visit is not appropriate, you will not be charged for this service\"     Consent has been obtained for this service by care team member: Yes     DATA  Interactive Complexity: No  Crisis: No        Progress Since Last Session (Related to Symptoms / Goals / Homework):   Symptoms: Worsening .    Homework: Completed in session      Episode of Care Goals: Minimal progress - ACTION (Actively working towards change); Intervened by reinforcing change plan / affirming steps taken     Current / Ongoing Stressors and Concerns:   The client stated her  has been very mean and lopez " lately with the move.      Treatment Objective(s) Addressed in This Session:   use thought-stopping strategy daily to reduce intrusive thoughts       Intervention:   Emotion Focused Therapy: validated her experiences and anger with her  currently. Continued to encourage her to ask for help from others and do her best to walk away from her  if he is baiting her for a fight.     Assessments completed prior to visit:  The following assessments were completed by patient for this visit:  PHQA: No flowsheet data found.  GAD7:   MARLIN-7 SCORE 11/18/2019 12/30/2019 2/26/2020 5/27/2020 7/28/2020 2/24/2021 7/13/2022   Total Score - - - - - - -   Total Score 20 (severe anxiety) 19 (severe anxiety) - - 19 (severe anxiety) 16 (severe anxiety) 21 (severe anxiety)   Total Score 20 19 19 21 19 16 21     PROMIS 10-Global Health (all questions and answers displayed): No flowsheet data found.      ASSESSMENT: Current Emotional / Mental Status (status of significant symptoms):   Risk status (Self / Other harm or suicidal ideation)   Patient denies current fears or concerns for personal safety.   Patient denies current or recent suicidal ideation or behaviors.   Patient denies current or recent homicidal ideation or behaviors.   Patient denies current or recent self injurious behavior or ideation.   Patient denies other safety concerns.   Patient reports there has been no change in risk factors since their last session.     Patient reports there has been no change in protective factors since their last session.     Recommended that patient call 911 or go to the local ED should there be a change in any of these risk factors.     Appearance:   Appropriate    Eye Contact:   Good    Psychomotor Behavior: Normal    Attitude:   Cooperative    Orientation:   All   Speech    Rate / Production: Normal/ Responsive    Volume:  Normal    Mood:    Normal   Affect:    Appropriate    Thought Content:  Clear    Thought Form:  Coherent   Logical    Insight:    Good      Medication Review:   No changes to current psychiatric medication(s)     Medication Compliance:   Yes     Changes in Health Issues:   None reported     Chemical Use Review:   Substance Use: Chemical use reviewed, no active concerns identified      Tobacco Use: No change in amount of tobacco use since last session.  Patient declined discussion at this time    Diagnosis:  1. 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 Generalized anxiety disorder    3. Post traumatic stress disorder (PTSD)        Collateral Reports Completed:   Not Applicable    PLAN: (Patient Tasks / Therapist Tasks / Other)  Client to continue to work on stress reduction skills.         Nasrin Valladares, [facility]                                                         ______________________________________________________________________    Individual Treatment Plan    Patient's Name: Linda Walsh  YOB: 1960    Date of Creation: 7/11/22  Date Treatment Plan Last Reviewed/Revised: 7/11/22    DSM5 Diagnoses: 296.33 (F33.2) Major Depressive Disorder, Recurrent Episode, Severe _, 300.02 (F41.1) Generalized Anxiety Disorder or 309.81 (F43.10) Posttraumatic Stress Disorder (includes Posttraumatic Stress Disorder for Children 6 Years and Younger)  Without dissociative symptoms  Psychosocial / Contextual Factors: CRPS  PROMIS (reviewed every 90 days):     Referral / Collaboration:  Referral to another professional/service is not indicated at this time..    Anticipated number of session for this episode of care: on-going  Anticipation frequency of session: Monthly  Anticipated Duration of each session: 38-52 minutes/53+ minutes  Treatment plan will be reviewed in 90 days or when goals have been changed.       MeasurableTreatment Goal(s) related to diagnosis / functional impairment(s)  Goal 1: Client will decrease thoughts of wanting to be dead from 10 out of 10 opportunities to at most 9  out of 10 opportunities for at least 3 consecutive weeks as evidenced by client report and a decrease in symptom report as evidenced by PhQ9 scores and GAD7 scores.    Objective #A (Client Action)    Client will Client will look at and read safety plan at least once a day and follow safety plan when thoughts and urges come up.  Status: Continued - Date(s): 7/11/22    Intervention(s)  Therapist will teach emotional regulation skills. distress tolerance skills, interpersonal effectiveness skills, emotion regluation skills, mindfulness skills, radical acceptance. Therapist will develop safety plan with the client.     Objective #B  Client will Client will agree to call the therapist or after hours crisis line if noticing intense suicidal thoughts for skills coaching.   Status: Continued - Date(s): 7/11/22    Intervention(s)  Therapist will Therapist will be available as much as possible during work hours for the client to contact and give the client several crisis resources.         Goal 2: Client will increase the use of skills (emotion regulation, distress tolerance, communication skill) from 1 out of 10 opportunities to at least 2 out of 10 opportunities for at least 3 consecutive weeks as evidenced by client report and a decrease in symptom report as evidenced by PhQ9 scores and GAD7 scores.     Objective #A (Client Action)    Status: Continued - Date(s): 7/11/22    Client will Increase interest, engagement, and pleasure in doing things  Decrease frequency and intensity of feeling down, depressed, hopeless  Improve diet, appetite, mindful eating, and / or meal planning  Identify negative self-talk and behaviors: challenge core beliefs, myths, and actions  Improve concentration, focus, and mindfulness in daily activities   Feel less fidgety, restless or slow in daily activities / interpersonal interactions  Decrease thoughts that you'd be better off dead or of suicide / self-harm.    Intervention(s)  Therapist will  teach emotional regulation skills. distress tolerance skills, interpersonal effectiveness skills, emotion regluation skills, mindfulness skills, radical acceptance. Therapist will teach client how to ID body cues for anxiety, anxiety reduction techniques, how to ID triggers for depression and anxiety- decrease reactivity/eliminate, lifestyle changes to reduce depression and anxiety, communication skills, explore cognitive beliefs and help client to develop healthy cognitive patterns and beliefs.    Objective #B  Client will Client will learn and  practice DBT skills daily..    Status: Continued - Date(s): 7/11/22    Intervention(s)  Therapist will Therapist will Therapist will teach emotional regulation skills. distress tolerance skills, interpersonal effectiveness skills, emotion regluation skills, mindfulness skills, radical acceptance..      Goal 3: Client will decrease anxious symptoms and reactions to triggers from 9 out of 10 opportunities to at most 8 out of 10 opportunities for at least 3 consecutive weeks as evidenced by client report and a decrease in symptom report as evidenced by PhQ9 scores and GAD7 scores.    Objective #A (Client Action)    Status: Continued - Date(s): 7/11/22    Client will Client will use cognitive strategies identified in therapy to challenge anxious thoughts.    Intervention(s)  Therapist will Therapist will teach emotional recognition/identification. emotion regulation skills, coping skills, mindfulness skills.    Objective #B  Client will Client will use thought-stopping strategy daily to reduce intrusive thoughts for at least 3 consecutive weeks as evidenced by client report and a decrease in symptom report as evidenced by PhQ9 scores and GAD7 scores.      Status: Continued - Date(s): 7/11/22    Intervention(s)  Therapist will teach emotional regulation skills. distress tolerance skills, interpersonal effectiveness skills, emotion regluation skills, mindfulness skills, radical  "acceptance. Therapist will teach client how to ID body cues for anxiety, anxiety reduction techniques, how to ID triggers for depression and anxiety- decrease reactivity/eliminate, lifestyle changes to reduce depression and anxiety, communication skills, explore cognitive beliefs and help client to develop healthy cognitive patterns and beliefs.    Objective #C  Client will Client will learn 5 crisis survival skills during the Distress Tolerance Module (6-8 weeks) for at least 3 consecutive weeks as evidenced by client report and a decrease in symptom report as evidenced by PhQ9 scores and GAD7 scores.  Status: Continued - Date(s): 7/11/22    Intervention(s)  Therapist will teach emotional regulation skills. distress tolerance skills, interpersonal effectiveness skills, emotion regluation skills, mindfulness skills, radical acceptance.      Client has reviewed and agreed to the above plan.      Nasrin Valladares [facility]        Linda Walsh     SAFETY PLAN:  Step 1: Warning signs / cues (Thoughts, images, mood, situation, behavior) that a crisis may be developing:    Thoughts: \"I don't matter\", \"People would be better off without me\", \"I'm a burden\", \"I can't do this anymore\", \"I just want this to end\" and \"Nothing makes it better\"    Images: obsessive thoughts of death or dying: car accident, using a gun and flashbacks    Thinking Processes: ruminations (can't stop thinking about my problems): court case, pain, racing thoughts, highly critical and negative thoughts: \"I can't do anything, I have to suffer everyday and go to work and other people have it so easy.\"  and paranoia: that the [place] is watching me    Mood: worsening depression, hopelessness, helplessness, intense anger, intense worry, agitation, disinhibited (not caring about things or consequences) and mood swings    Behaviors: isolating/withdrawing , can't stop crying, not taking care of myself, not taking care of my responsibilities, sleeping too " "much and not sleeping enough    Situations: legal issues: current court case, pain, trauma , financial stress and medical condition / diagnosis: CRPS   Step 2: Coping strategies - Things I can do to take my mind off of my problems without contacting another person (relaxation technique, physical activity):    Distress Tolerance Strategies:  arts and crafts: with her residents, play with my pet , pray, change body temperature (ice pack/cold water) , paced breathing/progressive muscle relaxation and puzzles, games on ipad    Physical Activities: deep breathing    Focus on helpful thoughts:  \"Ride the wave\", think about happy memories: when the grandkids were born, going camping, when the boys were little and remind myself of what is important to me: grandkids, family, the residents  Step 3: People and social settings that provide distraction:   Name: Alpesh  Phone: [phone]   Name: Velasquez  Phone:    Name: Thaddeus  Phone:     work   Step 4: Remind myself of people and things that are important to me and worth living for:    My family, my residents    Step 5: When I am in crisis, I can ask these people to help me use my safety plan:   Name: Alpesh  Phone: [phone]   Name: Velasquez  Phone: (number in phone)   Name: Thaddeus  Phone: (number in phone)  Step 6: Making the environment safe:     be around others  Step 7: Professionals or agencies I can contact during a crisis:    [facility] Daytime Number: [phone]    Suicide Prevention Lifeline: 6-604-208-TALK (9089)    Crisis Text Line Service (available 24 hours a day, 7 days a week): Text [place] to 503716  Local Crisis Services:     Call 911 or go to my nearest emergency department.   I helped develop this safety plan and agree to use it when needed.  I have been given a copy of this plan.      Client signature _________________________________________________________________  Today s date: 7/11/22  Adapted from Safety Plan Template 2008 Griselda Perez and " Livan Cutler is reprinted with the express permission of the authors.  No portion of the Safety Plan Template may be reproduced without the express, written permission.  You can contact the authors at [email or web address] or [email or web address].

## 2022-09-12 ENCOUNTER — TRANSFERRED RECORDS (OUTPATIENT)
Dept: HEALTH INFORMATION MANAGEMENT | Facility: CLINIC | Age: 62
End: 2022-09-12

## 2022-09-13 ENCOUNTER — VIRTUAL VISIT (OUTPATIENT)
Dept: PSYCHOLOGY | Facility: CLINIC | Age: 62
End: 2022-09-13
Payer: COMMERCIAL

## 2022-09-13 DIAGNOSIS — F41.1 GENERALIZED ANXIETY DISORDER: ICD-10-CM

## 2022-09-13 DIAGNOSIS — F43.10 POST TRAUMATIC STRESS DISORDER (PTSD): ICD-10-CM

## 2022-09-13 DIAGNOSIS — F33.2 SEVERE RECURRENT MAJOR DEPRESSION WITHOUT PSYCHOTIC FEATURES (H): Primary | ICD-10-CM

## 2022-09-13 PROCEDURE — 90834 PSYTX W PT 45 MINUTES: CPT | Mod: 95 | Performed by: COUNSELOR

## 2022-09-13 NOTE — PROGRESS NOTES
"        [facility]                                     Progress Note    Patient Name: Linda Walsh  Date: 9/13/22         Service Type: Individual      Session Start Time: 11:05am  Session End Time: 11:55am     Session Length: 50    Session #: 79    Attendees: Client    Service Modality:  Phone Visit:      Provider verified identity through the following two step process.  Patient provided:  Patient is known previously to provider    The patient has been notified of the following:      \"We have found that certain health care needs can be provided without the need for a face to face visit.  This service lets us provide the care you need with a phone conversation.       I will have full access to your [facility] medical record during this entire phone call.   I will be taking notes for your medical record.      Since this is like an office visit, we will bill your insurance company for this service.       There are potential benefits and risks of telephone visits (e.g. limits to patient confidentiality) that differ from in-person visits.?Confidentiality still applies for telephone services, and nobody will record the visit.  It is important to be in a quiet, private space that is free of distractions (including cell phone or other devices) during the visit.??      If during the course of the call I believe a telephone visit is not appropriate, you will not be charged for this service\"     Consent has been obtained for this service by care team member: Yes     DATA  Interactive Complexity: No  Crisis: No        Progress Since Last Session (Related to Symptoms / Goals / Homework):   Symptoms: No change .    Homework: Completed in session      Episode of Care Goals: Minimal progress - ACTION (Actively working towards change); Intervened by reinforcing change plan / affirming steps taken     Current / Ongoing Stressors and Concerns:   The client stated her  continues to be lopez and " mean with the move. They have argued a lot. The client has brought up the issues in couples counseling and she believes that has helped him to think a little more. She stated however he bought a vehicle recently without really telling her.      Treatment Objective(s) Addressed in This Session:   use thought-stopping strategy daily to reduce intrusive thoughts       Intervention:   Emotion Focused Therapy: validated her experiences and anger with her . Processed her feelings and frustrations around her  buying the vehicle.     Assessments completed prior to visit:  The following assessments were completed by patient for this visit:  PHQA: No flowsheet data found.  GAD7:   MARLIN-7 SCORE 11/18/2019 12/30/2019 2/26/2020 5/27/2020 7/28/2020 2/24/2021 7/13/2022   Total Score - - - - - - -   Total Score 20 (severe anxiety) 19 (severe anxiety) - - 19 (severe anxiety) 16 (severe anxiety) 21 (severe anxiety)   Total Score 20 19 19 21 19 16 21     PROMIS 10-Global Health (all questions and answers displayed): No flowsheet data found.      ASSESSMENT: Current Emotional / Mental Status (status of significant symptoms):   Risk status (Self / Other harm or suicidal ideation)   Patient denies current fears or concerns for personal safety.   Patient denies current or recent suicidal ideation or behaviors.   Patient denies current or recent homicidal ideation or behaviors.   Patient denies current or recent self injurious behavior or ideation.   Patient denies other safety concerns.   Patient reports there has been no change in risk factors since their last session.     Patient reports there has been no change in protective factors since their last session.     Recommended that patient call 911 or go to the local ED should there be a change in any of these risk factors.     Appearance:   Appropriate    Eye Contact:   Good    Psychomotor Behavior: Normal    Attitude:   Cooperative    Orientation:   All   Speech    Rate /  Production: Normal/ Responsive    Volume:  Normal    Mood:    Normal   Affect:    Appropriate    Thought Content:  Clear    Thought Form:  Coherent  Logical    Insight:    Good      Medication Review:   No changes to current psychiatric medication(s)     Medication Compliance:   Yes     Changes in Health Issues:   None reported     Chemical Use Review:   Substance Use: Chemical use reviewed, no active concerns identified      Tobacco Use: No change in amount of tobacco use since last session.  Patient declined discussion at this time    Diagnosis:  1. 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 Generalized anxiety disorder    3. Post traumatic stress disorder (PTSD)        Collateral Reports Completed:   Not Applicable    PLAN: (Patient Tasks / Therapist Tasks / Other)  Client to continue to work on stress reduction skills.         Nasrin Valladares, [facility]                                                         ______________________________________________________________________    Individual Treatment Plan    Patient's Name: Linda Walsh  YOB: 1960    Date of Creation: 7/11/22  Date Treatment Plan Last Reviewed/Revised: 7/11/22    DSM5 Diagnoses: 296.33 (F33.2) Major Depressive Disorder, Recurrent Episode, Severe _, 300.02 (F41.1) Generalized Anxiety Disorder or 309.81 (F43.10) Posttraumatic Stress Disorder (includes Posttraumatic Stress Disorder for Children 6 Years and Younger)  Without dissociative symptoms  Psychosocial / Contextual Factors: CRPS  PROMIS (reviewed every 90 days):     Referral / Collaboration:  Referral to another professional/service is not indicated at this time..    Anticipated number of session for this episode of care: on-going  Anticipation frequency of session: Monthly  Anticipated Duration of each session: 38-52 minutes/53+ minutes  Treatment plan will be reviewed in 90 days or when goals have been changed.       MeasurableTreatment Goal(s)  related to diagnosis / functional impairment(s)  Goal 1: Client will decrease thoughts of wanting to be dead from 10 out of 10 opportunities to at most 9 out of 10 opportunities for at least 3 consecutive weeks as evidenced by client report and a decrease in symptom report as evidenced by PhQ9 scores and GAD7 scores.    Objective #A (Client Action)    Client will Client will look at and read safety plan at least once a day and follow safety plan when thoughts and urges come up.  Status: Continued - Date(s): 7/11/22    Intervention(s)  Therapist will teach emotional regulation skills. distress tolerance skills, interpersonal effectiveness skills, emotion regluation skills, mindfulness skills, radical acceptance. Therapist will develop safety plan with the client.     Objective #B  Client will Client will agree to call the therapist or after hours crisis line if noticing intense suicidal thoughts for skills coaching.   Status: Continued - Date(s): 7/11/22    Intervention(s)  Therapist will Therapist will be available as much as possible during work hours for the client to contact and give the client several crisis resources.         Goal 2: Client will increase the use of skills (emotion regulation, distress tolerance, communication skill) from 1 out of 10 opportunities to at least 2 out of 10 opportunities for at least 3 consecutive weeks as evidenced by client report and a decrease in symptom report as evidenced by PhQ9 scores and GAD7 scores.     Objective #A (Client Action)    Status: Continued - Date(s): 7/11/22    Client will Increase interest, engagement, and pleasure in doing things  Decrease frequency and intensity of feeling down, depressed, hopeless  Improve diet, appetite, mindful eating, and / or meal planning  Identify negative self-talk and behaviors: challenge core beliefs, myths, and actions  Improve concentration, focus, and mindfulness in daily activities   Feel less fidgety, restless or slow in  daily activities / interpersonal interactions  Decrease thoughts that you'd be better off dead or of suicide / self-harm.    Intervention(s)  Therapist will teach emotional regulation skills. distress tolerance skills, interpersonal effectiveness skills, emotion regluation skills, mindfulness skills, radical acceptance. Therapist will teach client how to ID body cues for anxiety, anxiety reduction techniques, how to ID triggers for depression and anxiety- decrease reactivity/eliminate, lifestyle changes to reduce depression and anxiety, communication skills, explore cognitive beliefs and help client to develop healthy cognitive patterns and beliefs.    Objective #B  Client will Client will learn and  practice DBT skills daily..    Status: Continued - Date(s): 7/11/22    Intervention(s)  Therapist will Therapist will Therapist will teach emotional regulation skills. distress tolerance skills, interpersonal effectiveness skills, emotion regluation skills, mindfulness skills, radical acceptance..      Goal 3: Client will decrease anxious symptoms and reactions to triggers from 9 out of 10 opportunities to at most 8 out of 10 opportunities for at least 3 consecutive weeks as evidenced by client report and a decrease in symptom report as evidenced by PhQ9 scores and GAD7 scores.    Objective #A (Client Action)    Status: Continued - Date(s): 7/11/22    Client will Client will use cognitive strategies identified in therapy to challenge anxious thoughts.    Intervention(s)  Therapist will Therapist will teach emotional recognition/identification. emotion regulation skills, coping skills, mindfulness skills.    Objective #B  Client will Client will use thought-stopping strategy daily to reduce intrusive thoughts for at least 3 consecutive weeks as evidenced by client report and a decrease in symptom report as evidenced by PhQ9 scores and GAD7 scores.      Status: Continued - Date(s): 7/11/22    Intervention(s)  Therapist  "will teach emotional regulation skills. distress tolerance skills, interpersonal effectiveness skills, emotion regluation skills, mindfulness skills, radical acceptance. Therapist will teach client how to ID body cues for anxiety, anxiety reduction techniques, how to ID triggers for depression and anxiety- decrease reactivity/eliminate, lifestyle changes to reduce depression and anxiety, communication skills, explore cognitive beliefs and help client to develop healthy cognitive patterns and beliefs.    Objective #C  Client will Client will learn 5 crisis survival skills during the Distress Tolerance Module (6-8 weeks) for at least 3 consecutive weeks as evidenced by client report and a decrease in symptom report as evidenced by PhQ9 scores and GAD7 scores.  Status: Continued - Date(s): 7/11/22    Intervention(s)  Therapist will teach emotional regulation skills. distress tolerance skills, interpersonal effectiveness skills, emotion regluation skills, mindfulness skills, radical acceptance.      Client has reviewed and agreed to the above plan.      Nasrin Valladares [facility]        Linda Walsh     SAFETY PLAN:  Step 1: Warning signs / cues (Thoughts, images, mood, situation, behavior) that a crisis may be developing:    Thoughts: \"I don't matter\", \"People would be better off without me\", \"I'm a burden\", \"I can't do this anymore\", \"I just want this to end\" and \"Nothing makes it better\"    Images: obsessive thoughts of death or dying: car accident, using a gun and flashbacks    Thinking Processes: ruminations (can't stop thinking about my problems): court case, pain, racing thoughts, highly critical and negative thoughts: \"I can't do anything, I have to suffer everyday and go to work and other people have it so easy.\"  and paranoia: that the [place] is watching me    Mood: worsening depression, hopelessness, helplessness, intense anger, intense worry, agitation, disinhibited (not caring about things or " "consequences) and mood swings    Behaviors: isolating/withdrawing , can't stop crying, not taking care of myself, not taking care of my responsibilities, sleeping too much and not sleeping enough    Situations: legal issues: current court case, pain, trauma , financial stress and medical condition / diagnosis: CRPS   Step 2: Coping strategies - Things I can do to take my mind off of my problems without contacting another person (relaxation technique, physical activity):    Distress Tolerance Strategies:  arts and crafts: with her residents, play with my pet , pray, change body temperature (ice pack/cold water) , paced breathing/progressive muscle relaxation and puzzles, games on ipad    Physical Activities: deep breathing    Focus on helpful thoughts:  \"Ride the wave\", think about happy memories: when the grandkids were born, going camping, when the boys were little and remind myself of what is important to me: grandkids, family, the residents  Step 3: People and social settings that provide distraction:   Name: Alpesh  Phone: [phone]   Name: Velasquez  Phone:    Name: Thaddeus  Phone:     work   Step 4: Remind myself of people and things that are important to me and worth living for:    My family, my residents    Step 5: When I am in crisis, I can ask these people to help me use my safety plan:   Name: Alpesh  Phone: [phone]   Name: Velasquez  Phone: (number in phone)   Name: Thaddeus  Phone: (number in phone)  Step 6: Making the environment safe:     be around others  Step 7: Professionals or agencies I can contact during a crisis:    [facility] Daytime Number: [phone]    Suicide Prevention Lifeline: 7-173-117-TALK (9704)    Crisis Text Line Service (available 24 hours a day, 7 days a week): Text [place] to 175958  Local Crisis Services:     Call 911 or go to my nearest emergency department.   I helped develop this safety plan and agree to use it when needed.  I have been given a copy of this plan.  "     Client signature _________________________________________________________________  Today s date: 7/11/22  Adapted from Safety Plan Template 2008 Griselda Perez and Livan Cutler is reprinted with the express permission of the authors.  No portion of the Safety Plan Template may be reproduced without the express, written permission.  You can contact the authors at [email or web address] or [email or web address].

## 2022-09-16 NOTE — TELEPHONE ENCOUNTER
Left message for patient to return call to schedule EGD/colonoscopy. If Aishwarya or Pippa are not available, please transfer to same day surgery        x3 lettersent   1.5

## 2022-09-18 ENCOUNTER — MYC MEDICAL ADVICE (OUTPATIENT)
Dept: FAMILY MEDICINE | Facility: CLINIC | Age: 62
End: 2022-09-18

## 2022-09-19 ENCOUNTER — MYC MEDICAL ADVICE (OUTPATIENT)
Dept: FAMILY MEDICINE | Facility: CLINIC | Age: 62
End: 2022-09-19

## 2022-09-23 ENCOUNTER — MYC MEDICAL ADVICE (OUTPATIENT)
Dept: FAMILY MEDICINE | Facility: CLINIC | Age: 62
End: 2022-09-23

## 2022-09-23 DIAGNOSIS — R09.81 SINUS CONGESTION: ICD-10-CM

## 2022-09-23 RX ORDER — FLUTICASONE PROPIONATE 50 MCG
2 SPRAY, SUSPENSION (ML) NASAL DAILY
Qty: 16 G | Refills: 3 | Status: SHIPPED | OUTPATIENT
Start: 2022-09-23 | End: 2023-02-09

## 2022-09-24 ENCOUNTER — TRANSCRIBE ORDERS (OUTPATIENT)
Dept: OTHER | Age: 62
End: 2022-09-24

## 2022-09-24 DIAGNOSIS — M54.2 CERVICALGIA: Primary | ICD-10-CM

## 2022-10-03 ENCOUNTER — MYC MEDICAL ADVICE (OUTPATIENT)
Dept: FAMILY MEDICINE | Facility: CLINIC | Age: 62
End: 2022-10-03

## 2022-10-03 ENCOUNTER — E-VISIT (OUTPATIENT)
Dept: FAMILY MEDICINE | Facility: CLINIC | Age: 62
End: 2022-10-03
Payer: MEDICARE

## 2022-10-03 DIAGNOSIS — R09.81 SINUS CONGESTION: ICD-10-CM

## 2022-10-03 DIAGNOSIS — J01.40 ACUTE PANSINUSITIS, RECURRENCE NOT SPECIFIED: ICD-10-CM

## 2022-10-03 PROCEDURE — 99421 OL DIG E/M SVC 5-10 MIN: CPT | Performed by: PHYSICIAN ASSISTANT

## 2022-10-03 RX ORDER — DOXYCYCLINE 100 MG/1
100 CAPSULE ORAL 2 TIMES DAILY
Qty: 20 CAPSULE | Refills: 0 | Status: SHIPPED | OUTPATIENT
Start: 2022-10-03 | End: 2022-11-30

## 2022-10-03 NOTE — PATIENT INSTRUCTIONS
Dear Linda Walsh    After reviewing your responses, I've been able to diagnose you with?a sinus infection caused by bacteria.?     Based on your responses and diagnosis, I have prescribed doxycyline  to treat your symptoms. I have sent this to your pharmacy.?     It is also important to stay well hydrated, get lots of rest and take over-the-counter decongestants,?tylenol?or ibuprofen if you?are able to?take those medications per your primary care provider to help relieve discomfort.?     It is important that you take?all of?your prescribed medication even if your symptoms are improving after a few doses.? Taking?all of?your medicine helps prevent the symptoms from returning.?     If your symptoms worsen, you develop severe headache, vomiting, high fever (>102), or are not improving in 7 days, please contact your primary care provider for an appointment or visit any of our convenient Walk-in Care or Urgent Care Centers to be seen which can be found on our website?here.?     Thanks again for choosing?us?as your health care partner,?   ?  Jonna Gaitan PA-C?

## 2022-10-04 ENCOUNTER — HOSPITAL ENCOUNTER (OUTPATIENT)
Dept: PHYSICAL THERAPY | Facility: CLINIC | Age: 62
Setting detail: THERAPIES SERIES
Discharge: HOME | End: 2022-10-04
Attending: NURSE PRACTITIONER
Payer: MEDICARE

## 2022-10-04 DIAGNOSIS — M54.2 CERVICALGIA: ICD-10-CM

## 2022-10-04 PROCEDURE — 97110 THERAPEUTIC EXERCISES: CPT | Mod: GP

## 2022-10-04 PROCEDURE — 97140 MANUAL THERAPY 1/> REGIONS: CPT | Mod: GP

## 2022-10-04 PROCEDURE — 97161 PT EVAL LOW COMPLEX 20 MIN: CPT | Mod: GP

## 2022-10-04 NOTE — PROGRESS NOTES
10/04/22 1315   General Information   Type of Visit Initial OP Ortho PT Evaluation   Start of Care Date 10/04/22   Referring Physician Niya Duran  (NP)   Orders Evaluate and Treat   Date of Order 10/04/22   Certification Required? Yes   Medical Diagnosis Cervicalgia   Surgical/Medical history reviewed Yes       Present No   Body Part(s)   Body Part(s) Cervical Spine   Presentation and Etiology   Pertinent history of current problem (include personal factors and/or comorbidities that impact the POC) Patient attends pain clinic for neck pain. Patient reports falling out of hammock before crunching shoulder on left side. Patient has CRPS and a history of anxiety.   Impairments A. Pain;D. Decreased ROM;F. Decreased strength and endurance   Functional Limitations perform activities of daily living;perform desired leisure / sports activities   Symptom Location Left lower cervical to upper thoracic spine.   How/Where did it occur With a fall  (Falling out of a chair at home.)   Onset date of current episode/exacerbation 08/23/22   Chronicity New   Pain rating (0-10 point scale) Best (/10);Worst (/10)   Best (/10) 5   Worst (/10) 10   Pain quality A. Sharp;C. Aching  (sharp at pain origin, radiating pain feels achy)   Frequency of pain/symptoms A. Constant   Pain/symptoms are: The same all the time   Pain/symptoms exacerbated by M. Other  (Any activities involving turning and having to look below eye level.)   Pain/symptoms eased by A. Sitting;I. OTC medication(s)  (takes ibuprofen, oxycodone, fentanyl patch, zymbalta, gabapentin for CRPS)   Progression of symptoms since onset: Worsened   Current / Previous Interventions   Diagnostic Tests: CT scan   CT Results Results   CT results Straightening of the normal cervical lordosis.   Prior Level of Function   Prior Level of Function-Mobility Independent   Prior Level of Function-ADLs Independent   Functional Level Prior Comment Has had to  limit activity since onset of CRPS and neck pain.   Current Level of Function   Current Community Support Family/friend caregiver   Patient role/employment history G. Disabled   [facility]   Home/community accessibility No steps to get in, 13 steps to get upstairs, railing on right side of stairwell   Current equipment-Gait/Locomotion   (4WW and FWW)   Current equipment-ADL   (commode, toilet seat riser)   Fall Risk Screen   Fall screen completed by PT   Have you fallen 2 or more times in the past year? Yes   Have you fallen and had an injury in the past year? Yes   Timed Up and Go score (seconds) 10.63   Is patient a fall risk? No   Fall screen comments Falls were unrelated to balance deficits.   Abuse Screen (yes response referral indicated)   Feels Unsafe at Home or Work/School no   Feels Threatened by Someone no   Does Anyone Try to Keep You From Having Contact with Others or Doing Things Outside Your Home? no   Physical Signs of Abuse Present no   Patient needs abuse support services and resources No   Cervical Spine   Cervical Left Side Bending ROM Not assessed today due to pain.   Cervical Right Rotation ROM 20 degrees   Cervical Left Rotation ROM 18 degrees   Cervical Flexion ROM Patient only able to move neck a few degrees from neutral.   Cervical Extension ROM Patient only able to move neck a few degrees from neutral.   Cervical Right Side Bending ROM Not assessed today due to pain.   Shoulder AROM Screen All RUE shoulder AROM was WNL's. LUE shoulder movements: Flexion- 46 degrees, Abduction- 32 degrees, Extension- 18 degrees, ER-23 degrees, IR- 32 degrees.   Shoulder/Wrist/Hand Strength Comments Patient's RUE has gross 4/5 strength with mild pain during seated shoulder and elbow MMT testing. Patient LUE unable to be accurately tested due to signficant pain with LUE movement.   Segmental Mobility-Cervical No significant differences between C3-C7, no tissue restrictions noted upon  assessment.   Palpation Tender to palpation from C3 to C7-T1 vertebral area.   Dermatome/Sensory Testing Light touch sensation screen normal and equal for both LUE an RUE.   Observation No significant external findings from today's appointment.   Integumentary  No significant findings.   Posture Mild rounded shoulders. No other significant findings.   Planned Therapy Interventions   Planned Therapy Interventions ADL retraining;joint mobilization;manual therapy;neuromuscular re-education;motor coordination training;ROM;strengthening;stretching   Planned Therapy Interventions Comment Improve patient's painfree ROM with manual therapy, mobilization, and stretching. Stabilize new cervical mobility with strengthing.   Planned Modality Interventions   Planned Modality Interventions Cryotherapy;Hot packs   Planned Modality Interventions Comments Pain modulation.   Clinical Impression   Criteria for Skilled Therapeutic Interventions Met yes, treatment indicated   PT Diagnosis Cervical & Thoracic Back Pain   Influenced by the following impairments Reduced Cervical and LUE shoulder AROM, CRPS.   Functional limitations due to impairments Patient unable to perform household ADL's requiring bending or end of range cervical AROM, pain with mobility, difficulty lifting due to pain.   Clinical Presentation Evolving/Changing   Clinical Presentation Rationale Patient is limited in ability to houshold activities but she does what she can. Patient's pain not in a more diffuse pattern, symptomology will therefore be the direction of the patient's POC.   Clinical Decision Making (Complexity) Moderate complexity   Therapy Frequency 1 time/week   Predicted Duration of Therapy Intervention (days/wks) 3 weeks   Risk & Benefits of therapy have been explained Yes   Patient, Family & other staff in agreement with plan of care Yes   Clinical Impression Comments Patient has good social support for participating in physical therapy. Patient also  wishes to try an injection, but was told by [facility] that she needed to try physical therapy first. Patient seems eager to give therapy a shot and is understanding of the proposed therapeutic progression.   Education Assessment   Preferred Learning Style Demonstration   Barriers to Learning Physical  (Currently has significant restrictions in her Cervical AROM, which makes it tougher to read HEP handout.)   ORTHO GOALS   PT Ortho Eval Goals 1;2;3   Ortho Goal 1   Goal Identifier Cervical ROM   Goal Description Patient will achieve 50 degrees of bilateral cervical rotation, as well as 20 degrees cervical flexion and 30 degrees cervical extension.   Target Date 10/25/22   Ortho Goal 2   Goal Identifier Stairs   Goal Description Patient will report walking up and down her stairwell with 4/10 or less cervical neck pain.   Target Date 10/25/22   Ortho Goal 3   Goal Identifier Standing   Goal Description Patient will report standing for 2 hours with 4/10 or less pain.   Target Date 10/25/22   Total Evaluation Time   PT Eval, Low Complexity Minutes (04717) 35   Therapy Certification   Certification date from 10/04/22   Certification date to 01/02/23   Medical Diagnosis Cervicalgia

## 2022-10-04 NOTE — PROGRESS NOTES
10/04/22 1315   Signing Clinician's Name / Credentials   Signing clinician's name / credentials Cristiano Javier, PT, DPT   Session Number   Session Number 1   Progress Note/Recertification   Progress Note Due Date 10/25/22   Recertification Due Date 01/02/23   Adult Goals   PT Ortho Eval Goals 1;2;3   Ortho Goal 1   Goal Identifier Cervical ROM   Goal Description Patient will achieve 50 degrees of bilateral cervical rotation, as well as 20 degrees cervical flexion and 30 degrees cervical extension.   Target Date 10/25/22   Ortho Goal 2   Goal Identifier Stairs   Goal Description Patient will report walking up and down her stairwell with 4/10 or less cervical neck pain.   Target Date 10/25/22   Ortho Goal 3   Goal Identifier Standing   Goal Description Patient will report standing for 2 hours with 4/10 or less pain.   Target Date 10/25/22       Present No    Comments No  present today.   Subjective Report   Subjective Report Patient in good spirits during today's session. Patient reports her current neck pain is a result of falling off a chair a month and a half ago. Patient reports having injections for past pain concerns and was told by the [facility] that she needed to try physical therapy before she could receive an injection. Patient sitting very still due to severity of pain if she moves her neck or shoulders. Patient eager to begin therapy to help modulate her pain.   Objective Measures   Objective Measures Objective Measure 1;Objective Measure 2;Objective Measure 3   Objective Measure 1   Objective Measure Cervical ROM   Details Patient's Cervical AROM demonstrates significant restrictions due to cervical pain.   Objective Measure 2   Objective Measure SEBLE   Details Not assessed at eval, will have patient complete at next appointment   Objective Measure 3   Objective Measure Pain   Details Patient will go one full day with cervical pain 4/10 or less. Currently  rates it at 4/10 at best and 10/10 at worst.   Treatment Interventions   Interventions Therapeutic Procedure/Exercise;Manual Therapy   Therapeutic Procedure/exercise   Therapeutic Procedures: strength, endurance, ROM, flexibillity minutes (84798) 15   Skilled Intervention Selection, instruction, and modification of exercises for therapeutic benefit.   Patient Response Patient reports mild pain. Educated that some mild pain is to be expected in her POC progression.   Treatment Detail Cervical and thoracic retractions 1x10 with 3 sec holds, scalene stretches for left sides neck musculature 1x10 w/5 sec holds   Manual Therapy   Manual Therapy: Mobilization, MFR, MLD, friction massage minutes (24451) 8   Skilled Intervention Selection and modification of techniques for therapeutic benefit.   Patient Response No adverse response.   Treatment Detail Grade 1 Cervical PA mods to C3-T1   Education   Learner Patient   Readiness Acceptance   Method Booklet/handout   Response Verbalizes Understanding   Plan   Homework HEP   Home program Cervical and thoracic retractions 1x10 with 3 sec holds, scalene stretches for left sides neck musculature 1x10 w/5 sec holds   Plan for next session Progress stretching exercises. Utilize manual therapy for vertebral and soft tissue manipulation.   Total Session Time   Timed Code Treatment Minutes 23   Total Treatment Time (sum of timed and untimed services) 60   AMBULATORY CLINICS ONLY-MEDICAL AND TREATMENT DIAGNOSIS   Medical Diagnosis Cervicalgia   PT Diagnosis Cervical & Thoracic Back Pain

## 2022-10-04 NOTE — PROGRESS NOTES
[facility]    OUTPATIENT PHYSICAL THERAPY ORTHOPEDIC EVALUATION  PLAN OF TREATMENT FOR OUTPATIENT REHABILITATION  (COMPLETE FOR INITIAL CLAIMS ONLY)  Patient's Last Name, First Name, M.I.  YOB: 1960  Linda Walsh       Provider s Name:  [facility]   Medical Record No.  [id number]   Start of Care Date:  10/04/22   Onset Date:  08/23/22   Type:     _X__PT   ___OT   ___SLP Medical Diagnosis:  Cervicalgia     PT Diagnosis:  Cervical & Thoracic Back Pain   Visits from SOC:  1      _________________________________________________________________________________  Plan of Treatment/Functional Goals:  ADL retraining, joint mobilization, manual therapy, neuromuscular re-education, motor coordination training, ROM, strengthening, stretching  Improve patient's painfree ROM with manual therapy, mobilization, and stretching. Stabilize new cervical mobility with strengthing.  Cryotherapy, Hot packs  Pain modulation.  Goals  Goal Identifier: Cervical ROM  Goal Description: Patient will achieve 50 degrees of bilateral cervical rotation, as well as 20 degrees cervical flexion and 30 degrees cervical extension.  Target Date: 10/25/22    Goal Identifier: Stairs  Goal Description: Patient will report walking up and down her stairwell with 4/10 or less cervical neck pain.  Target Date: 10/25/22    Goal Identifier: Standing  Goal Description: Patient will report standing for 2 hours with 4/10 or less pain.  Target Date: 10/25/22    Therapy Frequency:  1 time/week  Predicted Duration of Therapy Intervention:  3 weeks    Cristiano Javier PT                 I CERTIFY THE NEED FOR THESE SERVICES FURNISHED UNDER        THIS PLAN OF TREATMENT AND WHILE UNDER MY CARE     (Physician co-signature of this document indicates review and certification of the therapy plan).                      Certification Date From:  10/04/22   Certification Date To:  01/02/23    Referring Provider:  Niya Duran (NP)    Initial Assessment        See Epic Evaluation Start of Care Date: 10/04/22

## 2022-10-10 DIAGNOSIS — K59.00 CONSTIPATION, UNSPECIFIED CONSTIPATION TYPE: Primary | ICD-10-CM

## 2022-10-10 DIAGNOSIS — Z96.641 STATUS POST TOTAL REPLACEMENT OF RIGHT HIP: ICD-10-CM

## 2022-10-10 RX ORDER — BISACODYL 10 MG
SUPPOSITORY, RECTAL RECTAL
Qty: 12 SUPPOSITORY | Refills: 1 | Status: SHIPPED | OUTPATIENT
Start: 2022-10-10 | End: 2022-12-05

## 2022-10-12 ENCOUNTER — VIRTUAL VISIT (OUTPATIENT)
Dept: PSYCHOLOGY | Facility: CLINIC | Age: 62
End: 2022-10-12
Payer: MEDICARE

## 2022-10-12 DIAGNOSIS — F33.2 SEVERE RECURRENT MAJOR DEPRESSION WITHOUT PSYCHOTIC FEATURES (H): Primary | ICD-10-CM

## 2022-10-12 DIAGNOSIS — F43.10 POST TRAUMATIC STRESS DISORDER (PTSD): ICD-10-CM

## 2022-10-12 DIAGNOSIS — F41.1 GENERALIZED ANXIETY DISORDER: ICD-10-CM

## 2022-10-12 PROCEDURE — 90837 PSYTX W PT 60 MINUTES: CPT | Mod: 95 | Performed by: COUNSELOR

## 2022-10-12 NOTE — PROGRESS NOTES
"        [facility]                                     Progress Note    Patient Name: Linda Walsh  Date: 10/12/22         Service Type: Individual      Session Start Time: 1:03pm  Session End Time: 2:00pm     Session Length: 57    Session #: 80    Attendees: Client    Service Modality:  Phone Visit:      Provider verified identity through the following two step process.  Patient provided:  Patient is known previously to provider    The patient has been notified of the following:      \"We have found that certain health care needs can be provided without the need for a face to face visit.  This service lets us provide the care you need with a phone conversation.       I will have full access to your [facility] medical record during this entire phone call.   I will be taking notes for your medical record.      Since this is like an office visit, we will bill your insurance company for this service.       There are potential benefits and risks of telephone visits (e.g. limits to patient confidentiality) that differ from in-person visits.?Confidentiality still applies for telephone services, and nobody will record the visit.  It is important to be in a quiet, private space that is free of distractions (including cell phone or other devices) during the visit.??      If during the course of the call I believe a telephone visit is not appropriate, you will not be charged for this service\"     Consent has been obtained for this service by care team member: Yes     DATA  Interactive Complexity: No  Crisis: No        Progress Since Last Session (Related to Symptoms / Goals / Homework):   Symptoms: No change .    Homework: Completed in session      Episode of Care Goals: Minimal progress - ACTION (Actively working towards change); Intervened by reinforcing change plan / affirming steps taken     Current / Ongoing Stressors and Concerns:   The client stated she is considering a separation with her " .     Treatment Objective(s) Addressed in This Session:   use thought-stopping strategy daily to reduce intrusive thoughts       Intervention:   Processed the recent arguments the client has had with her . She stated they have been realloy bad and she has even seen what she interprets as a look of hate in his eyes when he's looked at her. She stated she plans to seriously talk about seperation in couples counseling.     Assessments completed prior to visit:  The following assessments were completed by patient for this visit:  PHQA: No flowsheet data found.  GAD7:   MRALIN-7 SCORE 11/18/2019 12/30/2019 2/26/2020 5/27/2020 7/28/2020 2/24/2021 7/13/2022   Total Score - - - - - - -   Total Score 20 (severe anxiety) 19 (severe anxiety) - - 19 (severe anxiety) 16 (severe anxiety) 21 (severe anxiety)   Total Score 20 19 19 21 19 16 21     PROMIS 10-Global Health (all questions and answers displayed): No flowsheet data found.      ASSESSMENT: Current Emotional / Mental Status (status of significant symptoms):   Risk status (Self / Other harm or suicidal ideation)   Patient denies current fears or concerns for personal safety.   Patient denies current or recent suicidal ideation or behaviors.   Patient denies current or recent homicidal ideation or behaviors.   Patient denies current or recent self injurious behavior or ideation.   Patient denies other safety concerns.   Patient reports there has been no change in risk factors since their last session.     Patient reports there has been no change in protective factors since their last session.     Recommended that patient call 911 or go to the local ED should there be a change in any of these risk factors.     Appearance:   Appropriate    Eye Contact:   Good    Psychomotor Behavior: Normal    Attitude:   Cooperative    Orientation:   All   Speech    Rate / Production: Normal/ Responsive    Volume:  Normal    Mood:    Normal   Affect:    Appropriate    Thought  Content:  Clear    Thought Form:  Coherent  Logical    Insight:    Good      Medication Review:   No changes to current psychiatric medication(s)     Medication Compliance:   Yes     Changes in Health Issues:   None reported     Chemical Use Review:   Substance Use: Chemical use reviewed, no active concerns identified      Tobacco Use: No change in amount of tobacco use since last session.  Patient declined discussion at this time    Diagnosis:  1. 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 Generalized anxiety disorder    3. Post traumatic stress disorder (PTSD)        Collateral Reports Completed:   Not Applicable    PLAN: (Patient Tasks / Therapist Tasks / Other)  Client to continue to work on stress reduction skills.         Nasrin Valladares, [facility]                                                         ______________________________________________________________________    Individual Treatment Plan    Patient's Name: Linda Walsh  YOB: 1960    Date of Creation: 7/11/22  Date Treatment Plan Last Reviewed/Revised: 10/12/22    DSM5 Diagnoses: 296.33 (F33.2) Major Depressive Disorder, Recurrent Episode, Severe _, 300.02 (F41.1) Generalized Anxiety Disorder or 309.81 (F43.10) Posttraumatic Stress Disorder (includes Posttraumatic Stress Disorder for Children 6 Years and Younger)  Without dissociative symptoms  Psychosocial / Contextual Factors: CRPS  PROMIS (reviewed every 90 days):     Referral / Collaboration:  Referral to another professional/service is not indicated at this time..    Anticipated number of session for this episode of care: on-going  Anticipation frequency of session: Monthly  Anticipated Duration of each session: 38-52 minutes/53+ minutes  Treatment plan will be reviewed in 90 days or when goals have been changed.       MeasurableTreatment Goal(s) related to diagnosis / functional impairment(s)  Goal 1: Client will decrease thoughts of wanting to be dead  from 10 out of 10 opportunities to at most 9 out of 10 opportunities for at least 3 consecutive weeks as evidenced by client report and a decrease in symptom report as evidenced by PhQ9 scores and GAD7 scores.    Objective #A (Client Action)    Client will Client will look at and read safety plan at least once a day and follow safety plan when thoughts and urges come up.  Status: Continued - Date(s): 10/12/22    Intervention(s)  Therapist will teach emotional regulation skills. distress tolerance skills, interpersonal effectiveness skills, emotion regluation skills, mindfulness skills, radical acceptance. Therapist will develop safety plan with the client.     Objective #B  Client will Client will agree to call the therapist or after hours crisis line if noticing intense suicidal thoughts for skills coaching.   Status: Continued - Date(s): 10/12/22    Intervention(s)  Therapist will Therapist will be available as much as possible during work hours for the client to contact and give the client several crisis resources.         Goal 2: Client will increase the use of skills (emotion regulation, distress tolerance, communication skill) from 1 out of 10 opportunities to at least 2 out of 10 opportunities for at least 3 consecutive weeks as evidenced by client report and a decrease in symptom report as evidenced by PhQ9 scores and GAD7 scores.     Objective #A (Client Action)    Status: Continued - Date(s): 10/12/22    Client will Increase interest, engagement, and pleasure in doing things  Decrease frequency and intensity of feeling down, depressed, hopeless  Improve diet, appetite, mindful eating, and / or meal planning  Identify negative self-talk and behaviors: challenge core beliefs, myths, and actions  Improve concentration, focus, and mindfulness in daily activities   Feel less fidgety, restless or slow in daily activities / interpersonal interactions  Decrease thoughts that you'd be better off dead or of suicide  / self-harm.    Intervention(s)  Therapist will teach emotional regulation skills. distress tolerance skills, interpersonal effectiveness skills, emotion regluation skills, mindfulness skills, radical acceptance. Therapist will teach client how to ID body cues for anxiety, anxiety reduction techniques, how to ID triggers for depression and anxiety- decrease reactivity/eliminate, lifestyle changes to reduce depression and anxiety, communication skills, explore cognitive beliefs and help client to develop healthy cognitive patterns and beliefs.    Objective #B  Client will Client will learn and  practice DBT skills daily..    Status: Continued - Date(s): 10/12/22    Intervention(s)  Therapist will Therapist will Therapist will teach emotional regulation skills. distress tolerance skills, interpersonal effectiveness skills, emotion regluation skills, mindfulness skills, radical acceptance..      Goal 3: Client will decrease anxious symptoms and reactions to triggers from 9 out of 10 opportunities to at most 8 out of 10 opportunities for at least 3 consecutive weeks as evidenced by client report and a decrease in symptom report as evidenced by PhQ9 scores and GAD7 scores.    Objective #A (Client Action)    Status: Continued - Date(s): 10/12/22    Client will Client will use cognitive strategies identified in therapy to challenge anxious thoughts.    Intervention(s)  Therapist will Therapist will teach emotional recognition/identification. emotion regulation skills, coping skills, mindfulness skills.    Objective #B  Client will Client will use thought-stopping strategy daily to reduce intrusive thoughts for at least 3 consecutive weeks as evidenced by client report and a decrease in symptom report as evidenced by PhQ9 scores and GAD7 scores.      Status: Continued - Date(s): 10/12/22    Intervention(s)  Therapist will teach emotional regulation skills. distress tolerance skills, interpersonal effectiveness skills,  "emotion regluation skills, mindfulness skills, radical acceptance. Therapist will teach client how to ID body cues for anxiety, anxiety reduction techniques, how to ID triggers for depression and anxiety- decrease reactivity/eliminate, lifestyle changes to reduce depression and anxiety, communication skills, explore cognitive beliefs and help client to develop healthy cognitive patterns and beliefs.    Objective #C  Client will Client will learn 5 crisis survival skills during the Distress Tolerance Module (6-8 weeks) for at least 3 consecutive weeks as evidenced by client report and a decrease in symptom report as evidenced by PhQ9 scores and GAD7 scores.  Status: Continued - Date(s): 10/12/22    Intervention(s)  Therapist will teach emotional regulation skills. distress tolerance skills, interpersonal effectiveness skills, emotion regluation skills, mindfulness skills, radical acceptance.      Client has reviewed and agreed to the above plan.      Nasrin Valladares [facility]        Linda Walsh     SAFETY PLAN:  Step 1: Warning signs / cues (Thoughts, images, mood, situation, behavior) that a crisis may be developing:    Thoughts: \"I don't matter\", \"People would be better off without me\", \"I'm a burden\", \"I can't do this anymore\", \"I just want this to end\" and \"Nothing makes it better\"    Images: obsessive thoughts of death or dying: car accident, using a gun and flashbacks    Thinking Processes: ruminations (can't stop thinking about my problems): court case, pain, racing thoughts, highly critical and negative thoughts: \"I can't do anything, I have to suffer everyday and go to work and other people have it so easy.\"  and paranoia: that the Foremost is watching me    Mood: worsening depression, hopelessness, helplessness, intense anger, intense worry, agitation, disinhibited (not caring about things or consequences) and mood swings    Behaviors: isolating/withdrawing , can't stop crying, not taking care of " "myself, not taking care of my responsibilities, sleeping too much and not sleeping enough    Situations: legal issues: current court case, pain, trauma , financial stress and medical condition / diagnosis: CRPS   Step 2: Coping strategies - Things I can do to take my mind off of my problems without contacting another person (relaxation technique, physical activity):    Distress Tolerance Strategies:  arts and crafts: with her residents, play with my pet , pray, change body temperature (ice pack/cold water) , paced breathing/progressive muscle relaxation and puzzles, games on ipad    Physical Activities: deep breathing    Focus on helpful thoughts:  \"Ride the wave\", think about happy memories: when the grandkids were born, going camping, when the boys were little and remind myself of what is important to me: grandkids, family, the residents  Step 3: People and social settings that provide distraction:   Name: Alpesh  Phone: [phone]   Name: Velasquez  Phone:    Name: Thaddeus  Phone:     work   Step 4: Remind myself of people and things that are important to me and worth living for:    My family, my residents    Step 5: When I am in crisis, I can ask these people to help me use my safety plan:   Name: Alpesh  Phone: [phone]   Name: Velasquez  Phone: (number in phone)   Name: Thaddeus  Phone: (number in phone)  Step 6: Making the environment safe:     be around others  Step 7: Professionals or agencies I can contact during a crisis:    [facility] Daytime Number: [phone]    Suicide Prevention Lifeline: 1-110-041-UPQY (6689)    Crisis Text Line Service (available 24 hours a day, 7 days a week): Text [place] to 820298  Local Crisis Services:     Call 911 or go to my nearest emergency department.   I helped develop this safety plan and agree to use it when needed.  I have been given a copy of this plan.      Client signature _________________________________________________________________  Today s date: " 7/11/22  Adapted from Safety Plan Template 2008 Griselda Perez and Livan Cutler is reprinted with the express permission of the authors.  No portion of the Safety Plan Template may be reproduced without the express, written permission.  You can contact the authors at [email or web address] or [email or web address].

## 2022-10-19 ENCOUNTER — MYC MEDICAL ADVICE (OUTPATIENT)
Dept: FAMILY MEDICINE | Facility: CLINIC | Age: 62
End: 2022-10-19

## 2022-10-19 DIAGNOSIS — R09.81 NASAL CONGESTION: Primary | ICD-10-CM

## 2022-10-21 ENCOUNTER — HOSPITAL ENCOUNTER (OUTPATIENT)
Dept: PHYSICAL THERAPY | Facility: CLINIC | Age: 62
Setting detail: THERAPIES SERIES
Discharge: HOME | End: 2022-10-21
Attending: NURSE PRACTITIONER
Payer: MEDICARE

## 2022-10-21 PROCEDURE — 97112 NEUROMUSCULAR REEDUCATION: CPT | Mod: GP | Performed by: PHYSICAL THERAPIST

## 2022-10-26 ENCOUNTER — MYC MEDICAL ADVICE (OUTPATIENT)
Dept: PHYSICAL THERAPY | Facility: CLINIC | Age: 62
End: 2022-10-26

## 2022-11-07 ENCOUNTER — TRANSFERRED RECORDS (OUTPATIENT)
Dept: HEALTH INFORMATION MANAGEMENT | Facility: CLINIC | Age: 62
End: 2022-11-07

## 2022-11-07 LAB — PHQ9 SCORE: 16

## 2022-11-09 ENCOUNTER — VIRTUAL VISIT (OUTPATIENT)
Dept: PSYCHOLOGY | Facility: CLINIC | Age: 62
End: 2022-11-09
Payer: MEDICARE

## 2022-11-09 DIAGNOSIS — F33.2 SEVERE RECURRENT MAJOR DEPRESSION WITHOUT PSYCHOTIC FEATURES (H): Primary | ICD-10-CM

## 2022-11-09 DIAGNOSIS — F43.10 POST TRAUMATIC STRESS DISORDER (PTSD): ICD-10-CM

## 2022-11-09 DIAGNOSIS — F41.1 GENERALIZED ANXIETY DISORDER: ICD-10-CM

## 2022-11-09 PROCEDURE — 90834 PSYTX W PT 45 MINUTES: CPT | Mod: 95 | Performed by: COUNSELOR

## 2022-11-09 NOTE — PROGRESS NOTES
"        [facility] Counseling                                     Progress Note    Patient Name: Linda Walsh  Date: 11/9/22         Service Type: Individual      Session Start Time: 1:00pm  Session End Time: 1:50pm     Session Length: 50    Session #: 81    Attendees: Client    Service Modality:  Phone Visit:      Provider verified identity through the following two step process.  Patient provided:  Patient is known previously to provider    The patient has been notified of the following:      \"We have found that certain health care needs can be provided without the need for a face to face visit.  This service lets us provide the care you need with a phone conversation.       I will have full access to your [facility] medical record during this entire phone call.   I will be taking notes for your medical record.      Since this is like an office visit, we will bill your insurance company for this service.       There are potential benefits and risks of telephone visits (e.g. limits to patient confidentiality) that differ from in-person visits.?Confidentiality still applies for telephone services, and nobody will record the visit.  It is important to be in a quiet, private space that is free of distractions (including cell phone or other devices) during the visit.??      If during the course of the call I believe a telephone visit is not appropriate, you will not be charged for this service\"     Consent has been obtained for this service by care team member: Yes     DATA  Interactive Complexity: No  Crisis: No        Progress Since Last Session (Related to Symptoms / Goals / Homework):   Symptoms: No change .    Homework: Completed in session      Episode of Care Goals: Minimal progress - ACTION (Actively working towards change); Intervened by reinforcing change plan / affirming steps taken     Current / Ongoing Stressors and Concerns:   The client stated she is considering a separation with her "  still. She stated she's been struggling with his moods and how he's been treating her. She stated she feels like she's been doing so much around the house without any help and is feeling overwhelmed.      Treatment Objective(s) Addressed in This Session:   use thought-stopping strategy daily to reduce intrusive thoughts       Intervention:   The client stated she and her  haven't gone to couples counseling in while because her  hasn't wanted to go. They have an appointment coming up. Processed her frustrations and disapointments she's been feeling lately.      Assessments completed prior to visit:  The following assessments were completed by patient for this visit:  PHQA: No flowsheet data found.  GAD7:   MARLIN-7 SCORE 11/18/2019 12/30/2019 2/26/2020 5/27/2020 7/28/2020 2/24/2021 7/13/2022   Total Score - - - - - - -   Total Score 20 (severe anxiety) 19 (severe anxiety) - - 19 (severe anxiety) 16 (severe anxiety) 21 (severe anxiety)   Total Score 20 19 19 21 19 16 21     PROMIS 10-Global Health (all questions and answers displayed): No flowsheet data found.      ASSESSMENT: Current Emotional / Mental Status (status of significant symptoms):   Risk status (Self / Other harm or suicidal ideation)   Patient denies current fears or concerns for personal safety.   Patient denies current or recent suicidal ideation or behaviors.   Patient denies current or recent homicidal ideation or behaviors.   Patient denies current or recent self injurious behavior or ideation.   Patient denies other safety concerns.   Patient reports there has been no change in risk factors since their last session.     Patient reports there has been no change in protective factors since their last session.     Recommended that patient call 911 or go to the local ED should there be a change in any of these risk factors.     Appearance:   Appropriate    Eye Contact:   Good    Psychomotor Behavior: Normal    Attitude:   Cooperative     Orientation:   All   Speech    Rate / Production: Normal/ Responsive    Volume:  Normal    Mood:    Normal   Affect:    Appropriate    Thought Content:  Clear    Thought Form:  Coherent  Logical    Insight:    Good      Medication Review:   No changes to current psychiatric medication(s)     Medication Compliance:   Yes     Changes in Health Issues:   None reported     Chemical Use Review:   Substance Use: Chemical use reviewed, no active concerns identified      Tobacco Use: No change in amount of tobacco use since last session.  Patient declined discussion at this time    Diagnosis:  1. 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 Generalized anxiety disorder    3. Post traumatic stress disorder (PTSD)        Collateral Reports Completed:   Not Applicable    PLAN: (Patient Tasks / Therapist Tasks / Other)  Client to continue to work on stress reduction skills.         Nasrin Valladares, [facility]                                                         ______________________________________________________________________    Individual Treatment Plan    Patient's Name: Linda Walsh  YOB: 1960    Date of Creation: 7/11/22  Date Treatment Plan Last Reviewed/Revised: 10/12/22    DSM5 Diagnoses: 296.33 (F33.2) Major Depressive Disorder, Recurrent Episode, Severe _, 300.02 (F41.1) Generalized Anxiety Disorder or 309.81 (F43.10) Posttraumatic Stress Disorder (includes Posttraumatic Stress Disorder for Children 6 Years and Younger)  Without dissociative symptoms  Psychosocial / Contextual Factors: CRPS  PROMIS (reviewed every 90 days):     Referral / Collaboration:  Referral to another professional/service is not indicated at this time..    Anticipated number of session for this episode of care: on-going  Anticipation frequency of session: Monthly  Anticipated Duration of each session: 38-52 minutes/53+ minutes  Treatment plan will be reviewed in 90 days or when goals have been  changed.       MeasurableTreatment Goal(s) related to diagnosis / functional impairment(s)  Goal 1: Client will decrease thoughts of wanting to be dead from 10 out of 10 opportunities to at most 9 out of 10 opportunities for at least 3 consecutive weeks as evidenced by client report and a decrease in symptom report as evidenced by PhQ9 scores and GAD7 scores.    Objective #A (Client Action)    Client will Client will look at and read safety plan at least once a day and follow safety plan when thoughts and urges come up.  Status: Continued - Date(s): 10/12/22    Intervention(s)  Therapist will teach emotional regulation skills. distress tolerance skills, interpersonal effectiveness skills, emotion regluation skills, mindfulness skills, radical acceptance. Therapist will develop safety plan with the client.     Objective #B  Client will Client will agree to call the therapist or after hours crisis line if noticing intense suicidal thoughts for skills coaching.   Status: Continued - Date(s): 10/12/22    Intervention(s)  Therapist will Therapist will be available as much as possible during work hours for the client to contact and give the client several crisis resources.         Goal 2: Client will increase the use of skills (emotion regulation, distress tolerance, communication skill) from 1 out of 10 opportunities to at least 2 out of 10 opportunities for at least 3 consecutive weeks as evidenced by client report and a decrease in symptom report as evidenced by PhQ9 scores and GAD7 scores.     Objective #A (Client Action)    Status: Continued - Date(s): 10/12/22    Client will Increase interest, engagement, and pleasure in doing things  Decrease frequency and intensity of feeling down, depressed, hopeless  Improve diet, appetite, mindful eating, and / or meal planning  Identify negative self-talk and behaviors: challenge core beliefs, myths, and actions  Improve concentration, focus, and mindfulness in daily  activities   Feel less fidgety, restless or slow in daily activities / interpersonal interactions  Decrease thoughts that you'd be better off dead or of suicide / self-harm.    Intervention(s)  Therapist will teach emotional regulation skills. distress tolerance skills, interpersonal effectiveness skills, emotion regluation skills, mindfulness skills, radical acceptance. Therapist will teach client how to ID body cues for anxiety, anxiety reduction techniques, how to ID triggers for depression and anxiety- decrease reactivity/eliminate, lifestyle changes to reduce depression and anxiety, communication skills, explore cognitive beliefs and help client to develop healthy cognitive patterns and beliefs.    Objective #B  Client will Client will learn and  practice DBT skills daily..    Status: Continued - Date(s): 10/12/22    Intervention(s)  Therapist will Therapist will Therapist will teach emotional regulation skills. distress tolerance skills, interpersonal effectiveness skills, emotion regluation skills, mindfulness skills, radical acceptance..      Goal 3: Client will decrease anxious symptoms and reactions to triggers from 9 out of 10 opportunities to at most 8 out of 10 opportunities for at least 3 consecutive weeks as evidenced by client report and a decrease in symptom report as evidenced by PhQ9 scores and GAD7 scores.    Objective #A (Client Action)    Status: Continued - Date(s): 10/12/22    Client will Client will use cognitive strategies identified in therapy to challenge anxious thoughts.    Intervention(s)  Therapist will Therapist will teach emotional recognition/identification. emotion regulation skills, coping skills, mindfulness skills.    Objective #B  Client will Client will use thought-stopping strategy daily to reduce intrusive thoughts for at least 3 consecutive weeks as evidenced by client report and a decrease in symptom report as evidenced by PhQ9 scores and GAD7 scores.      Status:  "Continued - Date(s): 10/12/22    Intervention(s)  Therapist will teach emotional regulation skills. distress tolerance skills, interpersonal effectiveness skills, emotion regluation skills, mindfulness skills, radical acceptance. Therapist will teach client how to ID body cues for anxiety, anxiety reduction techniques, how to ID triggers for depression and anxiety- decrease reactivity/eliminate, lifestyle changes to reduce depression and anxiety, communication skills, explore cognitive beliefs and help client to develop healthy cognitive patterns and beliefs.    Objective #C  Client will Client will learn 5 crisis survival skills during the Distress Tolerance Module (6-8 weeks) for at least 3 consecutive weeks as evidenced by client report and a decrease in symptom report as evidenced by PhQ9 scores and GAD7 scores.  Status: Continued - Date(s): 10/12/22    Intervention(s)  Therapist will teach emotional regulation skills. distress tolerance skills, interpersonal effectiveness skills, emotion regluation skills, mindfulness skills, radical acceptance.      Client has reviewed and agreed to the above plan.      Nasrin Valladares [facility]        Linda Walsh     SAFETY PLAN:  Step 1: Warning signs / cues (Thoughts, images, mood, situation, behavior) that a crisis may be developing:    Thoughts: \"I don't matter\", \"People would be better off without me\", \"I'm a burden\", \"I can't do this anymore\", \"I just want this to end\" and \"Nothing makes it better\"    Images: obsessive thoughts of death or dying: car accident, using a gun and flashbacks    Thinking Processes: ruminations (can't stop thinking about my problems): court case, pain, racing thoughts, highly critical and negative thoughts: \"I can't do anything, I have to suffer everyday and go to work and other people have it so easy.\"  and paranoia: that the government is watching me    Mood: worsening depression, hopelessness, helplessness, intense anger, intense worry, " "agitation, disinhibited (not caring about things or consequences) and mood swings    Behaviors: isolating/withdrawing , can't stop crying, not taking care of myself, not taking care of my responsibilities, sleeping too much and not sleeping enough    Situations: legal issues: current court case, pain, trauma , financial stress and medical condition / diagnosis: CRPS   Step 2: Coping strategies - Things I can do to take my mind off of my problems without contacting another person (relaxation technique, physical activity):    Distress Tolerance Strategies:  arts and crafts: with her residents, play with my pet , pray, change body temperature (ice pack/cold water) , paced breathing/progressive muscle relaxation and puzzles, games on ipad    Physical Activities: deep breathing    Focus on helpful thoughts:  \"Ride the wave\", think about happy memories: when the grandkids were born, going camping, when the boys were little and remind myself of what is important to me: grandkids, family, the residents  Step 3: People and social settings that provide distraction:   Name: Alpesh  Phone: [phone]   Name: Velasquez  Phone:    Name: Thaddeus  Phone:     work   Step 4: Remind myself of people and things that are important to me and worth living for:    My family, my residents    Step 5: When I am in crisis, I can ask these people to help me use my safety plan:   Name: Alpesh  Phone: [phone]   Name: Velasquez  Phone: (number in phone)   Name: Thaddeus  Phone: (number in phone)  Step 6: Making the environment safe:     be around others  Step 7: Professionals or agencies I can contact during a crisis:    [facility] Number: [phone]    Suicide Prevention Lifeline: 4-855-134-TALK (3143)    Crisis Text Line Service (available 24 hours a day, 7 days a week): Text [place] to 850346  Local Crisis Services:     Call 911 or go to my nearest emergency department.   I helped develop this safety plan and agree to use it when " needed.  I have been given a copy of this plan.      Client signature _________________________________________________________________  Today s date: 7/11/22  Adapted from Safety Plan Template 2008 Griselda Perez and Livan Cutler is reprinted with the express permission of the authors.  No portion of the Safety Plan Template may be reproduced without the express, written permission.  You can contact the authors at [email or web address] or [email or web address].

## 2022-11-14 ENCOUNTER — MYC MEDICAL ADVICE (OUTPATIENT)
Dept: FAMILY MEDICINE | Facility: CLINIC | Age: 62
End: 2022-11-14

## 2022-11-15 ENCOUNTER — TRANSFERRED RECORDS (OUTPATIENT)
Dept: OTOLARYNGOLOGY | Facility: OTHER | Age: 62
End: 2022-11-15

## 2022-11-16 NOTE — DISCHARGE SUMMARY
[facility]    Outpatient Physical Therapy Discharge Note  Patient: Linda Walsh  : 1960    Beginning/End Dates of Reporting Period:  2 sessions: 10-4-2022 and 10-. Canceled last 2 appt.     Referring Provider: Niya Duran, CNP    Therapy Diagnosis: Cervical & Thoracic Back Pain     Client Self Report: At the time of her last session, she reported:  Doing exercises and it is painful. She has had injections. Trial of PT before injection. Feels neck AROM is improving. Symptoms into the C8 bilaterally and pain along clavicle. Also gets injections in the back. Not working as well as they used to - 3-4 months lasting. CRPS, injections are painful.    Objective Measurements:  At the time of her last session:  Objective Measure: Laterality  Details: Neck: 80%  correct x 10 cards in 29 seconds (2 seconds average normal); foot: 70% correct identification x 10 cards in 32 seconds (ave 2 seconds per card normal) - explained GPS system and homunculus  Objective Measure: Palpation of nerves  Details: Negative ulnar nerve bilaterally, positive radial nerve bilaterally;      Mary called after her last PT session stating she was in severe pain and needed to be seen immediately in PT and requesting PT call her provider about getting injections sooner due to the pain. She did not respond to message left on her voice mail/mobile phone nor did she respond to My CHart message stating she could be seen in PT that evening. Her provider was contacted and they requested she call them for follow up. Message was left for Mary regarding this immediately after the phone call.      Goals:  Goal Identifier Cervical ROM   Goal Description Mary will be able to turn her head to the L and R for seeing environment for safe driving.   Target Date 10/25/22   Date Met      Progress (detail required for progress note):        Goal Identifier Stairs   Goal Description Mary will be able to increase her neck motion fo she can negotiate steps safely using neck position to see steps clearly and not have to lean against the wall for descending steps.   Target Date 10/25/22   Date Met      Progress (detail required for progress note):       Goal Identifier Cooking   Goal Description Mary will be able to to use pacing and positioning/posture so she can make a whole meal 1-2 times per week without breaks.   Target Date 10/25/22   Date Met      Progress (detail required for progress note):         Plan:  Discharge from therapy.    Discharge:    Reason for Discharge: Canceled last 2 appt and did not return PT call nor reschedule.     Equipment Issued: none    Discharge Plan: Other services: Mary is checking into injections with care provider. Did not complete PT.

## 2022-11-18 ENCOUNTER — VIRTUAL VISIT (OUTPATIENT)
Dept: PSYCHOLOGY | Facility: CLINIC | Age: 62
End: 2022-11-18
Payer: MEDICARE

## 2022-11-18 DIAGNOSIS — F41.1 GENERALIZED ANXIETY DISORDER: ICD-10-CM

## 2022-11-18 DIAGNOSIS — F43.10 POST TRAUMATIC STRESS DISORDER (PTSD): ICD-10-CM

## 2022-11-18 DIAGNOSIS — F33.2 SEVERE RECURRENT MAJOR DEPRESSION WITHOUT PSYCHOTIC FEATURES (H): Primary | ICD-10-CM

## 2022-11-18 PROCEDURE — 90834 PSYTX W PT 45 MINUTES: CPT | Mod: 95 | Performed by: COUNSELOR

## 2022-11-18 NOTE — PROGRESS NOTES
"        [facility]                                     Progress Note    Patient Name: Linda Walsh  Date: 11/18/22         Service Type: Individual      Session Start Time: 9:05am  Session End Time: 9:55am      Session Length: 50    Session #: 82    Attendees: Client    Service Modality:  Phone Visit:      Provider verified identity through the following two step process.  Patient provided:  Patient is known previously to provider    The patient has been notified of the following:      \"We have found that certain health care needs can be provided without the need for a face to face visit.  This service lets us provide the care you need with a phone conversation.       I will have full access to your [facility] medical record during this entire phone call.   I will be taking notes for your medical record.      Since this is like an office visit, we will bill your insurance company for this service.       There are potential benefits and risks of telephone visits (e.g. limits to patient confidentiality) that differ from in-person visits.?Confidentiality still applies for telephone services, and nobody will record the visit.  It is important to be in a quiet, private space that is free of distractions (including cell phone or other devices) during the visit.??      If during the course of the call I believe a telephone visit is not appropriate, you will not be charged for this service\"     Consent has been obtained for this service by care team member: Yes     DATA  Interactive Complexity: No  Crisis: No        Progress Since Last Session (Related to Symptoms / Goals / Homework):   Symptoms: No change .    Homework: Completed in session      Episode of Care Goals: Minimal progress - ACTION (Actively working towards change); Intervened by reinforcing change plan / affirming steps taken     Current / Ongoing Stressors and Concerns:   The client stated she is struggling with the constant " arguing she and her  seem to be doing. She stated she wants help with keeping herself more calm.     Treatment Objective(s) Addressed in This Session:   use thought-stopping strategy daily to reduce intrusive thoughts       Intervention:   Worked with the client on stress reduction techniques. Validated her frustrations and continued to encourage her to bring them up in couples counseling. Discussed what makes relationships successful is when both parties are putting forth the effort which is what is interfering in her marraige currently.      Assessments completed prior to visit:  The following assessments were completed by patient for this visit:  PHQA: No flowsheet data found.  GAD7:   MARLIN-7 SCORE 11/18/2019 12/30/2019 2/26/2020 5/27/2020 7/28/2020 2/24/2021 7/13/2022   Total Score - - - - - - -   Total Score 20 (severe anxiety) 19 (severe anxiety) - - 19 (severe anxiety) 16 (severe anxiety) 21 (severe anxiety)   Total Score 20 19 19 21 19 16 21     PROMIS 10-Global Health (all questions and answers displayed): No flowsheet data found.      ASSESSMENT: Current Emotional / Mental Status (status of significant symptoms):   Risk status (Self / Other harm or suicidal ideation)   Patient denies current fears or concerns for personal safety.   Patient denies current or recent suicidal ideation or behaviors.   Patient denies current or recent homicidal ideation or behaviors.   Patient denies current or recent self injurious behavior or ideation.   Patient denies other safety concerns.   Patient reports there has been no change in risk factors since their last session.     Patient reports there has been no change in protective factors since their last session.     Recommended that patient call 911 or go to the local ED should there be a change in any of these risk factors.     Appearance:   Appropriate    Eye Contact:   Good    Psychomotor Behavior: Normal    Attitude:   Cooperative     Orientation:   All   Speech    Rate / Production: Normal/ Responsive    Volume:  Normal    Mood:    Normal   Affect:    Appropriate    Thought Content:  Clear    Thought Form:  Coherent  Logical    Insight:    Good      Medication Review:   No changes to current psychiatric medication(s)     Medication Compliance:   Yes     Changes in Health Issues:   None reported     Chemical Use Review:   Substance Use: Chemical use reviewed, no active concerns identified      Tobacco Use: No change in amount of tobacco use since last session.  Patient declined discussion at this time    Diagnosis:  1. 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 Generalized anxiety disorder    3. Post traumatic stress disorder (PTSD)        Collateral Reports Completed:   Not Applicable    PLAN: (Patient Tasks / Therapist Tasks / Other)  Client to continue to work on stress reduction skills.         Nasrin Valladares, [facility]                                                         ______________________________________________________________________    Individual Treatment Plan    Patient's Name: Linda Walsh  YOB: 1960    Date of Creation: 7/11/22  Date Treatment Plan Last Reviewed/Revised: 10/12/22    DSM5 Diagnoses: 296.33 (F33.2) Major Depressive Disorder, Recurrent Episode, Severe _, 300.02 (F41.1) Generalized Anxiety Disorder or 309.81 (F43.10) Posttraumatic Stress Disorder (includes Posttraumatic Stress Disorder for Children 6 Years and Younger)  Without dissociative symptoms  Psychosocial / Contextual Factors: CRPS  PROMIS (reviewed every 90 days):     Referral / Collaboration:  Referral to another professional/service is not indicated at this time..    Anticipated number of session for this episode of care: on-going  Anticipation frequency of session: Monthly  Anticipated Duration of each session: 38-52 minutes/53+ minutes  Treatment plan will be reviewed in 90 days or when goals have been  changed.       MeasurableTreatment Goal(s) related to diagnosis / functional impairment(s)  Goal 1: Client will decrease thoughts of wanting to be dead from 10 out of 10 opportunities to at most 9 out of 10 opportunities for at least 3 consecutive weeks as evidenced by client report and a decrease in symptom report as evidenced by PhQ9 scores and GAD7 scores.    Objective #A (Client Action)    Client will Client will look at and read safety plan at least once a day and follow safety plan when thoughts and urges come up.  Status: Continued - Date(s): 10/12/22    Intervention(s)  Therapist will teach emotional regulation skills. distress tolerance skills, interpersonal effectiveness skills, emotion regluation skills, mindfulness skills, radical acceptance. Therapist will develop safety plan with the client.     Objective #B  Client will Client will agree to call the therapist or after hours crisis line if noticing intense suicidal thoughts for skills coaching.   Status: Continued - Date(s): 10/12/22    Intervention(s)  Therapist will Therapist will be available as much as possible during work hours for the client to contact and give the client several crisis resources.         Goal 2: Client will increase the use of skills (emotion regulation, distress tolerance, communication skill) from 1 out of 10 opportunities to at least 2 out of 10 opportunities for at least 3 consecutive weeks as evidenced by client report and a decrease in symptom report as evidenced by PhQ9 scores and GAD7 scores.     Objective #A (Client Action)    Status: Continued - Date(s): 10/12/22    Client will Increase interest, engagement, and pleasure in doing things  Decrease frequency and intensity of feeling down, depressed, hopeless  Improve diet, appetite, mindful eating, and / or meal planning  Identify negative self-talk and behaviors: challenge core beliefs, myths, and actions  Improve concentration, focus, and mindfulness in daily  activities   Feel less fidgety, restless or slow in daily activities / interpersonal interactions  Decrease thoughts that you'd be better off dead or of suicide / self-harm.    Intervention(s)  Therapist will teach emotional regulation skills. distress tolerance skills, interpersonal effectiveness skills, emotion regluation skills, mindfulness skills, radical acceptance. Therapist will teach client how to ID body cues for anxiety, anxiety reduction techniques, how to ID triggers for depression and anxiety- decrease reactivity/eliminate, lifestyle changes to reduce depression and anxiety, communication skills, explore cognitive beliefs and help client to develop healthy cognitive patterns and beliefs.    Objective #B  Client will Client will learn and  practice DBT skills daily..    Status: Continued - Date(s): 10/12/22    Intervention(s)  Therapist will Therapist will Therapist will teach emotional regulation skills. distress tolerance skills, interpersonal effectiveness skills, emotion regluation skills, mindfulness skills, radical acceptance..      Goal 3: Client will decrease anxious symptoms and reactions to triggers from 9 out of 10 opportunities to at most 8 out of 10 opportunities for at least 3 consecutive weeks as evidenced by client report and a decrease in symptom report as evidenced by PhQ9 scores and GAD7 scores.    Objective #A (Client Action)    Status: Continued - Date(s): 10/12/22    Client will Client will use cognitive strategies identified in therapy to challenge anxious thoughts.    Intervention(s)  Therapist will Therapist will teach emotional recognition/identification. emotion regulation skills, coping skills, mindfulness skills.    Objective #B  Client will Client will use thought-stopping strategy daily to reduce intrusive thoughts for at least 3 consecutive weeks as evidenced by client report and a decrease in symptom report as evidenced by PhQ9 scores and GAD7 scores.      Status:  "Continued - Date(s): 10/12/22    Intervention(s)  Therapist will teach emotional regulation skills. distress tolerance skills, interpersonal effectiveness skills, emotion regluation skills, mindfulness skills, radical acceptance. Therapist will teach client how to ID body cues for anxiety, anxiety reduction techniques, how to ID triggers for depression and anxiety- decrease reactivity/eliminate, lifestyle changes to reduce depression and anxiety, communication skills, explore cognitive beliefs and help client to develop healthy cognitive patterns and beliefs.    Objective #C  Client will Client will learn 5 crisis survival skills during the Distress Tolerance Module (6-8 weeks) for at least 3 consecutive weeks as evidenced by client report and a decrease in symptom report as evidenced by PhQ9 scores and GAD7 scores.  Status: Continued - Date(s): 10/12/22    Intervention(s)  Therapist will teach emotional regulation skills. distress tolerance skills, interpersonal effectiveness skills, emotion regluation skills, mindfulness skills, radical acceptance.      Client has reviewed and agreed to the above plan.      Nasrin Valladares [facility]        Linda Walsh     SAFETY PLAN:  Step 1: Warning signs / cues (Thoughts, images, mood, situation, behavior) that a crisis may be developing:    Thoughts: \"I don't matter\", \"People would be better off without me\", \"I'm a burden\", \"I can't do this anymore\", \"I just want this to end\" and \"Nothing makes it better\"    Images: obsessive thoughts of death or dying: car accident, using a gun and flashbacks    Thinking Processes: ruminations (can't stop thinking about my problems): court case, pain, racing thoughts, highly critical and negative thoughts: \"I can't do anything, I have to suffer everyday and go to work and other people have it so easy.\"  and paranoia: that the government is watching me    Mood: worsening depression, hopelessness, helplessness, intense anger, intense worry, " "agitation, disinhibited (not caring about things or consequences) and mood swings    Behaviors: isolating/withdrawing , can't stop crying, not taking care of myself, not taking care of my responsibilities, sleeping too much and not sleeping enough    Situations: legal issues: current court case, pain, trauma , financial stress and medical condition / diagnosis: CRPS   Step 2: Coping strategies - Things I can do to take my mind off of my problems without contacting another person (relaxation technique, physical activity):    Distress Tolerance Strategies:  arts and crafts: with her residents, play with my pet , pray, change body temperature (ice pack/cold water) , paced breathing/progressive muscle relaxation and puzzles, games on ipad    Physical Activities: deep breathing    Focus on helpful thoughts:  \"Ride the wave\", think about happy memories: when the grandkids were born, going camping, when the boys were little and remind myself of what is important to me: grandkids, family, the residents  Step 3: People and social settings that provide distraction:   Name: Alpesh  Phone: [phone]   Name: Velasquez  Phone:    Name: Thaddeus  Phone:     work   Step 4: Remind myself of people and things that are important to me and worth living for:    My family, my residents    Step 5: When I am in crisis, I can ask these people to help me use my safety plan:   Name: Alpesh  Phone: [phone]   Name: Velasquez  Phone: (number in phone)   Name: Thaddeus  Phone: (number in phone)  Step 6: Making the environment safe:     be around others  Step 7: Professionals or agencies I can contact during a crisis:    [facility] Number: [phone]    Suicide Prevention Lifeline: 7-360-335-TALK (2889)    Crisis Text Line Service (available 24 hours a day, 7 days a week): Text [place] to 744385  Local Crisis Services:     Call 911 or go to my nearest emergency department.   I helped develop this safety plan and agree to use it when " needed.  I have been given a copy of this plan.      Client signature _________________________________________________________________  Today s date: 7/11/22  Adapted from Safety Plan Template 2008 Griselda Perez and Livan Cutler is reprinted with the express permission of the authors.  No portion of the Safety Plan Template may be reproduced without the express, written permission.  You can contact the authors at [email or web address] or [email or web address].

## 2022-11-19 ENCOUNTER — HEALTH MAINTENANCE LETTER (OUTPATIENT)
Age: 62
End: 2022-11-19

## 2022-11-30 ENCOUNTER — OFFICE VISIT (OUTPATIENT)
Dept: OTOLARYNGOLOGY | Facility: OTHER | Age: 62
End: 2022-11-30
Attending: PHYSICIAN ASSISTANT
Payer: MEDICARE

## 2022-11-30 VITALS
WEIGHT: 167 LBS | TEMPERATURE: 98.3 F | HEIGHT: 64 IN | BODY MASS INDEX: 28.51 KG/M2 | SYSTOLIC BLOOD PRESSURE: 120 MMHG | DIASTOLIC BLOOD PRESSURE: 70 MMHG

## 2022-11-30 DIAGNOSIS — J32.0 CHRONIC MAXILLARY SINUSITIS: Primary | ICD-10-CM

## 2022-11-30 DIAGNOSIS — R09.81 NASAL CONGESTION: ICD-10-CM

## 2022-11-30 PROCEDURE — 99203 OFFICE O/P NEW LOW 30 MIN: CPT | Performed by: OTOLARYNGOLOGY

## 2022-11-30 NOTE — LETTER
11/30/2022         RE: Linda Walsh  [address] [address] [place] [id number]        Dear Colleague,    Thank you for referring your patient, Linda Walsh, to the [facility]. Please see a copy of my visit note below.    ENT Consultation    Linda Walsh who is a 62 year old female seen in consultation at the request of self.      History of Present Illness - Linda Walsh is a 62 year old female presents with a history of facial pressure mostly in the right side.  She has had right-sided headaches in the mornings mostly clear to yellowish discharge.  Right-sided nose appears to be more congested in general.  She has tried Sudafed and fluticasone without much relief.  Drainage she gets mostly anterior some postnasal.  Sense of smell appears to be intact.  She is allergic to penicillins and has not been on many antibiotics previously.    Patient does appear to have reactive upper airway disease.  However denies any significant allergies seasonal perennial.      BP Readings from Last 1 Encounters:   11/30/22 120/70       BP noted to be well controlled today in office.     Linda IS NOT a smoker/uses chewing tobacco.  Linda already quit      Past Medical History -   Past Medical History:   Diagnosis Date     Anxiety      Asthma      Backache, unspecified      Esophageal reflux      Generalized anxiety disorder      Medical cannabis use 05/08/2017    used very short time and stopped     Mild intermittent asthma     Asthma, allergy induced     PONV (postoperative nausea and vomiting)      Reflex sympathetic dystrophy of left lower extremity        Current Medications -   Current Outpatient Medications:      albuterol (PROAIR HFA/PROVENTIL HFA/VENTOLIN HFA) 108 (90 Base) MCG/ACT inhaler, Inhale 2 puffs into the lungs every 6 hours as needed for shortness of breath / dyspnea or wheezing, Disp: 18 g, Rfl: 3     bisacodyl (DULCOLAX) 10 MG suppository, UNWRAP AND INSERT 1  SUPPOSITORY(10 MG) RECTALLY DAILY. DISCONTINUE IF HAVING BOWEL MOVEMENTS Strength: 10 mg, Disp: 12 suppository, Rfl: 1     DULoxetine (CYMBALTA) 60 MG capsule, Take 60 mg by mouth daily , Disp: , Rfl:      esomeprazole (NEXIUM) 40 MG DR capsule, TAKE 1 CAPSULE BY MOUTH EVERY MORNING 30 TO 60 MINUTES BEFORE A MEAL, Disp: 90 capsule, Rfl: 3     fentaNYL (DURAGESIC) 25 mcg/hr 72 hr patch, Place 1 patch onto the skin every 72 hours remove old patch., Disp: 1 patch, Rfl: 0     fluticasone (FLONASE) 50 MCG/ACT nasal spray, Spray 2 sprays into both nostrils daily, Disp: 16 g, Rfl: 3     gabapentin (NEURONTIN) 300 MG capsule, Take 900 mg by mouth 3 times daily , Disp: , Rfl: 3     hydrOXYzine (ATARAX) 10 MG tablet, Take 1 tablet (10 mg) by mouth every 4 hours as needed for anxiety, Disp: 30 tablet, Rfl: prn     ipratropium - albuterol 0.5 mg/2.5 mg/3 mL (DUONEB) 0.5-2.5 (3) MG/3ML nebulization, Take 1 vial (3 mLs) by nebulization every 6 hours as needed for shortness of breath / dyspnea, Disp: 1 Box, Rfl: prn     linaclotide (LINZESS) 145 MCG capsule, Take 1 capsule (145 mcg) by mouth every morning (before breakfast), Disp: 30 capsule, Rfl: 5     ondansetron (ZOFRAN-ODT) 4 MG ODT tab, DISSOLVE 1 TABLET ON TONGUE EVERY 8 HOURS AS NEEDED FOR NAUSEA, Disp: 20 tablet, Rfl: 0     order for DME, Equipment being ordered: Nebulizer, Disp: 1 Device, Rfl: 0     oxyCODONE (ROXICODONE) 5 MG tablet, Take 1-3 tablets (5-15 mg) by mouth every 3 hours as needed for pain (Moderate to Severe), Disp: 40 tablet, Rfl: 0     polyethylene glycol (MIRALAX) powder, Take 51 g (3 capfuls) by mouth 2 times daily, Disp: 3 Bottle, Rfl: 3     traZODone (DESYREL) 50 MG tablet, Take 3 tablets (150 mg) by mouth At Bedtime, Disp: 15 tablet, Rfl: 5     estradiol (CLIMARA) 0.0375 MG/24HR weekly patch, TAKE 1 PATCH ON SKIN ONCE WEEKLY (Patient not taking: Reported on 11/30/2022), Disp: 4 patch, Rfl: 3    Allergies -   Allergies   Allergen Reactions      "Penicillins Hives       Social History -   Social History     Socioeconomic History     Marital status:    Tobacco Use     Smoking status: Former     Packs/day: 0.25     Years: 10.00     Pack years: 2.50     Types: Cigarettes     Quit date: 2021     Years since quittin.2     Smokeless tobacco: Never   Vaping Use     Vaping Use: Never used   Substance and Sexual Activity     Alcohol use: Yes     Comment: rare use      Drug use: Not Currently     Comment: Medical Cannabis     Sexual activity: Yes     Partners: Male     Birth control/protection: Female Surgical     Comment: hyst   Other Topics Concern     Parent/sibling w/ CABG, MI or angioplasty before 65F 55M? Yes     Comment: mom at 62, and dad in his 50s       Family History -   Family History   Problem Relation Age of Onset     Heart Disease Mother         60's     Cardiovascular Mother         heart     Heart Disease Father         40's     Arthritis Father         RA     Other Cancer Father      Skin Cancer Father      Other - See Comments Father         \"mini stroke\"     Heart Surgery Father      Arthritis Paternal Grandmother         RA     Diabetes Son      Coronary Artery Disease Son      Depression Paternal Aunt         anxiety, too     Arthritis Paternal Aunt         RA       Review of Systems - As per HPI and PMHx, otherwise review of system review of the head and neck negative. Otherwise 10+ review of system is negative    Physical Exam  /70   Temp 98.3  F (36.8  C) (Temporal)   Ht 1.626 m (5' 4\")   Wt 75.8 kg (167 lb)   LMP  (LMP Unknown)   BMI 28.67 kg/m    BMI: Body mass index is 28.67 kg/m .    General - The patient is well nourished and well developed, and appears to have good nutritional status.  Alert and oriented to person and place, answers questions and cooperates with examination appropriately.    SKIN - No suspicious lesions or rashes.  Respiration - No respiratory distress.  Head and Face - Normocephalic and " atraumatic, with no gross asymmetry noted of the contour of the facial features.  The facial nerve is intact, with strong symmetric movements.    Voice and Breathing - The patient was breathing comfortably without the use of accessory muscles. The patients voice was clear and strong, and had appropriate pitch and quality.    Ears - Bilateral pinna and EACs with normal appearing overlying skin. Tympanic membrane intact with good mobility on pneumatic otoscopy bilaterally. Bony landmarks of the ossicular chain are normal. The tympanic membranes are normal in appearance. No retraction, perforation, or masses.  No fluid or purulence was seen in the external canal or the middle ear.     Eyes - Extraocular movements intact.  Sclera were not icteric or injected, conjunctiva were pink and moist.    Mouth - Examination of the oral cavity showed pink, healthy oral mucosa. No lesions or ulcerations noted.  The tongue was mobile and midline, and the dentition were in good condition.      Throat - The walls of the oropharynx were smooth, pink, moist, symmetric, and had no lesions or ulcerations.  The tonsillar pillars and soft palate were symmetric.  The uvula was midline on elevation.    Neck - Normal midline excursion of the laryngotracheal complex during swallowing.  Full range of motion on passive movement.  Palpation of the occipital, submental, submandibular, internal jugular chain, and supraclavicular nodes did not demonstrate any abnormal lymph nodes or masses.  The carotid pulse was palpable bilaterally.  Palpation of the thyroid was soft and smooth, with no nodules or goiter appreciated.  The trachea was mobile and midline.    Nose - External contour is symmetric, no gross deflection or scars.  Nasal mucosa is pink and moist with no abnormal mucus.  The septum was deviated to the right and somewhat obstructive, turbinates of enlarged size and position.  No polyps, masses, or purulence noted on examination.    Neuro -  Nonfocal neuro exam is normal, CN 2 through 12 intact, normal gait and muscle tone.      Performed in clinic today:  No procedures preformed in clinic today      A/P - Linda Walsh is a 62 year old female possible bilateral chronic sinusitis.  Would like to get a CT of the sinuses to further understand what is going on.  We will see patient back after the scan to discuss the results and decide on further treatment options.      Steffen Messina MD      Again, thank you for allowing me to participate in the care of your patient.        Sincerely,        Steffen Messina MD, MD

## 2022-11-30 NOTE — PROGRESS NOTES
ENT Consultation    Linda Walsh who is a 62 year old female seen in consultation at the request of self.      History of Present Illness - Linda Walsh is a 62 year old female presents with a history of facial pressure mostly in the right side.  She has had right-sided headaches in the mornings mostly clear to yellowish discharge.  Right-sided nose appears to be more congested in general.  She has tried Sudafed and fluticasone without much relief.  Drainage she gets mostly anterior some postnasal.  Sense of smell appears to be intact.  She is allergic to penicillins and has not been on many antibiotics previously.    Patient does appear to have reactive upper airway disease.  However denies any significant allergies seasonal perennial.      BP Readings from Last 1 Encounters:   11/30/22 120/70       BP noted to be well controlled today in office.     Linda IS NOT a smoker/uses chewing tobacco.  Linda already quit      Past Medical History -   Past Medical History:   Diagnosis Date     Anxiety      Asthma      Backache, unspecified      Esophageal reflux      Generalized anxiety disorder      Medical cannabis use 05/08/2017    used very short time and stopped     Mild intermittent asthma     Asthma, allergy induced     PONV (postoperative nausea and vomiting)      Reflex sympathetic dystrophy of left lower extremity        Current Medications -   Current Outpatient Medications:      albuterol (PROAIR HFA/PROVENTIL HFA/VENTOLIN HFA) 108 (90 Base) MCG/ACT inhaler, Inhale 2 puffs into the lungs every 6 hours as needed for shortness of breath / dyspnea or wheezing, Disp: 18 g, Rfl: 3     bisacodyl (DULCOLAX) 10 MG suppository, UNWRAP AND INSERT 1 SUPPOSITORY(10 MG) RECTALLY DAILY. DISCONTINUE IF HAVING BOWEL MOVEMENTS Strength: 10 mg, Disp: 12 suppository, Rfl: 1     DULoxetine (CYMBALTA) 60 MG capsule, Take 60 mg by mouth daily , Disp: , Rfl:      esomeprazole (NEXIUM) 40 MG DR capsule, TAKE 1 CAPSULE BY  MOUTH EVERY MORNING 30 TO 60 MINUTES BEFORE A MEAL, Disp: 90 capsule, Rfl: 3     fentaNYL (DURAGESIC) 25 mcg/hr 72 hr patch, Place 1 patch onto the skin every 72 hours remove old patch., Disp: 1 patch, Rfl: 0     fluticasone (FLONASE) 50 MCG/ACT nasal spray, Spray 2 sprays into both nostrils daily, Disp: 16 g, Rfl: 3     gabapentin (NEURONTIN) 300 MG capsule, Take 900 mg by mouth 3 times daily , Disp: , Rfl: 3     hydrOXYzine (ATARAX) 10 MG tablet, Take 1 tablet (10 mg) by mouth every 4 hours as needed for anxiety, Disp: 30 tablet, Rfl: prn     ipratropium - albuterol 0.5 mg/2.5 mg/3 mL (DUONEB) 0.5-2.5 (3) MG/3ML nebulization, Take 1 vial (3 mLs) by nebulization every 6 hours as needed for shortness of breath / dyspnea, Disp: 1 Box, Rfl: prn     linaclotide (LINZESS) 145 MCG capsule, Take 1 capsule (145 mcg) by mouth every morning (before breakfast), Disp: 30 capsule, Rfl: 5     ondansetron (ZOFRAN-ODT) 4 MG ODT tab, DISSOLVE 1 TABLET ON TONGUE EVERY 8 HOURS AS NEEDED FOR NAUSEA, Disp: 20 tablet, Rfl: 0     order for DME, Equipment being ordered: Nebulizer, Disp: 1 Device, Rfl: 0     oxyCODONE (ROXICODONE) 5 MG tablet, Take 1-3 tablets (5-15 mg) by mouth every 3 hours as needed for pain (Moderate to Severe), Disp: 40 tablet, Rfl: 0     polyethylene glycol (MIRALAX) powder, Take 51 g (3 capfuls) by mouth 2 times daily, Disp: 3 Bottle, Rfl: 3     traZODone (DESYREL) 50 MG tablet, Take 3 tablets (150 mg) by mouth At Bedtime, Disp: 15 tablet, Rfl: 5     estradiol (CLIMARA) 0.0375 MG/24HR weekly patch, TAKE 1 PATCH ON SKIN ONCE WEEKLY (Patient not taking: Reported on 11/30/2022), Disp: 4 patch, Rfl: 3    Allergies -   Allergies   Allergen Reactions     Penicillins Hives       Social History -   Social History     Socioeconomic History     Marital status:    Tobacco Use     Smoking status: Former     Packs/day: 0.25     Years: 10.00     Pack years: 2.50     Types: Cigarettes     Quit date: 9/14/2021     Years  "since quittin.2     Smokeless tobacco: Never   Vaping Use     Vaping Use: Never used   Substance and Sexual Activity     Alcohol use: Yes     Comment: rare use      Drug use: Not Currently     Comment: Medical Cannabis     Sexual activity: Yes     Partners: Male     Birth control/protection: Female Surgical     Comment: hyst   Other Topics Concern     Parent/sibling w/ CABG, MI or angioplasty before 65F 55M? Yes     Comment: mom at 62, and dad in his 50s       Family History -   Family History   Problem Relation Age of Onset     Heart Disease Mother         60's     Cardiovascular Mother         heart     Heart Disease Father         40's     Arthritis Father         RA     Other Cancer Father      Skin Cancer Father      Other - See Comments Father         \"mini stroke\"     Heart Surgery Father      Arthritis Paternal Grandmother         RA     Diabetes Son      Coronary Artery Disease Son      Depression Paternal Aunt         anxiety, too     Arthritis Paternal Aunt         RA       Review of Systems - As per HPI and PMHx, otherwise review of system review of the head and neck negative. Otherwise 10+ review of system is negative    Physical Exam  /70   Temp 98.3  F (36.8  C) (Temporal)   Ht 1.626 m (5' 4\")   Wt 75.8 kg (167 lb)   LMP  (LMP Unknown)   BMI 28.67 kg/m    BMI: Body mass index is 28.67 kg/m .    General - The patient is well nourished and well developed, and appears to have good nutritional status.  Alert and oriented to person and place, answers questions and cooperates with examination appropriately.    SKIN - No suspicious lesions or rashes.  Respiration - No respiratory distress.  Head and Face - Normocephalic and atraumatic, with no gross asymmetry noted of the contour of the facial features.  The facial nerve is intact, with strong symmetric movements.    Voice and Breathing - The patient was breathing comfortably without the use of accessory muscles. The patients voice was clear " and strong, and had appropriate pitch and quality.    Ears - Bilateral pinna and EACs with normal appearing overlying skin. Tympanic membrane intact with good mobility on pneumatic otoscopy bilaterally. Bony landmarks of the ossicular chain are normal. The tympanic membranes are normal in appearance. No retraction, perforation, or masses.  No fluid or purulence was seen in the external canal or the middle ear.     Eyes - Extraocular movements intact.  Sclera were not icteric or injected, conjunctiva were pink and moist.    Mouth - Examination of the oral cavity showed pink, healthy oral mucosa. No lesions or ulcerations noted.  The tongue was mobile and midline, and the dentition were in good condition.      Throat - The walls of the oropharynx were smooth, pink, moist, symmetric, and had no lesions or ulcerations.  The tonsillar pillars and soft palate were symmetric.  The uvula was midline on elevation.    Neck - Normal midline excursion of the laryngotracheal complex during swallowing.  Full range of motion on passive movement.  Palpation of the occipital, submental, submandibular, internal jugular chain, and supraclavicular nodes did not demonstrate any abnormal lymph nodes or masses.  The carotid pulse was palpable bilaterally.  Palpation of the thyroid was soft and smooth, with no nodules or goiter appreciated.  The trachea was mobile and midline.    Nose - External contour is symmetric, no gross deflection or scars.  Nasal mucosa is pink and moist with no abnormal mucus.  The septum was deviated to the right and somewhat obstructive, turbinates of enlarged size and position.  No polyps, masses, or purulence noted on examination.    Neuro - Nonfocal neuro exam is normal, CN 2 through 12 intact, normal gait and muscle tone.      Performed in clinic today:  No procedures preformed in clinic today      A/P - Linda Nico is a 62 year old female possible bilateral chronic sinusitis.  Would like to get a CT of  the sinuses to further understand what is going on.  We will see patient back after the scan to discuss the results and decide on further treatment options.      Steffen Messina MD

## 2022-12-01 ENCOUNTER — HOSPITAL ENCOUNTER (OUTPATIENT)
Dept: CT IMAGING | Facility: CLINIC | Age: 62
Discharge: HOME | End: 2022-12-01
Attending: OTOLARYNGOLOGY | Admitting: OTOLARYNGOLOGY
Payer: MEDICARE

## 2022-12-01 DIAGNOSIS — J32.0 CHRONIC MAXILLARY SINUSITIS: ICD-10-CM

## 2022-12-01 PROCEDURE — G1010 CDSM STANSON: HCPCS

## 2022-12-04 ENCOUNTER — MYC MEDICAL ADVICE (OUTPATIENT)
Dept: FAMILY MEDICINE | Facility: CLINIC | Age: 62
End: 2022-12-04

## 2022-12-04 DIAGNOSIS — K59.00 CONSTIPATION, UNSPECIFIED CONSTIPATION TYPE: ICD-10-CM

## 2022-12-05 RX ORDER — BISACODYL 10 MG
SUPPOSITORY, RECTAL RECTAL
Qty: 12 SUPPOSITORY | Refills: 1 | Status: SHIPPED | OUTPATIENT
Start: 2022-12-05 | End: 2023-02-09

## 2022-12-07 ENCOUNTER — TRANSFERRED RECORDS (OUTPATIENT)
Dept: HEALTH INFORMATION MANAGEMENT | Facility: CLINIC | Age: 62
End: 2022-12-07

## 2022-12-08 ENCOUNTER — TRANSFERRED RECORDS (OUTPATIENT)
Dept: HEALTH INFORMATION MANAGEMENT | Facility: CLINIC | Age: 62
End: 2022-12-08

## 2022-12-12 NOTE — PROGRESS NOTES
Mary is a 62 year old who is being evaluated via a billable video visit.      How would you like to obtain your AVS? MyChart  If the video visit is dropped, the invitation should be resent by: Text to cell phone: [phone]  Will anyone else be joining your video visit? No      Vitals:  No vitals were obtained today due to virtual visit.    Video-Visit Details    Video Start Time:11:45    Type of service:  Video Visit    Video End Time:12:10    Originating Location (pt. Location): Home        Distant Location (provider location):  On-site    Platform used for Video Visit: Spare Backup    History of Present Illness - Linda Walsh is a 62 year old female presenting in clinic today for a recheck on No chief complaint on file.    Patient with history of symptomatic chronic sinusitis maxillary sinus especially lateral pressure headaches as well as discharge anteriorly and posteriorly clear to yellowish but also intraorally on the right side occasional yellow discharge involving the upper gum area.  Patient has been intermittently off and on multiple antibiotics in the last couple years without much relief.  When she takes an antibiotic feels slightly better but not completely resolved and problem started to lower again.  She has been on saline and fluticasone for prolonged period of time with minimal results.  CT SCAN OF THE PARANASAL SINUSES AND FACE  12/1/2022 10:53 AM      HISTORY: Chronic maxillary sinusitis.     TECHNIQUE: Noncontrast axial scans of the sinuses with coronal and  sagittal reformations were completed. Radiation dose for this scan was  reduced using automated exposure control, adjustment of the mA and/or  kV according to patient size, or iterative reconstruction technique.       COMPARISON: None.     FINDINGS:   Frontal sinuses: Mild mucosal thickening in the inferior aspect of the  left frontal sinus, trace mucosal thickening in the inferior aspect of  the right frontal sinus. No air-fluid  levels.     Ethmoid sinuses: Moderate mucosal thickening seen in the anterior  ethmoid air cells bilaterally. Mild mucosal thickening seen in the  posterior ethmoid air cells with small volume aerated secretions in a  left posterior ethmoid air cell.     Sphenoid sinuses: No significant mucosal thickening or air-fluid  levels.     Maxillary sinuses: Complete soft tissue opacification of the bilateral  maxillary sinuses. There is diffuse circumferential mucoperiosteal  thickening along the inner margins of the walls of the maxillary  sinuses bilaterally, left greater than right, suggesting at least an  element of chronic sinusitis. Small focal area of osseous dehiscence  along the floor of the right maxillary sinus (series 4 image 43). The  floor of the left maxillary sinus appears intact.     Nasal cavity/skull base: Polypoid soft tissue is seen in the nasal  cavities bilaterally, most prominent in the middle meatus on both  sides. This is nonspecific, but may represent polypoid mucosal  thickening or possibly sinonasal polyps. The nasal turbinates  otherwise appear unremarkable. The anterior skull base appears intact  and is relatively symmetric. The anterior clinoid processes are not  pneumatized. Mild rightward deviation of the intact nasal septum.     Paranasal sinus drainage pathways: Complete soft tissue opacification  of the right maxillary sinus drainage pathways. The bilateral sphenoid  ostia/sphenoethmoidal recesses are patent. The bilateral frontal sinus  drainage pathways are patent.     Other findings: The images of the intracranial contents demonstrate  mild age-commensurate generalized brain parenchymal volume loss. The  orbits appear unremarkable. Degenerative changes in the right  temporomandibular joint are noted.                                                                      IMPRESSION:  1. Complete soft tissue opacification of the bilateral maxillary  sinuses. Moderate mucosal thickening in  the anterior bilateral ethmoid  air cells. Mild mucosal thickening in the left greater than right  frontal sinuses. Small volume aerated secretions in the posterior left  ethmoid region. Findings are likely related to chronic sinusitis,  although an element of acute on chronic sinusitis is not completely  excluded. Recommend clinical correlation.  2. Polypoid soft tissue in the bilateral nasal cavities centered  primarily in the middle meatus on both sides. Recommend correlation  with direct inspection.  3. Obstruction of the bilateral maxillary sinus drainage pathways. The  frontal and sphenoid sinus drainage pathways appear patent.  4. Focal area of osseous dehiscence along the floor of the right  maxillary sinus.     Present Symptoms include: {Today to go over the results of the scan with the patient.    There is no height or weight on file to calculate BMI.        BP Readings from Last 1 Encounters:   11/30/22 120/70       Linda IS NOT a smoker/uses chewing tobacco.    Past Medical History -   Past Medical History:   Diagnosis Date     Anxiety      Asthma      Backache, unspecified      Esophageal reflux      Generalized anxiety disorder      Medical cannabis use 05/08/2017    used very short time and stopped     Mild intermittent asthma     Asthma, allergy induced     PONV (postoperative nausea and vomiting)      Reflex sympathetic dystrophy of left lower extremity        Current Medications -   Current Outpatient Medications:      albuterol (PROAIR HFA/PROVENTIL HFA/VENTOLIN HFA) 108 (90 Base) MCG/ACT inhaler, Inhale 2 puffs into the lungs every 6 hours as needed for shortness of breath / dyspnea or wheezing, Disp: 18 g, Rfl: 3     bisacodyl (DULCOLAX) 10 MG suppository, UNWRAP AND INSERT 1 SUPPOSITORY(10 MG) RECTALLY DAILY. DISCONTINUE IF HAVING BOWEL MOVEMENTS Strength: 10 mg, Disp: 12 suppository, Rfl: 1     DULoxetine (CYMBALTA) 60 MG capsule, Take 60 mg by mouth daily , Disp: , Rfl:      esomeprazole  (NEXIUM) 40 MG DR capsule, TAKE 1 CAPSULE BY MOUTH EVERY MORNING 30 TO 60 MINUTES BEFORE A MEAL, Disp: 90 capsule, Rfl: 3     estradiol (CLIMARA) 0.0375 MG/24HR weekly patch, TAKE 1 PATCH ON SKIN ONCE WEEKLY (Patient not taking: Reported on 11/30/2022), Disp: 4 patch, Rfl: 3     fentaNYL (DURAGESIC) 25 mcg/hr 72 hr patch, Place 1 patch onto the skin every 72 hours remove old patch., Disp: 1 patch, Rfl: 0     fluticasone (FLONASE) 50 MCG/ACT nasal spray, Spray 2 sprays into both nostrils daily, Disp: 16 g, Rfl: 3     gabapentin (NEURONTIN) 300 MG capsule, Take 900 mg by mouth 3 times daily , Disp: , Rfl: 3     hydrOXYzine (ATARAX) 10 MG tablet, Take 1 tablet (10 mg) by mouth every 4 hours as needed for anxiety, Disp: 30 tablet, Rfl: prn     ipratropium - albuterol 0.5 mg/2.5 mg/3 mL (DUONEB) 0.5-2.5 (3) MG/3ML nebulization, Take 1 vial (3 mLs) by nebulization every 6 hours as needed for shortness of breath / dyspnea, Disp: 1 Box, Rfl: prn     linaclotide (LINZESS) 145 MCG capsule, Take 1 capsule (145 mcg) by mouth every morning (before breakfast), Disp: 30 capsule, Rfl: 5     ondansetron (ZOFRAN-ODT) 4 MG ODT tab, DISSOLVE 1 TABLET ON TONGUE EVERY 8 HOURS AS NEEDED FOR NAUSEA, Disp: 20 tablet, Rfl: 0     order for DME, Equipment being ordered: Nebulizer, Disp: 1 Device, Rfl: 0     oxyCODONE (ROXICODONE) 5 MG tablet, Take 1-3 tablets (5-15 mg) by mouth every 3 hours as needed for pain (Moderate to Severe), Disp: 40 tablet, Rfl: 0     polyethylene glycol (MIRALAX) powder, Take 51 g (3 capfuls) by mouth 2 times daily, Disp: 3 Bottle, Rfl: 3     traZODone (DESYREL) 50 MG tablet, Take 3 tablets (150 mg) by mouth At Bedtime, Disp: 15 tablet, Rfl: 5    Allergies -   Allergies   Allergen Reactions     Penicillins Hives       Social History -   Social History     Socioeconomic History     Marital status:    Tobacco Use     Smoking status: Former     Packs/day: 0.25     Years: 10.00     Pack years: 2.50     Types:  "Cigarettes     Quit date: 2021     Years since quittin.2     Smokeless tobacco: Never   Vaping Use     Vaping Use: Never used   Substance and Sexual Activity     Alcohol use: Yes     Comment: rare use      Drug use: Not Currently     Comment: Medical Cannabis     Sexual activity: Yes     Partners: Male     Birth control/protection: Female Surgical     Comment: hyst   Other Topics Concern     Parent/sibling w/ CABG, MI or angioplasty before 65F 55M? Yes     Comment: mom at 62, and dad in his 50s       Family History -   Family History   Problem Relation Age of Onset     Heart Disease Mother         60's     Cardiovascular Mother         heart     Heart Disease Father         40's     Arthritis Father         RA     Other Cancer Father      Skin Cancer Father      Other - See Comments Father         \"mini stroke\"     Heart Surgery Father      Arthritis Paternal Grandmother         RA     Diabetes Son      Coronary Artery Disease Son      Depression Paternal Aunt         anxiety, too     Arthritis Paternal Aunt         RA       Review of Systems - As per HPI and PMHx, otherwise review of system review of the head and neck negative. Otherwise 10+ review of system is negative    Physical Exam  LMP  (LMP Unknown)   BMI: There is no height or weight on file to calculate BMI.    General - The patient is well nourished and well developed, and appears to have good nutritional status.  Alert and oriented to person and place, answers questions and cooperates with examination appropriately.      Respiration - No respiratory distress.  Head and Face - Normocephalic and atraumatic, with no gross asymmetry noted of the contour of the facial features.  The facial nerve is intact, with strong symmetric movements.    Voice and Breathing - The patient was breathing comfortably without the use of accessory muscles. The patients voice was clear and strong, and had appropriate pitch and quality.        Performed in clinic " today:  No procedures preformed in clinic today      A/P - Linda Walsh is a 62 year old female No chief complaint on file.    We discussed the results of the CT scan in conjunction with patient's clinical status.  Considering failure to respond to aggressive and conservative therapy for prolonged period of time we discussed the possibility of surgical intervention.  We discussed septoplasty for nasal obstruction deviated septum to the right and submucous resection of turbinates but also discussed functional endoscopic sinus surgery involving anterior ethmoids and maxillary sinuses risks and benefits discussed at length.We discuss risks and possible complication of septoplasty including septal perforation, bleeding(that may require packing), infection, loss of smell, stenosis, CSF rhinorrhea.  We discuss risks of functional endoscopic sinus surgery including infection, scarring, double vision, other vision complications, CSF rhinorrhea, loss of sense of smell. With this knowledge the patient wishes to go ahead with surgical procedure.           Steffen Messina MD

## 2022-12-14 ENCOUNTER — VIRTUAL VISIT (OUTPATIENT)
Dept: OTOLARYNGOLOGY | Facility: OTHER | Age: 62
End: 2022-12-14
Payer: MEDICARE

## 2022-12-14 ENCOUNTER — TELEPHONE (OUTPATIENT)
Dept: SLEEP MEDICINE | Facility: CLINIC | Age: 62
End: 2022-12-14

## 2022-12-14 ENCOUNTER — PREP FOR PROCEDURE (OUTPATIENT)
Dept: SLEEP MEDICINE | Facility: CLINIC | Age: 62
End: 2022-12-14

## 2022-12-14 DIAGNOSIS — J32.2 CHRONIC ETHMOIDAL SINUSITIS: ICD-10-CM

## 2022-12-14 DIAGNOSIS — J32.0 CHRONIC MAXILLARY SINUSITIS: Primary | ICD-10-CM

## 2022-12-14 DIAGNOSIS — J34.2 DEVIATED NASAL SEPTUM: ICD-10-CM

## 2022-12-14 DIAGNOSIS — J34.3 HYPERTROPHY OF BOTH INFERIOR NASAL TURBINATES: ICD-10-CM

## 2022-12-14 PROCEDURE — 99213 OFFICE O/P EST LOW 20 MIN: CPT | Mod: 95 | Performed by: OTOLARYNGOLOGY

## 2022-12-14 NOTE — LETTER
12/14/2022         RE: Linda Walsh  [id number] [address] [place] [id number]        Dear Colleague,    Thank you for referring your patient, Linda Walsh, to the [facility]. Please see a copy of my visit note below.    Mary is a 62 year old who is being evaluated via a billable video visit.      How would you like to obtain your AVS? MyChart  If the video visit is dropped, the invitation should be resent by: Text to cell phone: [phone]  Will anyone else be joining your video visit? No      Vitals:  No vitals were obtained today due to virtual visit.    Video-Visit Details    Video Start Time:11:45    Type of service:  Video Visit    Video End Time:12:10    Originating Location (pt. Location): Home        Distant Location (provider location):  On-site    Platform used for Video Visit: [facility]    History of Present Illness - Linda Walsh is a 62 year old female presenting in clinic today for a recheck on No chief complaint on file.    Patient with history of symptomatic chronic sinusitis maxillary sinus especially lateral pressure headaches as well as discharge anteriorly and posteriorly clear to yellowish but also intraorally on the right side occasional yellow discharge involving the upper gum area.  Patient has been intermittently off and on multiple antibiotics in the last couple years without much relief.  When she takes an antibiotic feels slightly better but not completely resolved and problem started to lower again.  She has been on saline and fluticasone for prolonged period of time with minimal results.  CT SCAN OF THE PARANASAL SINUSES AND FACE  12/1/2022 10:53 AM      HISTORY: Chronic maxillary sinusitis.     TECHNIQUE: Noncontrast axial scans of the sinuses with coronal and  sagittal reformations were completed. Radiation dose for this scan was  reduced using automated exposure control, adjustment of the mA and/or  kV according to patient size, or iterative  reconstruction technique.       COMPARISON: None.     FINDINGS:   Frontal sinuses: Mild mucosal thickening in the inferior aspect of the  left frontal sinus, trace mucosal thickening in the inferior aspect of  the right frontal sinus. No air-fluid levels.     Ethmoid sinuses: Moderate mucosal thickening seen in the anterior  ethmoid air cells bilaterally. Mild mucosal thickening seen in the  posterior ethmoid air cells with small volume aerated secretions in a  left posterior ethmoid air cell.     Sphenoid sinuses: No significant mucosal thickening or air-fluid  levels.     Maxillary sinuses: Complete soft tissue opacification of the bilateral  maxillary sinuses. There is diffuse circumferential mucoperiosteal  thickening along the inner margins of the walls of the maxillary  sinuses bilaterally, left greater than right, suggesting at least an  element of chronic sinusitis. Small focal area of osseous dehiscence  along the floor of the right maxillary sinus (series 4 image 43). The  floor of the left maxillary sinus appears intact.     Nasal cavity/skull base: Polypoid soft tissue is seen in the nasal  cavities bilaterally, most prominent in the middle meatus on both  sides. This is nonspecific, but may represent polypoid mucosal  thickening or possibly sinonasal polyps. The nasal turbinates  otherwise appear unremarkable. The anterior skull base appears intact  and is relatively symmetric. The anterior clinoid processes are not  pneumatized. Mild rightward deviation of the intact nasal septum.     Paranasal sinus drainage pathways: Complete soft tissue opacification  of the right maxillary sinus drainage pathways. The bilateral sphenoid  ostia/sphenoethmoidal recesses are patent. The bilateral frontal sinus  drainage pathways are patent.     Other findings: The images of the intracranial contents demonstrate  mild age-commensurate generalized brain parenchymal volume loss. The  orbits appear unremarkable.  Degenerative changes in the right  temporomandibular joint are noted.                                                                      IMPRESSION:  1. Complete soft tissue opacification of the bilateral maxillary  sinuses. Moderate mucosal thickening in the anterior bilateral ethmoid  air cells. Mild mucosal thickening in the left greater than right  frontal sinuses. Small volume aerated secretions in the posterior left  ethmoid region. Findings are likely related to chronic sinusitis,  although an element of acute on chronic sinusitis is not completely  excluded. Recommend clinical correlation.  2. Polypoid soft tissue in the bilateral nasal cavities centered  primarily in the middle meatus on both sides. Recommend correlation  with direct inspection.  3. Obstruction of the bilateral maxillary sinus drainage pathways. The  frontal and sphenoid sinus drainage pathways appear patent.  4. Focal area of osseous dehiscence along the floor of the right  maxillary sinus.     Present Symptoms include: {Today to go over the results of the scan with the patient.    There is no height or weight on file to calculate BMI.        BP Readings from Last 1 Encounters:   11/30/22 120/70       Linda IS NOT a smoker/uses chewing tobacco.    Past Medical History -   Past Medical History:   Diagnosis Date     Anxiety      Asthma      Backache, unspecified      Esophageal reflux      Generalized anxiety disorder      Medical cannabis use 05/08/2017    used very short time and stopped     Mild intermittent asthma     Asthma, allergy induced     PONV (postoperative nausea and vomiting)      Reflex sympathetic dystrophy of left lower extremity        Current Medications -   Current Outpatient Medications:      albuterol (PROAIR HFA/PROVENTIL HFA/VENTOLIN HFA) 108 (90 Base) MCG/ACT inhaler, Inhale 2 puffs into the lungs every 6 hours as needed for shortness of breath / dyspnea or wheezing, Disp: 18 g, Rfl: 3     bisacodyl (DULCOLAX)  10 MG suppository, UNWRAP AND INSERT 1 SUPPOSITORY(10 MG) RECTALLY DAILY. DISCONTINUE IF HAVING BOWEL MOVEMENTS Strength: 10 mg, Disp: 12 suppository, Rfl: 1     DULoxetine (CYMBALTA) 60 MG capsule, Take 60 mg by mouth daily , Disp: , Rfl:      esomeprazole (NEXIUM) 40 MG DR capsule, TAKE 1 CAPSULE BY MOUTH EVERY MORNING 30 TO 60 MINUTES BEFORE A MEAL, Disp: 90 capsule, Rfl: 3     estradiol (CLIMARA) 0.0375 MG/24HR weekly patch, TAKE 1 PATCH ON SKIN ONCE WEEKLY (Patient not taking: Reported on 11/30/2022), Disp: 4 patch, Rfl: 3     fentaNYL (DURAGESIC) 25 mcg/hr 72 hr patch, Place 1 patch onto the skin every 72 hours remove old patch., Disp: 1 patch, Rfl: 0     fluticasone (FLONASE) 50 MCG/ACT nasal spray, Spray 2 sprays into both nostrils daily, Disp: 16 g, Rfl: 3     gabapentin (NEURONTIN) 300 MG capsule, Take 900 mg by mouth 3 times daily , Disp: , Rfl: 3     hydrOXYzine (ATARAX) 10 MG tablet, Take 1 tablet (10 mg) by mouth every 4 hours as needed for anxiety, Disp: 30 tablet, Rfl: prn     ipratropium - albuterol 0.5 mg/2.5 mg/3 mL (DUONEB) 0.5-2.5 (3) MG/3ML nebulization, Take 1 vial (3 mLs) by nebulization every 6 hours as needed for shortness of breath / dyspnea, Disp: 1 Box, Rfl: prn     linaclotide (LINZESS) 145 MCG capsule, Take 1 capsule (145 mcg) by mouth every morning (before breakfast), Disp: 30 capsule, Rfl: 5     ondansetron (ZOFRAN-ODT) 4 MG ODT tab, DISSOLVE 1 TABLET ON TONGUE EVERY 8 HOURS AS NEEDED FOR NAUSEA, Disp: 20 tablet, Rfl: 0     order for DME, Equipment being ordered: Nebulizer, Disp: 1 Device, Rfl: 0     oxyCODONE (ROXICODONE) 5 MG tablet, Take 1-3 tablets (5-15 mg) by mouth every 3 hours as needed for pain (Moderate to Severe), Disp: 40 tablet, Rfl: 0     polyethylene glycol (MIRALAX) powder, Take 51 g (3 capfuls) by mouth 2 times daily, Disp: 3 Bottle, Rfl: 3     traZODone (DESYREL) 50 MG tablet, Take 3 tablets (150 mg) by mouth At Bedtime, Disp: 15 tablet, Rfl: 5    Allergies -  "  Allergies   Allergen Reactions     Penicillins Hives       Social History -   Social History     Socioeconomic History     Marital status:    Tobacco Use     Smoking status: Former     Packs/day: 0.25     Years: 10.00     Pack years: 2.50     Types: Cigarettes     Quit date: 2021     Years since quittin.2     Smokeless tobacco: Never   Vaping Use     Vaping Use: Never used   Substance and Sexual Activity     Alcohol use: Yes     Comment: rare use      Drug use: Not Currently     Comment: Medical Cannabis     Sexual activity: Yes     Partners: Male     Birth control/protection: Female Surgical     Comment: hyst   Other Topics Concern     Parent/sibling w/ CABG, MI or angioplasty before 65F 55M? Yes     Comment: mom at 62, and dad in his 50s       Family History -   Family History   Problem Relation Age of Onset     Heart Disease Mother         60's     Cardiovascular Mother         heart     Heart Disease Father         40's     Arthritis Father         RA     Other Cancer Father      Skin Cancer Father      Other - See Comments Father         \"mini stroke\"     Heart Surgery Father      Arthritis Paternal Grandmother         RA     Diabetes Son      Coronary Artery Disease Son      Depression Paternal Aunt         anxiety, too     Arthritis Paternal Aunt         RA       Review of Systems - As per HPI and PMHx, otherwise review of system review of the head and neck negative. Otherwise 10+ review of system is negative    Physical Exam  LMP  (LMP Unknown)   BMI: There is no height or weight on file to calculate BMI.    General - The patient is well nourished and well developed, and appears to have good nutritional status.  Alert and oriented to person and place, answers questions and cooperates with examination appropriately.      Respiration - No respiratory distress.  Head and Face - Normocephalic and atraumatic, with no gross asymmetry noted of the contour of the facial features.  The facial nerve " is intact, with strong symmetric movements.    Voice and Breathing - The patient was breathing comfortably without the use of accessory muscles. The patients voice was clear and strong, and had appropriate pitch and quality.        Performed in clinic today:  No procedures preformed in clinic today      A/P - Linda Walsh is a 62 year old female No chief complaint on file.    We discussed the results of the CT scan in conjunction with patient's clinical status.  Considering failure to respond to aggressive and conservative therapy for prolonged period of time we discussed the possibility of surgical intervention.  We discussed septoplasty for nasal obstruction deviated septum to the right and submucous resection of turbinates but also discussed functional endoscopic sinus surgery involving anterior ethmoids and maxillary sinuses risks and benefits discussed at length.We discuss risks and possible complication of septoplasty including septal perforation, bleeding(that may require packing), infection, loss of smell, stenosis, CSF rhinorrhea.  We discuss risks of functional endoscopic sinus surgery including infection, scarring, double vision, other vision complications, CSF rhinorrhea, loss of sense of smell. With this knowledge the patient wishes to go ahead with surgical procedure.           Steffen Messina MD          Again, thank you for allowing me to participate in the care of your patient.        Sincerely,        Steffen Messina MD, MD

## 2022-12-30 ENCOUNTER — HOSPITAL ENCOUNTER (OUTPATIENT)
Facility: AMBULATORY SURGERY CENTER | Age: 62
End: 2022-12-30
Attending: OTOLARYNGOLOGY | Admitting: OTOLARYNGOLOGY
Payer: MEDICARE

## 2022-12-30 ENCOUNTER — MYC REFILL (OUTPATIENT)
Dept: FAMILY MEDICINE | Facility: CLINIC | Age: 62
End: 2022-12-30

## 2022-12-30 DIAGNOSIS — J34.2 DEVIATED NASAL SEPTUM: ICD-10-CM

## 2022-12-30 DIAGNOSIS — J32.2 CHRONIC ETHMOIDAL SINUSITIS: ICD-10-CM

## 2022-12-30 DIAGNOSIS — J32.0 CHRONIC MAXILLARY SINUSITIS: ICD-10-CM

## 2022-12-30 DIAGNOSIS — J34.3 HYPERTROPHY OF BOTH INFERIOR NASAL TURBINATES: ICD-10-CM

## 2022-12-30 NOTE — TELEPHONE ENCOUNTER
Type of surgery: FUNCTIONAL ENDOSCOPIC SINUS SURGERY, WITH NASAL SEPTOPLASTY and submucous resection of turbinates bilaterally (Bilateral)   CPT 74741,[id number]     Chronic maxillary sinusitis J32.0     Chronic ethmoidal sinusitis J32.2     Deviated nasal septum J34.2     Hypertrophy of both inferior nasal turbinates J34.3    Location of surgery: [facility]  Date and time of surgery: 03/21/2023  Surgeon: CLARISSA  Pre-Op Appt Date: 03/14/2023  Post-Op Appt Date: 03/29/2023   Packet sent out: Yes  Pre-cert/Authorization completed:  No prior auth required per Medicare and Medicaid online lists.    Date: 1-9-23    Kamryn Segura  Financial Securing/Prior Auth Dept  [phone]

## 2023-01-03 RX ORDER — GABAPENTIN 300 MG/1
900 CAPSULE ORAL 3 TIMES DAILY
Refills: 3 | OUTPATIENT
Start: 2023-01-03

## 2023-01-03 RX ORDER — DULOXETIN HYDROCHLORIDE 60 MG/1
60 CAPSULE, DELAYED RELEASE ORAL DAILY
OUTPATIENT
Start: 2023-01-03

## 2023-01-03 NOTE — TELEPHONE ENCOUNTER
Pending Prescriptions:                       Disp   Refills    gabapentin (NEURONTIN) 300 MG capsule             3        Sig: Take 3 capsules (900 mg) by mouth 3 times daily        Routing refill request to provider for review/approval because:  Drug not on the [facility] refill protocol

## 2023-01-03 NOTE — TELEPHONE ENCOUNTER
Pending Prescriptions:                       Disp   Refills    DULoxetine (CYMBALTA) 60 MG capsule                        Sig: Take 1 capsule (60 mg) by mouth daily        Routing refill request to provider for review/approval because:  Drug not active on patient's medication list

## 2023-01-22 ENCOUNTER — MYC MEDICAL ADVICE (OUTPATIENT)
Dept: FAMILY MEDICINE | Facility: CLINIC | Age: 63
End: 2023-01-22
Payer: MEDICARE

## 2023-01-23 ENCOUNTER — E-VISIT (OUTPATIENT)
Dept: FAMILY MEDICINE | Facility: CLINIC | Age: 63
End: 2023-01-23
Payer: MEDICARE

## 2023-01-23 ENCOUNTER — VIRTUAL VISIT (OUTPATIENT)
Dept: PSYCHOLOGY | Facility: CLINIC | Age: 63
End: 2023-01-23
Payer: MEDICARE

## 2023-01-23 DIAGNOSIS — F43.10 POST TRAUMATIC STRESS DISORDER (PTSD): ICD-10-CM

## 2023-01-23 DIAGNOSIS — F33.2 SEVERE RECURRENT MAJOR DEPRESSION WITHOUT PSYCHOTIC FEATURES (H): Primary | ICD-10-CM

## 2023-01-23 DIAGNOSIS — J01.90 ACUTE SINUSITIS, RECURRENCE NOT SPECIFIED, UNSPECIFIED LOCATION: Primary | ICD-10-CM

## 2023-01-23 DIAGNOSIS — F41.1 GENERALIZED ANXIETY DISORDER: ICD-10-CM

## 2023-01-23 PROCEDURE — 90834 PSYTX W PT 45 MINUTES: CPT | Mod: 95 | Performed by: COUNSELOR

## 2023-01-23 PROCEDURE — 99207 PR NO CHARGE LOS: CPT | Performed by: PHYSICIAN ASSISTANT

## 2023-01-23 RX ORDER — DOXYCYCLINE 100 MG/1
100 CAPSULE ORAL 2 TIMES DAILY
Qty: 20 CAPSULE | Refills: 0 | Status: SHIPPED | OUTPATIENT
Start: 2023-01-23 | End: 2023-02-02

## 2023-01-23 NOTE — PROGRESS NOTES
"        [facility]                                     Progress Note    Patient Name: Linda Walsh  Date: 1/23/23         Service Type: Individual      Session Start Time: 1:30pm  Session End Time: 2:20pm     Session Length: 50    Session #: 83    Attendees: Client      Service Modality:  Phone Visit:      Provider verified identity through the following two step process.  Patient provided:  Patient is known previously to provider    The patient has been notified of the following:      \"We have found that certain health care needs can be provided without the need for a face to face visit.  This service lets us provide the care you need with a phone conversation.       I will have full access to your [facility] medical record during this entire phone call.   I will be taking notes for your medical record.      Since this is like an office visit, we will bill your insurance company for this service.       There are potential benefits and risks of telephone visits (e.g. limits to patient confidentiality) that differ from in-person visits.?Confidentiality still applies for telephone services, and nobody will record the visit.  It is important to be in a quiet, private space that is free of distractions (including cell phone or other devices) during the visit.??      If during the course of the call I believe a telephone visit is not appropriate, you will not be charged for this service\"     Consent has been obtained for this service by care team member: Yes     DATA  Interactive Complexity: No  Crisis: No        Progress Since Last Session (Related to Symptoms / Goals / Homework):   Symptoms: No change .    Homework: Completed in session      Episode of Care Goals: Minimal progress - ACTION (Actively working towards change); Intervened by reinforcing change plan / affirming steps taken     Current / Ongoing Stressors and Concerns:   The client stated she got a job at the local [employer] " store part time  She stated things with her  have been very bad. He's been taking his pain pills and smoking his medical marijuana. She stated this combination seems to be making him manic. He didn't sleep much for a few weeks. He is going to do the genetic testing to see what medications work best for him.   She's been sick this week.      Treatment Objective(s) Addressed in This Session:   use thought-stopping strategy daily to reduce intrusive thoughts       Intervention:   Worked with the client on stress reduction techniques. Validated her frustrations and provided a container for the client's emotions.      Assessments completed prior to visit:  The following assessments were completed by patient for this visit:  PHQA: No flowsheet data found.  GAD7:   MARLIN-7 SCORE 11/18/2019 12/30/2019 2/26/2020 5/27/2020 7/28/2020 2/24/2021 7/13/2022   Total Score - - - - - - -   Total Score 20 (severe anxiety) 19 (severe anxiety) - - 19 (severe anxiety) 16 (severe anxiety) 21 (severe anxiety)   Total Score 20 19 19 21 19 16 21     PROMIS 10-Global Health (all questions and answers displayed): No flowsheet data found.      ASSESSMENT: Current Emotional / Mental Status (status of significant symptoms):   Risk status (Self / Other harm or suicidal ideation)   Patient denies current fears or concerns for personal safety.   Patient denies current or recent suicidal ideation or behaviors.   Patient denies current or recent homicidal ideation or behaviors.   Patient denies current or recent self injurious behavior or ideation.   Patient denies other safety concerns.   Patient reports there has been no change in risk factors since their last session.     Patient reports there has been no change in protective factors since their last session.     Recommended that patient call 911 or go to the local ED should there be a change in any of these risk factors.     Appearance:   Appropriate    Eye Contact:   Good    Psychomotor  Behavior: Normal    Attitude:   Cooperative    Orientation:   All   Speech    Rate / Production: Normal/ Responsive    Volume:  Normal    Mood:    Normal   Affect:    Appropriate    Thought Content:  Clear    Thought Form:  Coherent  Logical    Insight:    Good      Medication Review:   No changes to current psychiatric medication(s)     Medication Compliance:   Yes     Changes in Health Issues:   None reported     Chemical Use Review:   Substance Use: Chemical use reviewed, no active concerns identified      Tobacco Use: No change in amount of tobacco use since last session.  Patient declined discussion at this time    Diagnosis:  1. 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 Generalized anxiety disorder    3. Post traumatic stress disorder (PTSD)        Collateral Reports Completed:   Not Applicable    PLAN: (Patient Tasks / Therapist Tasks / Other)  Client to continue to work on stress reduction skills.         Nasrin Valladares, [facility]                                                         ______________________________________________________________________    Individual Treatment Plan    Patient's Name: Linda Walsh  YOB: 1960    Date of Creation: 7/11/22  Date Treatment Plan Last Reviewed/Revised: 1/23/23    DSM5 Diagnoses: 296.33 (F33.2) Major Depressive Disorder, Recurrent Episode, Severe _, 300.02 (F41.1) Generalized Anxiety Disorder or 309.81 (F43.10) Posttraumatic Stress Disorder (includes Posttraumatic Stress Disorder for Children 6 Years and Younger)  Without dissociative symptoms  Psychosocial / Contextual Factors: CRPS  PROMIS (reviewed every 90 days):     Referral / Collaboration:  Referral to another professional/service is not indicated at this time..    Anticipated number of session for this episode of care: on-going  Anticipation frequency of session: Monthly  Anticipated Duration of each session: 38-52 minutes/53+ minutes  Treatment plan will be  reviewed in 90 days or when goals have been changed.       MeasurableTreatment Goal(s) related to diagnosis / functional impairment(s)  Goal 1: Client will decrease thoughts of wanting to be dead from 10 out of 10 opportunities to at most 9 out of 10 opportunities for at least 3 consecutive weeks as evidenced by client report and a decrease in symptom report as evidenced by PhQ9 scores and GAD7 scores.    Objective #A (Client Action)    Client will Client will look at and read safety plan at least once a day and follow safety plan when thoughts and urges come up.  Status: Continued - Date(s): 1/23/23    Intervention(s)  Therapist will teach emotional regulation skills. distress tolerance skills, interpersonal effectiveness skills, emotion regluation skills, mindfulness skills, radical acceptance. Therapist will develop safety plan with the client.     Objective #B  Client will Client will agree to call the therapist or after hours crisis line if noticing intense suicidal thoughts for skills coaching.   Status: Continued - Date(s): 1/23/23    Intervention(s)  Therapist will Therapist will be available as much as possible during work hours for the client to contact and give the client several crisis resources.         Goal 2: Client will increase the use of skills (emotion regulation, distress tolerance, communication skill) from 1 out of 10 opportunities to at least 2 out of 10 opportunities for at least 3 consecutive weeks as evidenced by client report and a decrease in symptom report as evidenced by PhQ9 scores and GAD7 scores.     Objective #A (Client Action)    Status: Continued - Date(s): 1/23/23    Client will Increase interest, engagement, and pleasure in doing things  Decrease frequency and intensity of feeling down, depressed, hopeless  Improve diet, appetite, mindful eating, and / or meal planning  Identify negative self-talk and behaviors: challenge core beliefs, myths, and actions  Improve concentration,  focus, and mindfulness in daily activities   Feel less fidgety, restless or slow in daily activities / interpersonal interactions  Decrease thoughts that you'd be better off dead or of suicide / self-harm.    Intervention(s)  Therapist will teach emotional regulation skills. distress tolerance skills, interpersonal effectiveness skills, emotion regluation skills, mindfulness skills, radical acceptance. Therapist will teach client how to ID body cues for anxiety, anxiety reduction techniques, how to ID triggers for depression and anxiety- decrease reactivity/eliminate, lifestyle changes to reduce depression and anxiety, communication skills, explore cognitive beliefs and help client to develop healthy cognitive patterns and beliefs.    Objective #B  Client will Client will learn and  practice DBT skills daily..    Status: Continued - Date(s): 1/23/23    Intervention(s)  Therapist will Therapist will Therapist will teach emotional regulation skills. distress tolerance skills, interpersonal effectiveness skills, emotion regluation skills, mindfulness skills, radical acceptance..      Goal 3: Client will decrease anxious symptoms and reactions to triggers from 9 out of 10 opportunities to at most 8 out of 10 opportunities for at least 3 consecutive weeks as evidenced by client report and a decrease in symptom report as evidenced by PhQ9 scores and GAD7 scores.    Objective #A (Client Action)    Status: Continued - Date(s): 1/23/23    Client will Client will use cognitive strategies identified in therapy to challenge anxious thoughts.    Intervention(s)  Therapist will Therapist will teach emotional recognition/identification. emotion regulation skills, coping skills, mindfulness skills.    Objective #B  Client will Client will use thought-stopping strategy daily to reduce intrusive thoughts for at least 3 consecutive weeks as evidenced by client report and a decrease in symptom report as evidenced by PhQ9 scores and  "GAD7 scores.      Status: Continued - Date(s): 1/23/23    Intervention(s)  Therapist will teach emotional regulation skills. distress tolerance skills, interpersonal effectiveness skills, emotion regluation skills, mindfulness skills, radical acceptance. Therapist will teach client how to ID body cues for anxiety, anxiety reduction techniques, how to ID triggers for depression and anxiety- decrease reactivity/eliminate, lifestyle changes to reduce depression and anxiety, communication skills, explore cognitive beliefs and help client to develop healthy cognitive patterns and beliefs.    Objective #C  Client will Client will learn 5 crisis survival skills during the Distress Tolerance Module (6-8 weeks) for at least 3 consecutive weeks as evidenced by client report and a decrease in symptom report as evidenced by PhQ9 scores and GAD7 scores.  Status: Continued - Date(s): 1/23/23    Intervention(s)  Therapist will teach emotional regulation skills. distress tolerance skills, interpersonal effectiveness skills, emotion regluation skills, mindfulness skills, radical acceptance.      Client has reviewed and agreed to the above plan.      Nasrin Valladares [facility]        Linda Walsh     SAFETY PLAN:  Step 1: Warning signs / cues (Thoughts, images, mood, situation, behavior) that a crisis may be developing:    Thoughts: \"I don't matter\", \"People would be better off without me\", \"I'm a burden\", \"I can't do this anymore\", \"I just want this to end\" and \"Nothing makes it better\"    Images: obsessive thoughts of death or dying: car accident, using a gun and flashbacks    Thinking Processes: ruminations (can't stop thinking about my problems): court case, pain, racing thoughts, highly critical and negative thoughts: \"I can't do anything, I have to suffer everyday and go to work and other people have it so easy.\"  and paranoia: that the government is watching me    Mood: worsening depression, hopelessness, helplessness, intense " "anger, intense worry, agitation, disinhibited (not caring about things or consequences) and mood swings    Behaviors: isolating/withdrawing , can't stop crying, not taking care of myself, not taking care of my responsibilities, sleeping too much and not sleeping enough    Situations: legal issues: current court case, pain, trauma , financial stress and medical condition / diagnosis: CRPS   Step 2: Coping strategies - Things I can do to take my mind off of my problems without contacting another person (relaxation technique, physical activity):    Distress Tolerance Strategies:  arts and crafts: with her residents, play with my pet , pray, change body temperature (ice pack/cold water) , paced breathing/progressive muscle relaxation and puzzles, games on ipad    Physical Activities: deep breathing    Focus on helpful thoughts:  \"Ride the wave\", think about happy memories: when the grandkids were born, going camping, when the boys were little and remind myself of what is important to me: grandkids, family, the residents  Step 3: People and social settings that provide distraction:   Name: Alpesh  Phone: [phone]   Name: Velasquez  Phone:    Name: Thaddeus  Phone:     work   Step 4: Remind myself of people and things that are important to me and worth living for:    My family, my residents    Step 5: When I am in crisis, I can ask these people to help me use my safety plan:   Name: Alpesh  Phone: [phone]   Name: Velasquez  Phone: (number in phone)   Name: Thaddeus  Phone: (number in phone)  Step 6: Making the environment safe:     be around others  Step 7: Professionals or agencies I can contact during a crisis:    [facility] Number: [phone]    Suicide Prevention Lifeline: 9-667-287-TALK (9939)    Crisis Text Line Service (available 24 hours a day, 7 days a week): Text [place] to 532449  Local Crisis Services:     Call 911 or go to my nearest emergency department.   I helped develop this safety plan and " agree to use it when needed.  I have been given a copy of this plan.      Client signature _________________________________________________________________  Today s date: 7/11/22  Adapted from Safety Plan Template 2008 Griselda Perez and Livan Cutler is reprinted with the express permission of the authors.  No portion of the Safety Plan Template may be reproduced without the express, written permission.  You can contact the authors at [email or web address] or [email or web address].

## 2023-01-25 ENCOUNTER — MYC MEDICAL ADVICE (OUTPATIENT)
Dept: FAMILY MEDICINE | Facility: CLINIC | Age: 63
End: 2023-01-25
Payer: MEDICARE

## 2023-01-25 DIAGNOSIS — J32.9 SINUSITIS, BACTERIAL: Primary | ICD-10-CM

## 2023-01-25 DIAGNOSIS — B96.89 SINUSITIS, BACTERIAL: Primary | ICD-10-CM

## 2023-01-26 RX ORDER — AZITHROMYCIN 250 MG/1
TABLET, FILM COATED ORAL
Qty: 6 TABLET | Refills: 0 | Status: SHIPPED | OUTPATIENT
Start: 2023-01-26 | End: 2023-01-31

## 2023-01-26 NOTE — TELEPHONE ENCOUNTER
E-visit completed on 1/23/23. Routing to provider.     Margi Wilkins, AASHISHN, RN, PHN  Registered Nurse-Clinic Triage  [facility]/[place]  1/26/2023 at 12:25 PM

## 2023-02-09 ENCOUNTER — MYC MEDICAL ADVICE (OUTPATIENT)
Dept: FAMILY MEDICINE | Facility: CLINIC | Age: 63
End: 2023-02-09
Payer: MEDICARE

## 2023-02-09 DIAGNOSIS — R09.81 SINUS CONGESTION: ICD-10-CM

## 2023-02-09 DIAGNOSIS — K59.00 CONSTIPATION, UNSPECIFIED CONSTIPATION TYPE: ICD-10-CM

## 2023-02-09 RX ORDER — BISACODYL 10 MG
SUPPOSITORY, RECTAL RECTAL
Qty: 12 SUPPOSITORY | Refills: 1 | Status: SHIPPED | OUTPATIENT
Start: 2023-02-09 | End: 2023-08-03

## 2023-02-09 RX ORDER — FLUTICASONE PROPIONATE 50 MCG
2 SPRAY, SUSPENSION (ML) NASAL DAILY
Qty: 16 G | Refills: 3 | Status: SHIPPED | OUTPATIENT
Start: 2023-02-09 | End: 2023-11-22

## 2023-02-09 NOTE — TELEPHONE ENCOUNTER
Prescription approved per [facility] Refill Protocol.  Mychart sent to the patient.

## 2023-02-13 ENCOUNTER — VIRTUAL VISIT (OUTPATIENT)
Dept: PSYCHOLOGY | Facility: CLINIC | Age: 63
End: 2023-02-13
Payer: MEDICARE

## 2023-02-13 DIAGNOSIS — F33.2 SEVERE RECURRENT MAJOR DEPRESSION WITHOUT PSYCHOTIC FEATURES (H): Primary | ICD-10-CM

## 2023-02-13 DIAGNOSIS — F41.1 GENERALIZED ANXIETY DISORDER: ICD-10-CM

## 2023-02-13 DIAGNOSIS — F43.10 POST TRAUMATIC STRESS DISORDER (PTSD): ICD-10-CM

## 2023-02-13 PROCEDURE — 90834 PSYTX W PT 45 MINUTES: CPT | Mod: VID | Performed by: COUNSELOR

## 2023-02-13 NOTE — PROGRESS NOTES
"        [facility]                                     Progress Note    Patient Name: Linda Walsh  Date: 2/13/23         Service Type: Individual      Session Start Time: 1:30pm  Session End Time: 2:20pm     Session Length: 50    Session #: 84    Attendees: Client      Service Modality:  Phone Visit:      Provider verified identity through the following two step process.  Patient provided:  Patient is known previously to provider    The patient has been notified of the following:      \"We have found that certain health care needs can be provided without the need for a face to face visit.  This service lets us provide the care you need with a phone conversation.       I will have full access to your [facility] medical record during this entire phone call.   I will be taking notes for your medical record.      Since this is like an office visit, we will bill your insurance company for this service.       There are potential benefits and risks of telephone visits (e.g. limits to patient confidentiality) that differ from in-person visits.?Confidentiality still applies for telephone services, and nobody will record the visit.  It is important to be in a quiet, private space that is free of distractions (including cell phone or other devices) during the visit.??      If during the course of the call I believe a telephone visit is not appropriate, you will not be charged for this service\"     Consent has been obtained for this service by care team member: Yes     DATA  Interactive Complexity: No  Crisis: No        Progress Since Last Session (Related to Symptoms / Goals / Homework):   Symptoms: No change .    Homework: Completed in session      Episode of Care Goals: Minimal progress - ACTION (Actively working towards change); Intervened by reinforcing change plan / affirming steps taken     Current / Ongoing Stressors and Concerns:   The client stated she is loving having a job again. " However, it's causing her a lot of physical pain. She and her  continue to have volatile fights. The client stated she hasn't been taking her duloxetine for a long time now and wonders if that could be contributing to her increased irritability and anger. She was prescribed it for pain.       Treatment Objective(s) Addressed in This Session:   use thought-stopping strategy daily to reduce intrusive thoughts       Intervention:   Worked with the client on stress reduction techniques. Validated her frustrations and provided a container for the client's emotions.  Encouraged the client to talk to her PCP about getting back on an antidepressant. She stated she could start taking the duolxetine 30 mgs again for right now until she can get in with her doctor.     Assessments completed prior to visit:  The following assessments were completed by patient for this visit:  PHQA: No flowsheet data found.  GAD7:   MARLIN-7 SCORE 11/18/2019 12/30/2019 2/26/2020 5/27/2020 7/28/2020 2/24/2021 7/13/2022   Total Score - - - - - - -   Total Score 20 (severe anxiety) 19 (severe anxiety) - - 19 (severe anxiety) 16 (severe anxiety) 21 (severe anxiety)   Total Score 20 19 19 21 19 16 21     PROMIS 10-Global Health (all questions and answers displayed): No flowsheet data found.      ASSESSMENT: Current Emotional / Mental Status (status of significant symptoms):   Risk status (Self / Other harm or suicidal ideation)   Patient denies current fears or concerns for personal safety.   Patient denies current or recent suicidal ideation or behaviors.   Patient denies current or recent homicidal ideation or behaviors.   Patient denies current or recent self injurious behavior or ideation.   Patient denies other safety concerns.   Patient reports there has been no change in risk factors since their last session.     Patient reports there has been no change in protective factors since their last session.     Recommended that patient call 911 or  go to the local ED should there be a change in any of these risk factors.     Appearance:   Appropriate    Eye Contact:   Good    Psychomotor Behavior: Normal    Attitude:   Cooperative    Orientation:   All   Speech    Rate / Production: Normal/ Responsive    Volume:  Normal    Mood:    Normal   Affect:    Appropriate    Thought Content:  Clear    Thought Form:  Coherent  Logical    Insight:    Good      Medication Review:   No changes to current psychiatric medication(s)     Medication Compliance:   Yes     Changes in Health Issues:   None reported     Chemical Use Review:   Substance Use: Chemical use reviewed, no active concerns identified      Tobacco Use: No change in amount of tobacco use since last session.  Patient declined discussion at this time    Diagnosis:  1. 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 Generalized anxiety disorder    3. Post traumatic stress disorder (PTSD)        Collateral Reports Completed:   Not Applicable    PLAN: (Patient Tasks / Therapist Tasks / Other)  Client to continue to work on stress reduction skills and taking meds everyday.        Nasrin Valladares, [facility]                                                         ______________________________________________________________________    Individual Treatment Plan    Patient's Name: Linda Walsh  YOB: 1960    Date of Creation: 7/11/22  Date Treatment Plan Last Reviewed/Revised: 1/23/23    DSM5 Diagnoses: 296.33 (F33.2) Major Depressive Disorder, Recurrent Episode, Severe _, 300.02 (F41.1) Generalized Anxiety Disorder or 309.81 (F43.10) Posttraumatic Stress Disorder (includes Posttraumatic Stress Disorder for Children 6 Years and Younger)  Without dissociative symptoms  Psychosocial / Contextual Factors: CRPS  PROMIS (reviewed every 90 days):     Referral / Collaboration:  Referral to another professional/service is not indicated at this time..    Anticipated number of session for this  episode of care: on-going  Anticipation frequency of session: Monthly  Anticipated Duration of each session: 38-52 minutes/53+ minutes  Treatment plan will be reviewed in 90 days or when goals have been changed.       MeasurableTreatment Goal(s) related to diagnosis / functional impairment(s)  Goal 1: Client will decrease thoughts of wanting to be dead from 10 out of 10 opportunities to at most 9 out of 10 opportunities for at least 3 consecutive weeks as evidenced by client report and a decrease in symptom report as evidenced by PhQ9 scores and GAD7 scores.    Objective #A (Client Action)    Client will Client will look at and read safety plan at least once a day and follow safety plan when thoughts and urges come up.  Status: Continued - Date(s): 1/23/23    Intervention(s)  Therapist will teach emotional regulation skills. distress tolerance skills, interpersonal effectiveness skills, emotion regluation skills, mindfulness skills, radical acceptance. Therapist will develop safety plan with the client.     Objective #B  Client will Client will agree to call the therapist or after hours crisis line if noticing intense suicidal thoughts for skills coaching.   Status: Continued - Date(s): 1/23/23    Intervention(s)  Therapist will Therapist will be available as much as possible during work hours for the client to contact and give the client several crisis resources.         Goal 2: Client will increase the use of skills (emotion regulation, distress tolerance, communication skill) from 1 out of 10 opportunities to at least 2 out of 10 opportunities for at least 3 consecutive weeks as evidenced by client report and a decrease in symptom report as evidenced by PhQ9 scores and GAD7 scores.     Objective #A (Client Action)    Status: Continued - Date(s): 1/23/23    Client will Increase interest, engagement, and pleasure in doing things  Decrease frequency and intensity of feeling down, depressed, hopeless  Improve diet,  appetite, mindful eating, and / or meal planning  Identify negative self-talk and behaviors: challenge core beliefs, myths, and actions  Improve concentration, focus, and mindfulness in daily activities   Feel less fidgety, restless or slow in daily activities / interpersonal interactions  Decrease thoughts that you'd be better off dead or of suicide / self-harm.    Intervention(s)  Therapist will teach emotional regulation skills. distress tolerance skills, interpersonal effectiveness skills, emotion regluation skills, mindfulness skills, radical acceptance. Therapist will teach client how to ID body cues for anxiety, anxiety reduction techniques, how to ID triggers for depression and anxiety- decrease reactivity/eliminate, lifestyle changes to reduce depression and anxiety, communication skills, explore cognitive beliefs and help client to develop healthy cognitive patterns and beliefs.    Objective #B  Client will Client will learn and  practice DBT skills daily..    Status: Continued - Date(s): 1/23/23    Intervention(s)  Therapist will Therapist will Therapist will teach emotional regulation skills. distress tolerance skills, interpersonal effectiveness skills, emotion regluation skills, mindfulness skills, radical acceptance..      Goal 3: Client will decrease anxious symptoms and reactions to triggers from 9 out of 10 opportunities to at most 8 out of 10 opportunities for at least 3 consecutive weeks as evidenced by client report and a decrease in symptom report as evidenced by PhQ9 scores and GAD7 scores.    Objective #A (Client Action)    Status: Continued - Date(s): 1/23/23    Client will Client will use cognitive strategies identified in therapy to challenge anxious thoughts.    Intervention(s)  Therapist will Therapist will teach emotional recognition/identification. emotion regulation skills, coping skills, mindfulness skills.    Objective #B  Client will Client will use thought-stopping strategy daily  "to reduce intrusive thoughts for at least 3 consecutive weeks as evidenced by client report and a decrease in symptom report as evidenced by PhQ9 scores and GAD7 scores.      Status: Continued - Date(s): 1/23/23    Intervention(s)  Therapist will teach emotional regulation skills. distress tolerance skills, interpersonal effectiveness skills, emotion regluation skills, mindfulness skills, radical acceptance. Therapist will teach client how to ID body cues for anxiety, anxiety reduction techniques, how to ID triggers for depression and anxiety- decrease reactivity/eliminate, lifestyle changes to reduce depression and anxiety, communication skills, explore cognitive beliefs and help client to develop healthy cognitive patterns and beliefs.    Objective #C  Client will Client will learn 5 crisis survival skills during the Distress Tolerance Module (6-8 weeks) for at least 3 consecutive weeks as evidenced by client report and a decrease in symptom report as evidenced by PhQ9 scores and GAD7 scores.  Status: Continued - Date(s): 1/23/23    Intervention(s)  Therapist will teach emotional regulation skills. distress tolerance skills, interpersonal effectiveness skills, emotion regluation skills, mindfulness skills, radical acceptance.      Client has reviewed and agreed to the above plan.      Nasrin Valladares, [facility]        Linda Walsh     SAFETY PLAN:  Step 1: Warning signs / cues (Thoughts, images, mood, situation, behavior) that a crisis may be developing:    Thoughts: \"I don't matter\", \"People would be better off without me\", \"I'm a burden\", \"I can't do this anymore\", \"I just want this to end\" and \"Nothing makes it better\"    Images: obsessive thoughts of death or dying: car accident, using a gun and flashbacks    Thinking Processes: ruminations (can't stop thinking about my problems): court case, pain, racing thoughts, highly critical and negative thoughts: \"I can't do anything, I have to suffer everyday and go to " "work and other people have it so easy.\"  and paranoia: that the government is watching me    Mood: worsening depression, hopelessness, helplessness, intense anger, intense worry, agitation, disinhibited (not caring about things or consequences) and mood swings    Behaviors: isolating/withdrawing , can't stop crying, not taking care of myself, not taking care of my responsibilities, sleeping too much and not sleeping enough    Situations: legal issues: current court case, pain, trauma , financial stress and medical condition / diagnosis: CRPS   Step 2: Coping strategies - Things I can do to take my mind off of my problems without contacting another person (relaxation technique, physical activity):    Distress Tolerance Strategies:  arts and crafts: with her residents, play with my pet , pray, change body temperature (ice pack/cold water) , paced breathing/progressive muscle relaxation and puzzles, games on ipad    Physical Activities: deep breathing    Focus on helpful thoughts:  \"Ride the wave\", think about happy memories: when the grandkids were born, going camping, when the boys were little and remind myself of what is important to me: grandkids, family, the residents  Step 3: People and social settings that provide distraction:   Name: Alpesh  Phone: [phone]   Name: Velasquez  Phone:    Name: Thaddeus  Phone:     work   Step 4: Remind myself of people and things that are important to me and worth living for:    My family, my residents    Step 5: When I am in crisis, I can ask these people to help me use my safety plan:   Name: Alpesh  Phone: [phone]   Name: Velasquez  Phone: (number in phone)   Name: Thaddeus  Phone: (number in phone)  Step 6: Making the environment safe:     be around others  Step 7: Professionals or agencies I can contact during a crisis:    [facility] Daytime Number: [phone]    Suicide Prevention Lifeline: 9-928-692-VSQL (4471)    Crisis Text Line Service (available 24 hours a " day, 7 days a week): Text [place] to 976841  Local Crisis Services:     Call 911 or go to my nearest emergency department.   I helped develop this safety plan and agree to use it when needed.  I have been given a copy of this plan.      Client signature _________________________________________________________________  Today s date: 7/11/22  Adapted from Safety Plan Template 2008 Griselda Perez and Livan Cutler is reprinted with the express permission of the authors.  No portion of the Safety Plan Template may be reproduced without the express, written permission.  You can contact the authors at [email or web address] or [email or web address].

## 2023-02-20 ENCOUNTER — HOSPITAL ENCOUNTER (EMERGENCY)
Facility: CLINIC | Age: 63
Discharge: HOME | End: 2023-02-20
Attending: EMERGENCY MEDICINE | Admitting: EMERGENCY MEDICINE
Payer: MEDICARE

## 2023-02-20 ENCOUNTER — APPOINTMENT (OUTPATIENT)
Dept: GENERAL RADIOLOGY | Facility: CLINIC | Age: 63
End: 2023-02-20
Attending: EMERGENCY MEDICINE
Payer: MEDICARE

## 2023-02-20 ENCOUNTER — MYC MEDICAL ADVICE (OUTPATIENT)
Dept: FAMILY MEDICINE | Facility: CLINIC | Age: 63
End: 2023-02-20
Payer: MEDICARE

## 2023-02-20 ENCOUNTER — TELEPHONE (OUTPATIENT)
Dept: FAMILY MEDICINE | Facility: CLINIC | Age: 63
End: 2023-02-20
Payer: MEDICARE

## 2023-02-20 VITALS
BODY MASS INDEX: 28.67 KG/M2 | SYSTOLIC BLOOD PRESSURE: 128 MMHG | WEIGHT: 167 LBS | RESPIRATION RATE: 16 BRPM | HEART RATE: 82 BPM | OXYGEN SATURATION: 93 % | DIASTOLIC BLOOD PRESSURE: 72 MMHG | TEMPERATURE: 98 F

## 2023-02-20 DIAGNOSIS — M25.512 CHRONIC LEFT SHOULDER PAIN: ICD-10-CM

## 2023-02-20 DIAGNOSIS — G89.29 CHRONIC LEFT SHOULDER PAIN: ICD-10-CM

## 2023-02-20 PROCEDURE — 99283 EMERGENCY DEPT VISIT LOW MDM: CPT | Performed by: EMERGENCY MEDICINE

## 2023-02-20 PROCEDURE — 73030 X-RAY EXAM OF SHOULDER: CPT | Mod: LT

## 2023-02-20 ASSESSMENT — ACTIVITIES OF DAILY LIVING (ADL): ADLS_ACUITY_SCORE: 33
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
## 2023-02-20 NOTE — ED TRIAGE NOTES
Pt presents with concern for ongoing shoulder pain. Pt had injury over the summer, last had an injection in Dec. 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 Bethany Monk, RN       Triage Assessment     Row Name 02/20/23 1211       Triage Assessment (Adult)    Airway WDL WDL       Respiratory WDL    Respiratory WDL WDL       Cardiac WDL    Cardiac WDL WDL

## 2023-02-20 NOTE — DISCHARGE INSTRUCTIONS
Use sling as needed over the next 1 to 2 weeks.  However, recommend removing from the sling for range of motion exercises at least 3 times per day.  Continue with your ongoing pain management regimen.

## 2023-02-20 NOTE — TELEPHONE ENCOUNTER
Reason for Call:  Appointment Request    Patient requesting this type of appt:  Ongoing shoulder pain. Getting worse. Pt couldn't sleep last night.     Requested provider: Jonna Gaitan    Reason patient unable to be scheduled: Not within requested timeframe    When does patient want to be seen/preferred time: today    Comments:     Could we [facility] this [facility] to you [facility] or would you prefer to receive a phone call?:   Patient would prefer a phone call   Okay to leave a detailed message?: Yes at Cell number on file:    Telephone Information:   Mobile [phone]       Call taken on 2/20/2023 at 8:43 AM by Ana Maria Venegas

## 2023-02-27 ENCOUNTER — TRANSFERRED RECORDS (OUTPATIENT)
Dept: HEALTH INFORMATION MANAGEMENT | Facility: CLINIC | Age: 63
End: 2023-02-27

## 2023-03-06 ENCOUNTER — TRANSFERRED RECORDS (OUTPATIENT)
Dept: HEALTH INFORMATION MANAGEMENT | Facility: CLINIC | Age: 63
End: 2023-03-06

## 2023-03-13 ENCOUNTER — MYC MEDICAL ADVICE (OUTPATIENT)
Dept: OTOLARYNGOLOGY | Facility: OTHER | Age: 63
End: 2023-03-13
Payer: MEDICARE

## 2023-03-14 ENCOUNTER — OFFICE VISIT (OUTPATIENT)
Dept: FAMILY MEDICINE | Facility: CLINIC | Age: 63
End: 2023-03-14
Payer: MEDICARE

## 2023-03-14 VITALS
TEMPERATURE: 98.3 F | DIASTOLIC BLOOD PRESSURE: 86 MMHG | HEART RATE: 80 BPM | WEIGHT: 172.1 LBS | BODY MASS INDEX: 29.54 KG/M2 | OXYGEN SATURATION: 98 % | SYSTOLIC BLOOD PRESSURE: 138 MMHG

## 2023-03-14 DIAGNOSIS — J32.0 CHRONIC MAXILLARY SINUSITIS: ICD-10-CM

## 2023-03-14 DIAGNOSIS — Z01.818 PREOP GENERAL PHYSICAL EXAM: Primary | ICD-10-CM

## 2023-03-14 DIAGNOSIS — Z12.11 SCREEN FOR COLON CANCER: ICD-10-CM

## 2023-03-14 DIAGNOSIS — F32.1 MAJOR DEPRESSIVE DISORDER, SINGLE EPISODE, MODERATE (H): ICD-10-CM

## 2023-03-14 DIAGNOSIS — J34.2 DEVIATED NASAL SEPTUM: ICD-10-CM

## 2023-03-14 DIAGNOSIS — K59.09 CHRONIC CONSTIPATION: ICD-10-CM

## 2023-03-14 DIAGNOSIS — F41.1 GENERALIZED ANXIETY DISORDER: ICD-10-CM

## 2023-03-14 DIAGNOSIS — K21.9 GASTROESOPHAGEAL REFLUX DISEASE WITHOUT ESOPHAGITIS: ICD-10-CM

## 2023-03-14 DIAGNOSIS — F11.90 CHRONIC, CONTINUOUS USE OF OPIOIDS: ICD-10-CM

## 2023-03-14 DIAGNOSIS — J34.3 HYPERTROPHY OF BOTH INFERIOR NASAL TURBINATES: ICD-10-CM

## 2023-03-14 LAB
ANION GAP SERPL CALCULATED.3IONS-SCNC: 5 MMOL/L (ref 3–14)
BUN SERPL-MCNC: 13 MG/DL (ref 7–30)
CALCIUM SERPL-MCNC: 9.6 MG/DL (ref 8.5–10.1)
CHLORIDE BLD-SCNC: 106 MMOL/L (ref 94–109)
CO2 SERPL-SCNC: 26 MMOL/L (ref 20–32)
CREAT SERPL-MCNC: 0.94 MG/DL (ref 0.52–1.04)
GFR SERPL CREATININE-BSD FRML MDRD: 68 ML/MIN/1.73M2
GLUCOSE BLD-MCNC: 106 MG/DL (ref 70–99)
HGB BLD-MCNC: 14.4 G/DL (ref 11.7–15.7)
POTASSIUM BLD-SCNC: 3.8 MMOL/L (ref 3.4–5.3)
SODIUM SERPL-SCNC: 137 MMOL/L (ref 133–144)

## 2023-03-14 PROCEDURE — 36415 COLL VENOUS BLD VENIPUNCTURE: CPT | Performed by: PHYSICIAN ASSISTANT

## 2023-03-14 PROCEDURE — 80048 BASIC METABOLIC PNL TOTAL CA: CPT | Performed by: PHYSICIAN ASSISTANT

## 2023-03-14 PROCEDURE — 99214 OFFICE O/P EST MOD 30 MIN: CPT | Performed by: PHYSICIAN ASSISTANT

## 2023-03-14 PROCEDURE — 85018 HEMOGLOBIN: CPT | Performed by: PHYSICIAN ASSISTANT

## 2023-03-14 RX ORDER — OMEPRAZOLE 40 MG/1
40 CAPSULE, DELAYED RELEASE ORAL DAILY
Qty: 90 CAPSULE | Refills: 3 | Status: SHIPPED | OUTPATIENT
Start: 2023-03-14 | End: 2023-06-09

## 2023-03-14 ASSESSMENT — ASTHMA QUESTIONNAIRES
QUESTION_5 LAST FOUR WEEKS HOW WOULD YOU RATE YOUR ASTHMA CONTROL: COMPLETELY CONTROLLED
QUESTION_1 LAST FOUR WEEKS HOW MUCH OF THE TIME DID YOUR ASTHMA KEEP YOU FROM GETTING AS MUCH DONE AT WORK, SCHOOL OR AT HOME: NONE OF THE TIME
QUESTION_3 LAST FOUR WEEKS HOW OFTEN DID YOUR ASTHMA SYMPTOMS (WHEEZING, COUGHING, SHORTNESS OF BREATH, CHEST TIGHTNESS OR PAIN) WAKE YOU UP AT NIGHT OR EARLIER THAN USUAL IN THE MORNING: NOT AT ALL
ACT_TOTALSCORE: 24
QUESTION_4 LAST FOUR WEEKS HOW OFTEN HAVE YOU USED YOUR RESCUE INHALER OR NEBULIZER MEDICATION (SUCH AS ALBUTEROL): NOT AT ALL
QUESTION_2 LAST FOUR WEEKS HOW OFTEN HAVE YOU HAD SHORTNESS OF BREATH: ONCE OR TWICE A WEEK
ACT_TOTALSCORE: 24

## 2023-03-14 ASSESSMENT — PAIN SCALES - GENERAL: PAINLEVEL: WORST PAIN (10)

## 2023-03-14 ASSESSMENT — PATIENT HEALTH QUESTIONNAIRE - PHQ9
SUM OF ALL RESPONSES TO PHQ QUESTIONS 1-9: 15
10. IF YOU CHECKED OFF ANY PROBLEMS, HOW DIFFICULT HAVE THESE PROBLEMS MADE IT FOR YOU TO DO YOUR WORK, TAKE CARE OF THINGS AT HOME, OR GET ALONG WITH OTHER PEOPLE: VERY DIFFICULT
SUM OF ALL RESPONSES TO PHQ QUESTIONS 1-9: 15
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
## 2023-03-16 ENCOUNTER — TRANSFERRED RECORDS (OUTPATIENT)
Dept: HEALTH INFORMATION MANAGEMENT | Facility: CLINIC | Age: 63
End: 2023-03-16

## 2023-03-17 NOTE — TELEPHONE ENCOUNTER
Patient called back to reschedule FESS surgery for 8/15/2023 at [facility].    She will call pcp to schedule preop.  Postop rescheduled.

## 2023-03-17 NOTE — TELEPHONE ENCOUNTER
Spoke to patient, she needs to cancel 3/21 and did not want to reschedule.   [facility] Charge nurse notified and will place case back into depot for future reschedule.    Patient informed to call back when ready to reschedule.

## 2023-03-18 ENCOUNTER — MYC MEDICAL ADVICE (OUTPATIENT)
Dept: FAMILY MEDICINE | Facility: CLINIC | Age: 63
End: 2023-03-18
Payer: MEDICARE

## 2023-03-18 DIAGNOSIS — J45.40 MODERATE PERSISTENT ASTHMA WITHOUT COMPLICATION: ICD-10-CM

## 2023-03-21 RX ORDER — ALBUTEROL SULFATE 90 UG/1
2 AEROSOL, METERED RESPIRATORY (INHALATION) EVERY 6 HOURS PRN
Qty: 18 G | Refills: 3 | Status: SHIPPED | OUTPATIENT
Start: 2023-03-21 | End: 2024-08-19

## 2023-03-30 ENCOUNTER — TRANSFERRED RECORDS (OUTPATIENT)
Dept: HEALTH INFORMATION MANAGEMENT | Facility: CLINIC | Age: 63
End: 2023-03-30

## 2023-04-04 ENCOUNTER — FCC EXTENDED DOCUMENTATION (OUTPATIENT)
Dept: PSYCHOLOGY | Facility: CLINIC | Age: 63
End: 2023-04-04
Payer: MEDICARE

## 2023-04-04 ENCOUNTER — MYC MEDICAL ADVICE (OUTPATIENT)
Dept: FAMILY MEDICINE | Facility: CLINIC | Age: 63
End: 2023-04-04
Payer: MEDICARE

## 2023-04-04 NOTE — PROGRESS NOTES
Provider attempted to reach the patient for their appointment. The patient did not answer the phone call. The provider LVM reminding the patient of their next appointment and the attendance policy.

## 2023-04-05 NOTE — TELEPHONE ENCOUNTER
Please offer an evisit, phone or video visit, she can come in to clinic if she prefers using my last virtual spot of the day if she wants  Jonna Gaitan PA-C

## 2023-04-05 NOTE — TELEPHONE ENCOUNTER
Responded to MyChart.     Margi Wilkins, BSN, RN, PHN  Registered Nurse-Clinic Triage  [facility]  4/5/2023 at 11:36 AM

## 2023-04-07 ENCOUNTER — TRANSCRIBE ORDERS (OUTPATIENT)
Dept: OTHER | Age: 63
End: 2023-04-07

## 2023-04-07 DIAGNOSIS — M54.12 RADICULOPATHY, CERVICAL REGION: Primary | ICD-10-CM

## 2023-04-10 ENCOUNTER — VIRTUAL VISIT (OUTPATIENT)
Dept: PSYCHOLOGY | Facility: CLINIC | Age: 63
End: 2023-04-10
Payer: MEDICARE

## 2023-04-10 ENCOUNTER — ANCILLARY PROCEDURE (OUTPATIENT)
Dept: GENERAL RADIOLOGY | Facility: CLINIC | Age: 63
End: 2023-04-10
Attending: PODIATRIST
Payer: MEDICARE

## 2023-04-10 ENCOUNTER — OFFICE VISIT (OUTPATIENT)
Dept: PODIATRY | Facility: CLINIC | Age: 63
End: 2023-04-10
Payer: MEDICARE

## 2023-04-10 VITALS
HEIGHT: 64 IN | WEIGHT: 172 LBS | SYSTOLIC BLOOD PRESSURE: 124 MMHG | BODY MASS INDEX: 29.37 KG/M2 | DIASTOLIC BLOOD PRESSURE: 76 MMHG

## 2023-04-10 DIAGNOSIS — M24.572 EQUINUS CONTRACTURE OF LEFT ANKLE: ICD-10-CM

## 2023-04-10 DIAGNOSIS — F41.1 GENERALIZED ANXIETY DISORDER: ICD-10-CM

## 2023-04-10 DIAGNOSIS — F43.10 POST TRAUMATIC STRESS DISORDER (PTSD): ICD-10-CM

## 2023-04-10 DIAGNOSIS — F33.2 SEVERE RECURRENT MAJOR DEPRESSION WITHOUT PSYCHOTIC FEATURES (H): Primary | ICD-10-CM

## 2023-04-10 DIAGNOSIS — Q66.6 PES VALGUS OF LEFT FOOT: ICD-10-CM

## 2023-04-10 DIAGNOSIS — M72.2 PLANTAR FASCIITIS, LEFT: ICD-10-CM

## 2023-04-10 DIAGNOSIS — M72.2 PLANTAR FASCIITIS, LEFT: Primary | ICD-10-CM

## 2023-04-10 DIAGNOSIS — G90.522 REFLEX SYMPATHETIC DYSTROPHY OF LEFT LOWER EXTREMITY: ICD-10-CM

## 2023-04-10 PROCEDURE — 73630 X-RAY EXAM OF FOOT: CPT | Mod: TC | Performed by: RADIOLOGY

## 2023-04-10 PROCEDURE — 90834 PSYTX W PT 45 MINUTES: CPT | Mod: 95 | Performed by: COUNSELOR

## 2023-04-10 PROCEDURE — 73610 X-RAY EXAM OF ANKLE: CPT | Mod: TC | Performed by: RADIOLOGY

## 2023-04-10 PROCEDURE — 99203 OFFICE O/P NEW LOW 30 MIN: CPT | Performed by: PODIATRIST

## 2023-04-10 ASSESSMENT — PAIN SCALES - GENERAL: PAINLEVEL: EXTREME PAIN (8)

## 2023-04-10 NOTE — PROGRESS NOTES
"        [facility]                                     Progress Note    Patient Name: Linda Walsh  Date: 4/10/23         Service Type: Individual      Session Start Time: 1:30pm  Session End Time: 2:20pm     Session Length: 50    Session #: 85    Attendees: Client      Service Modality:  Phone Visit:      Provider verified identity through the following two step process.  Patient provided:  Patient is known previously to provider    The patient has been notified of the following:      \"We have found that certain health care needs can be provided without the need for a face to face visit.  This service lets us provide the care you need with a phone conversation.       I will have full access to your [facility] medical record during this entire phone call.   I will be taking notes for your medical record.      Since this is like an office visit, we will bill your insurance company for this service.       There are potential benefits and risks of telephone visits (e.g. limits to patient confidentiality) that differ from in-person visits.?Confidentiality still applies for telephone services, and nobody will record the visit.  It is important to be in a quiet, private space that is free of distractions (including cell phone or other devices) during the visit.??      If during the course of the call I believe a telephone visit is not appropriate, you will not be charged for this service\"     Consent has been obtained for this service by care team member: Yes     DATA  Interactive Complexity: No  Crisis: No        Progress Since Last Session (Related to Symptoms / Goals / Homework):   Symptoms: No change .    Homework: Completed in session      Episode of Care Goals: Minimal progress - ACTION (Actively working towards change); Intervened by reinforcing change plan / affirming steps taken     Current / Ongoing Stressors and Concerns:   The client stated she's had a lot of health issues lately " that have been interfering with her quality of life. She and her  have been having some bad arguments too.       Treatment Objective(s) Addressed in This Session:   use thought-stopping strategy daily to reduce intrusive thoughts       Intervention:   Worked with the client on stress reduction techniques. Validated her frustrations and provided a container for the client's emotions.    Processed her feelings around her arguments with her . Discussed various ways she can work on her own self care and to continue getting out of the house without working too much at her job to avoid him.     Assessments completed prior to visit:  The following assessments were completed by patient for this visit:  PHQA:        View : No data to display.              GAD7:       11/18/2019    12:26 PM 12/30/2019     1:39 PM 2/26/2020    11:18 AM 5/27/2020     3:23 PM 7/28/2020    10:37 AM 2/24/2021     1:26 PM 7/13/2022    10:16 AM   MARLIN-7 SCORE   Total Score 20 (severe anxiety) 19 (severe anxiety)   19 (severe anxiety) 16 (severe anxiety) 21 (severe anxiety)   Total Score 20 19 19 21 19 16 21     PROMIS 10-Global Health (all questions and answers displayed):        View : No data to display.                  ASSESSMENT: Current Emotional / Mental Status (status of significant symptoms):   Risk status (Self / Other harm or suicidal ideation)   Patient denies current fears or concerns for personal safety.   Patient denies current or recent suicidal ideation or behaviors.   Patient denies current or recent homicidal ideation or behaviors.   Patient denies current or recent self injurious behavior or ideation.   Patient denies other safety concerns.   Patient reports there has been no change in risk factors since their last session.     Patient reports there has been no change in protective factors since their last session.     Recommended that patient call 911 or go to the local ED should there be a change in any of these  risk factors.     Appearance:   Appropriate    Eye Contact:   Good    Psychomotor Behavior: Normal    Attitude:   Cooperative    Orientation:   All   Speech    Rate / Production: Normal/ Responsive    Volume:  Normal    Mood:    Normal   Affect:    Appropriate    Thought Content:  Clear    Thought Form:  Coherent  Logical    Insight:    Good      Medication Review:   No changes to current psychiatric medication(s)     Medication Compliance:   Yes     Changes in Health Issues:   None reported     Chemical Use Review:   Substance Use: Chemical use reviewed, no active concerns identified      Tobacco Use: No change in amount of tobacco use since last session.  Patient declined discussion at this time    Diagnosis:  1. 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 Generalized anxiety disorder    3. Post traumatic stress disorder (PTSD)        Collateral Reports Completed:   Not Applicable    PLAN: (Patient Tasks / Therapist Tasks / Other)  Client to continue to work on stress reduction skills and taking meds everyday.        [facility]                                                         ______________________________________________________________________    Individual Treatment Plan    Patient's Name: Linda Walsh  YOB: 1960    Date of Creation: 7/11/22  Date Treatment Plan Last Reviewed/Revised: 4/10/23    DSM5 Diagnoses: 296.33 (F33.2) Major Depressive Disorder, Recurrent Episode, Severe _, 300.02 (F41.1) Generalized Anxiety Disorder or 309.81 (F43.10) Posttraumatic Stress Disorder (includes Posttraumatic Stress Disorder for Children 6 Years and Younger)  Without dissociative symptoms  Psychosocial / Contextual Factors: CRPS  PROMIS (reviewed every 90 days):     Referral / Collaboration:  Referral to another professional/service is not indicated at this time..    Anticipated number of session for this episode of care: on-going  Anticipation frequency of  session: Monthly  Anticipated Duration of each session: 38-52 minutes/53+ minutes  Treatment plan will be reviewed in 90 days or when goals have been changed.       MeasurableTreatment Goal(s) related to diagnosis / functional impairment(s)  Goal 1: Client will decrease thoughts of wanting to be dead from 10 out of 10 opportunities to at most 9 out of 10 opportunities for at least 3 consecutive weeks as evidenced by client report and a decrease in symptom report as evidenced by PhQ9 scores and GAD7 scores.    Objective #A (Client Action)    Client will Client will look at and read safety plan at least once a day and follow safety plan when thoughts and urges come up.  Status: Continued - Date(s): 4/10/23    Intervention(s)  Therapist will teach emotional regulation skills. distress tolerance skills, interpersonal effectiveness skills, emotion regluation skills, mindfulness skills, radical acceptance. Therapist will develop safety plan with the client.     Objective #B  Client will Client will agree to call the therapist or after hours crisis line if noticing intense suicidal thoughts for skills coaching.   Status: Continued - Date(s): 4/10/23    Intervention(s)  Therapist will Therapist will be available as much as possible during work hours for the client to contact and give the client several crisis resources.         Goal 2: Client will increase the use of skills (emotion regulation, distress tolerance, communication skill) from 1 out of 10 opportunities to at least 2 out of 10 opportunities for at least 3 consecutive weeks as evidenced by client report and a decrease in symptom report as evidenced by PhQ9 scores and GAD7 scores.     Objective #A (Client Action)    Status: Continued - Date(s): 4/10/23    Client will Increase interest, engagement, and pleasure in doing things  Decrease frequency and intensity of feeling down, depressed, hopeless  Improve diet, appetite, mindful eating, and / or meal  planning  Identify negative self-talk and behaviors: challenge core beliefs, myths, and actions  Improve concentration, focus, and mindfulness in daily activities   Feel less fidgety, restless or slow in daily activities / interpersonal interactions  Decrease thoughts that you'd be better off dead or of suicide / self-harm.    Intervention(s)  Therapist will teach emotional regulation skills. distress tolerance skills, interpersonal effectiveness skills, emotion regluation skills, mindfulness skills, radical acceptance. Therapist will teach client how to ID body cues for anxiety, anxiety reduction techniques, how to ID triggers for depression and anxiety- decrease reactivity/eliminate, lifestyle changes to reduce depression and anxiety, communication skills, explore cognitive beliefs and help client to develop healthy cognitive patterns and beliefs.    Objective #B  Client will Client will learn and  practice DBT skills daily..    Status: Continued - Date(s): 4/10/23    Intervention(s)  Therapist will Therapist will Therapist will teach emotional regulation skills. distress tolerance skills, interpersonal effectiveness skills, emotion regluation skills, mindfulness skills, radical acceptance..      Goal 3: Client will decrease anxious symptoms and reactions to triggers from 9 out of 10 opportunities to at most 8 out of 10 opportunities for at least 3 consecutive weeks as evidenced by client report and a decrease in symptom report as evidenced by PhQ9 scores and GAD7 scores.    Objective #A (Client Action)    Status: Continued - Date(s): 4/10/23    Client will Client will use cognitive strategies identified in therapy to challenge anxious thoughts.    Intervention(s)  Therapist will Therapist will teach emotional recognition/identification. emotion regulation skills, coping skills, mindfulness skills.    Objective #B  Client will Client will use thought-stopping strategy daily to reduce intrusive thoughts for at  "least 3 consecutive weeks as evidenced by client report and a decrease in symptom report as evidenced by PhQ9 scores and GAD7 scores.      Status: Continued - Date(s): 4/10/23    Intervention(s)  Therapist will teach emotional regulation skills. distress tolerance skills, interpersonal effectiveness skills, emotion regluation skills, mindfulness skills, radical acceptance. Therapist will teach client how to ID body cues for anxiety, anxiety reduction techniques, how to ID triggers for depression and anxiety- decrease reactivity/eliminate, lifestyle changes to reduce depression and anxiety, communication skills, explore cognitive beliefs and help client to develop healthy cognitive patterns and beliefs.    Objective #C  Client will Client will learn 5 crisis survival skills during the Distress Tolerance Module (6-8 weeks) for at least 3 consecutive weeks as evidenced by client report and a decrease in symptom report as evidenced by PhQ9 scores and GAD7 scores.  Status: Continued - Date(s): 4/10/23    Intervention(s)  Therapist will teach emotional regulation skills. distress tolerance skills, interpersonal effectiveness skills, emotion regluation skills, mindfulness skills, radical acceptance.      Client has reviewed and agreed to the above plan.      [facility]        Linda Walsh     SAFETY PLAN:  Step 1: Warning signs / cues (Thoughts, images, mood, situation, behavior) that a crisis may be developing:    Thoughts: \"I don't matter\", \"People would be better off without me\", \"I'm a burden\", \"I can't do this anymore\", \"I just want this to end\" and \"Nothing makes it better\"    Images: obsessive thoughts of death or dying: car accident, using a gun and flashbacks    Thinking Processes: ruminations (can't stop thinking about my problems): court case, pain, racing thoughts, highly critical and negative thoughts: \"I can't do anything, I have to suffer everyday and go to work and other people have it so " "easy.\"  and paranoia: that the Simpler is watching me    Mood: worsening depression, hopelessness, helplessness, intense anger, intense worry, agitation, disinhibited (not caring about things or consequences) and mood swings    Behaviors: isolating/withdrawing , can't stop crying, not taking care of myself, not taking care of my responsibilities, sleeping too much and not sleeping enough    Situations: legal issues: current court case, pain, trauma , financial stress and medical condition / diagnosis: CRPS   Step 2: Coping strategies - Things I can do to take my mind off of my problems without contacting another person (relaxation technique, physical activity):    Distress Tolerance Strategies:  arts and crafts: with her residents, play with my pet , pray, change body temperature (ice pack/cold water) , paced breathing/progressive muscle relaxation and puzzles, games on ipad    Physical Activities: deep breathing    Focus on helpful thoughts:  \"Ride the wave\", think about happy memories: when the grandkids were born, going camping, when the boys were little and remind myself of what is important to me: grandkids, family, the residents  Step 3: People and social settings that provide distraction:   Name: Alpesh  Phone: [phone]   Name: Velasquez  Phone:    Name: Thaddeus  Phone:     work   Step 4: Remind myself of people and things that are important to me and worth living for:    My family, my residents    Step 5: When I am in crisis, I can ask these people to help me use my safety plan:   Name: Alpesh  Phone: [phone]   Name: Velasquez  Phone: (number in phone)   Name: Thaddeus  Phone: (number in phone)  Step 6: Making the environment safe:     be around others  Step 7: Professionals or agencies I can contact during a crisis:    [facility] Number: [phone]    Suicide Prevention Lifeline: 3-431-158-WAKJ (7355)    Crisis Text Line Service (available 24 hours a day, 7 days a week): Text [place] to " 552038  Local Crisis Services:     Call 911 or go to my nearest emergency department.   I helped develop this safety plan and agree to use it when needed.  I have been given a copy of this plan.      Client signature _________________________________________________________________  Today s date: 7/11/22  Adapted from Safety Plan Template 2008 Griselda Perez and Livan Cutler is reprinted with the express permission of the authors.  No portion of the Safety Plan Template may be reproduced without the express, written permission.  You can contact the authors at [email or web address] or [email or web address].

## 2023-04-10 NOTE — PATIENT INSTRUCTIONS
Sturdy and reliable ankle braces are most readily available on line, [employer], or [employer] and delivered for around $15-40.  Health insurance often does not pay for this.    Procare double strap ankle support   Tri Lock ankle brace   Figure of 8 ankle brace  Sweedo ankle brace    Reliable shoe stores: To maximize your experience and provide the best possible fit.  Be sure to show them your foot concerns and tell them Dr. Diehl sent you.      Stores listed in bold have only athletic shoes, and stores that are not bold are mostly casual or variety of shoes    [employer]  [address] [place] [address]  [phone]    [email or web address] [place]  [address] [place] [address]  [phone]    [employer] Brenda Throckmorton  [address] [place] [address]  [phone]    [employer]  [address] [facility]  [facility] [address]  [phone]    [employer]  [address] [place] [address]  [phone]    [employer]  [address] [place] [address]  [phone]                         [employer] Locations:     [address] [place] [address]   [phone]     [address] [place] [address] [address] [place] [address]   [phone]     [address] [place] [address]   [phone]     [address] [facility] [place] [address]   [phone]     [address] [place] [address]   [phone]     [address] [facility] [place] [address]   [phone]     [address] [facility] [address]   [place] [address]   [phone]     [address]   [place], [place] [id number] [phone]             How to find reasonable shoes          The correct width    Correct Fitting    Correct Length      Foot Distortion    Posture Distortion                          Torsional Rigidity      Grasp behind the heel and underneath the foot and twist      Bad    Excessive torsion/twist in midfoot     Less torsion/twist in midfoot is  better                   Heel Counter Rigidity      Grasp just above   midsole and squeeze      Bad    Soft heel counter      Good    Rigid Heel Counter      Flexion Rigidity      Grasp shoe and bend from forefoot to rearfoot
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
## 2023-04-14 ENCOUNTER — HOSPITAL ENCOUNTER (OUTPATIENT)
Dept: PHYSICAL THERAPY | Facility: CLINIC | Age: 63
Setting detail: THERAPIES SERIES
Discharge: HOME | End: 2023-04-14
Attending: NURSE PRACTITIONER
Payer: MEDICARE

## 2023-04-14 PROCEDURE — 97162 PT EVAL MOD COMPLEX 30 MIN: CPT | Mod: GP | Performed by: PHYSICAL THERAPIST

## 2023-04-14 PROCEDURE — 97140 MANUAL THERAPY 1/> REGIONS: CPT | Mod: GP | Performed by: PHYSICAL THERAPIST
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
## 2023-04-14 NOTE — PROGRESS NOTES
[facility]    OUTPATIENT PHYSICAL THERAPY ORTHOPEDIC EVALUATION  PLAN OF TREATMENT FOR OUTPATIENT REHABILITATION  (COMPLETE FOR INITIAL CLAIMS ONLY)  Patient's Last Name, First Name, M.I.  YOB: 1960  Linda Walsh       Provider s Name:  [facility]   Medical Record No.  [id number]   Start of Care Date:  04/14/23   Onset Date:  07/11/22 (Sometime last July)   Type:     _X__PT   ___OT   ___SLP Medical Diagnosis:  Radiculopathy, cervical region (M54.12)     PT Diagnosis:  Decreased ROM, shoulder instability, weakness, and poor posture.   Visits from SOC:  1      _________________________________________________________________________________  Plan of Treatment/Functional Goals:  joint mobilization, manual therapy, neuromuscular re-education, ROM, strengthening, stretching           Goals  Goal Identifier: #1  Goal Description: Pt will demonstrate ROM of the L shoulder to 90 degs flexion and abduction in order to improve to overhead activities.  Target Date: 05/28/23    Goal Identifier: #2  Goal Description: Pt will demonstrate ability to wash her hair using her L UE in order to demonstrate more motion and function in L UE.  Target Date: 05/28/23    Goal Identifier: #3  Goal Description: Pt will demonstrate ability to drive using L UE in order to be safe with controling stirring wheel and be comfortable while traveling.  Target Date: 05/28/23    Goal Identifier: #4  Goal Description: Pt will report 50% on the SPADI demonstrating improvement with function and pain in the L shoulder.  Target Date: 05/28/23    Therapy Frequency:  2 times/Week  Predicted Duration of Therapy Intervention:  6 weeks    Deborah Anthony, PT                 I CERTIFY THE NEED FOR THESE SERVICES FURNISHED UNDER        THIS PLAN OF TREATMENT AND WHILE UNDER MY CARE .       Physician  Signature               Date    X_____________________________________________________           Certification Date From:  04/14/23   Certification Date To:  05/28/23    Referring Provider:  HERMELINDO Vanessa CNP    Initial Assessment        See Epic Evaluation Start of Care Date: 04/14/23

## 2023-04-18 ENCOUNTER — HOSPITAL ENCOUNTER (OUTPATIENT)
Dept: PHYSICAL THERAPY | Facility: CLINIC | Age: 63
Setting detail: THERAPIES SERIES
Discharge: HOME | End: 2023-04-18
Attending: NURSE PRACTITIONER
Payer: MEDICARE

## 2023-04-18 PROCEDURE — 97035 APP MDLTY 1+ULTRASOUND EA 15: CPT | Mod: GP | Performed by: PHYSICAL THERAPIST

## 2023-04-18 PROCEDURE — 97140 MANUAL THERAPY 1/> REGIONS: CPT | Mod: GP | Performed by: PHYSICAL THERAPIST

## 2023-04-20 ENCOUNTER — HOSPITAL ENCOUNTER (OUTPATIENT)
Dept: PHYSICAL THERAPY | Facility: CLINIC | Age: 63
Setting detail: THERAPIES SERIES
Discharge: HOME | End: 2023-04-20
Attending: NURSE PRACTITIONER
Payer: MEDICARE

## 2023-04-20 ENCOUNTER — MYC MEDICAL ADVICE (OUTPATIENT)
Dept: FAMILY MEDICINE | Facility: CLINIC | Age: 63
End: 2023-04-20

## 2023-04-20 PROCEDURE — 97140 MANUAL THERAPY 1/> REGIONS: CPT | Mod: GP | Performed by: PHYSICAL THERAPIST

## 2023-04-20 PROCEDURE — 97035 APP MDLTY 1+ULTRASOUND EA 15: CPT | Mod: GP | Performed by: PHYSICAL THERAPIST

## 2023-04-20 NOTE — LETTER
April 27, 2023      Linda Walsh  [id number] [address] [place] [id number]              Dear Linda,      We care about your health and have reviewed your health plan including your medical conditions, medications, and lab results.  Based on this review, it is recommended that you follow up regarding the following health topic(s):  -Colon Cancer Screening  -Wellness (Physical) Visit      We recommend you take the following action(s):  -schedule a COLONOSCOPY to look for colon cancer (due every 10 years or 5 years in higher risk situations.)  Colonoscopies can prevent 90-95% of colon cancer deaths.  Problem lesions can be removed before they ever become cancer.  If you do not wish to do a colonoscopy or cannot afford to do one at this time, there is another option called a Fecal Immunochemical Occult Blood Test (FIT) a take home stool sample kit.  It does not replace the colonoscopy for colorectal cancer screening, but it can detect hidden bleeding in the lower colon.  It does need to be repeated every year and if a positive result is obtained, you would be referred for a colonoscopy.  If you have completed either one of these tests at another facility, please have the records sent to our clinic for our records.   -Complete and return the attached ASTHMA CONTROL TEST.  If your total score is 19 or less or you have been to the ER or urgent care for your asthma, then please schedule an asthma followup appointment.  -Complete and return the attached PHQ-9 Form.  If your total score is greater than 9, please schedule a followup appointment.  If you answer Yes to question 9, call your clinic between the hours of 8 to 5.  You may also call the Suicide Hotline at 2-334-784-CLVH (3103) any time.  -Schedule your annual physical      Please call us at the [facility] [phone] (or use [employer]) to address the above recommendations.      Thank you for trusting [facility] and we appreciate the  opportunity to serve you.  We look forward to supporting your healthcare needs in the future.     Healthy Regards,     Your Health Care Team at [facility]

## 2023-04-20 NOTE — LETTER
My Asthma Action Plan    Name: Linda Walsh   YOB: 1960  Date: 4/21/2023   My doctor: Jonna Gaitan PA-C   My clinic: [facility]        My Rescue Medicine:   Albuterol inhaler (Proair/Ventolin/Proventil HFA)  2-4 puffs EVERY 4 HOURS as needed. Use a spacer if recommended by your provider.   My Asthma Severity:   Intermittent / Exercise Induced  Know your asthma triggers:              GREEN ZONE   Good Control  I feel good  No cough or wheeze  Can work, sleep and play without asthma symptoms       Take your asthma control medicine every day.     If exercise triggers your asthma, take your rescue medication  15 minutes before exercise or sports, and  During exercise if you have asthma symptoms  Spacer to use with inhaler: If you have a spacer, make sure to use it with your inhaler             YELLOW ZONE Getting Worse  I have ANY of these:  I do not feel good  Cough or wheeze  Chest feels tight  Wake up at night   Keep taking your Green Zone medications  Start taking your rescue medicine:  every 20 minutes for up to 1 hour. Then every 4 hours for 24-48 hours.  If you stay in the Yellow Zone for more than 12-24 hours, contact your doctor.  If you do not return to the Green Zone in 12-24 hours or you get worse, start taking your oral steroid medicine if prescribed by your provider.           RED ZONE Medical Alert - Get Help  I have ANY of these:  I feel awful  Medicine is not helping  Breathing getting harder  Trouble walking or talking  Nose opens wide to breathe       Take your rescue medicine NOW  If your provider has prescribed an oral steroid medicine, start taking it NOW  Call your doctor NOW  If you are still in the Red Zone after 20 minutes and you have not reached your doctor:  Take your rescue medicine again and  Call 911 or go to the emergency room right away    See your regular doctor within 2 weeks of an Emergency Room or Urgent Care visit for follow-up treatment.           Annual Reminders:  Meet with Asthma Educator,  Flu Shot in the Fall, consider Pneumonia Vaccination for patients with asthma (aged 19 and older).    Pharmacy:    [facility] [place] - [phone]   [facility] [place] - [address] [facility]     Electronically signed by Jonna Gaitan PA-C   Date: 04/21/23                    Asthma Triggers  How To Control Things That Make Your Asthma Worse    Triggers are things that make your asthma worse.  Look at the list below to help you find your triggers and   what you can do about them. You can help prevent asthma flare-ups by staying away from your triggers.      Trigger                                                          What you can do   Cigarette Smoke  Tobacco smoke can make asthma worse. Do not allow smoking in your home, car or around you.  Be sure no one smokes at a child s day care or school.  If you smoke, ask your health care provider for ways to help you quit.  Ask family members to quit too.  Ask your health care provider for a referral to Quit Plan to help you quit smoking, or call 7-475-691-PLAN.     Colds, Flu, Bronchitis  These are common triggers of asthma. Wash your hands often.  Don t touch your eyes, nose or mouth.  Get a flu shot every year.     Dust Mites  These are tiny bugs that live in cloth or carpet. They are too small to see. Wash sheets and blankets in hot water every week.   Encase pillows and mattress in dust mite proof covers.  Avoid having carpet if you can. If you have carpet, vacuum weekly.   Use a dust mask and HEPA vacuum.   Pollen and Outdoor Mold  Some people are allergic to trees, grass, or weed pollen, or molds. Try to keep your windows closed.  Limit time out doors when pollen count is high.   Ask you health care provider about taking medicine during allergy season.     Animal Dander  Some people are allergic to skin flakes, urine or saliva from pets with fur or feathers.  Keep pets with fur or feathers out of your home.    If you can t keep the pet outdoors, then keep the pet out of your bedroom.  Keep the bedroom door closed.  Keep pets off cloth furniture and away from stuffed toys.     Mice, Rats, and Cockroaches  Some people are allergic to the waste from these pests.   Cover food and garbage.  Clean up spills and food crumbs.  Store grease in the refrigerator.   Keep food out of the bedroom.   Indoor Mold  This can be a trigger if your home has high moisture. Fix leaking faucets, pipes, or other sources of water.   Clean moldy surfaces.  Dehumidify basement if it is damp and smelly.   Smoke, Strong Odors, and Sprays  These can reduce air quality. Stay away from strong odors and sprays, such as perfume, powder, hair spray, paints, smoke incense, paint, cleaning products, candles and new carpet.   Exercise or Sports  Some people with asthma have this trigger. Be active!  Ask your doctor about taking medicine before sports or exercise to prevent symptoms.    Warm up for 5-10 minutes before and after sports or exercise.     Other Triggers of Asthma  Cold air:  Cover your nose and mouth with a scarf.  Sometimes laughing or crying can be a trigger.  Some medicines and food can trigger asthma.

## 2023-04-20 NOTE — TELEPHONE ENCOUNTER
Patient Quality Outreach    Patient is due for the following:   Asthma  -  AAP  Colon Cancer Screening  Depression  -  PHQ-9 needed  Physical Annual Wellness Visit      Topic Date Due     Zoster (Shingles) Vaccine (1 of 2) Never done     Pneumococcal Vaccine (2 - PCV) 11/25/2021     COVID-19 Vaccine (4 - Booster for Pfizer series) 03/31/2022     Flu Vaccine (1) 09/01/2022     Diptheria Tetanus Pertussis (DTAP/TDAP/TD) Vaccine (2 - Td or Tdap) 12/11/2022       Next Steps:   Schedule a Annual Wellness Visit    Type of outreach:    Sent [employer] message.      Questions for provider review:    Update YENI Vela, [facility]  Chart routed to Care Team and provider.

## 2023-04-25 ENCOUNTER — HOSPITAL ENCOUNTER (OUTPATIENT)
Dept: PHYSICAL THERAPY | Facility: CLINIC | Age: 63
Setting detail: THERAPIES SERIES
Discharge: HOME | End: 2023-04-25
Attending: NURSE PRACTITIONER
Payer: MEDICARE

## 2023-04-25 PROCEDURE — 97035 APP MDLTY 1+ULTRASOUND EA 15: CPT | Mod: GP | Performed by: PHYSICAL THERAPIST

## 2023-04-25 PROCEDURE — 97140 MANUAL THERAPY 1/> REGIONS: CPT | Mod: GP | Performed by: PHYSICAL THERAPIST

## 2023-04-25 PROCEDURE — 97110 THERAPEUTIC EXERCISES: CPT | Mod: GP | Performed by: PHYSICAL THERAPIST

## 2023-04-27 ENCOUNTER — TRANSFERRED RECORDS (OUTPATIENT)
Dept: HEALTH INFORMATION MANAGEMENT | Facility: CLINIC | Age: 63
End: 2023-04-27
Payer: MEDICARE

## 2023-04-30 ENCOUNTER — MYC MEDICAL ADVICE (OUTPATIENT)
Dept: PODIATRY | Facility: CLINIC | Age: 63
End: 2023-04-30
Payer: MEDICARE

## 2023-04-30 DIAGNOSIS — M72.2 PLANTAR FASCIITIS, LEFT: Primary | ICD-10-CM

## 2023-05-01 ENCOUNTER — TRANSFERRED RECORDS (OUTPATIENT)
Dept: HEALTH INFORMATION MANAGEMENT | Facility: CLINIC | Age: 63
End: 2023-05-01

## 2023-05-01 ENCOUNTER — HOSPITAL ENCOUNTER (OUTPATIENT)
Dept: PHYSICAL THERAPY | Facility: CLINIC | Age: 63
Setting detail: THERAPIES SERIES
Discharge: HOME | End: 2023-05-01
Attending: NURSE PRACTITIONER
Payer: MEDICARE

## 2023-05-01 PROCEDURE — 97110 THERAPEUTIC EXERCISES: CPT | Mod: GP | Performed by: PHYSICAL THERAPIST

## 2023-05-01 PROCEDURE — 97035 APP MDLTY 1+ULTRASOUND EA 15: CPT | Mod: GP | Performed by: PHYSICAL THERAPIST

## 2023-05-01 NOTE — TELEPHONE ENCOUNTER
Dr. Diehl, rolling knee scooter pended for your review. Please sign if you're ok with ordering this.     Sherry Goldstein RN on 5/1/2023 at 9:01 AM

## 2023-05-04 ENCOUNTER — HOSPITAL ENCOUNTER (OUTPATIENT)
Dept: PHYSICAL THERAPY | Facility: CLINIC | Age: 63
Setting detail: THERAPIES SERIES
Discharge: HOME | End: 2023-05-04
Attending: NURSE PRACTITIONER
Payer: MEDICARE

## 2023-05-04 PROCEDURE — 97035 APP MDLTY 1+ULTRASOUND EA 15: CPT | Mod: GP | Performed by: PHYSICAL THERAPIST

## 2023-05-04 PROCEDURE — 97110 THERAPEUTIC EXERCISES: CPT | Mod: GP | Performed by: PHYSICAL THERAPIST

## 2023-05-08 ENCOUNTER — VIRTUAL VISIT (OUTPATIENT)
Dept: PSYCHOLOGY | Facility: CLINIC | Age: 63
End: 2023-05-08
Payer: MEDICARE

## 2023-05-08 ENCOUNTER — E-VISIT (OUTPATIENT)
Dept: FAMILY MEDICINE | Facility: CLINIC | Age: 63
End: 2023-05-08
Payer: MEDICARE

## 2023-05-08 ENCOUNTER — HOSPITAL ENCOUNTER (OUTPATIENT)
Dept: PHYSICAL THERAPY | Facility: CLINIC | Age: 63
Setting detail: THERAPIES SERIES
Discharge: HOME | End: 2023-05-08
Attending: NURSE PRACTITIONER
Payer: MEDICARE

## 2023-05-08 ENCOUNTER — E-VISIT (OUTPATIENT)
Dept: FAMILY MEDICINE | Facility: CLINIC | Age: 63
End: 2023-05-08

## 2023-05-08 ENCOUNTER — MYC MEDICAL ADVICE (OUTPATIENT)
Dept: FAMILY MEDICINE | Facility: CLINIC | Age: 63
End: 2023-05-08
Payer: MEDICARE

## 2023-05-08 DIAGNOSIS — Z53.9 NO SHOW: Primary | ICD-10-CM

## 2023-05-08 DIAGNOSIS — H04.123 DRY EYES: Primary | ICD-10-CM

## 2023-05-08 DIAGNOSIS — F33.2 SEVERE RECURRENT MAJOR DEPRESSION WITHOUT PSYCHOTIC FEATURES (H): Primary | ICD-10-CM

## 2023-05-08 DIAGNOSIS — F43.10 POST TRAUMATIC STRESS DISORDER (PTSD): ICD-10-CM

## 2023-05-08 DIAGNOSIS — F41.1 GENERALIZED ANXIETY DISORDER: ICD-10-CM

## 2023-05-08 PROCEDURE — 90834 PSYTX W PT 45 MINUTES: CPT | Mod: VID | Performed by: COUNSELOR

## 2023-05-08 PROCEDURE — 97140 MANUAL THERAPY 1/> REGIONS: CPT | Mod: GP | Performed by: PHYSICAL THERAPIST

## 2023-05-08 PROCEDURE — 99421 OL DIG E/M SVC 5-10 MIN: CPT | Performed by: PHYSICIAN ASSISTANT

## 2023-05-08 PROCEDURE — 97110 THERAPEUTIC EXERCISES: CPT | Mod: GP | Performed by: PHYSICAL THERAPIST

## 2023-05-08 NOTE — PROGRESS NOTES
"        [facility]                                     Progress Note    Patient Name: Linda Walsh  Date: 5/8/23         Service Type: Individual      Session Start Time: 1:30pm  Session End Time: 2:20pm     Session Length: 50    Session #: 86    Attendees: Client      Service Modality:  Phone Visit:      Provider verified identity through the following two step process.  Patient provided:  Patient is known previously to provider    The patient has been notified of the following:      \"We have found that certain health care needs can be provided without the need for a face to face visit.  This service lets us provide the care you need with a phone conversation.       I will have full access to your [facility] medical record during this entire phone call.   I will be taking notes for your medical record.      Since this is like an office visit, we will bill your insurance company for this service.       There are potential benefits and risks of telephone visits (e.g. limits to patient confidentiality) that differ from in-person visits.?Confidentiality still applies for telephone services, and nobody will record the visit.  It is important to be in a quiet, private space that is free of distractions (including cell phone or other devices) during the visit.??      If during the course of the call I believe a telephone visit is not appropriate, you will not be charged for this service\"     Consent has been obtained for this service by care team member: Yes     DATA  Interactive Complexity: No  Crisis: No        Progress Since Last Session (Related to Symptoms / Goals / Homework):   Symptoms: No change .    Homework: Completed in session      Episode of Care Goals: Minimal progress - ACTION (Actively working towards change); Intervened by reinforcing change plan / affirming steps taken     Current / Ongoing Stressors and Concerns:   The client stated she's been in a lot of pain and is " worried she might have to quit her job.   She has dry eye lately which has also been very bothersome.      Treatment Objective(s) Addressed in This Session:   use thought-stopping strategy daily to reduce intrusive thoughts       Intervention:   Validated the client's frustrations and emotions around the pain and her relationship. Talked about her perhaps having to quit her job.        Assessments completed prior to visit:  The following assessments were completed by patient for this visit:  PHQA:        View : No data to display.              GAD7:       11/18/2019    12:26 PM 12/30/2019     1:39 PM 2/26/2020    11:18 AM 5/27/2020     3:23 PM 7/28/2020    10:37 AM 2/24/2021     1:26 PM 7/13/2022    10:16 AM   MARLIN-7 SCORE   Total Score 20 (severe anxiety) 19 (severe anxiety)   19 (severe anxiety) 16 (severe anxiety) 21 (severe anxiety)   Total Score 20 19 19 21 19 16 21     PROMIS 10-Global Health (all questions and answers displayed):        View : No data to display.                  ASSESSMENT: Current Emotional / Mental Status (status of significant symptoms):   Risk status (Self / Other harm or suicidal ideation)   Patient denies current fears or concerns for personal safety.   Patient denies current or recent suicidal ideation or behaviors.   Patient denies current or recent homicidal ideation or behaviors.   Patient denies current or recent self injurious behavior or ideation.   Patient denies other safety concerns.   Patient reports there has been no change in risk factors since their last session.     Patient reports there has been no change in protective factors since their last session.     Recommended that patient call 911 or go to the local ED should there be a change in any of these risk factors.     Appearance:   Appropriate    Eye Contact:   Good    Psychomotor Behavior: Normal    Attitude:   Cooperative    Orientation:   All   Speech    Rate / Production: Normal/ Responsive    Volume:  Normal     Mood:    Normal   Affect:    Appropriate    Thought Content:  Clear    Thought Form:  Coherent  Logical    Insight:    Good      Medication Review:   No changes to current psychiatric medication(s)     Medication Compliance:   Yes     Changes in Health Issues:   None reported     Chemical Use Review:   Substance Use: Chemical use reviewed, no active concerns identified      Tobacco Use: No change in amount of tobacco use since last session.  Patient declined discussion at this time    Diagnosis:  1. 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 Generalized anxiety disorder    3. Post traumatic stress disorder (PTSD)        Collateral Reports Completed:   Not Applicable    PLAN: (Patient Tasks / Therapist Tasks / Other)  Client to continue to work on stress reduction skills.        Nasrin Valladares [facility]                                                         ______________________________________________________________________    Individual Treatment Plan    Patient's Name: Linda Walsh  YOB: 1960    Date of Creation: 7/11/22  Date Treatment Plan Last Reviewed/Revised: 4/10/23    DSM5 Diagnoses: 296.33 (F33.2) Major Depressive Disorder, Recurrent Episode, Severe _, 300.02 (F41.1) Generalized Anxiety Disorder or 309.81 (F43.10) Posttraumatic Stress Disorder (includes Posttraumatic Stress Disorder for Children 6 Years and Younger)  Without dissociative symptoms  Psychosocial / Contextual Factors: CRPS  PROMIS (reviewed every 90 days):     Referral / Collaboration:  Referral to another professional/service is not indicated at this time..    Anticipated number of session for this episode of care: on-going  Anticipation frequency of session: Monthly  Anticipated Duration of each session: 38-52 minutes/53+ minutes  Treatment plan will be reviewed in 90 days or when goals have been changed.       MeasurableTreatment Goal(s) related to diagnosis / functional impairment(s)  Goal  1: Client will decrease thoughts of wanting to be dead from 10 out of 10 opportunities to at most 9 out of 10 opportunities for at least 3 consecutive weeks as evidenced by client report and a decrease in symptom report as evidenced by PhQ9 scores and GAD7 scores.    Objective #A (Client Action)    Client will Client will look at and read safety plan at least once a day and follow safety plan when thoughts and urges come up.  Status: Continued - Date(s): 4/10/23    Intervention(s)  Therapist will teach emotional regulation skills. distress tolerance skills, interpersonal effectiveness skills, emotion regluation skills, mindfulness skills, radical acceptance. Therapist will develop safety plan with the client.     Objective #B  Client will Client will agree to call the therapist or after hours crisis line if noticing intense suicidal thoughts for skills coaching.   Status: Continued - Date(s): 4/10/23    Intervention(s)  Therapist will Therapist will be available as much as possible during work hours for the client to contact and give the client several crisis resources.         Goal 2: Client will increase the use of skills (emotion regulation, distress tolerance, communication skill) from 1 out of 10 opportunities to at least 2 out of 10 opportunities for at least 3 consecutive weeks as evidenced by client report and a decrease in symptom report as evidenced by PhQ9 scores and GAD7 scores.     Objective #A (Client Action)    Status: Continued - Date(s): 4/10/23    Client will Increase interest, engagement, and pleasure in doing things  Decrease frequency and intensity of feeling down, depressed, hopeless  Improve diet, appetite, mindful eating, and / or meal planning  Identify negative self-talk and behaviors: challenge core beliefs, myths, and actions  Improve concentration, focus, and mindfulness in daily activities   Feel less fidgety, restless or slow in daily activities / interpersonal interactions  Decrease  thoughts that you'd be better off dead or of suicide / self-harm.    Intervention(s)  Therapist will teach emotional regulation skills. distress tolerance skills, interpersonal effectiveness skills, emotion regluation skills, mindfulness skills, radical acceptance. Therapist will teach client how to ID body cues for anxiety, anxiety reduction techniques, how to ID triggers for depression and anxiety- decrease reactivity/eliminate, lifestyle changes to reduce depression and anxiety, communication skills, explore cognitive beliefs and help client to develop healthy cognitive patterns and beliefs.    Objective #B  Client will Client will learn and  practice DBT skills daily..    Status: Continued - Date(s): 4/10/23    Intervention(s)  Therapist will Therapist will Therapist will teach emotional regulation skills. distress tolerance skills, interpersonal effectiveness skills, emotion regluation skills, mindfulness skills, radical acceptance..      Goal 3: Client will decrease anxious symptoms and reactions to triggers from 9 out of 10 opportunities to at most 8 out of 10 opportunities for at least 3 consecutive weeks as evidenced by client report and a decrease in symptom report as evidenced by PhQ9 scores and GAD7 scores.    Objective #A (Client Action)    Status: Continued - Date(s): 4/10/23    Client will Client will use cognitive strategies identified in therapy to challenge anxious thoughts.    Intervention(s)  Therapist will Therapist will teach emotional recognition/identification. emotion regulation skills, coping skills, mindfulness skills.    Objective #B  Client will Client will use thought-stopping strategy daily to reduce intrusive thoughts for at least 3 consecutive weeks as evidenced by client report and a decrease in symptom report as evidenced by PhQ9 scores and GAD7 scores.      Status: Continued - Date(s): 4/10/23    Intervention(s)  Therapist will teach emotional regulation skills. distress tolerance  "skills, interpersonal effectiveness skills, emotion regluation skills, mindfulness skills, radical acceptance. Therapist will teach client how to ID body cues for anxiety, anxiety reduction techniques, how to ID triggers for depression and anxiety- decrease reactivity/eliminate, lifestyle changes to reduce depression and anxiety, communication skills, explore cognitive beliefs and help client to develop healthy cognitive patterns and beliefs.    Objective #C  Client will Client will learn 5 crisis survival skills during the Distress Tolerance Module (6-8 weeks) for at least 3 consecutive weeks as evidenced by client report and a decrease in symptom report as evidenced by PhQ9 scores and GAD7 scores.  Status: Continued - Date(s): 4/10/23    Intervention(s)  Therapist will teach emotional regulation skills. distress tolerance skills, interpersonal effectiveness skills, emotion regluation skills, mindfulness skills, radical acceptance.      Client has reviewed and agreed to the above plan.      Nasrin Valladares, [facility]        Linda Walsh     SAFETY PLAN:  Step 1: Warning signs / cues (Thoughts, images, mood, situation, behavior) that a crisis may be developing:    Thoughts: \"I don't matter\", \"People would be better off without me\", \"I'm a burden\", \"I can't do this anymore\", \"I just want this to end\" and \"Nothing makes it better\"    Images: obsessive thoughts of death or dying: car accident, using a gun and flashbacks    Thinking Processes: ruminations (can't stop thinking about my problems): court case, pain, racing thoughts, highly critical and negative thoughts: \"I can't do anything, I have to suffer everyday and go to work and other people have it so easy.\"  and paranoia: that the [employer] is watching me    Mood: worsening depression, hopelessness, helplessness, intense anger, intense worry, agitation, disinhibited (not caring about things or consequences) and mood swings    Behaviors: isolating/withdrawing , " "can't stop crying, not taking care of myself, not taking care of my responsibilities, sleeping too much and not sleeping enough    Situations: legal issues: current court case, pain, trauma , financial stress and medical condition / diagnosis: CRPS   Step 2: Coping strategies - Things I can do to take my mind off of my problems without contacting another person (relaxation technique, physical activity):    Distress Tolerance Strategies:  arts and crafts: with her residents, play with my pet , pray, change body temperature (ice pack/cold water) , paced breathing/progressive muscle relaxation and puzzles, games on ipad    Physical Activities: deep breathing    Focus on helpful thoughts:  \"Ride the wave\", think about happy memories: when the grandkids were born, going camping, when the boys were little and remind myself of what is important to me: grandkids, family, the residents  Step 3: People and social settings that provide distraction:   Name: Alpesh  Phone: [phone]   Name: Velasquez  Phone:    Name: Thaddeus  Phone:     work   Step 4: Remind myself of people and things that are important to me and worth living for:    My family, my residents    Step 5: When I am in crisis, I can ask these people to help me use my safety plan:   Name: Alpesh  Phone: [phone]   Name: Velasquez  Phone: (number in phone)   Name: Thaddeus  Phone: (number in phone)  Step 6: Making the environment safe:     be around others  Step 7: Professionals or agencies I can contact during a crisis:    [facility] Daytime Number: [phone]    Suicide Prevention Lifeline: 6-148-094-NMFQ (3219)    Crisis Text Line Service (available 24 hours a day, 7 days a week): Text [place] to 960330  Local Crisis Services:     Call 911 or go to my nearest emergency department.   I helped develop this safety plan and agree to use it when needed.  I have been given a copy of this plan.      Client signature " _________________________________________________________________  Today s date: 7/11/22  Adapted from Safety Plan Template 2008 Griselda Peerz and Livan Cutler is reprinted with the express permission of the authors.  No portion of the Safety Plan Template may be reproduced without the express, written permission.  You can contact the authors at [email or web address] or [email or web address].

## 2023-05-11 ENCOUNTER — HOSPITAL ENCOUNTER (OUTPATIENT)
Dept: PHYSICAL THERAPY | Facility: CLINIC | Age: 63
Setting detail: THERAPIES SERIES
Discharge: HOME | End: 2023-05-11
Attending: NURSE PRACTITIONER
Payer: MEDICARE

## 2023-05-11 PROCEDURE — 97530 THERAPEUTIC ACTIVITIES: CPT | Mod: GP | Performed by: PHYSICAL THERAPIST

## 2023-05-11 PROCEDURE — 97110 THERAPEUTIC EXERCISES: CPT | Mod: GP | Performed by: PHYSICAL THERAPIST

## 2023-05-15 ENCOUNTER — VIRTUAL VISIT (OUTPATIENT)
Dept: PSYCHOLOGY | Facility: CLINIC | Age: 63
End: 2023-05-15
Payer: MEDICARE

## 2023-05-15 DIAGNOSIS — F41.1 GENERALIZED ANXIETY DISORDER: ICD-10-CM

## 2023-05-15 DIAGNOSIS — F43.10 POST TRAUMATIC STRESS DISORDER (PTSD): ICD-10-CM

## 2023-05-15 DIAGNOSIS — F33.2 SEVERE RECURRENT MAJOR DEPRESSION WITHOUT PSYCHOTIC FEATURES (H): Primary | ICD-10-CM

## 2023-05-15 PROCEDURE — 90834 PSYTX W PT 45 MINUTES: CPT | Mod: VID | Performed by: COUNSELOR

## 2023-05-15 NOTE — PROGRESS NOTES
"        [facility]                                     Progress Note    Patient Name: Linda Walsh  Date: 5/15/23         Service Type: Individual      Session Start Time: 12:30pm  Session End Time: 1:20pm     Session Length: 50    Session #: 87    Attendees: Client      Service Modality:  Phone Visit:      Provider verified identity through the following two step process.  Patient provided:  Patient is known previously to provider    The patient has been notified of the following:      \"We have found that certain health care needs can be provided without the need for a face to face visit.  This service lets us provide the care you need with a phone conversation.       I will have full access to your [facility] medical record during this entire phone call.   I will be taking notes for your medical record.      Since this is like an office visit, we will bill your insurance company for this service.       There are potential benefits and risks of telephone visits (e.g. limits to patient confidentiality) that differ from in-person visits.?Confidentiality still applies for telephone services, and nobody will record the visit.  It is important to be in a quiet, private space that is free of distractions (including cell phone or other devices) during the visit.??      If during the course of the call I believe a telephone visit is not appropriate, you will not be charged for this service\"     Consent has been obtained for this service by care team member: Yes     DATA  Interactive Complexity: No  Crisis: No        Progress Since Last Session (Related to Symptoms / Goals / Homework):   Symptoms: No change .    Homework: Completed in session      Episode of Care Goals: Minimal progress - ACTION (Actively working towards change); Intervened by reinforcing change plan / affirming steps taken     Current / Ongoing Stressors and Concerns:   The client stated she ad her  continue to have " altercations. She stated she told him recently that she doesn't think she can continue living with him.      Treatment Objective(s) Addressed in This Session:   use thought-stopping strategy daily to reduce intrusive thoughts       Intervention:   Processed through her thoughts and feelings about her .        Assessments completed prior to visit:  The following assessments were completed by patient for this visit:  PHQA:        View : No data to display.              GAD7:       11/18/2019    12:26 PM 12/30/2019     1:39 PM 2/26/2020    11:18 AM 5/27/2020     3:23 PM 7/28/2020    10:37 AM 2/24/2021     1:26 PM 7/13/2022    10:16 AM   MARLIN-7 SCORE   Total Score 20 (severe anxiety) 19 (severe anxiety)   19 (severe anxiety) 16 (severe anxiety) 21 (severe anxiety)   Total Score 20 19 19 21 19 16 21     PROMIS 10-Global Health (all questions and answers displayed):        View : No data to display.                  ASSESSMENT: Current Emotional / Mental Status (status of significant symptoms):   Risk status (Self / Other harm or suicidal ideation)   Patient denies current fears or concerns for personal safety.   Patient denies current or recent suicidal ideation or behaviors.   Patient denies current or recent homicidal ideation or behaviors.   Patient denies current or recent self injurious behavior or ideation.   Patient denies other safety concerns.   Patient reports there has been no change in risk factors since their last session.     Patient reports there has been no change in protective factors since their last session.     Recommended that patient call 911 or go to the local ED should there be a change in any of these risk factors.     Appearance:   Appropriate    Eye Contact:   Good    Psychomotor Behavior: Normal    Attitude:   Cooperative    Orientation:   All   Speech    Rate / Production: Normal/ Responsive    Volume:  Normal    Mood:    Normal   Affect:    Appropriate    Thought Content:  Clear     Thought Form:  Coherent  Logical    Insight:    Good      Medication Review:   No changes to current psychiatric medication(s)     Medication Compliance:   Yes     Changes in Health Issues:   None reported     Chemical Use Review:   Substance Use: Chemical use reviewed, no active concerns identified      Tobacco Use: No change in amount of tobacco use since last session.  Patient declined discussion at this time    Diagnosis:  1. 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 Generalized anxiety disorder    3. Post traumatic stress disorder (PTSD)        Collateral Reports Completed:   Not Applicable    PLAN: (Patient Tasks / Therapist Tasks / Other)  Client to continue to work on stress reduction skills.        Nasrin Valladares [facility]                                                         ______________________________________________________________________    Individual Treatment Plan    Patient's Name: Linda Walsh  YOB: 1960    Date of Creation: 7/11/22  Date Treatment Plan Last Reviewed/Revised: 4/10/23    DSM5 Diagnoses: 296.33 (F33.2) Major Depressive Disorder, Recurrent Episode, Severe _, 300.02 (F41.1) Generalized Anxiety Disorder or 309.81 (F43.10) Posttraumatic Stress Disorder (includes Posttraumatic Stress Disorder for Children 6 Years and Younger)  Without dissociative symptoms  Psychosocial / Contextual Factors: CRPS  PROMIS (reviewed every 90 days):     Referral / Collaboration:  Referral to another professional/service is not indicated at this time..    Anticipated number of session for this episode of care: on-going  Anticipation frequency of session: Monthly  Anticipated Duration of each session: 38-52 minutes/53+ minutes  Treatment plan will be reviewed in 90 days or when goals have been changed.       MeasurableTreatment Goal(s) related to diagnosis / functional impairment(s)  Goal 1: Client will decrease thoughts of wanting to be dead from 10 out of 10  opportunities to at most 9 out of 10 opportunities for at least 3 consecutive weeks as evidenced by client report and a decrease in symptom report as evidenced by PhQ9 scores and GAD7 scores.    Objective #A (Client Action)    Client will Client will look at and read safety plan at least once a day and follow safety plan when thoughts and urges come up.  Status: Continued - Date(s): 4/10/23    Intervention(s)  Therapist will teach emotional regulation skills. distress tolerance skills, interpersonal effectiveness skills, emotion regluation skills, mindfulness skills, radical acceptance. Therapist will develop safety plan with the client.     Objective #B  Client will Client will agree to call the therapist or after hours crisis line if noticing intense suicidal thoughts for skills coaching.   Status: Continued - Date(s): 4/10/23    Intervention(s)  Therapist will Therapist will be available as much as possible during work hours for the client to contact and give the client several crisis resources.         Goal 2: Client will increase the use of skills (emotion regulation, distress tolerance, communication skill) from 1 out of 10 opportunities to at least 2 out of 10 opportunities for at least 3 consecutive weeks as evidenced by client report and a decrease in symptom report as evidenced by PhQ9 scores and GAD7 scores.     Objective #A (Client Action)    Status: Continued - Date(s): 4/10/23    Client will Increase interest, engagement, and pleasure in doing things  Decrease frequency and intensity of feeling down, depressed, hopeless  Improve diet, appetite, mindful eating, and / or meal planning  Identify negative self-talk and behaviors: challenge core beliefs, myths, and actions  Improve concentration, focus, and mindfulness in daily activities   Feel less fidgety, restless or slow in daily activities / interpersonal interactions  Decrease thoughts that you'd be better off dead or of suicide /  self-harm.    Intervention(s)  Therapist will teach emotional regulation skills. distress tolerance skills, interpersonal effectiveness skills, emotion regluation skills, mindfulness skills, radical acceptance. Therapist will teach client how to ID body cues for anxiety, anxiety reduction techniques, how to ID triggers for depression and anxiety- decrease reactivity/eliminate, lifestyle changes to reduce depression and anxiety, communication skills, explore cognitive beliefs and help client to develop healthy cognitive patterns and beliefs.    Objective #B  Client will Client will learn and  practice DBT skills daily..    Status: Continued - Date(s): 4/10/23    Intervention(s)  Therapist will Therapist will Therapist will teach emotional regulation skills. distress tolerance skills, interpersonal effectiveness skills, emotion regluation skills, mindfulness skills, radical acceptance..      Goal 3: Client will decrease anxious symptoms and reactions to triggers from 9 out of 10 opportunities to at most 8 out of 10 opportunities for at least 3 consecutive weeks as evidenced by client report and a decrease in symptom report as evidenced by PhQ9 scores and GAD7 scores.    Objective #A (Client Action)    Status: Continued - Date(s): 4/10/23    Client will Client will use cognitive strategies identified in therapy to challenge anxious thoughts.    Intervention(s)  Therapist will Therapist will teach emotional recognition/identification. emotion regulation skills, coping skills, mindfulness skills.    Objective #B  Client will Client will use thought-stopping strategy daily to reduce intrusive thoughts for at least 3 consecutive weeks as evidenced by client report and a decrease in symptom report as evidenced by PhQ9 scores and GAD7 scores.      Status: Continued - Date(s): 4/10/23    Intervention(s)  Therapist will teach emotional regulation skills. distress tolerance skills, interpersonal effectiveness skills, emotion  "regluation skills, mindfulness skills, radical acceptance. Therapist will teach client how to ID body cues for anxiety, anxiety reduction techniques, how to ID triggers for depression and anxiety- decrease reactivity/eliminate, lifestyle changes to reduce depression and anxiety, communication skills, explore cognitive beliefs and help client to develop healthy cognitive patterns and beliefs.    Objective #C  Client will Client will learn 5 crisis survival skills during the Distress Tolerance Module (6-8 weeks) for at least 3 consecutive weeks as evidenced by client report and a decrease in symptom report as evidenced by PhQ9 scores and GAD7 scores.  Status: Continued - Date(s): 4/10/23    Intervention(s)  Therapist will teach emotional regulation skills. distress tolerance skills, interpersonal effectiveness skills, emotion regluation skills, mindfulness skills, radical acceptance.      Client has reviewed and agreed to the above plan.      Nasrin Valladares [facility]        Linda Walsh     SAFETY PLAN:  Step 1: Warning signs / cues (Thoughts, images, mood, situation, behavior) that a crisis may be developing:    Thoughts: \"I don't matter\", \"People would be better off without me\", \"I'm a burden\", \"I can't do this anymore\", \"I just want this to end\" and \"Nothing makes it better\"    Images: obsessive thoughts of death or dying: car accident, using a gun and flashbacks    Thinking Processes: ruminations (can't stop thinking about my problems): court case, pain, racing thoughts, highly critical and negative thoughts: \"I can't do anything, I have to suffer everyday and go to work and other people have it so easy.\"  and paranoia: that the [employer] is watching me    Mood: worsening depression, hopelessness, helplessness, intense anger, intense worry, agitation, disinhibited (not caring about things or consequences) and mood swings    Behaviors: isolating/withdrawing , can't stop crying, not taking care of myself, not " "taking care of my responsibilities, sleeping too much and not sleeping enough    Situations: legal issues: current court case, pain, trauma , financial stress and medical condition / diagnosis: CRPS   Step 2: Coping strategies - Things I can do to take my mind off of my problems without contacting another person (relaxation technique, physical activity):    Distress Tolerance Strategies:  arts and crafts: with her residents, play with my pet , pray, change body temperature (ice pack/cold water) , paced breathing/progressive muscle relaxation and puzzles, games on ipad    Physical Activities: deep breathing    Focus on helpful thoughts:  \"Ride the wave\", think about happy memories: when the grandkids were born, going camping, when the boys were little and remind myself of what is important to me: grandkids, family, the residents  Step 3: People and social settings that provide distraction:   Name: Alpesh  Phone: [phone]   Name: Velasquez  Phone:    Name: Thaddeus  Phone:     work   Step 4: Remind myself of people and things that are important to me and worth living for:    My family, my residents    Step 5: When I am in crisis, I can ask these people to help me use my safety plan:   Name: Alpesh  Phone: [phone]   Name: Velasquez  Phone: (number in phone)   Name: Thaddeus  Phone: (number in phone)  Step 6: Making the environment safe:     be around others  Step 7: Professionals or agencies I can contact during a crisis:    [facility] Number: [phone]    Suicide Prevention Lifeline: 5-421-677-TALK (1114)    Crisis Text Line Service (available 24 hours a day, 7 days a week): Text [place] to 051379  Local Crisis Services:     Call 911 or go to my nearest emergency department.   I helped develop this safety plan and agree to use it when needed.  I have been given a copy of this plan.      Client signature _________________________________________________________________  Today s date: 7/11/22  Adapted " from Safety Plan Template 2008 Griselda Perez and Livan Cutler is reprinted with the express permission of the authors.  No portion of the Safety Plan Template may be reproduced without the express, written permission.  You can contact the authors at [email or web address] or [email or web address].

## 2023-05-21 ENCOUNTER — NURSE TRIAGE (OUTPATIENT)
Dept: NURSING | Facility: CLINIC | Age: 63
End: 2023-05-21
Payer: MEDICARE

## 2023-05-21 NOTE — TELEPHONE ENCOUNTER
"Pt calling about on going Headaches, feeling nausea, tired, lightheaded and dizzy for couple of months now, worsen in the past few weeks  Having hard time sleeping  History of migraines, taking ibuprofen and pain meds, does not help        Triage to see HCP within 4 hours, pt states she wanted to see if ED can do anything for her symptoms. Advised to page on call provider, pt states she will wait tomorrow and try to see if she can get an appointment with clinic. Encouraged ED, call back if have other questions/concerns.    Jhonathan Valderrama, RN, BSN  5/21/2023 at 10:44 AM  [facility]        Reason for Disposition    [1] SEVERE headache (e.g., excruciating) AND [2] not improved after 2 hours of pain medicine    Additional Information    Negative: Difficult to awaken or acting confused (e.g., disoriented, slurred speech)    Negative: [1] Weakness of the face, arm or leg on one side of the body AND [2] new-onset    Negative: [1] Numbness of the face, arm or leg on one side of the body AND [2] new-onset    Negative: [1] Loss of speech or garbled speech AND [2] new-onset    Negative: Passed out (i.e., lost consciousness, collapsed and was not responding)    Negative: Sounds like a life-threatening emergency to the triager    Negative: Unable to walk, or can only walk with assistance (e.g., requires support)    Negative: Stiff neck (can't touch chin to chest)    Negative: Severe pain in one eye    Negative: [1] Other family members (or roommates) with headaches AND [2] possibility of carbon monoxide exposure    Negative: [1] SEVERE headache (e.g., excruciating) AND [2] \"worst headache\" of life    Negative: [1] SEVERE headache AND [2] sudden-onset (i.e., reaching maximum intensity within seconds to 1 hour)    Negative: [1] SEVERE headache AND [2] fever    Negative: Loss of vision or double vision (Exception: same as prior migraines)    Negative: [1] Fever > 100.0 F (37.8 C) AND [2] diabetes mellitus or weak immune " system (e.g., HIV positive, cancer chemo, splenectomy, organ transplant, chronic steroids)    Negative: Patient sounds very sick or weak to the triager    Protocols used: HEADACHE-A-AH

## 2023-05-22 ENCOUNTER — NURSE TRIAGE (OUTPATIENT)
Dept: FAMILY MEDICINE | Facility: CLINIC | Age: 63
End: 2023-05-22
Payer: MEDICARE

## 2023-05-22 ENCOUNTER — THERAPY VISIT (OUTPATIENT)
Dept: PHYSICAL THERAPY | Facility: CLINIC | Age: 63
End: 2023-05-22
Attending: NURSE PRACTITIONER
Payer: MEDICARE

## 2023-05-22 ENCOUNTER — VIRTUAL VISIT (OUTPATIENT)
Dept: PSYCHOLOGY | Facility: CLINIC | Age: 63
End: 2023-05-22
Payer: MEDICARE

## 2023-05-22 DIAGNOSIS — F33.2 SEVERE RECURRENT MAJOR DEPRESSION WITHOUT PSYCHOTIC FEATURES (H): Primary | ICD-10-CM

## 2023-05-22 DIAGNOSIS — M54.2 CERVICALGIA: Primary | ICD-10-CM

## 2023-05-22 DIAGNOSIS — F41.1 GENERALIZED ANXIETY DISORDER: ICD-10-CM

## 2023-05-22 DIAGNOSIS — F43.10 POST TRAUMATIC STRESS DISORDER (PTSD): ICD-10-CM

## 2023-05-22 PROCEDURE — 90834 PSYTX W PT 45 MINUTES: CPT | Mod: VID | Performed by: COUNSELOR

## 2023-05-22 PROCEDURE — 97110 THERAPEUTIC EXERCISES: CPT | Mod: GP | Performed by: PHYSICAL THERAPIST

## 2023-05-22 PROCEDURE — 97112 NEUROMUSCULAR REEDUCATION: CPT | Mod: GP | Performed by: PHYSICAL THERAPIST

## 2023-05-22 NOTE — TELEPHONE ENCOUNTER
"Called and spoke with patient. Advised of the below per provider. Explained [facility]S Referral and how if is only for [place] patients, no walk in accepted, cheaper than ER, appointment based but offers resources like an ER. Also advised ER would be also recommended by provider. Patient declined ER - \"I have gone there before for other things and they don't do anything for me. I want to start with [facility] and see if I have low iron or something.\" RN reiterated and advised based on symptoms imaging and fluids may be needed and that is not something we provider in clinic and ADS or ER is recommended.     Patient again refused ADS or ER. RN advised [facility] could see patient tomorrow at 9:10am. Patient states-\"That won't work, I work.\" RN checked other providers schedules today in [place] no openings available.     Patient asked about Thursday appointment. RN advised provider is virtual on Thursday and virtual is not appropriate for symptoms.     Patient asked about Friday appointment RN advised provider is not in clinic on Friday. Patient stated- \"Are you kidding me, just forget about it, I will find another clinic,\" and proceeded to hang up on RN. RN was unable check [place] schedule or transfer patient to central scheduling to see if any appointments were available today elsewhere prior to caller hanging up on RN.     ZANE Orourke, RN  [facility] ~ Registered Nurse  Clinic Triage ~ [place] [facility]  May 22, 2023    "

## 2023-05-22 NOTE — PROGRESS NOTES
"        [facility]                                     Progress Note    Patient Name: Linda Walsh  Date: 5/22/23         Service Type: Individual      Session Start Time: 1:35pm  Session End Time: 2:20pm     Session Length: 45    Session #: 88    Attendees: Client      Service Modality:  Phone Visit:      Provider verified identity through the following two step process.  Patient provided:  Patient is known previously to provider    The patient has been notified of the following:      \"We have found that certain health care needs can be provided without the need for a face to face visit.  This service lets us provide the care you need with a phone conversation.       I will have full access to your [facility] medical record during this entire phone call.   I will be taking notes for your medical record.      Since this is like an office visit, we will bill your insurance company for this service.       There are potential benefits and risks of telephone visits (e.g. limits to patient confidentiality) that differ from in-person visits.?Confidentiality still applies for telephone services, and nobody will record the visit.  It is important to be in a quiet, private space that is free of distractions (including cell phone or other devices) during the visit.??      If during the course of the call I believe a telephone visit is not appropriate, you will not be charged for this service\"     Consent has been obtained for this service by care team member: Yes     DATA  Interactive Complexity: No  Crisis: No        Progress Since Last Session (Related to Symptoms / Goals / Homework):   Symptoms: No change .    Homework: Completed in session      Episode of Care Goals: Minimal progress - ACTION (Actively working towards change); Intervened by reinforcing change plan / affirming steps taken     Current / Ongoing Stressors and Concerns:   The client stated things with her  continue to be " difficult however he continues to be somewhat kind right now. She is waiting to see how long this will last. She spoke with her mom about the situation and her mom said it would be fine if she moved in with them.      Treatment Objective(s) Addressed in This Session:   use thought-stopping strategy daily to reduce intrusive thoughts       Intervention:   Processed through her thoughts and feelings about her .  Talked about the fears she has about their housing situation and what her thoughts are on  him.       Assessments completed prior to visit:  The following assessments were completed by patient for this visit:  PHQA:        View : No data to display.              GAD7:       11/18/2019    12:26 PM 12/30/2019     1:39 PM 2/26/2020    11:18 AM 5/27/2020     3:23 PM 7/28/2020    10:37 AM 2/24/2021     1:26 PM 7/13/2022    10:16 AM   MARLIN-7 SCORE   Total Score 20 (severe anxiety) 19 (severe anxiety)   19 (severe anxiety) 16 (severe anxiety) 21 (severe anxiety)   Total Score 20 19 19 21 19 16 21     PROMIS 10-Global Health (all questions and answers displayed):        View : No data to display.                  ASSESSMENT: Current Emotional / Mental Status (status of significant symptoms):   Risk status (Self / Other harm or suicidal ideation)   Patient denies current fears or concerns for personal safety.   Patient denies current or recent suicidal ideation or behaviors.   Patient denies current or recent homicidal ideation or behaviors.   Patient denies current or recent self injurious behavior or ideation.   Patient denies other safety concerns.   Patient reports there has been no change in risk factors since their last session.     Patient reports there has been no change in protective factors since their last session.     Recommended that patient call 911 or go to the local ED should there be a change in any of these risk factors.     Appearance:   Appropriate    Eye Contact:   Good     Psychomotor Behavior: Normal    Attitude:   Cooperative    Orientation:   All   Speech    Rate / Production: Normal/ Responsive    Volume:  Normal    Mood:    Normal   Affect:    Appropriate    Thought Content:  Clear    Thought Form:  Coherent  Logical    Insight:    Good      Medication Review:   No changes to current psychiatric medication(s)     Medication Compliance:   Yes     Changes in Health Issues:   None reported     Chemical Use Review:   Substance Use: Chemical use reviewed, no active concerns identified      Tobacco Use: No change in amount of tobacco use since last session.  Patient declined discussion at this time    Diagnosis:  1. 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 Generalized anxiety disorder    3. Post traumatic stress disorder (PTSD)        Collateral Reports Completed:   Not Applicable    PLAN: (Patient Tasks / Therapist Tasks / Other)  Client to continue to work on stress reduction skills.        Nasrin Valladares, [facility]                                                         ______________________________________________________________________    Individual Treatment Plan    Patient's Name: Linda Walsh  YOB: 1960    Date of Creation: 7/11/22  Date Treatment Plan Last Reviewed/Revised: 4/10/23    DSM5 Diagnoses: 296.33 (F33.2) Major Depressive Disorder, Recurrent Episode, Severe _, 300.02 (F41.1) Generalized Anxiety Disorder or 309.81 (F43.10) Posttraumatic Stress Disorder (includes Posttraumatic Stress Disorder for Children 6 Years and Younger)  Without dissociative symptoms  Psychosocial / Contextual Factors: CRPS  PROMIS (reviewed every 90 days):     Referral / Collaboration:  Referral to another professional/service is not indicated at this time..    Anticipated number of session for this episode of care: on-going  Anticipation frequency of session: Monthly  Anticipated Duration of each session: 38-52 minutes/53+ minutes  Treatment plan  will be reviewed in 90 days or when goals have been changed.       MeasurableTreatment Goal(s) related to diagnosis / functional impairment(s)  Goal 1: Client will decrease thoughts of wanting to be dead from 10 out of 10 opportunities to at most 9 out of 10 opportunities for at least 3 consecutive weeks as evidenced by client report and a decrease in symptom report as evidenced by PhQ9 scores and GAD7 scores.    Objective #A (Client Action)    Client will Client will look at and read safety plan at least once a day and follow safety plan when thoughts and urges come up.  Status: Continued - Date(s): 4/10/23    Intervention(s)  Therapist will teach emotional regulation skills. distress tolerance skills, interpersonal effectiveness skills, emotion regluation skills, mindfulness skills, radical acceptance. Therapist will develop safety plan with the client.     Objective #B  Client will Client will agree to call the therapist or after hours crisis line if noticing intense suicidal thoughts for skills coaching.   Status: Continued - Date(s): 4/10/23    Intervention(s)  Therapist will Therapist will be available as much as possible during work hours for the client to contact and give the client several crisis resources.         Goal 2: Client will increase the use of skills (emotion regulation, distress tolerance, communication skill) from 1 out of 10 opportunities to at least 2 out of 10 opportunities for at least 3 consecutive weeks as evidenced by client report and a decrease in symptom report as evidenced by PhQ9 scores and GAD7 scores.     Objective #A (Client Action)    Status: Continued - Date(s): 4/10/23    Client will Increase interest, engagement, and pleasure in doing things  Decrease frequency and intensity of feeling down, depressed, hopeless  Improve diet, appetite, mindful eating, and / or meal planning  Identify negative self-talk and behaviors: challenge core beliefs, myths, and actions  Improve  concentration, focus, and mindfulness in daily activities   Feel less fidgety, restless or slow in daily activities / interpersonal interactions  Decrease thoughts that you'd be better off dead or of suicide / self-harm.    Intervention(s)  Therapist will teach emotional regulation skills. distress tolerance skills, interpersonal effectiveness skills, emotion regluation skills, mindfulness skills, radical acceptance. Therapist will teach client how to ID body cues for anxiety, anxiety reduction techniques, how to ID triggers for depression and anxiety- decrease reactivity/eliminate, lifestyle changes to reduce depression and anxiety, communication skills, explore cognitive beliefs and help client to develop healthy cognitive patterns and beliefs.    Objective #B  Client will Client will learn and  practice DBT skills daily..    Status: Continued - Date(s): 4/10/23    Intervention(s)  Therapist will Therapist will Therapist will teach emotional regulation skills. distress tolerance skills, interpersonal effectiveness skills, emotion regluation skills, mindfulness skills, radical acceptance..      Goal 3: Client will decrease anxious symptoms and reactions to triggers from 9 out of 10 opportunities to at most 8 out of 10 opportunities for at least 3 consecutive weeks as evidenced by client report and a decrease in symptom report as evidenced by PhQ9 scores and GAD7 scores.    Objective #A (Client Action)    Status: Continued - Date(s): 4/10/23    Client will Client will use cognitive strategies identified in therapy to challenge anxious thoughts.    Intervention(s)  Therapist will Therapist will teach emotional recognition/identification. emotion regulation skills, coping skills, mindfulness skills.    Objective #B  Client will Client will use thought-stopping strategy daily to reduce intrusive thoughts for at least 3 consecutive weeks as evidenced by client report and a decrease in symptom report as evidenced by PhQ9  "scores and GAD7 scores.      Status: Continued - Date(s): 4/10/23    Intervention(s)  Therapist will teach emotional regulation skills. distress tolerance skills, interpersonal effectiveness skills, emotion regluation skills, mindfulness skills, radical acceptance. Therapist will teach client how to ID body cues for anxiety, anxiety reduction techniques, how to ID triggers for depression and anxiety- decrease reactivity/eliminate, lifestyle changes to reduce depression and anxiety, communication skills, explore cognitive beliefs and help client to develop healthy cognitive patterns and beliefs.    Objective #C  Client will Client will learn 5 crisis survival skills during the Distress Tolerance Module (6-8 weeks) for at least 3 consecutive weeks as evidenced by client report and a decrease in symptom report as evidenced by PhQ9 scores and GAD7 scores.  Status: Continued - Date(s): 4/10/23    Intervention(s)  Therapist will teach emotional regulation skills. distress tolerance skills, interpersonal effectiveness skills, emotion regluation skills, mindfulness skills, radical acceptance.      Client has reviewed and agreed to the above plan.      Nasrin Valladares [facility]        Linda Walsh     SAFETY PLAN:  Step 1: Warning signs / cues (Thoughts, images, mood, situation, behavior) that a crisis may be developing:    Thoughts: \"I don't matter\", \"People would be better off without me\", \"I'm a burden\", \"I can't do this anymore\", \"I just want this to end\" and \"Nothing makes it better\"    Images: obsessive thoughts of death or dying: car accident, using a gun and flashbacks    Thinking Processes: ruminations (can't stop thinking about my problems): court case, pain, racing thoughts, highly critical and negative thoughts: \"I can't do anything, I have to suffer everyday and go to work and other people have it so easy.\"  and paranoia: that the government is watching me    Mood: worsening depression, hopelessness, " "helplessness, intense anger, intense worry, agitation, disinhibited (not caring about things or consequences) and mood swings    Behaviors: isolating/withdrawing , can't stop crying, not taking care of myself, not taking care of my responsibilities, sleeping too much and not sleeping enough    Situations: legal issues: current court case, pain, trauma , financial stress and medical condition / diagnosis: CRPS   Step 2: Coping strategies - Things I can do to take my mind off of my problems without contacting another person (relaxation technique, physical activity):    Distress Tolerance Strategies:  arts and crafts: with her residents, play with my pet , pray, change body temperature (ice pack/cold water) , paced breathing/progressive muscle relaxation and puzzles, games on ipad    Physical Activities: deep breathing    Focus on helpful thoughts:  \"Ride the wave\", think about happy memories: when the grandkids were born, going camping, when the boys were little and remind myself of what is important to me: grandkids, family, the residents  Step 3: People and social settings that provide distraction:   Name: Alpesh  Phone: [phone]   Name: Velasquez  Phone:    Name: Thaddeus  Phone:     work   Step 4: Remind myself of people and things that are important to me and worth living for:    My family, my residents    Step 5: When I am in crisis, I can ask these people to help me use my safety plan:   Name: Alpesh  Phone: [phone]   Name: Velasquez  Phone: (number in phone)   Name: Thaddeus  Phone: (number in phone)  Step 6: Making the environment safe:     be around others  Step 7: Professionals or agencies I can contact during a crisis:    [facility] Number: [phone]    Suicide Prevention Lifeline: 8-494-067-TALK (3150)    Crisis Text Line Service (available 24 hours a day, 7 days a week): Text [place] to 273425  Local Crisis Services:     Call 911 or go to my nearest emergency department.   I helped " develop this safety plan and agree to use it when needed.  I have been given a copy of this plan.      Client signature _________________________________________________________________  Today s date: 7/11/22  Adapted from Safety Plan Template 2008 Griselda Perez and Livan Cutler is reprinted with the express permission of the authors.  No portion of the Safety Plan Template may be reproduced without the express, written permission.  You can contact the authors at [email or web address] or [email or web address].

## 2023-05-22 NOTE — TELEPHONE ENCOUNTER
"Nurse Triage SBAR  Is this a 2nd Level Triage? YES, LICENSED PRACTITIONER REVIEW IS REQUIRED    SITUATION:                                                    (Clearly and briefly define the situation)  Linda Walsh is a 62 year old female     Patient reports headache with nausea the last couple of months. 7/10 currently but increases to \"a severe headache with movement.\"    BACKGROUND:                                                    (Provide clear, relevant background information that relates to the situation)  Current Medication: Sudafed, Flonase, Ibuprofen  Hx: Patient reports she needs a neck fusion  Last seen: 3/14/2023    Patient reports she started not feeling well a couple of months ago.  Thought not feeling well was related to allergies but allergy medications have not helped      Headaches started a couple months ago. Headaches have gotten worse in the last month or two. \"they have just gotten worse and I can't function.\"    Patient very fatigued  Patient reports history of lower blood pressures- last BP recorded (124/76 on 4/10/23)     Couple bottles of water a day 1-3 bottles of water per day- \"I'm trying to do more but with the nausea it is hard\"    No SOB   No Chest Pain  No eye pain  No vision changes- no blurry vision     Headache worse with movement- laying down helps    Dizziness with headaches- denies dizziness currently    Patient reports a history of \"neck issues\" needs fusion   Current pain- 7/10 increases to severe headache with movement    Has not been seen for headaches- declines ED- \"that's not what ED's for anymore, they just send you home.\"    NURSE ASSESSMENT:                                                    (A statement of your professional conclusion)    Patient should be seen in clinic for evaluation due to on going symptoms that get worse through out the day.    (See information below for more triage details.)  RECOMMENDATION(S) and PLAN:                                        "             (What do you need from this individual?)  Protocol Recommended Disposition: Callback By PCP Within 1 Hour  Will comply with recommendation: yes, patient desires in clinic appointment. RN advised ED may be needed due to available resources and testing but will speak with provider if clinic visit is appropriate and she is able to work in.     Routing to provider for recommendation on: Are you able to work patient in for an appointment today.    Does the patient meet one of the following criteria for ADS visit consideration? 16+ years old, with an [facility] PCP     TIP  Providers, please consider if this condition is appropriate for management at one of our Acute and Diagnostic Services sites.     If patient is a good candidate, please use dotphrase <dot>triageresponse and select Refer to ADS to document.    Encourage to return call clinic triage nurse if further questions/concerns that may come up or if symptoms do not improve, worsen, or new symptoms develop.    NOTES: Disposition was determined by the first positive assessment question, therefore all previous assessment questions were negative.  Guideline used:System Protocol    ZANE Orourke, RN  [facility] ~ Registered Nurse  Clinic Triage ~ [place] [facility] [place]  May 22, 2023      Reason for Disposition    SEVERE headache (e.g., excruciating) and has had severe headaches before    Additional Information    Negative: Difficult to awaken or acting confused (e.g., disoriented, slurred speech)    Negative: Weakness of the face, arm or leg on one side of the body and new-onset    Negative: Numbness of the face, arm or leg on one side of the body and new-onset    Negative: Loss of speech or garbled speech and new-onset    Negative: Passed out (i.e., fainted, collapsed and was not responding)    Negative: Sounds like a life-threatening emergency to the triager    Negative: Unable to walk without falling    Negative: Stiff neck (can't touch chin to  chest)    Negative: Possibility of carbon monoxide exposure    Negative: SEVERE headache, states 'worst headache' of life    Negative: SEVERE headache, sudden-onset (i.e., reaching maximum intensity within seconds to 1 hour)    Negative: Severe pain in one eye    Negative: Loss of vision or double vision  (Exception: Same as prior migraines.)    Negative: Patient sounds very sick or weak to the triager    Negative: Fever > 103 F (39.4 C)    Negative: Fever > 100.0 F (37.8 C) and has diabetes mellitus or a weak immune system (e.g., HIV positive, cancer chemotherapy, organ transplant, splenectomy, chronic steroids)    Protocols used: HEADACHE-A-OH

## 2023-05-22 NOTE — TELEPHONE ENCOUNTER
Please call pt- we could have her see ADS clinic if she is willing to go to [place] they can do imaging if needed and fluids to help rehydrate her, ER, or okay to use same day appt tomorrow but my strong preference is ADS or ER  Jonna Gaitan PA-C

## 2023-05-25 ENCOUNTER — THERAPY VISIT (OUTPATIENT)
Dept: PHYSICAL THERAPY | Facility: CLINIC | Age: 63
End: 2023-05-25
Attending: NURSE PRACTITIONER
Payer: MEDICARE

## 2023-05-25 ENCOUNTER — MYC MEDICAL ADVICE (OUTPATIENT)
Dept: PODIATRY | Facility: CLINIC | Age: 63
End: 2023-05-25

## 2023-05-25 DIAGNOSIS — M54.2 CERVICALGIA: Primary | ICD-10-CM

## 2023-05-25 PROCEDURE — 97035 APP MDLTY 1+ULTRASOUND EA 15: CPT | Mod: GP | Performed by: PHYSICAL THERAPIST

## 2023-05-25 PROCEDURE — 97140 MANUAL THERAPY 1/> REGIONS: CPT | Mod: GP | Performed by: PHYSICAL THERAPIST

## 2023-05-25 PROCEDURE — 97110 THERAPEUTIC EXERCISES: CPT | Mod: GP | Performed by: PHYSICAL THERAPIST

## 2023-06-01 ENCOUNTER — VIRTUAL VISIT (OUTPATIENT)
Dept: PSYCHOLOGY | Facility: CLINIC | Age: 63
End: 2023-06-01
Payer: MEDICARE

## 2023-06-01 DIAGNOSIS — F33.2 SEVERE RECURRENT MAJOR DEPRESSION WITHOUT PSYCHOTIC FEATURES (H): Primary | ICD-10-CM

## 2023-06-01 DIAGNOSIS — F43.10 POST TRAUMATIC STRESS DISORDER (PTSD): ICD-10-CM

## 2023-06-01 DIAGNOSIS — F41.1 GENERALIZED ANXIETY DISORDER: ICD-10-CM

## 2023-06-01 PROCEDURE — 90834 PSYTX W PT 45 MINUTES: CPT | Mod: 95 | Performed by: COUNSELOR
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
## 2023-06-05 ENCOUNTER — THERAPY VISIT (OUTPATIENT)
Dept: PHYSICAL THERAPY | Facility: CLINIC | Age: 63
End: 2023-06-05
Attending: NURSE PRACTITIONER
Payer: MEDICARE

## 2023-06-05 DIAGNOSIS — M54.2 CERVICALGIA: Primary | ICD-10-CM

## 2023-06-05 PROCEDURE — 97110 THERAPEUTIC EXERCISES: CPT | Mod: GP | Performed by: PHYSICAL THERAPIST

## 2023-06-05 PROCEDURE — 97035 APP MDLTY 1+ULTRASOUND EA 15: CPT | Mod: GP | Performed by: PHYSICAL THERAPIST

## 2023-06-05 PROCEDURE — 97140 MANUAL THERAPY 1/> REGIONS: CPT | Mod: GP | Performed by: PHYSICAL THERAPIST

## 2023-06-09 ENCOUNTER — OFFICE VISIT (OUTPATIENT)
Dept: FAMILY MEDICINE | Facility: OTHER | Age: 63
End: 2023-06-09
Payer: MEDICARE

## 2023-06-09 VITALS
DIASTOLIC BLOOD PRESSURE: 76 MMHG | SYSTOLIC BLOOD PRESSURE: 110 MMHG | RESPIRATION RATE: 20 BRPM | HEIGHT: 64 IN | OXYGEN SATURATION: 98 % | BODY MASS INDEX: 27.31 KG/M2 | WEIGHT: 160 LBS | HEART RATE: 79 BPM | TEMPERATURE: 98.3 F

## 2023-06-09 DIAGNOSIS — R11.0 NAUSEA: ICD-10-CM

## 2023-06-09 DIAGNOSIS — R63.4 ABNORMAL WEIGHT LOSS: Primary | ICD-10-CM

## 2023-06-09 DIAGNOSIS — K21.9 GASTROESOPHAGEAL REFLUX DISEASE WITHOUT ESOPHAGITIS: ICD-10-CM

## 2023-06-09 DIAGNOSIS — R42 LIGHTHEADEDNESS: ICD-10-CM

## 2023-06-09 DIAGNOSIS — Z13.220 SCREENING FOR HYPERLIPIDEMIA: ICD-10-CM

## 2023-06-09 DIAGNOSIS — R53.83 OTHER FATIGUE: ICD-10-CM

## 2023-06-09 LAB
ALBUMIN SERPL BCG-MCNC: 4.2 G/DL (ref 3.5–5.2)
ALBUMIN UR-MCNC: NEGATIVE MG/DL
ALP SERPL-CCNC: 64 U/L (ref 35–104)
ALT SERPL W P-5'-P-CCNC: 15 U/L (ref 10–35)
AMYLASE SERPL-CCNC: 133 U/L (ref 28–100)
ANION GAP SERPL CALCULATED.3IONS-SCNC: 11 MMOL/L (ref 7–15)
APPEARANCE UR: CLEAR
AST SERPL W P-5'-P-CCNC: 19 U/L (ref 10–35)
BASOPHILS # BLD AUTO: 0 10E3/UL (ref 0–0.2)
BASOPHILS NFR BLD AUTO: 0 %
BILIRUB SERPL-MCNC: 1.3 MG/DL
BILIRUB UR QL STRIP: ABNORMAL
BUN SERPL-MCNC: 10.5 MG/DL (ref 8–23)
CALCIUM SERPL-MCNC: 9.8 MG/DL (ref 8.8–10.2)
CHLORIDE SERPL-SCNC: 102 MMOL/L (ref 98–107)
CHOLEST SERPL-MCNC: 267 MG/DL
COLOR UR AUTO: YELLOW
CREAT SERPL-MCNC: 0.97 MG/DL (ref 0.51–0.95)
CRP SERPL-MCNC: <3 MG/L
DEPRECATED HCO3 PLAS-SCNC: 26 MMOL/L (ref 22–29)
EOSINOPHIL # BLD AUTO: 0.1 10E3/UL (ref 0–0.7)
EOSINOPHIL NFR BLD AUTO: 1 %
ERYTHROCYTE [DISTWIDTH] IN BLOOD BY AUTOMATED COUNT: 12.4 % (ref 10–15)
ERYTHROCYTE [SEDIMENTATION RATE] IN BLOOD BY WESTERGREN METHOD: 12 MM/HR (ref 0–30)
GFR SERPL CREATININE-BSD FRML MDRD: 66 ML/MIN/1.73M2
GLUCOSE SERPL-MCNC: 103 MG/DL (ref 70–99)
GLUCOSE UR STRIP-MCNC: NEGATIVE MG/DL
HCT VFR BLD AUTO: 46.6 % (ref 35–47)
HDLC SERPL-MCNC: 57 MG/DL
HGB BLD-MCNC: 15.3 G/DL (ref 11.7–15.7)
HGB UR QL STRIP: NEGATIVE
IMM GRANULOCYTES # BLD: 0 10E3/UL
IMM GRANULOCYTES NFR BLD: 0 %
KETONES UR STRIP-MCNC: NEGATIVE MG/DL
LDLC SERPL CALC-MCNC: 177 MG/DL
LEUKOCYTE ESTERASE UR QL STRIP: NEGATIVE
LIPASE SERPL-CCNC: 33 U/L (ref 13–60)
LYMPHOCYTES # BLD AUTO: 2 10E3/UL (ref 0.8–5.3)
LYMPHOCYTES NFR BLD AUTO: 28 %
MCH RBC QN AUTO: 28.7 PG (ref 26.5–33)
MCHC RBC AUTO-ENTMCNC: 32.8 G/DL (ref 31.5–36.5)
MCV RBC AUTO: 87 FL (ref 78–100)
MONOCYTES # BLD AUTO: 0.3 10E3/UL (ref 0–1.3)
MONOCYTES NFR BLD AUTO: 5 %
NEUTROPHILS # BLD AUTO: 4.6 10E3/UL (ref 1.6–8.3)
NEUTROPHILS NFR BLD AUTO: 65 %
NITRATE UR QL: NEGATIVE
NONHDLC SERPL-MCNC: 210 MG/DL
PH UR STRIP: 5.5 [PH] (ref 5–7)
PLATELET # BLD AUTO: 273 10E3/UL (ref 150–450)
POTASSIUM SERPL-SCNC: 4.5 MMOL/L (ref 3.4–5.3)
PROT SERPL-MCNC: 7 G/DL (ref 6.4–8.3)
RBC # BLD AUTO: 5.33 10E6/UL (ref 3.8–5.2)
SODIUM SERPL-SCNC: 139 MMOL/L (ref 136–145)
SP GR UR STRIP: 1.02 (ref 1–1.03)
TRIGL SERPL-MCNC: 166 MG/DL
TSH SERPL DL<=0.005 MIU/L-ACNC: 1 UIU/ML (ref 0.3–4.2)
UROBILINOGEN UR STRIP-ACNC: 0.2 E.U./DL
WBC # BLD AUTO: 7 10E3/UL (ref 4–11)

## 2023-06-09 PROCEDURE — 83690 ASSAY OF LIPASE: CPT | Performed by: PHYSICIAN ASSISTANT

## 2023-06-09 PROCEDURE — 36415 COLL VENOUS BLD VENIPUNCTURE: CPT | Performed by: PHYSICIAN ASSISTANT

## 2023-06-09 PROCEDURE — 80061 LIPID PANEL: CPT | Performed by: PHYSICIAN ASSISTANT

## 2023-06-09 PROCEDURE — 99214 OFFICE O/P EST MOD 30 MIN: CPT | Performed by: PHYSICIAN ASSISTANT

## 2023-06-09 PROCEDURE — 86140 C-REACTIVE PROTEIN: CPT | Performed by: PHYSICIAN ASSISTANT

## 2023-06-09 PROCEDURE — 80050 GENERAL HEALTH PANEL: CPT | Performed by: PHYSICIAN ASSISTANT

## 2023-06-09 PROCEDURE — 85652 RBC SED RATE AUTOMATED: CPT | Performed by: PHYSICIAN ASSISTANT

## 2023-06-09 PROCEDURE — 82150 ASSAY OF AMYLASE: CPT | Performed by: PHYSICIAN ASSISTANT

## 2023-06-09 PROCEDURE — 81003 URINALYSIS AUTO W/O SCOPE: CPT | Performed by: PHYSICIAN ASSISTANT

## 2023-06-09 RX ORDER — OMEPRAZOLE 40 MG/1
40 CAPSULE, DELAYED RELEASE ORAL DAILY
Qty: 90 CAPSULE | Refills: 3 | Status: SHIPPED | OUTPATIENT
Start: 2023-06-09 | End: 2024-06-03

## 2023-06-09 ASSESSMENT — PATIENT HEALTH QUESTIONNAIRE - PHQ9
SUM OF ALL RESPONSES TO PHQ QUESTIONS 1-9: 19
10. IF YOU CHECKED OFF ANY PROBLEMS, HOW DIFFICULT HAVE THESE PROBLEMS MADE IT FOR YOU TO DO YOUR WORK, TAKE CARE OF THINGS AT HOME, OR GET ALONG WITH OTHER PEOPLE: VERY DIFFICULT
SUM OF ALL RESPONSES TO PHQ QUESTIONS 1-9: 19

## 2023-06-09 ASSESSMENT — PAIN SCALES - GENERAL: PAINLEVEL: EXTREME PAIN (9)

## 2023-06-09 ASSESSMENT — ENCOUNTER SYMPTOMS
MYALGIAS: 1
LIGHT-HEADEDNESS: 1
HEARTBURN: 1
FATIGUE: 1
ARTHRALGIAS: 1
VOMITING: 0
UNEXPECTED WEIGHT CHANGE: 1
SHORTNESS OF BREATH: 0
RECTAL PAIN: 0
ABDOMINAL DISTENTION: 0
CHEST TIGHTNESS: 0
RHINORRHEA: 0
PALPITATIONS: 0
DYSURIA: 0
WHEEZING: 0
FEVER: 0
ABDOMINAL PAIN: 0
NUMBNESS: 0
NAUSEA: 1
DIZZINESS: 0
CONSTIPATION: 1
CHILLS: 0
DIAPHORESIS: 1
PARESTHESIAS: 0
APPETITE CHANGE: 1
DIARRHEA: 0
ANAL BLEEDING: 0
SORE THROAT: 0
NERVOUS/ANXIOUS: 1
TROUBLE SWALLOWING: 0
HEMATOCHEZIA: 0
COUGH: 0

## 2023-06-09 ASSESSMENT — ANXIETY QUESTIONNAIRES
IF YOU CHECKED OFF ANY PROBLEMS ON THIS QUESTIONNAIRE, HOW DIFFICULT HAVE THESE PROBLEMS MADE IT FOR YOU TO DO YOUR WORK, TAKE CARE OF THINGS AT HOME, OR GET ALONG WITH OTHER PEOPLE: VERY DIFFICULT
GAD7 TOTAL SCORE: 13
4. TROUBLE RELAXING: SEVERAL DAYS
1. FEELING NERVOUS, ANXIOUS, OR ON EDGE: NEARLY EVERY DAY
6. BECOMING EASILY ANNOYED OR IRRITABLE: MORE THAN HALF THE DAYS
8. IF YOU CHECKED OFF ANY PROBLEMS, HOW DIFFICULT HAVE THESE MADE IT FOR YOU TO DO YOUR WORK, TAKE CARE OF THINGS AT HOME, OR GET ALONG WITH OTHER PEOPLE?: VERY DIFFICULT
5. BEING SO RESTLESS THAT IT IS HARD TO SIT STILL: NOT AT ALL
GAD7 TOTAL SCORE: 13
GAD7 TOTAL SCORE: 13
2. NOT BEING ABLE TO STOP OR CONTROL WORRYING: NEARLY EVERY DAY
7. FEELING AFRAID AS IF SOMETHING AWFUL MIGHT HAPPEN: SEVERAL DAYS
3. WORRYING TOO MUCH ABOUT DIFFERENT THINGS: NEARLY EVERY DAY
7. FEELING AFRAID AS IF SOMETHING AWFUL MIGHT HAPPEN: SEVERAL DAYS

## 2023-06-09 NOTE — PROGRESS NOTES
Assessment & Plan     Abnormal weight loss  Other fatigue  Lightheadedness  Nausea  - Physical exam unremarkable, however her combination of  ~18 lb weight loss, extreme fatigue, nausea, decreased appetite, and lightheadedness are concerning. Differential diagnosis at this time is broad and includes but is not limited to anemia, thyroid dysfunction, cancer, depression, sleep apnea, pancreatitis, cardiac etiology. Importantly, she has a history of tobacco use and her colonoscopy is long overdue. If labs come back unremarkable, will have a low threshold to consider CT chest abdomen pelvis as a next step, as well as FIT test.  Consider sleep evaluation as well.   - Comprehensive metabolic panel (BMP + Alb, Alk Phos, ALT, AST, Total. Bili, TP); Future  - CBC with platelets and differential; Future  - CRP, inflammation; Future  - ESR: Erythrocyte sedimentation rate; Future  - Lipase; Future  - Amylase; Future  - TSH with free T4 reflex; Future  - UA Macroscopic with reflex to Microscopic and Culture; Future  - Comprehensive metabolic panel (BMP + Alb, Alk Phos, ALT, AST, Total. Bili, TP)  - CBC with platelets and differential  - CRP, inflammation  - ESR: Erythrocyte sedimentation rate  - Lipase  - Amylase  - TSH with free T4 reflex  - UA Macroscopic with reflex to Microscopic and Culture    Screening for hyperlipidemia  Due for screening.   - Lipid panel reflex to direct LDL Fasting    Gastroesophageal reflux disease without esophagitis  Taking medication regularly.   - omeprazole (PRILOSEC) 40 MG DR capsule; Take 1 capsule (40 mg) by mouth daily    Options for treatment and follow-up care were reviewed with the patient and/or guardian. Patient and/or guardian engaged in the decision making process and verbalized understanding of the options discussed and agreed with the final plan.    Tomasa XIONG PA-C, was present with the Physician Assistant student who participated in the service and in the documentation of  "the note.  I have verified the history and personally performed the physical exam and medical decision making.  I agree with the assessment and plan of care as documented in the note.       SCARLET Rodriguez-S2    Tomasa Medina PA-C  [facility] JORGE Hernandez is a 62 year old, presenting for the following health issues:  Fatigue and Dizziness        6/9/2023    10:41 AM   Additional Questions   Roomed by Kari KIDD   Accompanied by self         6/9/2023    10:41 AM   Patient Reported Additional Medications   Patient reports taking the following new medications NA     Weight loss, fatigue, and nausea: 2.5 months of fatigue, weight loss, and nausea. She has noticed around ~18 lbs of weight loss over the last month or two. She has been extremely fatigued as well. She reports prior to this she would sleep 4-5 hours a night, but she has been now sleeping 8-10 hours which is unlike her. Whenever she sits down during the day she has a hard time staying awake. In regards to her nausea and decreased appetite, she reports she tries to eat a bite or two but is unable to continue. She reports she normally loves cooking but has been unable to do so with her fatigue, and often resorts to making a half a sandwhich and \"picking away at it\". She has never been told that she snores. When waking up she feels only slightly rested, but then quickly feels fatigued again. She drinks 1 large coffee every morning, but otherwise has no caffeine.  No new medication changes.     Lightheadedness: which she believes is associated with not eating as much. She reports that at her part time job she has felt lightheaded to the point of needing to sit down from standing. She has not been syncopal. She reports that she has a protein shake in the mornings and tries to make dinner when she gets home at night but finds that when she goes to eat she gets nauseated and isn't able to get herself to eat more than 1 or 2 bites of " food. She is drinking 7, 8oz bottles of water a day.     Complex regional pain syndrome: Uses fentanyl patch and oxycodone. Not normally nausea when using these. No recent changes in these medications or doses that may be contributing to other symptoms.    Rotator cuff injury: reports rotator cuff injury, but otherwise no new myalgias or arthralgias. Seeing PT for her rotator cuff.     Depression/anxiety - stable, going to counseling and feels this is helpful. Stopped taking Duloxetine 2 years ago. Rarely uses the hydroxyzine.     Fatigue  Associated symptoms include arthralgias, diaphoresis, fatigue, myalgias and nausea. Pertinent negatives include no abdominal pain, chest pain, chills, congestion, coughing, fever, numbness, rash, sore throat or vomiting.   History of Present Illness       Reason for visit:  Lightheaded extreme fatique nausea  Symptom onset:  More than a month  Symptoms include:  Lightheaded extreme fatique nausous  Symptom intensity:  Severe  Symptom progression:  Staying the same  Had these symptoms before:  No  What makes it worse:  Moving and working  What makes it better:  I resr and it still continues    She eats 0-1 servings of fruits and vegetables daily.She consumes 0 sweetened beverage(s) daily.She exercises with enough effort to increase her heart rate 9 or less minutes per day.  She exercises with enough effort to increase her heart rate 3 or less days per week.   She is taking medications regularly.    Today's PHQ-9         PHQ-9 Total Score: 19    PHQ-9 Q9 Thoughts of better off dead/self-harm past 2 weeks :   Not at all    How difficult have these problems made it for you to do your work, take care of things at home, or get along with other people: Very difficult  Today's MARLIN-7 Score: 13           Review of Systems   Constitutional: Positive for appetite change, diaphoresis, fatigue and unexpected weight change. Negative for chills and fever.   HENT: Negative for congestion,  "rhinorrhea, sore throat and trouble swallowing.    Eyes:        Dry eyes   Respiratory: Negative for cough, chest tightness, shortness of breath and wheezing.    Cardiovascular: Negative for chest pain and palpitations.   Gastrointestinal: Positive for constipation, heartburn and nausea. Negative for abdominal distention, abdominal pain, anal bleeding, diarrhea, hematochezia, rectal pain and vomiting.   Endocrine: Negative for cold intolerance and heat intolerance.   Genitourinary: Negative for dysuria.   Musculoskeletal: Positive for arthralgias and myalgias.   Skin: Negative for rash.   Neurological: Positive for light-headedness. Negative for dizziness, syncope, numbness and paresthesias.   Psychiatric/Behavioral: The patient is nervous/anxious.           Objective    /76   Pulse 79   Temp 98.3  F (36.8  C) (Temporal)   Resp 20   Ht 1.622 m (5' 3.86\")   Wt 72.6 kg (160 lb)   LMP  (LMP Unknown)   SpO2 98%   BMI 27.59 kg/m    Body mass index is 27.59 kg/m .  Physical Exam  Constitutional:       General: She is not in acute distress.     Appearance: Normal appearance. She is not ill-appearing, toxic-appearing or diaphoretic.   HENT:      Head: Normocephalic and atraumatic.      Right Ear: Tympanic membrane, ear canal and external ear normal.      Left Ear: Tympanic membrane, ear canal and external ear normal.      Mouth/Throat:      Mouth: Mucous membranes are moist.      Pharynx: Oropharynx is clear. No posterior oropharyngeal erythema.   Eyes:      Extraocular Movements: Extraocular movements intact.      Conjunctiva/sclera: Conjunctivae normal.   Cardiovascular:      Rate and Rhythm: Normal rate and regular rhythm.      Heart sounds: Normal heart sounds. No murmur heard.  Pulmonary:      Effort: Pulmonary effort is normal. No respiratory distress.      Breath sounds: Normal breath sounds. No stridor. No wheezing.   Abdominal:      General: Abdomen is flat. Bowel sounds are normal. There is no " distension.      Palpations: Abdomen is soft. There is no mass.      Tenderness: There is no abdominal tenderness.   Musculoskeletal:      Cervical back: Normal range of motion and neck supple.   Skin:     General: Skin is warm.      Coloration: Skin is not jaundiced or pale.      Findings: No bruising or rash.   Neurological:      General: No focal deficit present.      Mental Status: She is alert and oriented to person, place, and time.   Psychiatric:         Mood and Affect: Mood normal.         Behavior: Behavior normal.         Thought Content: Thought content normal.         Judgment: Judgment normal.

## 2023-06-12 ENCOUNTER — TELEPHONE (OUTPATIENT)
Dept: FAMILY MEDICINE | Facility: OTHER | Age: 63
End: 2023-06-12
Payer: MEDICARE

## 2023-06-12 ENCOUNTER — HOSPITAL ENCOUNTER (OUTPATIENT)
Dept: CT IMAGING | Facility: CLINIC | Age: 63
Discharge: HOME | End: 2023-06-12
Attending: PHYSICIAN ASSISTANT | Admitting: PHYSICIAN ASSISTANT
Payer: MEDICARE

## 2023-06-12 DIAGNOSIS — R63.4 ABNORMAL WEIGHT LOSS: Primary | ICD-10-CM

## 2023-06-12 DIAGNOSIS — R63.4 ABNORMAL WEIGHT LOSS: ICD-10-CM

## 2023-06-12 DIAGNOSIS — R74.8 ELEVATED PANCREATIC ENZYME: ICD-10-CM

## 2023-06-12 PROCEDURE — 250N000009 HC RX 250: Performed by: PHYSICIAN ASSISTANT

## 2023-06-12 PROCEDURE — 250N000011 HC RX IP 250 OP 636: Performed by: PHYSICIAN ASSISTANT

## 2023-06-12 PROCEDURE — G1010 CDSM STANSON: HCPCS

## 2023-06-12 PROCEDURE — 74177 CT ABD & PELVIS W/CONTRAST: CPT | Mod: MG

## 2023-06-12 RX ORDER — IOPAMIDOL 755 MG/ML
500 INJECTION, SOLUTION INTRAVASCULAR ONCE
Status: COMPLETED | OUTPATIENT
Start: 2023-06-12 | End: 2023-06-12

## 2023-06-12 RX ADMIN — SODIUM CHLORIDE 60 ML: 9 INJECTION, SOLUTION INTRAVENOUS at 14:21

## 2023-06-12 RX ADMIN — IOPAMIDOL 79 ML: 755 INJECTION, SOLUTION INTRAVENOUS at 14:21

## 2023-06-12 NOTE — TELEPHONE ENCOUNTER
Please call, need to get patient in for Stat CT today.  Patient is aware of the results.  She can go whenever today.     Tomasa Medina PA-C

## 2023-06-12 NOTE — TELEPHONE ENCOUNTER
Patient is scheduled for arrival time of 2pm in [facility] 6/12. Patient has been notified.   Shelley Portillo MA

## 2023-06-12 NOTE — PROGRESS NOTES
Result note:  Please call patient. Her CT showed nothing concerning or new.  There are some previously seen changes but it hasn't changed so they are benign.     Given her symptoms with the nausea I ordered the upper endoscopy for further evaluation and added colonoscopy as well since this can be done with the same sedation.     They will call her to schedule. In the meantime if she has any changes to symptoms or new symptoms she needs to let us know.     Would encourage protein shakes to make sure she is getting enough calories.  1-2 per day.     Tomasa Medina PA-C

## 2023-06-12 NOTE — TELEPHONE ENCOUNTER
"Patient is requesting to know what provider means by this statement;  What is she referring to?  States can send mychart response to her.    Per CT result note: \" There are some previously seen changes but it hasn't changed so they are benign. \"  Patient wanting specifically to know what is meant by this.    Dominique YOUSIF RN  "

## 2023-06-13 ENCOUNTER — TELEPHONE (OUTPATIENT)
Dept: GASTROENTEROLOGY | Facility: CLINIC | Age: 63
End: 2023-06-13

## 2023-06-13 NOTE — TELEPHONE ENCOUNTER
Spoke with Mary and read back verbatim the message from Tomasa Medina.    Patient had no further questions or concerns.    Closing encounter.    Brissa Garcia RN  [facility] ~ Registered Nurse  Clinic Triage ~ [facility]  June 13, 2023

## 2023-06-13 NOTE — TELEPHONE ENCOUNTER
There were some calcifications in the arteries and in the abdomen there were some lymph nodes but they are the same size from years prior and no other findings to suggest a concern.      Tomasa Medina PA-C

## 2023-06-15 ENCOUNTER — HOSPITAL ENCOUNTER (OUTPATIENT)
Facility: AMBULATORY SURGERY CENTER | Age: 63
End: 2023-06-15
Attending: INTERNAL MEDICINE | Admitting: INTERNAL MEDICINE
Payer: COMMERCIAL

## 2023-06-15 ENCOUNTER — TELEPHONE (OUTPATIENT)
Dept: GASTROENTEROLOGY | Facility: CLINIC | Age: 63
End: 2023-06-15

## 2023-06-15 ENCOUNTER — HOSPITAL ENCOUNTER (OUTPATIENT)
Facility: AMBULATORY SURGERY CENTER | Age: 63
End: 2023-06-15
Attending: INTERNAL MEDICINE | Admitting: INTERNAL MEDICINE
Payer: MEDICARE

## 2023-06-15 NOTE — TELEPHONE ENCOUNTER
"Endoscopy Scheduling Screen    Have you had a positive Covid test in the last 14 days?  No    Are you active on MyChart?   Yes    What insurance is in the chart?  Other:  MEDICARE    Ordering/Referring Provider: Tomasa Medina PA-C   (If ordering provider performs procedure, schedule with ordering provider unless otherwise instructed. )    BMI: Estimated body mass index is 27.59 kg/m  as calculated from the following:    Height as of 6/9/23: 1.622 m (5' 3.86\").    Weight as of 6/9/23: 72.6 kg (160 lb).     Sedation Ordered  MAC/deep sedation.   BMI<= 45 45 < BMI <= 48 48 < BMI < = 50  BMI > 50   No Restrictions No [facility]  No [facility]  [facility] with exceptions Hospital Only OR Only       Do you have a history of malignant hyperthermia or adverse reaction to anesthesia?  No    (Females) Are you currently pregnant?   No     Have you been diagnosed or told you have pulmonary hypertension?   No    Do you have an LVAD?  No    Have you been told you have moderate to severe sleep apnea?  No    Have you been told you have COPD, asthma, or any other lung disease?  Yes     What breathing problems do you have?  Asthma     Do you use home oxygen?  No    Have your breathing problems required an ED visit or hospitalization in the last year?  No    Do you have any heart conditions?  No     Have you ever had or are you awaiting a heart or lung transplant?   No    Have you had a stroke or transient ischemic attack (TIA aka \"mini stroke\" in the last 6 months?   No    Have you been diagnosed with or been told you have cirrhosis of the liver?   No    Are you currently on dialysis?   No    Do you need assistance transferring?   No    BMI: Estimated body mass index is 27.59 kg/m  as calculated from the following:    Height as of 6/9/23: 1.622 m (5' 3.86\").    Weight as of 6/9/23: 72.6 kg (160 lb).     Is patients BMI > 40 and scheduling location [facility]?  No    Do you take the medication Phentermine, Ozempic or Wegovy?  No    Do you take the " medication Naltrexone?  No    Do you take blood thinners?  No    Preferred Pharmacy:    [facility] - [address] [place] [address]  Phone: [phone] Fax: [phone]    [facility] [place], [place] - [address]   [address] [place] [address]  Phone: [phone] Fax: [phone]       Prep   Are you currently on dialysis or do you have chronic kidney disease?  No (standard prep)    Do you have a diagnosis of diabetes?  No    Do you have a diagnosis of cystic fibrosis (CF)?  No    On a regular basis do you go 3 -5 days between bowel movements?  No    BMI > 40?  No    Final Scheduling Details   Colonoscopy prep sent?  MiraLAX (No Mag Citrate)    Procedure scheduled  EGD/COLONOSCOPY     Surgeon:  EGD - LEVENTHAL  COLONOSCOPY - EMMA    Date of procedure:  06/28/2023 - EGD  09/07/2023 - COLONOSCOPY     Schedule PAC:   No    Location  [facility] - [facility]    Sedation   MAC/Deep Sedation - PER ORDER    Patient Reminders:    You will receive a call from a Nurse to review instructions and health history.  This assessment must be completed prior to your procedure.  Failure to complete the Nurse assessment may result in the procedure being cancelled.       On the day of your procedure, please designate an adult(s) who can drive you home stay with you for the next 24 hours. The medicines used in the exam will make you sleepy. You will not be able to drive.       You cannot take public transportation, ride share services, or non-medical taxi service without a responsible caregiver.  Medical transport services are allowed with the requirement that a responsible caregiver will receive you at your destination.  We require that drivers and caregivers are confirmed prior to your procedure.

## 2023-06-16 ENCOUNTER — TELEPHONE (OUTPATIENT)
Dept: GASTROENTEROLOGY | Facility: CLINIC | Age: 63
End: 2023-06-16

## 2023-06-16 NOTE — TELEPHONE ENCOUNTER
Attempted to contact patient regarding upcoming Upper endoscopy (EGD) procedure on 6.28.23 for pre assessment questions.     Patient answered the phone however states unable to talk at this time.    Provided number to return call to [phone] #[phone]    Discuss Covid policy and designated  policy.    Pre op exam? N/A    Arrival time: 1330. Procedure time: 1430    Facility location: [facility]; [address] [place] [address], [address] Floor, [place] [address]    Sedation type: MAC    NSAIDs? No NSAID medications per patient's medication list.  RN will verify with pre-assessment call.    Anticoagulants: No    Electronic implanted devices? No    Diabetic? No    HOLD (if applicable)  Oral diabetic medications: N/A  Diabetic injectables: N/A  Insulin: N/A    Indication for procedure: abnormal weight loss        Joyce Lyman RN  Endoscopy Procedure Pre Assessment RN

## 2023-06-19 ENCOUNTER — VIRTUAL VISIT (OUTPATIENT)
Dept: PSYCHOLOGY | Facility: CLINIC | Age: 63
End: 2023-06-19
Payer: MEDICARE

## 2023-06-19 DIAGNOSIS — F41.1 GENERALIZED ANXIETY DISORDER: ICD-10-CM

## 2023-06-19 DIAGNOSIS — F43.10 POST TRAUMATIC STRESS DISORDER (PTSD): ICD-10-CM

## 2023-06-19 DIAGNOSIS — F33.2 SEVERE RECURRENT MAJOR DEPRESSION WITHOUT PSYCHOTIC FEATURES (H): Primary | ICD-10-CM

## 2023-06-19 PROCEDURE — 90834 PSYTX W PT 45 MINUTES: CPT | Mod: 95 | Performed by: COUNSELOR

## 2023-06-19 NOTE — PROGRESS NOTES
"        [facility]                                     Progress Note    Patient Name: Linda Walsh  Date: 6/19/23         Service Type: Individual      Session Start Time: 2:30pm  Session End Time: 3:20pm     Session Length: 50    Session #: 90    Attendees: Client      Service Modality:  Phone Visit:      Provider verified identity through the following two step process.  Patient provided:  Patient is known previously to provider    The patient has been notified of the following:      \"We have found that certain health care needs can be provided without the need for a face to face visit.  This service lets us provide the care you need with a phone conversation.       I will have full access to your [facility] medical record during this entire phone call.   I will be taking notes for your medical record.      Since this is like an office visit, we will bill your insurance company for this service.       There are potential benefits and risks of telephone visits (e.g. limits to patient confidentiality) that differ from in-person visits.?Confidentiality still applies for telephone services, and nobody will record the visit.  It is important to be in a quiet, private space that is free of distractions (including cell phone or other devices) during the visit.??      If during the course of the call I believe a telephone visit is not appropriate, you will not be charged for this service\"     Consent has been obtained for this service by care team member: Yes     DATA  Interactive Complexity: No  Crisis: No        Progress Since Last Session (Related to Symptoms / Goals / Homework):   Symptoms: No change .    Homework: Completed in session      Episode of Care Goals: Minimal progress - ACTION (Actively working towards change); Intervened by reinforcing change plan / affirming steps taken     Current / Ongoing Stressors and Concerns:   The client stated she's been feeling really lost. " "     Treatment Objective(s) Addressed in This Session:   use thought-stopping strategy daily to reduce intrusive thoughts       Intervention:   Processed through her thoughts and feelings about her . She stated he continues to start arguments and gaslight her. She stated she's been struggling with thinking she's just being \"horrible to him\" and not being empathetic enough.  Talked about what boundaries she wants to continue to have with him and how she can work to take care of herself.        Assessments completed prior to visit:  The following assessments were completed by patient for this visit:  PHQA:        View : No data to display.              GAD7:       12/30/2019     1:39 PM 2/26/2020    11:18 AM 5/27/2020     3:23 PM 7/28/2020    10:37 AM 2/24/2021     1:26 PM 7/13/2022    10:16 AM 6/9/2023    10:20 AM   MARLIN-7 SCORE   Total Score 19 (severe anxiety)   19 (severe anxiety) 16 (severe anxiety) 21 (severe anxiety) 13 (moderate anxiety)   Total Score 19 19 21 19 16 21 13     PROMIS 10-Global Health (all questions and answers displayed):        View : No data to display.                  ASSESSMENT: Current Emotional / Mental Status (status of significant symptoms):   Risk status (Self / Other harm or suicidal ideation)   Patient denies current fears or concerns for personal safety.   Patient denies current or recent suicidal ideation or behaviors.   Patient denies current or recent homicidal ideation or behaviors.   Patient denies current or recent self injurious behavior or ideation.   Patient denies other safety concerns.   Patient reports there has been no change in risk factors since their last session.     Patient reports there has been no change in protective factors since their last session.     Recommended that patient call 911 or go to the local ED should there be a change in any of these risk factors.     Appearance:   Appropriate    Eye Contact:   Good    Psychomotor Behavior: Normal "    Attitude:   Cooperative    Orientation:   All   Speech    Rate / Production: Normal/ Responsive    Volume:  Normal    Mood:    Normal   Affect:    Appropriate    Thought Content:  Clear    Thought Form:  Coherent  Logical    Insight:    Good      Medication Review:   No changes to current psychiatric medication(s)     Medication Compliance:   Yes     Changes in Health Issues:   None reported     Chemical Use Review:   Substance Use: Chemical use reviewed, no active concerns identified      Tobacco Use: No change in amount of tobacco use since last session.  Patient declined discussion at this time    Diagnosis:  1. 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 Generalized anxiety disorder    3. Post traumatic stress disorder (PTSD)        Collateral Reports Completed:   Not Applicable    PLAN: (Patient Tasks / Therapist Tasks / Other)  Client to work on self care.         Nasrin Valladares, [facility]                                                         ______________________________________________________________________    Individual Treatment Plan    Patient's Name: Linda Walsh  YOB: 1960    Date of Creation: 7/11/22  Date Treatment Plan Last Reviewed/Revised: 4/10/23    DSM5 Diagnoses: 296.33 (F33.2) Major Depressive Disorder, Recurrent Episode, Severe _, 300.02 (F41.1) Generalized Anxiety Disorder or 309.81 (F43.10) Posttraumatic Stress Disorder (includes Posttraumatic Stress Disorder for Children 6 Years and Younger)  Without dissociative symptoms  Psychosocial / Contextual Factors: CRPS  PROMIS (reviewed every 90 days):     Referral / Collaboration:  Referral to another professional/service is not indicated at this time..    Anticipated number of session for this episode of care: on-going  Anticipation frequency of session: Monthly  Anticipated Duration of each session: 38-52 minutes/53+ minutes  Treatment plan will be reviewed in 90 days or when goals have been  changed.       MeasurableTreatment Goal(s) related to diagnosis / functional impairment(s)  Goal 1: Client will decrease thoughts of wanting to be dead from 10 out of 10 opportunities to at most 9 out of 10 opportunities for at least 3 consecutive weeks as evidenced by client report and a decrease in symptom report as evidenced by PhQ9 scores and GAD7 scores.    Objective #A (Client Action)    Client will Client will look at and read safety plan at least once a day and follow safety plan when thoughts and urges come up.  Status: Continued - Date(s): 4/10/23    Intervention(s)  Therapist will teach emotional regulation skills. distress tolerance skills, interpersonal effectiveness skills, emotion regluation skills, mindfulness skills, radical acceptance. Therapist will develop safety plan with the client.     Objective #B  Client will Client will agree to call the therapist or after hours crisis line if noticing intense suicidal thoughts for skills coaching.   Status: Continued - Date(s): 4/10/23    Intervention(s)  Therapist will Therapist will be available as much as possible during work hours for the client to contact and give the client several crisis resources.         Goal 2: Client will increase the use of skills (emotion regulation, distress tolerance, communication skill) from 1 out of 10 opportunities to at least 2 out of 10 opportunities for at least 3 consecutive weeks as evidenced by client report and a decrease in symptom report as evidenced by PhQ9 scores and GAD7 scores.     Objective #A (Client Action)    Status: Continued - Date(s): 4/10/23    Client will Increase interest, engagement, and pleasure in doing things  Decrease frequency and intensity of feeling down, depressed, hopeless  Improve diet, appetite, mindful eating, and / or meal planning  Identify negative self-talk and behaviors: challenge core beliefs, myths, and actions  Improve concentration, focus, and mindfulness in daily activities    Feel less fidgety, restless or slow in daily activities / interpersonal interactions  Decrease thoughts that you'd be better off dead or of suicide / self-harm.    Intervention(s)  Therapist will teach emotional regulation skills. distress tolerance skills, interpersonal effectiveness skills, emotion regluation skills, mindfulness skills, radical acceptance. Therapist will teach client how to ID body cues for anxiety, anxiety reduction techniques, how to ID triggers for depression and anxiety- decrease reactivity/eliminate, lifestyle changes to reduce depression and anxiety, communication skills, explore cognitive beliefs and help client to develop healthy cognitive patterns and beliefs.    Objective #B  Client will Client will learn and  practice DBT skills daily..    Status: Continued - Date(s): 4/10/23    Intervention(s)  Therapist will Therapist will Therapist will teach emotional regulation skills. distress tolerance skills, interpersonal effectiveness skills, emotion regluation skills, mindfulness skills, radical acceptance..      Goal 3: Client will decrease anxious symptoms and reactions to triggers from 9 out of 10 opportunities to at most 8 out of 10 opportunities for at least 3 consecutive weeks as evidenced by client report and a decrease in symptom report as evidenced by PhQ9 scores and GAD7 scores.    Objective #A (Client Action)    Status: Continued - Date(s): 4/10/23    Client will Client will use cognitive strategies identified in therapy to challenge anxious thoughts.    Intervention(s)  Therapist will Therapist will teach emotional recognition/identification. emotion regulation skills, coping skills, mindfulness skills.    Objective #B  Client will Client will use thought-stopping strategy daily to reduce intrusive thoughts for at least 3 consecutive weeks as evidenced by client report and a decrease in symptom report as evidenced by PhQ9 scores and GAD7 scores.      Status: Continued - Date(s):  "4/10/23    Intervention(s)  Therapist will teach emotional regulation skills. distress tolerance skills, interpersonal effectiveness skills, emotion regluation skills, mindfulness skills, radical acceptance. Therapist will teach client how to ID body cues for anxiety, anxiety reduction techniques, how to ID triggers for depression and anxiety- decrease reactivity/eliminate, lifestyle changes to reduce depression and anxiety, communication skills, explore cognitive beliefs and help client to develop healthy cognitive patterns and beliefs.    Objective #C  Client will Client will learn 5 crisis survival skills during the Distress Tolerance Module (6-8 weeks) for at least 3 consecutive weeks as evidenced by client report and a decrease in symptom report as evidenced by PhQ9 scores and GAD7 scores.  Status: Continued - Date(s): 4/10/23    Intervention(s)  Therapist will teach emotional regulation skills. distress tolerance skills, interpersonal effectiveness skills, emotion regluation skills, mindfulness skills, radical acceptance.      Client has reviewed and agreed to the above plan.      Nasrin Valladares, [facility]        Linda Walsh     SAFETY PLAN:  Step 1: Warning signs / cues (Thoughts, images, mood, situation, behavior) that a crisis may be developing:    Thoughts: \"I don't matter\", \"People would be better off without me\", \"I'm a burden\", \"I can't do this anymore\", \"I just want this to end\" and \"Nothing makes it better\"    Images: obsessive thoughts of death or dying: car accident, using a gun and flashbacks    Thinking Processes: ruminations (can't stop thinking about my problems): court case, pain, racing thoughts, highly critical and negative thoughts: \"I can't do anything, I have to suffer everyday and go to work and other people have it so easy.\"  and paranoia: that the [employer] is watching me    Mood: worsening depression, hopelessness, helplessness, intense anger, intense worry, agitation, disinhibited " "(not caring about things or consequences) and mood swings    Behaviors: isolating/withdrawing , can't stop crying, not taking care of myself, not taking care of my responsibilities, sleeping too much and not sleeping enough    Situations: legal issues: current court case, pain, trauma , financial stress and medical condition / diagnosis: CRPS   Step 2: Coping strategies - Things I can do to take my mind off of my problems without contacting another person (relaxation technique, physical activity):    Distress Tolerance Strategies:  arts and crafts: with her residents, play with my pet , pray, change body temperature (ice pack/cold water) , paced breathing/progressive muscle relaxation and puzzles, games on ipad    Physical Activities: deep breathing    Focus on helpful thoughts:  \"Ride the wave\", think about happy memories: when the grandkids were born, going camping, when the boys were little and remind myself of what is important to me: grandkids, family, the residents  Step 3: People and social settings that provide distraction:   Name: Alpesh  Phone: [phone]   Name: Velasquez  Phone:    Name: Thaddeus  Phone:     work   Step 4: Remind myself of people and things that are important to me and worth living for:    My family, my residents    Step 5: When I am in crisis, I can ask these people to help me use my safety plan:   Name: Alpesh  Phone: [phone]   Name: Velasquez  Phone: (number in phone)   Name: Thaddeus  Phone: (number in phone)  Step 6: Making the environment safe:     be around others  Step 7: Professionals or agencies I can contact during a crisis:    [facility] Daytime Number: [phone]    Suicide Prevention Lifeline: 8-915-615-UUPG (4314)    Crisis Text Line Service (available 24 hours a day, 7 days a week): Text [place] to 772170  Local Crisis Services:     Call 911 or go to my nearest emergency department.   I helped develop this safety plan and agree to use it when needed.  I have been " given a copy of this plan.      Client signature _________________________________________________________________  Today s date: 7/11/22  Adapted from Safety Plan Template 2008 Griselda Perez and Livan Cutler is reprinted with the express permission of the authors.  No portion of the Safety Plan Template may be reproduced without the express, written permission.  You can contact the authors at [email or web address] or [email or web address].

## 2023-06-20 NOTE — TELEPHONE ENCOUNTER
Second attempt for pre-assessment prior to upcoming upper endoscopy (EGD) on 6.28.23    No answer.  Left message to return call [phone] #4    MyChart message sent    Joyce Lyman RN  Endoscopy Procedure Pre Assessment RN

## 2023-06-23 ENCOUNTER — TELEPHONE (OUTPATIENT)
Dept: GASTROENTEROLOGY | Facility: CLINIC | Age: 63
End: 2023-06-23
Payer: MEDICARE

## 2023-06-23 NOTE — TELEPHONE ENCOUNTER
Caller: Linda  Reason for Reschedule/Cancellation: Patient did not realize procedure scheduled at [place] and can not make it . Would like to reschedule to [facility].      Prior to reschedule please review:    Ordering Provider: Tomasa Medina PA-C    Sedation per order: MAC    Does patient have any ASC Exclusions, please identify?: N      Notes on Cancelled Procedure:    Procedure: Upper Endoscopy [EGD]     Date: 6/28    Location: [facility]; [address] [place] [address], [address] [place] [address]  Surgeon: LEVENTHAL      Procedure: Lower Endoscopy [Colonoscopy]     Date: 09/07/    Location: [facility]; [address] [place] [address], [address], [place] [id number]    Surgeon: Rohith      Rescheduled: Yes  Procedure: Upper Endoscopy [EGD]   Date: 06/26  Surgeon: Herbert    Procedure: Colonoscopy  DOS 08/14  Surgeon: Cynthia      Location: [facility]; [address] [place] [address] [place] [address]    Sedation Level Scheduled  MAC,  Reason for Sedation Level Order  Prep/Instructions updated and sent: Dona      Patient request two separate appointment with EGD first then Colonoscopy later.

## 2023-06-23 NOTE — TELEPHONE ENCOUNTER
Third attempt for pre-assessment prior to upcoming upper endoscopy (EGD).    Patient stated they need to r/s.  RN reached out to the scheduling team to contact pt.     Shannan Seay RN  Endoscopy Procedure Pre Assessment RN

## 2023-06-23 NOTE — TELEPHONE ENCOUNTER
Caller:   Reason for Reschedule/Cancellation (please be detailed, any staff messages or encounters to note?): WAS SCHEDULED AT [facility]. LIVES IN [place] AND CANT DO THAT.      Prior to reschedule please review:    Ordering Provider: CHRISTOPHER PATEL    Sedation per order: MAC    Does patient have any ASC Exclusions, please identify?:       Notes on Cancelled Procedure:    Procedure: Upper Endoscopy [EGD]     Date: 6/28    Location: [facility]; [address] [place] [address], [address] Floor, [place] [address]    Surgeon: LEVENTHAL      Rescheduled: No  PATIENT ALSO HAS A COLON SCHEDULED 9/7 WITH SONIA AT [facility] I LVM TO CALL TO RESCHEDULE THE EGD THEN SEEN THE COLON. I DIDN'T CANCEL THAT ONE YET BUT DID CALL BACK AND LEFT ANOTHER VM TO LET HER KNOW THAT WAS STILL SCHEDULED.     Send In - basket message to Panc - Rusty Pool if EUS  procedure is canceled or rescheduled: [ N/A, YES or NO]

## 2023-06-25 ENCOUNTER — ANESTHESIA EVENT (OUTPATIENT)
Dept: GASTROENTEROLOGY | Facility: CLINIC | Age: 63
End: 2023-06-25
Payer: MEDICARE

## 2023-06-25 ASSESSMENT — LIFESTYLE VARIABLES: TOBACCO_USE: 1

## 2023-06-25 NOTE — H&P
[facility] History and Physical    Linda Walsh MRN# [id number]   Age: 62 year old YOB: 1960     Date of Admission:  (Not on file)    Home clinic: [facility]  Primary care provider: Jonna Gaitan          Impression and Plan:   Impression:   Abnormal weight loss [R63.4]  Elevated pancreatic enzyme [R74.8]  Last EGD 2011 - no h. Pylori.  Results not viewable      Plan:   Proceed to EGD as planned.  The procedure, risks(bleeding, perforation), benefits and alternatives were discussed and the patient agrees to proceed. Cleared for Anesthesia             Chief Complaint:   Abnormal weight loss [R63.4]  Elevated pancreatic enzyme [R74.8]    History is obtained from the patient          History of Present Illness:   This 62 year old female is being seen at this time for evaluation for EGD.  Nausea vomiting and reflux sx.  On tums and PPI.           Past Medical History:     Past Medical History:   Diagnosis Date     Anxiety      Asthma      Backache, unspecified      Esophageal reflux      Generalized anxiety disorder      Medical cannabis use 05/08/2017    used very short time and stopped     Mild intermittent asthma     Asthma, allergy induced     PONV (postoperative nausea and vomiting)      Reflex sympathetic dystrophy of left lower extremity             Past Surgical History:     Past Surgical History:   Procedure Laterality Date     ARTHROPLASTY HIP Right 10/5/2021    Procedure: Right total hip replacement;  Surgeon: Velasquez Stewart DO;  [facility]: [facility]     COLONOSCOPY  6/29/2011    Procedure:COMBINED COLONOSCOPY, SINGLE BIOPSY/POLYPECTOMY BY BIOPSY; Surgeon:JENIFER LANDA; Location:[facility]     COLONOSCOPY  6/29/2011    Procedure:COMBINED COLONOSCOPY, REMOVE TUMOR/POLYP/LESION BY SNARE; Surgeon:JENIFER LANDA; [facility]     ESOPHAGOSCOPY, GASTROSCOPY, DUODENOSCOPY (EGD), COMBINED  8/23/2011    Procedure:COMBINED ESOPHAGOSCOPY, GASTROSCOPY,  "DUODENOSCOPY (EGD), BIOPSY SINGLE OR MULTIPLE; ESOPHAGOGASTRODUODENOSCOPY WITH       HC INJECTION ANES AGENT AND/OR STERIOD, SCIATIC NERVE  13    [facility]     HYSTERECTOMY      fibroids, no h/o previous abn paps     HYSTERECTOMY, PAP NO LONGER INDICATED       LAPAROSCOPIC CHOLECYSTECTOMY  3/19/2014    Procedure: LAPAROSCOPIC CHOLECYSTECTOMY;  Laparoscopic Cholecystectomy;  Surgeon: Marcus Griffith MD;  Location: [facility]     NERVE BLOCK SYMPATHETIC LUMBAR  2014    [facility]     OPEN REDUCTION INTERNAL FIXATION ANKLE  2011    Procedure:OPEN REDUCTION INTERNAL FIXATION ANKLE; Open Reduction Internal Fixation Left Ankle; Surgeon:ADONAY SHRESTHA; Location:[facility]     OPEN REDUCTION INTERNAL FIXATION ANKLE  12    Left ankle.  [facility]     ZZC NONSPECIFIC PROCEDURE      Hysterectomy            Social History:     Social History     Tobacco Use     Smoking status: Former     Packs/day: 0.25     Years: 10.00     Pack years: 2.50     Types: Cigarettes     Quit date: 2021     Years since quittin.7     Smokeless tobacco: Never   Substance Use Topics     Alcohol use: Yes     Comment: rare use             Family History:     Family History   Problem Relation Age of Onset     Heart Disease Mother         60's     Cardiovascular Mother         heart     Heart Disease Father         40's     Arthritis Father         RA     Other Cancer Father      Skin Cancer Father      Other - See Comments Father         \"mini stroke\"     Heart Surgery Father      Arthritis Paternal Grandmother         RA     Diabetes Son      Coronary Artery Disease Son      Depression Paternal Aunt         anxiety, too     Arthritis Paternal Aunt         RA            Immunizations:     VACCINE/DOSE   Diptheria   DPT   DTAP   HBIG   Hepatitis A   Hepatitis B   HIB   Influenza   Measles   Meningococcal   MMR   Mumps   Pneumococcal   Polio   Rubella   Small Pox   TDAP   Varicella   Zoster            " Allergies:     Allergies   Allergen Reactions     Penicillins Hives            Medications:     No current facility-administered medications for this encounter.     Current Outpatient Medications   Medication Sig     albuterol (PROAIR HFA/PROVENTIL HFA/VENTOLIN HFA) 108 (90 Base) MCG/ACT inhaler Inhale 2 puffs into the lungs every 6 hours as needed for shortness of breath or wheezing     atorvastatin (LIPITOR) 20 MG tablet Take 1 tablet (20 mg) by mouth daily     bisacodyl (DULCOLAX) 10 MG suppository UNWRAP AND INSERT 1 SUPPOSITORY(10 MG) RECTALLY DAILY. DISCONTINUE IF HAVING BOWEL MOVEMENTS Strength: 10 mg (Patient not taking: Reported on 6/9/2023)     DULoxetine (CYMBALTA) 60 MG capsule Take 60 mg by mouth daily      fentaNYL (DURAGESIC) 25 mcg/hr 72 hr patch Place 1 patch onto the skin every 72 hours remove old patch.     fluticasone (FLONASE) 50 MCG/ACT nasal spray Spray 2 sprays into both nostrils daily     gabapentin (NEURONTIN) 300 MG capsule Take 900 mg by mouth 3 times daily      hydrOXYzine (ATARAX) 10 MG tablet Take 1 tablet (10 mg) by mouth every 4 hours as needed for anxiety     ipratropium - albuterol 0.5 mg/2.5 mg/3 mL (DUONEB) 0.5-2.5 (3) MG/3ML nebulization Take 1 vial (3 mLs) by nebulization every 6 hours as needed for shortness of breath / dyspnea (Patient not taking: Reported on 6/9/2023)     linaclotide (LINZESS) 145 MCG capsule Take 1 capsule (145 mcg) by mouth every morning (before breakfast)     omeprazole (PRILOSEC) 40 MG DR capsule Take 1 capsule (40 mg) by mouth daily     ondansetron (ZOFRAN-ODT) 4 MG ODT tab DISSOLVE 1 TABLET ON TONGUE EVERY 8 HOURS AS NEEDED FOR NAUSEA     order for DME Equipment being ordered: Nebulizer     oxyCODONE (ROXICODONE) 5 MG tablet Take 1-3 tablets (5-15 mg) by mouth every 3 hours as needed for pain (Moderate to Severe)     polyethylene glycol (MIRALAX) powder Take 51 g (3 capfuls) by mouth 2 times daily     traZODone (DESYREL) 50 MG tablet Take 3 tablets  (150 mg) by mouth At Bedtime             Review of Systems:   The review of systems was positive for the following findings.  None.  The remainder of the review of systems was unremarkable.          Physical Exam:   All vitals have been reviewed  not currently breastfeeding.  No intake or output data in the 24 hours ending 06/25/23 1827  SHEENT examination revealed NC/AT, EOMI.  Examination of the chest revealed CTA.  Examination of the heart revealed RRR.  Examination of the abdomen revealed soft, non tender.  The neuromuscular examination was NL.          Data:   All laboratory data reviewed  No results found for any visits on 06/26/23.  -     Marcus Griffith MD, FACS

## 2023-06-25 NOTE — ANESTHESIA PREPROCEDURE EVALUATION
Anesthesia Pre-Procedure Evaluation    Patient: Linda Walsh   MRN: [id number] : 1960        Procedure : Procedure(s):  Esophagoscopy, gastroscopy, duodenoscopy (EGD), combined          Past Medical History:   Diagnosis Date     Anxiety      Asthma      Backache, unspecified      Esophageal reflux      Generalized anxiety disorder      Medical cannabis use 2017    used very short time and stopped     Mild intermittent asthma     Asthma, allergy induced     PONV (postoperative nausea and vomiting)      Reflex sympathetic dystrophy of left lower extremity       Past Surgical History:   Procedure Laterality Date     ARTHROPLASTY HIP Right 10/5/2021    Procedure: Right total hip replacement;  Surgeon: Velasquez Stewart DO;  [facility]     COLONOSCOPY  2011    Procedure:COMBINED COLONOSCOPY, SINGLE BIOPSY/POLYPECTOMY BY BIOPSY; Surgeon:JENIFER LANDA; Location:[facility]     COLONOSCOPY  2011    Procedure:COMBINED COLONOSCOPY, REMOVE TUMOR/POLYP/LESION BY SNARE; Surgeon:JENIFER LANDA; [facility]     ESOPHAGOSCOPY, GASTROSCOPY, DUODENOSCOPY (EGD), COMBINED  2011    Procedure:COMBINED ESOPHAGOSCOPY, GASTROSCOPY, DUODENOSCOPY (EGD), BIOPSY SINGLE OR MULTIPLE; ESOPHAGOGASTRODUODENOSCOPY WITH       HC INJECTION ANES AGENT AND/OR STERIOD, SCIATIC NERVE  13    [facility]     HYSTERECTOMY      fibroids, no h/o previous abn paps     HYSTERECTOMY, PAP NO LONGER INDICATED       LAPAROSCOPIC CHOLECYSTECTOMY  3/19/2014    Procedure: LAPAROSCOPIC CHOLECYSTECTOMY;  Laparoscopic Cholecystectomy;  Surgeon: Marcus Griffith MD;  [facility]: [facility]     NERVE BLOCK SYMPATHETIC LUMBAR  2014    [facility]     OPEN REDUCTION INTERNAL FIXATION ANKLE  2011    Procedure:OPEN REDUCTION INTERNAL FIXATION ANKLE; Open Reduction Internal Fixation Left Ankle; Surgeon:ADONAY SHRESTHA; Location:[facility]     OPEN REDUCTION INTERNAL FIXATION ANKLE  12    Left  ankle.       ZZC NONSPECIFIC PROCEDURE      Hysterectomy      Allergies   Allergen Reactions     Penicillins Hives      Social History     Tobacco Use     Smoking status: Former     Packs/day: 0.25     Years: 10.00     Pack years: 2.50     Types: Cigarettes     Quit date: 2021     Years since quittin.7     Smokeless tobacco: Never   Substance Use Topics     Alcohol use: Yes     Comment: rare use       Wt Readings from Last 1 Encounters:   23 72.6 kg (160 lb)        Anesthesia Evaluation   Pt has had prior anesthetic. Type: General and MAC.    History of anesthetic complications  - PONV.      ROS/MED HX  ENT/Pulmonary:     (+) tobacco use, Past use, Intermittent, asthma Treatment: Inhaler prn,      Neurologic:  - neg neurologic ROS     Cardiovascular:     (+) Dyslipidemia -----Previous cardiac testing   Echo: Date: Results:    Stress Test: Date: Results:    ECG Reviewed: Date:  Results:  Sinus Rhythm   Low voltage -possible pulmonary disease.     Cath: Date: Results:      METS/Exercise Tolerance:     Hematologic:  - neg hematologic  ROS     Musculoskeletal: Comment: [facility] [employer]    CLBP and cervicalgia  (+) arthritis,     GI/Hepatic:     (+) GERD, Symptomatic,     Renal/Genitourinary:  - neg Renal ROS     Endo:  - neg endo ROS     Psychiatric/Substance Use:     (+) psychiatric history anxiety and depression H/O chronic opiod use .     Infectious Disease:  - neg infectious disease ROS     Malignancy:  - neg malignancy ROS     Other:  - neg other ROS    (+) , H/O Chronic Pain,        Physical Exam    Airway  airway exam normal      Mallampati: II   TM distance: > 3 FB   Neck ROM: full   Mouth opening: > 3 cm    Respiratory Devices and Support         Dental       (+) Minor Abnormalities - some fillings, tiny chips      Cardiovascular   cardiovascular exam normal       Rhythm and rate: regular and normal     Pulmonary   pulmonary exam  normal        breath sounds clear to auscultation           OUTSIDE LABS:  CBC:   Lab Results   Component Value Date    WBC 7.0 06/09/2023    WBC 7.2 09/29/2021    HGB 15.3 06/09/2023    HGB 14.4 03/14/2023    HCT 46.6 06/09/2023    HCT 44.2 09/29/2021     06/09/2023     09/29/2021     BMP:   Lab Results   Component Value Date     06/09/2023     03/14/2023    POTASSIUM 4.5 06/09/2023    POTASSIUM 3.8 03/14/2023    CHLORIDE 102 06/09/2023    CHLORIDE 106 03/14/2023    CO2 26 06/09/2023    CO2 26 03/14/2023    BUN 10.5 06/09/2023    BUN 13 03/14/2023    CR 0.97 (H) 06/09/2023    CR 0.94 03/14/2023     (H) 06/09/2023     (H) 03/14/2023     COAGS: No results found for: PTT, INR, FIBR  POC: No results found for: BGM, HCG, HCGS  HEPATIC:   Lab Results   Component Value Date    ALBUMIN 4.2 06/09/2023    PROTTOTAL 7.0 06/09/2023    ALT 15 06/09/2023    AST 19 06/09/2023    ALKPHOS 64 06/09/2023    BILITOTAL 1.3 (H) 06/09/2023     OTHER:   Lab Results   Component Value Date    A1C 5.3 06/08/2022    CHRIS 9.8 06/09/2023    LIPASE 33 06/09/2023    AMYLASE 133 (H) 06/09/2023    TSH 1.00 06/09/2023    CRP <2.9 11/29/2015    SED 12 06/09/2023       Anesthesia Plan    ASA Status:  2   NPO Status:  NPO Appropriate    Anesthesia Type: MAC.      Maintenance: TIVA.        Consents    Anesthesia Plan(s) and associated risks, benefits, and realistic alternatives discussed. Questions answered and patient/representative(s) expressed understanding.    - Discussed:     - Discussed with:  Patient    Use of blood products discussed: No .     Postoperative Care            Comments:    Other Comments: The risks and benefits of anesthesia, and the alternatives where applicable, have been discussed with the patient, and they wish to proceed.            Paulo Mckeon APRN CRNA

## 2023-06-26 ENCOUNTER — HOSPITAL ENCOUNTER (OUTPATIENT)
Facility: CLINIC | Age: 63
Discharge: HOME | End: 2023-06-26
Attending: SPECIALIST | Admitting: SPECIALIST
Payer: MEDICARE

## 2023-06-26 ENCOUNTER — ANESTHESIA (OUTPATIENT)
Dept: GASTROENTEROLOGY | Facility: CLINIC | Age: 63
End: 2023-06-26
Payer: MEDICARE

## 2023-06-26 VITALS
DIASTOLIC BLOOD PRESSURE: 69 MMHG | RESPIRATION RATE: 18 BRPM | HEART RATE: 66 BPM | OXYGEN SATURATION: 98 % | SYSTOLIC BLOOD PRESSURE: 121 MMHG | TEMPERATURE: 97 F

## 2023-06-26 LAB — UPPER GI ENDOSCOPY: NORMAL

## 2023-06-26 PROCEDURE — 250N000009 HC RX 250: Performed by: NURSE ANESTHETIST, CERTIFIED REGISTERED

## 2023-06-26 PROCEDURE — 43239 EGD BIOPSY SINGLE/MULTIPLE: CPT | Performed by: SPECIALIST

## 2023-06-26 PROCEDURE — 250N000011 HC RX IP 250 OP 636: Performed by: NURSE ANESTHETIST, CERTIFIED REGISTERED

## 2023-06-26 PROCEDURE — 258N000003 HC RX IP 258 OP 636: Performed by: NURSE ANESTHETIST, CERTIFIED REGISTERED

## 2023-06-26 PROCEDURE — 250N000009 HC RX 250: Performed by: SPECIALIST

## 2023-06-26 PROCEDURE — 88305 TISSUE EXAM BY PATHOLOGIST: CPT | Mod: TC | Performed by: SPECIALIST

## 2023-06-26 PROCEDURE — 370N000017 HC ANESTHESIA TECHNICAL FEE, PER MIN: Performed by: SPECIALIST

## 2023-06-26 RX ORDER — LIDOCAINE 40 MG/G
CREAM TOPICAL
Status: DISCONTINUED | OUTPATIENT
Start: 2023-06-26 | End: 2023-06-26 | Stop reason: HOSPADM

## 2023-06-26 RX ORDER — PROPOFOL 10 MG/ML
INJECTION, EMULSION INTRAVENOUS PRN
Status: DISCONTINUED | OUTPATIENT
Start: 2023-06-26 | End: 2023-06-26

## 2023-06-26 RX ORDER — GLYCOPYRROLATE 0.2 MG/ML
INJECTION, SOLUTION INTRAMUSCULAR; INTRAVENOUS PRN
Status: DISCONTINUED | OUTPATIENT
Start: 2023-06-26 | End: 2023-06-26

## 2023-06-26 RX ORDER — LIDOCAINE HYDROCHLORIDE 20 MG/ML
INJECTION, SOLUTION INFILTRATION; PERINEURAL PRN
Status: DISCONTINUED | OUTPATIENT
Start: 2023-06-26 | End: 2023-06-26

## 2023-06-26 RX ORDER — SODIUM CHLORIDE, SODIUM LACTATE, POTASSIUM CHLORIDE, CALCIUM CHLORIDE 600; 310; 30; 20 MG/100ML; MG/100ML; MG/100ML; MG/100ML
INJECTION, SOLUTION INTRAVENOUS CONTINUOUS
Status: DISCONTINUED | OUTPATIENT
Start: 2023-06-26 | End: 2023-06-26 | Stop reason: HOSPADM

## 2023-06-26 RX ORDER — FENTANYL CITRATE 50 UG/ML
INJECTION, SOLUTION INTRAMUSCULAR; INTRAVENOUS PRN
Status: DISCONTINUED | OUTPATIENT
Start: 2023-06-26 | End: 2023-06-26

## 2023-06-26 RX ADMIN — FENTANYL CITRATE 50 MCG: 50 INJECTION, SOLUTION INTRAMUSCULAR; INTRAVENOUS at 08:03

## 2023-06-26 RX ADMIN — LIDOCAINE HYDROCHLORIDE 1 ML: 10 INJECTION, SOLUTION EPIDURAL; INFILTRATION; INTRACAUDAL; PERINEURAL at 07:24

## 2023-06-26 RX ADMIN — PROPOFOL 90 MG: 10 INJECTION, EMULSION INTRAVENOUS at 08:05

## 2023-06-26 RX ADMIN — FENTANYL CITRATE 50 MCG: 50 INJECTION, SOLUTION INTRAMUSCULAR; INTRAVENOUS at 08:01

## 2023-06-26 RX ADMIN — SODIUM CHLORIDE, POTASSIUM CHLORIDE, SODIUM LACTATE AND CALCIUM CHLORIDE: 600; 310; 30; 20 INJECTION, SOLUTION INTRAVENOUS at 07:24

## 2023-06-26 RX ADMIN — LIDOCAINE HYDROCHLORIDE 80 MG: 20 INJECTION, SOLUTION INFILTRATION; PERINEURAL at 08:03

## 2023-06-26 RX ADMIN — GLYCOPYRROLATE 0.1 MG: 0.2 INJECTION, SOLUTION INTRAMUSCULAR; INTRAVENOUS at 08:01

## 2023-06-26 ASSESSMENT — ACTIVITIES OF DAILY LIVING (ADL)
ADLS_ACUITY_SCORE: 35
ADLS_ACUITY_SCORE: 35

## 2023-06-26 NOTE — ANESTHESIA POSTPROCEDURE EVALUATION
Patient: Linda Walsh    Procedure: Procedure(s):  Esophagoscopy, gastroscopy, duodenoscopy (EGD), combined       Anesthesia Type:  MAC    Note:  Disposition: Outpatient   Postop Pain Control: Uneventful            Sign Out: Well controlled pain   PONV: No   Neuro/Psych: Uneventful            Sign Out: Acceptable/Baseline neuro status   Airway/Respiratory: Uneventful            Sign Out: Acceptable/Baseline resp. status   CV/Hemodynamics: Uneventful            Sign Out: Acceptable CV status   Other NRE: NONE   DID A NON-ROUTINE EVENT OCCUR? No    Event details/Postop Comments:  Pt was happy with anesthesia care.  No complications.  I will follow up with the pt if needed.           Last vitals:  Vitals Value Taken Time   /67 06/26/23 0830   Temp     Pulse 61 06/26/23 0830   Resp 18 06/26/23 0840   SpO2 98 % 06/26/23 0847   Vitals shown include unvalidated device data.    Electronically Signed By: HERMELINDO Sweeney CRNA  June 26, 2023  8:48 AM

## 2023-06-26 NOTE — DISCHARGE INSTRUCTIONS
[facility]    Home Care Following Endoscopy          Activity:  You have just undergone an endoscopic procedure usually performed with conscious sedation.  Do not work or operate machinery (including a car) for at least 12 hours.      Diet:  Return to the diet you were on before your procedure but eat lightly for the first 12-24 hours.  Resume any regular medications unless otherwise advised by your physician.  Please begin any new medication prescribed as a result of your procedure as directed by your physician.   If you had any biopsy or polyp removed please refrain from aspirin or aspirin products for 2 days.  If on Coumadin please restart as instructed by your physician.   Pain:  You may take Tylenol as needed for pain.  Expected Recovery:   It is normal to have a mild sore throat after upper endoscopy.    Call Your Physician if You Have:  After Upper Endoscopy:  Shoulder, back or chest pain.  Difficulty breathing or swallowing.  Vomiting blood.    Any questions or concerns about your recovery, please call [phone] or after hours [phone] ([phone]) Nurse Advice Line.    Follow-up Care:  You did have biopsies tissue sample(s) removed.  The biopsy tissue sample(s) will be sent to pathology.    You should receive letter in your My Chart from Dr. Griffith with your results within 1-2 weeks.   Please call if you have not received a notification of your results.   Call [phone].

## 2023-06-26 NOTE — ANESTHESIA CARE TRANSFER NOTE
Patient: Linda Walsh    Procedure: Procedure(s):  Esophagoscopy, gastroscopy, duodenoscopy (EGD), combined       Diagnosis: Abnormal weight loss [R63.4]  Elevated pancreatic enzyme [R74.8]  Diagnosis Additional Information: No value filed.    Anesthesia Type:   MAC     Note:    Oropharynx: oropharynx clear of all foreign objects and spontaneously breathing  Level of Consciousness: awake  Oxygen Supplementation: room air    Independent Airway: airway patency satisfactory and stable  Dentition: dentition unchanged  Vital Signs Stable: post-procedure vital signs reviewed and stable  Report to RN Given: handoff report given  Patient transferred to: Phase II    Handoff Report: Identifed the Patient, Identified the Reponsible Provider, Reviewed the pertinent medical history, Discussed the surgical course, Reviewed Intra-OP anesthesia mangement and issues during anesthesia, Set expectations for post-procedure period and Allowed opportunity for questions and acknowledgement of understanding      Vitals:  Vitals Value Taken Time   BP     Temp     Pulse     Resp     SpO2 97 % 06/26/23 0824   Vitals shown include unvalidated device data.    Electronically Signed By: HERMELINDO Sweeney CRNA  June 26, 2023  8:25 AM

## 2023-06-27 PROCEDURE — 88342 IMHCHEM/IMCYTCHM 1ST ANTB: CPT | Mod: 26 | Performed by: PATHOLOGY

## 2023-06-27 PROCEDURE — 88305 TISSUE EXAM BY PATHOLOGIST: CPT | Mod: 26 | Performed by: PATHOLOGY

## 2023-06-28 LAB
PATH REPORT.ADDENDUM SPEC: NORMAL
PATH REPORT.COMMENTS IMP SPEC: NORMAL
PATH REPORT.COMMENTS IMP SPEC: NORMAL
PATH REPORT.FINAL DX SPEC: NORMAL
PATH REPORT.GROSS SPEC: NORMAL
PATH REPORT.MICROSCOPIC SPEC OTHER STN: NORMAL
PATH REPORT.RELEVANT HX SPEC: NORMAL
PHOTO IMAGE: NORMAL

## 2023-07-06 ENCOUNTER — MYC MEDICAL ADVICE (OUTPATIENT)
Dept: FAMILY MEDICINE | Facility: CLINIC | Age: 63
End: 2023-07-06
Payer: COMMERCIAL

## 2023-07-06 NOTE — TELEPHONE ENCOUNTER
Patient Quality Outreach    Patient is due for the following:   Colon Cancer Screening  Depression  -  PHQ-9 needed  Physical Annual Wellness Visit      Topic Date Due     Zoster (Shingles) Vaccine (1 of 2) Never done     Pneumococcal Vaccine (2 - PCV) 11/25/2021     COVID-19 Vaccine (4 - Pfizer series) 03/31/2022     Diptheria Tetanus Pertussis (DTAP/TDAP/TD) Vaccine (2 - Td or Tdap) 12/11/2022       Next Steps:   Schedule a Annual Wellness Visit    Type of outreach:    Sent [employer] message.      Questions for provider review:    None           Carmencita Vela, [facility]  Chart routed to Care Team.

## 2023-07-19 ENCOUNTER — TRANSCRIBE ORDERS (OUTPATIENT)
Dept: OTHER | Age: 63
End: 2023-07-19

## 2023-07-19 DIAGNOSIS — M19.012 PRIMARY OSTEOARTHRITIS, LEFT SHOULDER: Primary | ICD-10-CM

## 2023-07-24 ENCOUNTER — VIRTUAL VISIT (OUTPATIENT)
Dept: PSYCHOLOGY | Facility: CLINIC | Age: 63
End: 2023-07-24
Payer: MEDICARE

## 2023-07-24 ENCOUNTER — E-VISIT (OUTPATIENT)
Dept: FAMILY MEDICINE | Facility: CLINIC | Age: 63
End: 2023-07-24
Payer: COMMERCIAL

## 2023-07-24 ENCOUNTER — MYC MEDICAL ADVICE (OUTPATIENT)
Dept: FAMILY MEDICINE | Facility: CLINIC | Age: 63
End: 2023-07-24
Payer: COMMERCIAL

## 2023-07-24 DIAGNOSIS — F33.2 SEVERE RECURRENT MAJOR DEPRESSION WITHOUT PSYCHOTIC FEATURES (H): Primary | ICD-10-CM

## 2023-07-24 DIAGNOSIS — F43.10 POST TRAUMATIC STRESS DISORDER (PTSD): ICD-10-CM

## 2023-07-24 DIAGNOSIS — J32.9 SINUSITIS, UNSPECIFIED CHRONICITY, UNSPECIFIED LOCATION: Primary | ICD-10-CM

## 2023-07-24 DIAGNOSIS — F41.1 GENERALIZED ANXIETY DISORDER: ICD-10-CM

## 2023-07-24 PROCEDURE — 90834 PSYTX W PT 45 MINUTES: CPT | Mod: VID | Performed by: COUNSELOR

## 2023-07-24 PROCEDURE — 99421 OL DIG E/M SVC 5-10 MIN: CPT | Performed by: PHYSICIAN ASSISTANT

## 2023-07-24 NOTE — PROGRESS NOTES
"        [facility]                                     Progress Note    Patient Name: Linda Walsh  Date: 7/24/23         Service Type: Individual      Session Start Time: 2:40pm  Session End Time: 3:30pm     Session Length: 50    Session #: 91    Attendees: Client      Service Modality:  Phone Visit:      Provider verified identity through the following two step process.  Patient provided:  Patient is known previously to provider    The patient has been notified of the following:      \"We have found that certain health care needs can be provided without the need for a face to face visit.  This service lets us provide the care you need with a phone conversation.       I will have full access to your [facility] medical record during this entire phone call.   I will be taking notes for your medical record.      Since this is like an office visit, we will bill your insurance company for this service.       There are potential benefits and risks of telephone visits (e.g. limits to patient confidentiality) that differ from in-person visits.?Confidentiality still applies for telephone services, and nobody will record the visit.  It is important to be in a quiet, private space that is free of distractions (including cell phone or other devices) during the visit.??      If during the course of the call I believe a telephone visit is not appropriate, you will not be charged for this service\"     Consent has been obtained for this service by care team member: Yes     DATA  Interactive Complexity: No  Crisis: No        Progress Since Last Session (Related to Symptoms / Goals / Homework):   Symptoms: No change .    Homework: Completed in session      Episode of Care Goals: Minimal progress - ACTION (Actively working towards change); Intervened by reinforcing change plan / affirming steps taken     Current / Ongoing Stressors and Concerns:   The client stated she's still dealing with her husbands " "CRPS symptoms and trying to deal with his moods too.      Treatment Objective(s) Addressed in This Session:   use thought-stopping strategy daily to reduce intrusive thoughts       Intervention:   The client stated she continues to try to \"pick her battles\" concerning her  but also help him with his medical appointments. She stated she's been working on boundaries for herself with him so she doesn't end up doing too much that causes her a lot of pain.         Assessments completed prior to visit:  The following assessments were completed by patient for this visit:  PHQA:        No data to display              GAD7:       12/30/2019     1:39 PM 2/26/2020    11:18 AM 5/27/2020     3:23 PM 7/28/2020    10:37 AM 2/24/2021     1:26 PM 7/13/2022    10:16 AM 6/9/2023    10:20 AM   MARLIN-7 SCORE   Total Score 19 (severe anxiety)   19 (severe anxiety) 16 (severe anxiety) 21 (severe anxiety) 13 (moderate anxiety)   Total Score 19 19 21 19 16 21 13     PROMIS 10-Global Health (all questions and answers displayed):        No data to display                  ASSESSMENT: Current Emotional / Mental Status (status of significant symptoms):   Risk status (Self / Other harm or suicidal ideation)   Patient denies current fears or concerns for personal safety.   Patient denies current or recent suicidal ideation or behaviors.   Patient denies current or recent homicidal ideation or behaviors.   Patient denies current or recent self injurious behavior or ideation.   Patient denies other safety concerns.   Patient reports there has been no change in risk factors since their last session.     Patient reports there has been no change in protective factors since their last session.     Recommended that patient call 911 or go to the local ED should there be a change in any of these risk factors.     Appearance:   Appropriate    Eye Contact:   Good    Psychomotor Behavior: Normal    Attitude:   Cooperative "    Orientation:   All   Speech    Rate / Production: Normal/ Responsive    Volume:  Normal    Mood:    Normal   Affect:    Appropriate    Thought Content:  Clear    Thought Form:  Coherent  Logical    Insight:    Good      Medication Review:   No changes to current psychiatric medication(s)     Medication Compliance:   Yes     Changes in Health Issues:   None reported     Chemical Use Review:   Substance Use: Chemical use reviewed, no active concerns identified      Tobacco Use: No change in amount of tobacco use since last session.  Patient declined discussion at this time    Diagnosis:  1. 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 Generalized anxiety disorder    3. Post traumatic stress disorder (PTSD)        Collateral Reports Completed:   Not Applicable    PLAN: (Patient Tasks / Therapist Tasks / Other)  Client to work on self care.         Nasrin Valladares, [facility]                                                         ______________________________________________________________________    Individual Treatment Plan    Patient's Name: Linda Walsh  YOB: 1960    Date of Creation: 7/11/22  Date Treatment Plan Last Reviewed/Revised: 7/24/23    DSM5 Diagnoses: 296.33 (F33.2) Major Depressive Disorder, Recurrent Episode, Severe _, 300.02 (F41.1) Generalized Anxiety Disorder or 309.81 (F43.10) Posttraumatic Stress Disorder (includes Posttraumatic Stress Disorder for Children 6 Years and Younger)  Without dissociative symptoms  Psychosocial / Contextual Factors: CRPS  PROMIS (reviewed every 90 days):     Referral / Collaboration:  Referral to another professional/service is not indicated at this time..    Anticipated number of session for this episode of care: on-going  Anticipation frequency of session: Monthly  Anticipated Duration of each session: 38-52 minutes/53+ minutes  Treatment plan will be reviewed in 90 days or when goals have been changed.        MeasurableTreatment Goal(s) related to diagnosis / functional impairment(s)  Goal 1: Client will decrease thoughts of wanting to be dead from 10 out of 10 opportunities to at most 9 out of 10 opportunities for at least 3 consecutive weeks as evidenced by client report and a decrease in symptom report as evidenced by PhQ9 scores and GAD7 scores.    Objective #A (Client Action)    Client will  Client will look at and read safety plan at least once a day and follow safety plan when thoughts and urges come up.  Status: Continued - Date(s): 7/24/23    Intervention(s)  Therapist will teach emotional regulation skills. distress tolerance skills, interpersonal effectiveness skills, emotion regluation skills, mindfulness skills, radical acceptance. Therapist will develop safety plan with the client.     Objective #B  Client will  Client will agree to call the therapist or after hours crisis line if noticing intense suicidal thoughts for skills coaching.   Status: Continued - Date(s): 7/24/23    Intervention(s)  Therapist will  Therapist will be available as much as possible during work hours for the client to contact and give the client several crisis resources.         Goal 2: Client will increase the use of skills (emotion regulation, distress tolerance, communication skill) from 1 out of 10 opportunities to at least 2 out of 10 opportunities for at least 3 consecutive weeks as evidenced by client report and a decrease in symptom report as evidenced by PhQ9 scores and GAD7 scores.     Objective #A (Client Action)    Status: Continued - Date(s): 7/24/23    Client will Increase interest, engagement, and pleasure in doing things  Decrease frequency and intensity of feeling down, depressed, hopeless  Improve diet, appetite, mindful eating, and / or meal planning  Identify negative self-talk and behaviors: challenge core beliefs, myths, and actions  Improve concentration, focus, and mindfulness in daily activities   Feel  less fidgety, restless or slow in daily activities / interpersonal interactions  Decrease thoughts that you'd be better off dead or of suicide / self-harm.    Intervention(s)  Therapist will teach emotional regulation skills. distress tolerance skills, interpersonal effectiveness skills, emotion regluation skills, mindfulness skills, radical acceptance. Therapist will teach client how to ID body cues for anxiety, anxiety reduction techniques, how to ID triggers for depression and anxiety- decrease reactivity/eliminate, lifestyle changes to reduce depression and anxiety, communication skills, explore cognitive beliefs and help client to develop healthy cognitive patterns and beliefs.    Objective #B  Client will  Client will learn and  practice DBT skills daily. .    Status: Continued - Date(s): 7/24/23    Intervention(s)  Therapist will  Therapist will Therapist will teach emotional regulation skills. distress tolerance skills, interpersonal effectiveness skills, emotion regluation skills, mindfulness skills, radical acceptance. .      Goal 3: Client will decrease anxious symptoms and reactions to triggers from 9 out of 10 opportunities to at most 8 out of 10 opportunities for at least 3 consecutive weeks as evidenced by client report and a decrease in symptom report as evidenced by PhQ9 scores and GAD7 scores.    Objective #A (Client Action)    Status: Continued - Date(s): 7/24/23    Client will  Client will use cognitive strategies identified in therapy to challenge anxious thoughts.    Intervention(s)  Therapist will  Therapist will teach emotional recognition/identification. emotion regulation skills, coping skills, mindfulness skills.    Objective #B  Client will  Client will use thought-stopping strategy daily to reduce intrusive thoughts for at least 3 consecutive weeks as evidenced by client report and a decrease in symptom report as evidenced by PhQ9 scores and GAD7 scores.       Status: Continued -  "Date(s): 7/24/23    Intervention(s)  Therapist will teach emotional regulation skills. distress tolerance skills, interpersonal effectiveness skills, emotion regluation skills, mindfulness skills, radical acceptance. Therapist will teach client how to ID body cues for anxiety, anxiety reduction techniques, how to ID triggers for depression and anxiety- decrease reactivity/eliminate, lifestyle changes to reduce depression and anxiety, communication skills, explore cognitive beliefs and help client to develop healthy cognitive patterns and beliefs.    Objective #C  Client will  Client will learn 5 crisis survival skills during the Distress Tolerance Module (6-8 weeks) for at least 3 consecutive weeks as evidenced by client report and a decrease in symptom report as evidenced by PhQ9 scores and GAD7 scores.  Status: Continued - Date(s): 7/24/23    Intervention(s)  Therapist will teach emotional regulation skills. distress tolerance skills, interpersonal effectiveness skills, emotion regluation skills, mindfulness skills, radical acceptance.      Client has reviewed and agreed to the above plan.      Nasrin Valladares, [facility]        Linda Walsh     SAFETY PLAN:  Step 1: Warning signs / cues (Thoughts, images, mood, situation, behavior) that a crisis may be developing:  Thoughts: \"I don't matter\", \"People would be better off without me\", \"I'm a burden\", \"I can't do this anymore\", \"I just want this to end\" and \"Nothing makes it better\"  Images: obsessive thoughts of death or dying: car accident, using a gun and flashbacks  Thinking Processes: ruminations (can't stop thinking about my problems): court case, pain, racing thoughts, highly critical and negative thoughts: \"I can't do anything, I have to suffer everyday and go to work and other people have it so easy.\"  and paranoia: that the [employer] is watching me  Mood: worsening depression, hopelessness, helplessness, intense anger, intense worry, agitation, " "disinhibited (not caring about things or consequences) and mood swings  Behaviors: isolating/withdrawing , can't stop crying, not taking care of myself, not taking care of my responsibilities, sleeping too much and not sleeping enough  Situations: legal issues: current court case, pain, trauma , financial stress and medical condition / diagnosis: CRPS    Step 2: Coping strategies - Things I can do to take my mind off of my problems without contacting another person (relaxation technique, physical activity):  Distress Tolerance Strategies:  arts and crafts: with her residents, play with my pet , pray, change body temperature (ice pack/cold water) , paced breathing/progressive muscle relaxation and puzzles, games on ipad  Physical Activities: deep breathing  Focus on helpful thoughts:  \"Ride the wave\", think about happy memories: when the grandkids were born, going camping, when the boys were little and remind myself of what is important to me: grandkids, family, the residents  Step 3: People and social settings that provide distraction:   Name: Alpesh  Phone: [phone]   Name: Velasquez  Phone:    Name: Thaddeus  Phone:   work   Step 4: Remind myself of people and things that are important to me and worth living for:    My family, my residents    Step 5: When I am in crisis, I can ask these people to help me use my safety plan:   Name: Alpesh  Phone: [phone]   Name: Velasquez  Phone: (number in phone)   Name: Thaddeus  Phone: (number in phone)  Step 6: Making the environment safe:   be around others  Step 7: Professionals or agencies I can contact during a crisis:  [facility] Number: [phone]  Suicide Prevention Lifeline: 5-061-878-TALK (8295)  Crisis Text Line Service (available 24 hours a day, 7 days a week): Text [place] to 192708  Local Crisis Services:     Call 911 or go to my nearest emergency department.   I helped develop this safety plan and agree to use it when needed.  I have been given a " copy of this plan.      Client signature _________________________________________________________________  Today s date: 7/11/22  Adapted from Safety Plan Template 2008 Griselda Perez and Livan Cutler is reprinted with the express permission of the authors.  No portion of the Safety Plan Template may be reproduced without the express, written permission.  You can contact the authors at [email or web address] or [email or web address].

## 2023-07-26 RX ORDER — DOXYCYCLINE 100 MG/1
100 CAPSULE ORAL 2 TIMES DAILY
Qty: 20 CAPSULE | Refills: 0 | Status: SHIPPED | OUTPATIENT
Start: 2023-07-26 | End: 2023-09-27

## 2023-07-26 NOTE — PATIENT INSTRUCTIONS
Dear Linda Walsh    After reviewing your responses, I've been able to diagnose you with Sinusitis, unspecified chronicity, unspecified location.      Based on your responses and diagnosis, I have prescribed   Orders Placed This Encounter     doxycycline hyclate (VIBRAMYCIN) 100 MG capsule    to treat your symptoms. I have sent this to your pharmacy.?     It is also important to stay well hydrated, get lots of rest and take over-the-counter decongestants,?tylenol?or ibuprofen if you?are able to?take those medications per your primary care provider to help relieve discomfort.?     It is important that you take?all of?your prescribed medication even if your symptoms are improving after a few doses.? Taking?all of?your medicine helps prevent the symptoms from returning.?     If your symptoms worsen, you develop severe headache, vomiting, high fever (>102), or are not improving in 7 days, please contact your primary care provider for an appointment or visit any of our convenient Walk-in Care or Urgent Care Centers to be seen which can be found on our website?here.?     Thanks again for choosing?us?as your health care partner,?   ?  Jonna Gaitan PA-C?

## 2023-08-01 ENCOUNTER — HOSPITAL ENCOUNTER (OUTPATIENT)
Facility: CLINIC | Age: 63
End: 2023-08-01
Attending: SURGERY | Admitting: SURGERY
Payer: COMMERCIAL

## 2023-08-03 ENCOUNTER — TELEPHONE (OUTPATIENT)
Dept: GASTROENTEROLOGY | Facility: CLINIC | Age: 63
End: 2023-08-03
Payer: COMMERCIAL

## 2023-08-03 NOTE — TELEPHONE ENCOUNTER
Caller: Mary  Reason for Reschedule/Cancellation (please be detailed, any staff messages or encounters to note?): pt has to work      Prior to reschedule please review:  Ordering Provider:     Tomasa Medina PA-C in [facility]     Sedation per order: Moderate  Does patient have any ASC Exclusions, please identify?: n      Notes on Cancelled Procedure:  Procedure: Lower Endoscopy [Colonoscopy]   Date: 08/14/2023  Location: [facility]; [address] , [place] [address]  Surgeon: Cynthia      Rescheduled: Yes  Procedure: Lower Endoscopy [Colonoscopy]   Date: 10/02/2023  Location: [facility]; [address] , [place] [address]  Surgeon: Vitaly  Sedation Level Scheduled  MAC,  Reason for Sedation Level per location  Prep/Instructions updated and sent: y     Send In - basket message to Panc - Rusty Pool if EUS  procedure is canceled or rescheduled: [ N/A, YES or NO] na

## 2023-08-07 ENCOUNTER — VIRTUAL VISIT (OUTPATIENT)
Dept: PSYCHOLOGY | Facility: CLINIC | Age: 63
End: 2023-08-07
Payer: COMMERCIAL

## 2023-08-07 DIAGNOSIS — F33.2 SEVERE RECURRENT MAJOR DEPRESSION WITHOUT PSYCHOTIC FEATURES (H): Primary | ICD-10-CM

## 2023-08-07 DIAGNOSIS — F41.1 GENERALIZED ANXIETY DISORDER: ICD-10-CM

## 2023-08-07 DIAGNOSIS — F43.10 POST TRAUMATIC STRESS DISORDER (PTSD): ICD-10-CM

## 2023-08-07 PROCEDURE — 90834 PSYTX W PT 45 MINUTES: CPT | Mod: VID | Performed by: COUNSELOR

## 2023-08-07 NOTE — PROGRESS NOTES
"        [facility]                                     Progress Note    Patient Name: Linda Walsh  Date: 8/7/23         Service Type: Individual      Session Start Time: 2:30pm  Session End Time: 3:20pm     Session Length: 50    Session #: 92    Attendees: Client      Service Modality:  Phone Visit:      Provider verified identity through the following two step process.  Patient provided:  Patient is known previously to provider    The patient has been notified of the following:      \"We have found that certain health care needs can be provided without the need for a face to face visit.  This service lets us provide the care you need with a phone conversation.       I will have full access to your [facility] medical record during this entire phone call.   I will be taking notes for your medical record.      Since this is like an office visit, we will bill your insurance company for this service.       There are potential benefits and risks of telephone visits (e.g. limits to patient confidentiality) that differ from in-person visits.?Confidentiality still applies for telephone services, and nobody will record the visit.  It is important to be in a quiet, private space that is free of distractions (including cell phone or other devices) during the visit.??      If during the course of the call I believe a telephone visit is not appropriate, you will not be charged for this service\"     Consent has been obtained for this service by care team member: Yes     DATA  Interactive Complexity: No  Crisis: No        Progress Since Last Session (Related to Symptoms / Goals / Homework):   Symptoms: No change .    Homework: Completed in session      Episode of Care Goals: Minimal progress - ACTION (Actively working towards change); Intervened by reinforcing change plan / affirming steps taken     Current / Ongoing Stressors and Concerns:   The client stated she has a lot of dental work that needs " "to be done but can't afford it.  Continued stress with her husbands health conditions and his complaints.   She stated she's been in so much pain lately.      Treatment Objective(s) Addressed in This Session:   use thought-stopping strategy daily to reduce intrusive thoughts       Intervention:   The client stated she continues to try to \"pick her battles\" concerning her  but also help him with his medical appointments. She stated she's been working on boundaries for herself with him so she doesn't end up doing too much that causes her a lot of pain.         Assessments completed prior to visit:  The following assessments were completed by patient for this visit:  PHQA:        No data to display              GAD7:       12/30/2019     1:39 PM 2/26/2020    11:18 AM 5/27/2020     3:23 PM 7/28/2020    10:37 AM 2/24/2021     1:26 PM 7/13/2022    10:16 AM 6/9/2023    10:20 AM   MARLIN-7 SCORE   Total Score 19 (severe anxiety)   19 (severe anxiety) 16 (severe anxiety) 21 (severe anxiety) 13 (moderate anxiety)   Total Score 19 19 21 19 16 21 13     PROMIS 10-Global Health (all questions and answers displayed):        No data to display                  ASSESSMENT: Current Emotional / Mental Status (status of significant symptoms):   Risk status (Self / Other harm or suicidal ideation)   Patient denies current fears or concerns for personal safety.   Patient denies current or recent suicidal ideation or behaviors.   Patient denies current or recent homicidal ideation or behaviors.   Patient denies current or recent self injurious behavior or ideation.   Patient denies other safety concerns.   Patient reports there has been no change in risk factors since their last session.     Patient reports there has been no change in protective factors since their last session.     Recommended that patient call 911 or go to the local ED should there be a change in any of these risk factors.     Appearance:   Appropriate    Eye " Contact:   Good    Psychomotor Behavior: Normal    Attitude:   Cooperative    Orientation:   All   Speech    Rate / Production: Normal/ Responsive    Volume:  Normal    Mood:    Normal   Affect:    Appropriate    Thought Content:  Clear    Thought Form:  Coherent  Logical    Insight:    Good      Medication Review:   No changes to current psychiatric medication(s)     Medication Compliance:   Yes     Changes in Health Issues:   None reported     Chemical Use Review:   Substance Use: Chemical use reviewed, no active concerns identified      Tobacco Use: No change in amount of tobacco use since last session.  Patient declined discussion at this time    Diagnosis:  1. 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 Generalized anxiety disorder    3. Post traumatic stress disorder (PTSD)        Collateral Reports Completed:   Not Applicable    PLAN: (Patient Tasks / Therapist Tasks / Other)  Client to work on self care.         Nasrin Valladares, [facility]                                                         ______________________________________________________________________    Individual Treatment Plan    Patient's Name: Linda Walsh  YOB: 1960    Date of Creation: 7/11/22  Date Treatment Plan Last Reviewed/Revised: 7/24/23    DSM5 Diagnoses: 296.33 (F33.2) Major Depressive Disorder, Recurrent Episode, Severe _, 300.02 (F41.1) Generalized Anxiety Disorder or 309.81 (F43.10) Posttraumatic Stress Disorder (includes Posttraumatic Stress Disorder for Children 6 Years and Younger)  Without dissociative symptoms  Psychosocial / Contextual Factors: CRPS  PROMIS (reviewed every 90 days):     Referral / Collaboration:  Referral to another professional/service is not indicated at this time..    Anticipated number of session for this episode of care: on-going  Anticipation frequency of session: Monthly  Anticipated Duration of each session: 38-52 minutes/53+ minutes  Treatment plan will be  reviewed in 90 days or when goals have been changed.       MeasurableTreatment Goal(s) related to diagnosis / functional impairment(s)  Goal 1: Client will decrease thoughts of wanting to be dead from 10 out of 10 opportunities to at most 9 out of 10 opportunities for at least 3 consecutive weeks as evidenced by client report and a decrease in symptom report as evidenced by PhQ9 scores and GAD7 scores.    Objective #A (Client Action)    Client will  Client will look at and read safety plan at least once a day and follow safety plan when thoughts and urges come up.  Status: Continued - Date(s): 7/24/23    Intervention(s)  Therapist will teach emotional regulation skills. distress tolerance skills, interpersonal effectiveness skills, emotion regluation skills, mindfulness skills, radical acceptance. Therapist will develop safety plan with the client.     Objective #B  Client will  Client will agree to call the therapist or after hours crisis line if noticing intense suicidal thoughts for skills coaching.   Status: Continued - Date(s): 7/24/23    Intervention(s)  Therapist will  Therapist will be available as much as possible during work hours for the client to contact and give the client several crisis resources.         Goal 2: Client will increase the use of skills (emotion regulation, distress tolerance, communication skill) from 1 out of 10 opportunities to at least 2 out of 10 opportunities for at least 3 consecutive weeks as evidenced by client report and a decrease in symptom report as evidenced by PhQ9 scores and GAD7 scores.     Objective #A (Client Action)    Status: Continued - Date(s): 7/24/23    Client will Increase interest, engagement, and pleasure in doing things  Decrease frequency and intensity of feeling down, depressed, hopeless  Improve diet, appetite, mindful eating, and / or meal planning  Identify negative self-talk and behaviors: challenge core beliefs, myths, and actions  Improve  concentration, focus, and mindfulness in daily activities   Feel less fidgety, restless or slow in daily activities / interpersonal interactions  Decrease thoughts that you'd be better off dead or of suicide / self-harm.    Intervention(s)  Therapist will teach emotional regulation skills. distress tolerance skills, interpersonal effectiveness skills, emotion regluation skills, mindfulness skills, radical acceptance. Therapist will teach client how to ID body cues for anxiety, anxiety reduction techniques, how to ID triggers for depression and anxiety- decrease reactivity/eliminate, lifestyle changes to reduce depression and anxiety, communication skills, explore cognitive beliefs and help client to develop healthy cognitive patterns and beliefs.    Objective #B  Client will  Client will learn and  practice DBT skills daily. .    Status: Continued - Date(s): 7/24/23    Intervention(s)  Therapist will  Therapist will Therapist will teach emotional regulation skills. distress tolerance skills, interpersonal effectiveness skills, emotion regluation skills, mindfulness skills, radical acceptance. .      Goal 3: Client will decrease anxious symptoms and reactions to triggers from 9 out of 10 opportunities to at most 8 out of 10 opportunities for at least 3 consecutive weeks as evidenced by client report and a decrease in symptom report as evidenced by PhQ9 scores and GAD7 scores.    Objective #A (Client Action)    Status: Continued - Date(s): 7/24/23    Client will  Client will use cognitive strategies identified in therapy to challenge anxious thoughts.    Intervention(s)  Therapist will  Therapist will teach emotional recognition/identification. emotion regulation skills, coping skills, mindfulness skills.    Objective #B  Client will  Client will use thought-stopping strategy daily to reduce intrusive thoughts for at least 3 consecutive weeks as evidenced by client report and a decrease in symptom report as evidenced  "by PhQ9 scores and GAD7 scores.       Status: Continued - Date(s): 7/24/23    Intervention(s)  Therapist will teach emotional regulation skills. distress tolerance skills, interpersonal effectiveness skills, emotion regluation skills, mindfulness skills, radical acceptance. Therapist will teach client how to ID body cues for anxiety, anxiety reduction techniques, how to ID triggers for depression and anxiety- decrease reactivity/eliminate, lifestyle changes to reduce depression and anxiety, communication skills, explore cognitive beliefs and help client to develop healthy cognitive patterns and beliefs.    Objective #C  Client will  Client will learn 5 crisis survival skills during the Distress Tolerance Module (6-8 weeks) for at least 3 consecutive weeks as evidenced by client report and a decrease in symptom report as evidenced by PhQ9 scores and GAD7 scores.  Status: Continued - Date(s): 7/24/23    Intervention(s)  Therapist will teach emotional regulation skills. distress tolerance skills, interpersonal effectiveness skills, emotion regluation skills, mindfulness skills, radical acceptance.      Client has reviewed and agreed to the above plan.      Nasrin Valladares, [facility]        Linda Walsh     SAFETY PLAN:  Step 1: Warning signs / cues (Thoughts, images, mood, situation, behavior) that a crisis may be developing:  Thoughts: \"I don't matter\", \"People would be better off without me\", \"I'm a burden\", \"I can't do this anymore\", \"I just want this to end\" and \"Nothing makes it better\"  Images: obsessive thoughts of death or dying: car accident, using a gun and flashbacks  Thinking Processes: ruminations (can't stop thinking about my problems): court case, pain, racing thoughts, highly critical and negative thoughts: \"I can't do anything, I have to suffer everyday and go to work and other people have it so easy.\"  and paranoia: that the government is watching me  Mood: worsening depression, hopelessness, " "helplessness, intense anger, intense worry, agitation, disinhibited (not caring about things or consequences) and mood swings  Behaviors: isolating/withdrawing , can't stop crying, not taking care of myself, not taking care of my responsibilities, sleeping too much and not sleeping enough  Situations: legal issues: current court case, pain, trauma , financial stress and medical condition / diagnosis: CRPS    Step 2: Coping strategies - Things I can do to take my mind off of my problems without contacting another person (relaxation technique, physical activity):  Distress Tolerance Strategies:  arts and crafts: with her residents, play with my pet , pray, change body temperature (ice pack/cold water) , paced breathing/progressive muscle relaxation and puzzles, games on ipad  Physical Activities: deep breathing  Focus on helpful thoughts:  \"Ride the wave\", think about happy memories: when the grandkids were born, going camping, when the boys were little and remind myself of what is important to me: grandkids, family, the residents  Step 3: People and social settings that provide distraction:   Name: Alpesh  Phone: [phone]   Name: Velasquez  Phone:    Name: Thaddeus  Phone:   work   Step 4: Remind myself of people and things that are important to me and worth living for:    My family, my residents    Step 5: When I am in crisis, I can ask these people to help me use my safety plan:   Name: Alpesh  Phone: [phone]   Name: Velasquez  Phone: (number in phone)   Name: Thaddeus  Phone: (number in phone)  Step 6: Making the environment safe:   be around others  Step 7: Professionals or agencies I can contact during a crisis:  [facility] Number: [phone]  Suicide Prevention Lifeline: 3-114-667-TALK (4523)  Crisis Text Line Service (available 24 hours a day, 7 days a week): Text [place] to 549848  Local Crisis Services:     Call 911 or go to my nearest emergency department.   I helped develop this safety plan " and agree to use it when needed.  I have been given a copy of this plan.      Client signature _________________________________________________________________  Today s date: 7/11/22  Adapted from Safety Plan Template 2008 Griselda Perez and Livan Cutler is reprinted with the express permission of the authors.  No portion of the Safety Plan Template may be reproduced without the express, written permission.  You can contact the authors at [email or web address] or [email or web address].

## 2023-08-21 ENCOUNTER — TRANSFERRED RECORDS (OUTPATIENT)
Dept: HEALTH INFORMATION MANAGEMENT | Facility: CLINIC | Age: 63
End: 2023-08-21

## 2023-08-22 ENCOUNTER — TELEPHONE (OUTPATIENT)
Dept: GASTROENTEROLOGY | Facility: CLINIC | Age: 63
End: 2023-08-22

## 2023-08-22 NOTE — TELEPHONE ENCOUNTER
Attempted to contact patient in order to complete pre assessment questions.     No answer. Left message to return call to [phone] option 4      Procedure details:    Patient scheduled for Colonoscopy  on 9/7/23.     Arrival time: 0930. Procedure time 1030    Pre op exam needed? N/A    [facility] [address] [place] [address], [address] Floor, [place] [address]    Sedation type: MAC    Indication for procedure: screening    Note - EGD was already completed.       Chart review:     Electronic implanted devices? No    Diabetic? No      Medication review:    Anticoagulants? No    NSAIDS? No    Other medication HOLDING recommendations:  N/A      Prep for procedure:     Bowel prep recommendation:  verify bowel habits. Narcotics listed on medication list.     Prep instructions to be resent once bowel habits are determined.       Coby Rios RN  Endoscopy Procedure Pre Assessment RN

## 2023-08-28 ENCOUNTER — VIRTUAL VISIT (OUTPATIENT)
Dept: PSYCHOLOGY | Facility: CLINIC | Age: 63
End: 2023-08-28
Payer: COMMERCIAL

## 2023-08-28 DIAGNOSIS — F41.1 GENERALIZED ANXIETY DISORDER: ICD-10-CM

## 2023-08-28 DIAGNOSIS — F43.10 POST TRAUMATIC STRESS DISORDER (PTSD): ICD-10-CM

## 2023-08-28 DIAGNOSIS — F33.2 SEVERE RECURRENT MAJOR DEPRESSION WITHOUT PSYCHOTIC FEATURES (H): Primary | ICD-10-CM

## 2023-08-28 PROCEDURE — 90834 PSYTX W PT 45 MINUTES: CPT | Mod: VID | Performed by: COUNSELOR

## 2023-08-28 NOTE — PROGRESS NOTES
"        [facility]                                     Progress Note    Patient Name: Linda Walsh  Date: 8/28/23         Service Type: Individual      Session Start Time: 10:35am  Session End Time: 11:25am     Session Length: 50    Session #: 93    Attendees: Client      Service Modality:  Phone Visit:      Provider verified identity through the following two step process.  Patient provided:  Patient is known previously to provider    The patient has been notified of the following:      \"We have found that certain health care needs can be provided without the need for a face to face visit.  This service lets us provide the care you need with a phone conversation.       I will have full access to your [facility] medical record during this entire phone call.   I will be taking notes for your medical record.      Since this is like an office visit, we will bill your insurance company for this service.       There are potential benefits and risks of telephone visits (e.g. limits to patient confidentiality) that differ from in-person visits.?Confidentiality still applies for telephone services, and nobody will record the visit.  It is important to be in a quiet, private space that is free of distractions (including cell phone or other devices) during the visit.??      If during the course of the call I believe a telephone visit is not appropriate, you will not be charged for this service\"     Consent has been obtained for this service by care team member: Yes     DATA  Interactive Complexity: No  Crisis: No        Progress Since Last Session (Related to Symptoms / Goals / Homework):   Symptoms: Worsening .    Homework: Completed in session      Episode of Care Goals: Minimal progress - ACTION (Actively working towards change); Intervened by reinforcing change plan / affirming steps taken     Current / Ongoing Stressors and Concerns:   The client stated she has been really struggling lately " "with her , emotions, and physical pain.      Treatment Objective(s) Addressed in This Session:   use thought-stopping strategy daily to reduce intrusive thoughts       Intervention:   The client stated she continues to try to \"pick her battles\" concerning her  but also help him with his medical appointments. She stated she's been working on boundaries for herself with him so she doesn't end up doing too much that causes her a lot of pain.   However, she stated she is really struggling with her mental health with how her  has been.       Assessments completed prior to visit:  The following assessments were completed by patient for this visit:  PHQA:        No data to display              GAD7:       12/30/2019     1:39 PM 2/26/2020    11:18 AM 5/27/2020     3:23 PM 7/28/2020    10:37 AM 2/24/2021     1:26 PM 7/13/2022    10:16 AM 6/9/2023    10:20 AM   MARLIN-7 SCORE   Total Score 19 (severe anxiety)   19 (severe anxiety) 16 (severe anxiety) 21 (severe anxiety) 13 (moderate anxiety)   Total Score 19 19 21 19 16 21 13     PROMIS 10-Global Health (all questions and answers displayed):        No data to display                  ASSESSMENT: Current Emotional / Mental Status (status of significant symptoms):   Risk status (Self / Other harm or suicidal ideation)   Patient denies current fears or concerns for personal safety.   Patient denies current or recent suicidal ideation or behaviors.   Patient denies current or recent homicidal ideation or behaviors.   Patient denies current or recent self injurious behavior or ideation.   Patient denies other safety concerns.   Patient reports there has been no change in risk factors since their last session.     Patient reports there has been no change in protective factors since their last session.     Recommended that patient call 911 or go to the local ED should there be a change in any of these risk factors.     Appearance:   Appropriate    Eye " Contact:   Good    Psychomotor Behavior: Normal    Attitude:   Cooperative    Orientation:   All   Speech    Rate / Production: Normal/ Responsive    Volume:  Normal    Mood:    Normal   Affect:    Appropriate    Thought Content:  Clear    Thought Form:  Coherent  Logical    Insight:    Good      Medication Review:   No changes to current psychiatric medication(s)     Medication Compliance:   Yes     Changes in Health Issues:   None reported     Chemical Use Review:   Substance Use: Chemical use reviewed, no active concerns identified      Tobacco Use: No change in amount of tobacco use since last session.  Patient declined discussion at this time    Diagnosis:  1. 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 Generalized anxiety disorder    3. Post traumatic stress disorder (PTSD)        Collateral Reports Completed:   Not Applicable    PLAN: (Patient Tasks / Therapist Tasks / Other)  Client to work on self care.         Nasrin Valladares, [facility]                                                         ______________________________________________________________________    Individual Treatment Plan    Patient's Name: Linda Walsh  YOB: 1960    Date of Creation: 7/11/22  Date Treatment Plan Last Reviewed/Revised: 7/24/23    DSM5 Diagnoses: 296.33 (F33.2) Major Depressive Disorder, Recurrent Episode, Severe _, 300.02 (F41.1) Generalized Anxiety Disorder or 309.81 (F43.10) Posttraumatic Stress Disorder (includes Posttraumatic Stress Disorder for Children 6 Years and Younger)  Without dissociative symptoms  Psychosocial / Contextual Factors: CRPS  PROMIS (reviewed every 90 days):     Referral / Collaboration:  Referral to another professional/service is not indicated at this time..    Anticipated number of session for this episode of care: on-going  Anticipation frequency of session: Monthly  Anticipated Duration of each session: 38-52 minutes/53+ minutes  Treatment plan will be  reviewed in 90 days or when goals have been changed.       MeasurableTreatment Goal(s) related to diagnosis / functional impairment(s)  Goal 1: Client will decrease thoughts of wanting to be dead from 10 out of 10 opportunities to at most 9 out of 10 opportunities for at least 3 consecutive weeks as evidenced by client report and a decrease in symptom report as evidenced by PhQ9 scores and GAD7 scores.    Objective #A (Client Action)    Client will  Client will look at and read safety plan at least once a day and follow safety plan when thoughts and urges come up.  Status: Continued - Date(s): 7/24/23    Intervention(s)  Therapist will teach emotional regulation skills. distress tolerance skills, interpersonal effectiveness skills, emotion regluation skills, mindfulness skills, radical acceptance. Therapist will develop safety plan with the client.     Objective #B  Client will  Client will agree to call the therapist or after hours crisis line if noticing intense suicidal thoughts for skills coaching.   Status: Continued - Date(s): 7/24/23    Intervention(s)  Therapist will  Therapist will be available as much as possible during work hours for the client to contact and give the client several crisis resources.         Goal 2: Client will increase the use of skills (emotion regulation, distress tolerance, communication skill) from 1 out of 10 opportunities to at least 2 out of 10 opportunities for at least 3 consecutive weeks as evidenced by client report and a decrease in symptom report as evidenced by PhQ9 scores and GAD7 scores.     Objective #A (Client Action)    Status: Continued - Date(s): 7/24/23    Client will Increase interest, engagement, and pleasure in doing things  Decrease frequency and intensity of feeling down, depressed, hopeless  Improve diet, appetite, mindful eating, and / or meal planning  Identify negative self-talk and behaviors: challenge core beliefs, myths, and actions  Improve  concentration, focus, and mindfulness in daily activities   Feel less fidgety, restless or slow in daily activities / interpersonal interactions  Decrease thoughts that you'd be better off dead or of suicide / self-harm.    Intervention(s)  Therapist will teach emotional regulation skills. distress tolerance skills, interpersonal effectiveness skills, emotion regluation skills, mindfulness skills, radical acceptance. Therapist will teach client how to ID body cues for anxiety, anxiety reduction techniques, how to ID triggers for depression and anxiety- decrease reactivity/eliminate, lifestyle changes to reduce depression and anxiety, communication skills, explore cognitive beliefs and help client to develop healthy cognitive patterns and beliefs.    Objective #B  Client will  Client will learn and  practice DBT skills daily. .    Status: Continued - Date(s): 7/24/23    Intervention(s)  Therapist will  Therapist will Therapist will teach emotional regulation skills. distress tolerance skills, interpersonal effectiveness skills, emotion regluation skills, mindfulness skills, radical acceptance. .      Goal 3: Client will decrease anxious symptoms and reactions to triggers from 9 out of 10 opportunities to at most 8 out of 10 opportunities for at least 3 consecutive weeks as evidenced by client report and a decrease in symptom report as evidenced by PhQ9 scores and GAD7 scores.    Objective #A (Client Action)    Status: Continued - Date(s): 7/24/23    Client will  Client will use cognitive strategies identified in therapy to challenge anxious thoughts.    Intervention(s)  Therapist will  Therapist will teach emotional recognition/identification. emotion regulation skills, coping skills, mindfulness skills.    Objective #B  Client will  Client will use thought-stopping strategy daily to reduce intrusive thoughts for at least 3 consecutive weeks as evidenced by client report and a decrease in symptom report as evidenced  "by PhQ9 scores and GAD7 scores.       Status: Continued - Date(s): 7/24/23    Intervention(s)  Therapist will teach emotional regulation skills. distress tolerance skills, interpersonal effectiveness skills, emotion regluation skills, mindfulness skills, radical acceptance. Therapist will teach client how to ID body cues for anxiety, anxiety reduction techniques, how to ID triggers for depression and anxiety- decrease reactivity/eliminate, lifestyle changes to reduce depression and anxiety, communication skills, explore cognitive beliefs and help client to develop healthy cognitive patterns and beliefs.    Objective #C  Client will  Client will learn 5 crisis survival skills during the Distress Tolerance Module (6-8 weeks) for at least 3 consecutive weeks as evidenced by client report and a decrease in symptom report as evidenced by PhQ9 scores and GAD7 scores.  Status: Continued - Date(s): 7/24/23    Intervention(s)  Therapist will teach emotional regulation skills. distress tolerance skills, interpersonal effectiveness skills, emotion regluation skills, mindfulness skills, radical acceptance.      Client has reviewed and agreed to the above plan.      Nasrin Valladares, [facility]        Linda Walsh     SAFETY PLAN:  Step 1: Warning signs / cues (Thoughts, images, mood, situation, behavior) that a crisis may be developing:  Thoughts: \"I don't matter\", \"People would be better off without me\", \"I'm a burden\", \"I can't do this anymore\", \"I just want this to end\" and \"Nothing makes it better\"  Images: obsessive thoughts of death or dying: car accident, using a gun and flashbacks  Thinking Processes: ruminations (can't stop thinking about my problems): court case, pain, racing thoughts, highly critical and negative thoughts: \"I can't do anything, I have to suffer everyday and go to work and other people have it so easy.\"  and paranoia: that the government is watching me  Mood: worsening depression, hopelessness, " "helplessness, intense anger, intense worry, agitation, disinhibited (not caring about things or consequences) and mood swings  Behaviors: isolating/withdrawing , can't stop crying, not taking care of myself, not taking care of my responsibilities, sleeping too much and not sleeping enough  Situations: legal issues: current court case, pain, trauma , financial stress and medical condition / diagnosis: CRPS    Step 2: Coping strategies - Things I can do to take my mind off of my problems without contacting another person (relaxation technique, physical activity):  Distress Tolerance Strategies:  arts and crafts: with her residents, play with my pet , pray, change body temperature (ice pack/cold water) , paced breathing/progressive muscle relaxation and puzzles, games on ipad  Physical Activities: deep breathing  Focus on helpful thoughts:  \"Ride the wave\", think about happy memories: when the grandkids were born, going camping, when the boys were little and remind myself of what is important to me: grandkids, family, the residents  Step 3: People and social settings that provide distraction:   Name: Alpesh  Phone: [phone]   Name: Velasquez  Phone:    Name: Thaddeus  Phone:   work   Step 4: Remind myself of people and things that are important to me and worth living for:    My family, my residents    Step 5: When I am in crisis, I can ask these people to help me use my safety plan:   Name: Alpesh  Phone: [phone]   Name: Velasquez  Phone: (number in phone)   Name: Thaddeus  Phone: (number in phone)  Step 6: Making the environment safe:   be around others  Step 7: Professionals or agencies I can contact during a crisis:  [facility] Number: [phone]  Suicide Prevention Lifeline: 5-954-322-TALK (8470)  Crisis Text Line Service (available 24 hours a day, 7 days a week): Text [place] to 611793  Local Crisis Services:     Call 911 or go to my nearest emergency department.   I helped develop this safety plan " and agree to use it when needed.  I have been given a copy of this plan.      Client signature _________________________________________________________________  Today s date: 7/11/22  Adapted from Safety Plan Template 2008 Griselda Perez and Livan Cutler is reprinted with the express permission of the authors.  No portion of the Safety Plan Template may be reproduced without the express, written permission.  You can contact the authors at [email or web address] or [email or web address].

## 2023-08-30 ENCOUNTER — TELEPHONE (OUTPATIENT)
Dept: GASTROENTEROLOGY | Facility: CLINIC | Age: 63
End: 2023-08-30
Payer: COMMERCIAL

## 2023-08-30 NOTE — TELEPHONE ENCOUNTER
Caller: Linda Walsh    Reason for Reschedule/Cancellation (please be detailed, any staff messages or encounters to note?): pt unavailable      Prior to reschedule please review:  Ordering Provider:      Tomasa Medina PA-C     Sedation per order: mac  Does patient have any ASC Exclusions, please identify?: N      Notes on Cancelled Procedure:  Procedure: Lower Endoscopy [Colonoscopy]   Date: 09/07/2023  Location: [facility]; [address], [place] [address]  Surgeon: EMMA    Notes on Cancelled Procedure:  Procedure: Lower Endoscopy [Colonoscopy]   Date: 10/02/2023  Location: [facility]; [address] , [place] [address]  Surgeon: SALUD      Rescheduled: No

## 2023-08-30 NOTE — TELEPHONE ENCOUNTER
Second call attempt to complete pre assessment.     No answer.  Left message to return call to [phone] #4 within 24 hours or risk procedure being cancelled.     Additional information needed?  N/A      Coby Don RN  Endoscopy Procedure Pre Assessment RN

## 2023-09-15 ENCOUNTER — MYC MEDICAL ADVICE (OUTPATIENT)
Dept: FAMILY MEDICINE | Facility: CLINIC | Age: 63
End: 2023-09-15
Payer: COMMERCIAL

## 2023-09-15 DIAGNOSIS — K59.00 CONSTIPATION, UNSPECIFIED CONSTIPATION TYPE: ICD-10-CM

## 2023-09-15 RX ORDER — BISACODYL 10 MG
SUPPOSITORY, RECTAL RECTAL
Qty: 12 SUPPOSITORY | Refills: 1 | Status: SHIPPED | OUTPATIENT
Start: 2023-09-15 | End: 2024-03-25

## 2023-09-18 ENCOUNTER — TELEPHONE (OUTPATIENT)
Dept: FAMILY MEDICINE | Facility: CLINIC | Age: 63
End: 2023-09-18
Payer: COMMERCIAL

## 2023-09-18 NOTE — TELEPHONE ENCOUNTER
Reason for Call:  Appointment Request    Patient requesting this type of appt:  NAUSEATED- SIGNIFICANT WEIGHT LOSS- CAN'T EAT    Requested provider: Jonna Gaitan    Reason patient unable to be scheduled: Not within requested timeframe    When does patient want to be seen/preferred time: Same day    Comments: only Jnona Gaitan-     Could we send this information to you in [employer] or would you prefer to receive a phone call?:   Patient would prefer a phone call   Okay to leave a detailed message?: Yes at Cell number on file:    Telephone Information:   Mobile [phone]       Call taken on 9/18/2023 at 9:12 AM by Leandra NAVARRO

## 2023-09-19 ENCOUNTER — OFFICE VISIT (OUTPATIENT)
Dept: FAMILY MEDICINE | Facility: CLINIC | Age: 63
End: 2023-09-19
Payer: COMMERCIAL

## 2023-09-19 VITALS
OXYGEN SATURATION: 98 % | TEMPERATURE: 98.4 F | DIASTOLIC BLOOD PRESSURE: 70 MMHG | HEIGHT: 64 IN | RESPIRATION RATE: 18 BRPM | HEART RATE: 76 BPM | SYSTOLIC BLOOD PRESSURE: 120 MMHG | BODY MASS INDEX: 28.17 KG/M2 | WEIGHT: 165 LBS

## 2023-09-19 DIAGNOSIS — E78.5 HYPERLIPIDEMIA LDL GOAL <160: ICD-10-CM

## 2023-09-19 DIAGNOSIS — R53.83 FATIGUE, UNSPECIFIED TYPE: ICD-10-CM

## 2023-09-19 DIAGNOSIS — R63.4 ABNORMAL WEIGHT LOSS: Primary | ICD-10-CM

## 2023-09-19 DIAGNOSIS — Z12.11 SCREEN FOR COLON CANCER: ICD-10-CM

## 2023-09-19 DIAGNOSIS — Z23 NEED FOR TDAP VACCINATION: ICD-10-CM

## 2023-09-19 DIAGNOSIS — Z13.21 ENCOUNTER FOR VITAMIN DEFICIENCY SCREENING: ICD-10-CM

## 2023-09-19 LAB
ALT SERPL W P-5'-P-CCNC: 11 U/L (ref 0–50)
BASOPHILS # BLD AUTO: 0.1 10E3/UL (ref 0–0.2)
BASOPHILS NFR BLD AUTO: 1 %
CHOLEST SERPL-MCNC: 185 MG/DL
EOSINOPHIL # BLD AUTO: 0.1 10E3/UL (ref 0–0.7)
EOSINOPHIL NFR BLD AUTO: 1 %
ERYTHROCYTE [DISTWIDTH] IN BLOOD BY AUTOMATED COUNT: 12.1 % (ref 10–15)
HCT VFR BLD AUTO: 44.8 % (ref 35–47)
HDLC SERPL-MCNC: 55 MG/DL
HGB BLD-MCNC: 14.8 G/DL (ref 11.7–15.7)
IMM GRANULOCYTES # BLD: 0 10E3/UL
IMM GRANULOCYTES NFR BLD: 0 %
LDLC SERPL CALC-MCNC: 106 MG/DL
LYMPHOCYTES # BLD AUTO: 1.8 10E3/UL (ref 0.8–5.3)
LYMPHOCYTES NFR BLD AUTO: 30 %
MCH RBC QN AUTO: 27.9 PG (ref 26.5–33)
MCHC RBC AUTO-ENTMCNC: 33 G/DL (ref 31.5–36.5)
MCV RBC AUTO: 85 FL (ref 78–100)
MONOCYTES # BLD AUTO: 0.3 10E3/UL (ref 0–1.3)
MONOCYTES NFR BLD AUTO: 6 %
NEUTROPHILS # BLD AUTO: 3.7 10E3/UL (ref 1.6–8.3)
NEUTROPHILS NFR BLD AUTO: 63 %
NONHDLC SERPL-MCNC: 130 MG/DL
PLATELET # BLD AUTO: 273 10E3/UL (ref 150–450)
RBC # BLD AUTO: 5.3 10E6/UL (ref 3.8–5.2)
TRIGL SERPL-MCNC: 118 MG/DL
WBC # BLD AUTO: 6 10E3/UL (ref 4–11)

## 2023-09-19 PROCEDURE — 80061 LIPID PANEL: CPT | Performed by: PHYSICIAN ASSISTANT

## 2023-09-19 PROCEDURE — 84460 ALANINE AMINO (ALT) (SGPT): CPT | Performed by: PHYSICIAN ASSISTANT

## 2023-09-19 PROCEDURE — 36415 COLL VENOUS BLD VENIPUNCTURE: CPT | Performed by: PHYSICIAN ASSISTANT

## 2023-09-19 PROCEDURE — 82306 VITAMIN D 25 HYDROXY: CPT | Performed by: PHYSICIAN ASSISTANT

## 2023-09-19 PROCEDURE — 85025 COMPLETE CBC W/AUTO DIFF WBC: CPT | Performed by: PHYSICIAN ASSISTANT

## 2023-09-19 PROCEDURE — 99214 OFFICE O/P EST MOD 30 MIN: CPT | Performed by: PHYSICIAN ASSISTANT

## 2023-09-19 ASSESSMENT — ENCOUNTER SYMPTOMS
CONSTIPATION: 1
FEVER: 0
DIARRHEA: 0
DIZZINESS: 0
ABDOMINAL PAIN: 0
DYSURIA: 0
EYE PAIN: 1
CHILLS: 0
JOINT SWELLING: 1
HEARTBURN: 1
FREQUENCY: 0
HEMATURIA: 0
PALPITATIONS: 0
BREAST MASS: 0
NAUSEA: 1
SORE THROAT: 0
MYALGIAS: 1
PARESTHESIAS: 1
NERVOUS/ANXIOUS: 1
HEMATOCHEZIA: 0
COUGH: 0
ARTHRALGIAS: 1
WEAKNESS: 1
SHORTNESS OF BREATH: 0
HEADACHES: 1

## 2023-09-19 ASSESSMENT — PATIENT HEALTH QUESTIONNAIRE - PHQ9
10. IF YOU CHECKED OFF ANY PROBLEMS, HOW DIFFICULT HAVE THESE PROBLEMS MADE IT FOR YOU TO DO YOUR WORK, TAKE CARE OF THINGS AT HOME, OR GET ALONG WITH OTHER PEOPLE: VERY DIFFICULT
SUM OF ALL RESPONSES TO PHQ QUESTIONS 1-9: 21
SUM OF ALL RESPONSES TO PHQ QUESTIONS 1-9: 21

## 2023-09-19 ASSESSMENT — ACTIVITIES OF DAILY LIVING (ADL)
CURRENT_FUNCTION: PREPARING MEALS REQUIRES ASSISTANCE
CURRENT_FUNCTION: LAUNDRY REQUIRES ASSISTANCE
CURRENT_FUNCTION: HOUSEWORK REQUIRES ASSISTANCE

## 2023-09-19 ASSESSMENT — ASTHMA QUESTIONNAIRES: ACT_TOTALSCORE: 24

## 2023-09-19 ASSESSMENT — PAIN SCALES - GENERAL: PAINLEVEL: EXTREME PAIN (8)

## 2023-09-19 NOTE — PROGRESS NOTES
"  Assessment & Plan     Abnormal weight loss  Weight loss seems to have stabilized, she will work on eating more nutritious foods and supplementing her diet with Ensure and a multivitamin    Encounter for vitamin deficiency screening    - Vitamin D Deficiency; Future  - Vitamin D Deficiency    Fatigue, unspecified type  May be related to low caloric intake and poor nutrition, may also be related to a lot of stress she has in her life and her chronic pain  - CBC with platelets and differential; Future  - CBC with platelets and differential    Hyperlipidemia LDL goal <160  We will check labs, recent start of medication titrate dosage as needed  - Lipid panel reflex to direct LDL Fasting; Future  - ALT; Future  - Lipid panel reflex to direct LDL Fasting  - ALT    Need for Tdap vaccination  Patient declines we will get this updated when she is feeling better    Screen for colon cancer  Declined she will await feeling better         BMI:   Estimated body mass index is 28.54 kg/m  as calculated from the following:    Height as of this encounter: 1.619 m (5' 3.75\").    Weight as of this encounter: 74.8 kg (165 lb).   Weight management plan: Discussed healthy diet and exercise guidelines        Jonna Gaitan PA-C  [facility] KRISHNA Hernandez is a 63 year old, presenting for the following health issues:  Fatigue and Weight Problem      9/19/2023     9:42 AM   Additional Questions   Roomed by        Healthy Habits:     In general, how would you rate your overall health?  Poor    Frequency of exercise:  None    Do you usually eat at least 4 servings of fruit and vegetables a day, include whole grains    & fiber and avoid regularly eating high fat or \"junk\" foods?  No    Taking medications regularly:  Yes    Medication side effects:  None    Ability to successfully perform activities of daily living:  Preparing meals requires assistance, housework requires assistance and laundry requires " assistance    Home Safety:  Lack of grab bars in the bathroom    Hearing Impairment:  No hearing concerns    In the past 6 months, have you been bothered by leaking of urine?  No    In general, how would you rate your overall mental or emotional health?  Fair    Additional concerns today:  No       Acute Illness  Acute illness concerns: Fatigue  Onset/Duration: July  Symptoms:  Fever: No  Chills/Sweats: YES- sweats at night  Headache (location?): No  Sinus Pressure: YES  Conjunctivitis:  No  Ear Pain: plugged  Rhinorrhea: No  Congestion: YES  Sore Throat: No  Cough: no  Wheeze: No  Decreased Appetite: YES  Nausea: YES  Vomiting: dry heaving  Diarrhea: No  Dysuria/Freq.: No  Dysuria or Hematuria: No  Fatigue/Achiness: YES  Sick/Strep Exposure: No  Therapies tried and outcome: None      In June, patient was seen by Tomasa Medina for fatigue, weakness and unexplained weight loss.  She did a very thorough laboratory work-up which was normal.  She then proceeded to order a CT abdomen pelvis which was normal and finally an upper GI endoscopy which is also normal.  Patient continues to feel very fatigued and weak she has had some dry eyes and blurred vision.  According to our record her weight has increased 5 pounds but she states she had her cast boot and shoe on.  Weight last week was 162.  She has a lot of stress at home.  She feels that her personal mental health is stable with depression and anxiety that she is trying to help her spouse through chronic pain in his depression which is very fatigued and wearing on her.    She still does not eat much her appetite is poor she will eat a few bites of something to be comfortable.  She essentially eats part of the lean cuisine and peanut butter crackers at night.  She may have a half of a pop tart or a half of a piece of toast.  She will put protein powder in her coffee.  She is having some dental issues and has not been able to eat fruits and vegetables due to this.  Because  "of her insurance she is having a hard time finding a dentist.  She is drinking plenty of fluids and avoids alcohol always    Review of Systems   Constitutional:  Negative for chills and fever.   HENT:  Positive for congestion. Negative for ear pain, hearing loss and sore throat.    Eyes:  Positive for pain and visual disturbance.   Respiratory:  Negative for cough and shortness of breath.    Cardiovascular:  Negative for chest pain and palpitations.   Gastrointestinal:  Positive for constipation, heartburn and nausea. Negative for abdominal pain, diarrhea and hematochezia.   Breasts:  Negative for tenderness, breast mass and discharge.   Genitourinary:  Negative for dysuria, frequency, genital sores, hematuria, pelvic pain, urgency, vaginal bleeding and vaginal discharge.   Musculoskeletal:  Positive for arthralgias, joint swelling and myalgias.   Skin:  Negative for rash.   Neurological:  Positive for weakness, headaches and paresthesias. Negative for dizziness.   Psychiatric/Behavioral:  Positive for mood changes. The patient is nervous/anxious.       Constitutional, HEENT, cardiovascular, pulmonary, gi and gu systems are negative, except as otherwise noted.      Objective    /70 (BP Location: Left arm, Patient Position: Chair, Cuff Size: Adult Regular)   Pulse 76   Temp 98.4  F (36.9  C) (Temporal)   Resp 18   Ht 1.619 m (5' 3.75\")   Wt 74.8 kg (165 lb)   LMP  (LMP Unknown)   SpO2 98%   Breastfeeding No   BMI 28.54 kg/m    Body mass index is 28.54 kg/m .  Physical Exam   GENERAL: healthy, alert and no distress  NECK: no adenopathy, no asymmetry, masses, or scars and thyroid normal to palpation  RESP: lungs clear to auscultation - no rales, rhonchi or wheezes  CV: regular rate and rhythm, normal S1 S2, no S3 or S4, no murmur, click or rub, no peripheral edema and peripheral pulses strong  ABDOMEN: soft, nontender, no hepatosplenomegaly, no masses and bowel sounds normal  MS: no gross musculoskeletal " defects noted, no edema  MS: Wearing her cast boot as typical  PSYCH: mentation appears normal, affect normal/bright    Admission on 06/26/2023, Discharged on 06/26/2023   Component Date Value Ref Range Status    Case Report 06/26/2023    Final                    Value:Surgical Pathology Report                         Case: [id number]                                  Authorizing Provider:  Marcus Griffith MD       Collected:           06/26/2023 08:12 AM          Ordering Location:     [facility]          Received:            06/26/2023 08:33 AM                                 [facility]                                                          Pathologist:           Brice Griffith MD                                                                           Specimens:   A) - Stomach, Pyloric Antrum, Pre-pyloric biopsies for H.Pylori                                     B) - Gastric Esophageal Junction, GE junction biopsies, rule out Allen's                 Addendum 06/26/2023    Final                    Value:This result contains rich text formatting which cannot be displayed here.    Final Diagnosis 06/26/2023    Final                    Value:This result contains rich text formatting which cannot be displayed here.    Clinical Information 06/26/2023    Final                    Value:This result contains rich text formatting which cannot be displayed here.    Gross Description 06/26/2023    Final                    Value:This result contains rich text formatting which cannot be displayed here.    Microscopic Description 06/26/2023    Final                    Value:This result contains rich text formatting which cannot be displayed here.    Performing Labs 06/26/2023    Final                    Value:This result contains rich text formatting which cannot be displayed here.    Upper GI Endoscopy 06/26/2023    Final                     Value:[facility]  [address] [place] [address]  _______________________________________________________________________________  Patient Name: Linda Walsh         Procedure Date: 6/26/2023 7:54 AM  MRN: [id number]                       Account Number: [id number]  YOB: 1960               Admit Type: Outpatient  Age: 62                               Room: [place] [address]  Gender: Female                        Note Status: Finalized  Attending MD: SKYE TOWNSEND MD,   Total Sedation Time:   _______________________________________________________________________________     Procedure:             Upper GI endoscopy  Indications:           Heartburn, Suspected esophageal reflux  Providers:             SKYE TOWNSEND MD  Referring MD:          CHRISTOPHER PATEL PA-C  Medicines:             Propofol per Anesthesia  Complications:         No immediate complications.  _______________________________________________________________________________  Proc                          edure:             Pre-Anesthesia Assessment:                         - Prior to the procedure, a History and Physical was                          performed, and patient medications, allergies and                          sensitivities were reviewed. The patient's tolerance                          of previous anesthesia was reviewed.                         - Pre-procedure physical examination revealed no                          contraindications to sedation.                         After obtaining informed consent, the endoscope was                          passed under direct vision. Throughout the procedure,                          the patient's blood pressure, pulse, and oxygen                          saturations were monitored continuously. The                          Endosonoscope was introduced through the mouth, and                          advanced to the second part of  duodenum.                                                                                   Findings:                                 LA Grade B (one or more mucosal breaks greater than 5 mm, not extending        between the tops of two mucosal folds) esophagitis with no bleeding was        found 35 to 38 cm from the incisors.       Localized moderately erythematous mucosa without bleeding was found in        the prepyloric region of the stomach. Biopsies were taken with a cold        forceps for Helicobacter pylori testing.       The examined duodenum was normal.                                                                                   Impression:            - LA Grade B reflux esophagitis with no bleeding. Rule                          out Allen's esophagus.                         - Erythematous mucosa in the prepyloric region of the                          stomach. Biopsied.                         - Normal examined duodenum.  Recommendation:        - Follow an antireflux regimen.                         - Continue present medications. Increase Prilosec to 2                          x a day                                                   - Await pathology results.                         - Refer to a surgeon Dr. Thomas for possbible                          antireflux procedure.                                                                                   Procedure Code(s):       --- Professional ---       44576, Esophagogastroduodenoscopy, flexible, transoral; with biopsy,        single or multiple    CPT copyright 2021 [employer]. All rights reserved.    The codes documented in this report are preliminary and upon  review may   be revised to meet current compliance requirements.    Marcus Griffith  ___________________  MARCUS GRIFFITH MD  6/26/2023 8:18:19 AM  I was physically present for the entire viewing portion of the exam.MARCUS GRIFFITH MD  Number of  Addenda: 0    Note Initiated On: 6/26/2023 7:54 AM                     Answers submitted by the patient for this visit:  Patient Health Questionnaire (Submitted on 9/19/2023)  If you checked off any problems, how difficult have these problems made it for you to do your work, take care of things at home, or get along with other people?: Very difficult  PHQ9 TOTAL SCORE: 21

## 2023-09-20 ENCOUNTER — MYC MEDICAL ADVICE (OUTPATIENT)
Dept: FAMILY MEDICINE | Facility: CLINIC | Age: 63
End: 2023-09-20
Payer: COMMERCIAL

## 2023-09-20 NOTE — TELEPHONE ENCOUNTER
Patient seen yesterday in clinic for fatigue.   Routing to provider to review as patient was seen for concerns.     Vitamin D is still in process.   CBC, Lipids, and ALT complete.     AASHISH OrourkeN, RN  [facility] ~ Registered Nurse  Clinic Triage ~ [facility]  September 20, 2023

## 2023-09-22 LAB — DEPRECATED CALCIDIOL+CALCIFEROL SERPL-MC: 35 UG/L (ref 20–75)

## 2023-09-25 ENCOUNTER — E-VISIT (OUTPATIENT)
Dept: FAMILY MEDICINE | Facility: CLINIC | Age: 63
End: 2023-09-25
Payer: COMMERCIAL

## 2023-09-25 DIAGNOSIS — B96.89 BACTERIAL URI: Primary | ICD-10-CM

## 2023-09-25 DIAGNOSIS — J06.9 BACTERIAL URI: Primary | ICD-10-CM

## 2023-09-25 PROCEDURE — 99421 OL DIG E/M SVC 5-10 MIN: CPT | Performed by: PHYSICIAN ASSISTANT

## 2023-09-25 RX ORDER — DOXYCYCLINE 100 MG/1
100 CAPSULE ORAL 2 TIMES DAILY
Qty: 10 CAPSULE | Refills: 0 | Status: SHIPPED | OUTPATIENT
Start: 2023-09-25 | End: 2023-09-30

## 2023-09-26 ENCOUNTER — APPOINTMENT (OUTPATIENT)
Dept: GENERAL RADIOLOGY | Facility: CLINIC | Age: 63
End: 2023-09-26
Attending: FAMILY MEDICINE
Payer: COMMERCIAL

## 2023-09-26 ENCOUNTER — HOSPITAL ENCOUNTER (EMERGENCY)
Facility: CLINIC | Age: 63
Discharge: HOME | End: 2023-09-26
Attending: FAMILY MEDICINE | Admitting: FAMILY MEDICINE
Payer: COMMERCIAL

## 2023-09-26 VITALS
OXYGEN SATURATION: 98 % | HEIGHT: 65 IN | DIASTOLIC BLOOD PRESSURE: 80 MMHG | SYSTOLIC BLOOD PRESSURE: 116 MMHG | TEMPERATURE: 98.1 F | WEIGHT: 163 LBS | RESPIRATION RATE: 20 BRPM | HEART RATE: 86 BPM | BODY MASS INDEX: 27.16 KG/M2

## 2023-09-26 DIAGNOSIS — G47.00 INSOMNIA, UNSPECIFIED TYPE: ICD-10-CM

## 2023-09-26 DIAGNOSIS — R53.1 WEAKNESS: ICD-10-CM

## 2023-09-26 DIAGNOSIS — J01.90 ACUTE SINUSITIS WITH SYMPTOMS > 10 DAYS: ICD-10-CM

## 2023-09-26 LAB
ANION GAP SERPL CALCULATED.3IONS-SCNC: 12 MMOL/L (ref 7–15)
BASOPHILS # BLD AUTO: 0.1 10E3/UL (ref 0–0.2)
BASOPHILS NFR BLD AUTO: 1 %
BUN SERPL-MCNC: 12.6 MG/DL (ref 8–23)
CALCIUM SERPL-MCNC: 9.5 MG/DL (ref 8.8–10.2)
CHLORIDE SERPL-SCNC: 103 MMOL/L (ref 98–107)
CREAT SERPL-MCNC: 1 MG/DL (ref 0.51–0.95)
DEPRECATED HCO3 PLAS-SCNC: 24 MMOL/L (ref 22–29)
EGFRCR SERPLBLD CKD-EPI 2021: 63 ML/MIN/1.73M2
EOSINOPHIL # BLD AUTO: 0.1 10E3/UL (ref 0–0.7)
EOSINOPHIL NFR BLD AUTO: 2 %
ERYTHROCYTE [DISTWIDTH] IN BLOOD BY AUTOMATED COUNT: 12.2 % (ref 10–15)
GLUCOSE SERPL-MCNC: 112 MG/DL (ref 70–99)
HCT VFR BLD AUTO: 42.8 % (ref 35–47)
HGB BLD-MCNC: 14.6 G/DL (ref 11.7–15.7)
IMM GRANULOCYTES # BLD: 0 10E3/UL
IMM GRANULOCYTES NFR BLD: 0 %
LYMPHOCYTES # BLD AUTO: 1.7 10E3/UL (ref 0.8–5.3)
LYMPHOCYTES NFR BLD AUTO: 28 %
MCH RBC QN AUTO: 28.6 PG (ref 26.5–33)
MCHC RBC AUTO-ENTMCNC: 34.1 G/DL (ref 31.5–36.5)
MCV RBC AUTO: 84 FL (ref 78–100)
MONOCYTES # BLD AUTO: 0.3 10E3/UL (ref 0–1.3)
MONOCYTES NFR BLD AUTO: 5 %
NEUTROPHILS # BLD AUTO: 3.9 10E3/UL (ref 1.6–8.3)
NEUTROPHILS NFR BLD AUTO: 64 %
NRBC # BLD AUTO: 0 10E3/UL
NRBC BLD AUTO-RTO: 0 /100
PLATELET # BLD AUTO: 249 10E3/UL (ref 150–450)
POTASSIUM SERPL-SCNC: 4.4 MMOL/L (ref 3.4–5.3)
RBC # BLD AUTO: 5.11 10E6/UL (ref 3.8–5.2)
SARS-COV-2 RNA RESP QL NAA+PROBE: NEGATIVE
SODIUM SERPL-SCNC: 139 MMOL/L (ref 136–145)
WBC # BLD AUTO: 6.1 10E3/UL (ref 4–11)

## 2023-09-26 PROCEDURE — 250N000011 HC RX IP 250 OP 636: Mod: JZ | Performed by: FAMILY MEDICINE

## 2023-09-26 PROCEDURE — 85004 AUTOMATED DIFF WBC COUNT: CPT | Performed by: FAMILY MEDICINE

## 2023-09-26 PROCEDURE — 99284 EMERGENCY DEPT VISIT MOD MDM: CPT | Performed by: FAMILY MEDICINE

## 2023-09-26 PROCEDURE — 80048 BASIC METABOLIC PNL TOTAL CA: CPT | Performed by: FAMILY MEDICINE

## 2023-09-26 PROCEDURE — 96361 HYDRATE IV INFUSION ADD-ON: CPT | Performed by: FAMILY MEDICINE

## 2023-09-26 PROCEDURE — 96374 THER/PROPH/DIAG INJ IV PUSH: CPT | Performed by: FAMILY MEDICINE

## 2023-09-26 PROCEDURE — 36415 COLL VENOUS BLD VENIPUNCTURE: CPT | Performed by: FAMILY MEDICINE

## 2023-09-26 PROCEDURE — 71045 X-RAY EXAM CHEST 1 VIEW: CPT

## 2023-09-26 PROCEDURE — 87635 SARS-COV-2 COVID-19 AMP PRB: CPT | Performed by: FAMILY MEDICINE

## 2023-09-26 PROCEDURE — 99284 EMERGENCY DEPT VISIT MOD MDM: CPT | Mod: 25 | Performed by: FAMILY MEDICINE

## 2023-09-26 PROCEDURE — 258N000003 HC RX IP 258 OP 636: Performed by: FAMILY MEDICINE

## 2023-09-26 RX ORDER — ONDANSETRON 2 MG/ML
4 INJECTION INTRAMUSCULAR; INTRAVENOUS EVERY 30 MIN PRN
Status: DISCONTINUED | OUTPATIENT
Start: 2023-09-26 | End: 2023-09-26 | Stop reason: HOSPADM

## 2023-09-26 RX ORDER — PSEUDOEPHEDRINE HCL 30 MG
30 TABLET ORAL EVERY 4 HOURS PRN
Qty: 60 TABLET | Refills: 0 | Status: SHIPPED | OUTPATIENT
Start: 2023-09-26 | End: 2023-11-22

## 2023-09-26 RX ADMIN — ONDANSETRON 4 MG: 2 INJECTION INTRAMUSCULAR; INTRAVENOUS at 10:11

## 2023-09-26 RX ADMIN — SODIUM CHLORIDE 1000 ML: 9 INJECTION, SOLUTION INTRAVENOUS at 10:10

## 2023-09-26 ASSESSMENT — ACTIVITIES OF DAILY LIVING (ADL): ADLS_ACUITY_SCORE: 35

## 2023-09-26 NOTE — ED TRIAGE NOTES
Reports weakness and N/V, is mid treatment for sinus infection and bronchitis.     Triage Assessment       Row Name 09/26/23 0917       Triage Assessment (Adult)    Airway WDL WDL       Respiratory WDL    Respiratory WDL X  being treated for sinus infection and bronchitis       Skin Circulation/Temperature WDL    Skin Circulation/Temperature WDL WDL       Cardiac WDL    Cardiac WDL WDL       Peripheral/Neurovascular WDL    Peripheral Neurovascular WDL WDL       Cognitive/Neuro/Behavioral WDL    Cognitive/Neuro/Behavioral WDL WDL
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
## 2023-09-26 NOTE — DISCHARGE INSTRUCTIONS
1.  Please continue the antibiotics as previously prescribed.  At the Sudafed to see if this works better for you.  Continue to push fluids.  Please see your doctor if there is no improvement in the next 7 days.

## 2023-09-27 RX ORDER — TRAZODONE HYDROCHLORIDE 50 MG/1
150 TABLET, FILM COATED ORAL AT BEDTIME
Qty: 15 TABLET | Refills: 5 | OUTPATIENT
Start: 2023-09-27

## 2023-10-10 ENCOUNTER — VIRTUAL VISIT (OUTPATIENT)
Dept: PSYCHOLOGY | Facility: OTHER | Age: 63
End: 2023-10-10
Payer: COMMERCIAL

## 2023-10-10 DIAGNOSIS — F43.10 POST TRAUMATIC STRESS DISORDER (PTSD): ICD-10-CM

## 2023-10-10 DIAGNOSIS — F33.2 SEVERE RECURRENT MAJOR DEPRESSION WITHOUT PSYCHOTIC FEATURES (H): Primary | ICD-10-CM

## 2023-10-10 DIAGNOSIS — F41.1 GENERALIZED ANXIETY DISORDER: ICD-10-CM

## 2023-10-10 PROCEDURE — 90834 PSYTX W PT 45 MINUTES: CPT | Mod: 95 | Performed by: COUNSELOR

## 2023-10-10 NOTE — PROGRESS NOTES
06/05/23 0500   Appointment Info   Signing clinician's name / credentials Lauri Daniel, PT, DPT, OCS   Total/Authorized Visits 20   Visits Used 11   Medical Diagnosis Radiculopathy, cervical region (M54.12)   PT Tx Diagnosis Decreased ROM, shoulder instability, weakness, and poor posture.   Progress Note/Certification   Onset of illness/injury or Date of Surgery 07/11/22   Therapy Frequency 2x/week   Predicted Duration 6 weeks   Progress Note Due Date 05/28/23       Present No   GOALS   PT Goals 2;3;4   PT Goal 1   Goal Identifier #1   Goal Description Pt will demonstrate ROM of the L shoulder to 90 degs flexion and abduction in order to improve to overhead activities.   Goal Progress 70 degrees   Target Date 05/28/23   PT Goal 2   Goal Identifier #2   Goal Description Pt will demonstrate ability to wash her hair using her L UE in order to demonstrate more motion and function in L UE.   Goal Progress Painful and difficult   PT Goal 3   Goal Identifier #3   Goal Description Pt will demonstrate ability to drive using L UE in order to be safe with controling stirring wheel and be comfortable while traveling.   Goal Progress unable   Target Date 05/28/23   PT Goal 4   Goal Identifier #4   Goal Description Pt will report 50% on the SPADI demonstrating improvement with function and pain in the L shoulder.   Goal Progress not met   Target Date 05/28/23   Subjective Report   Subjective Report Pt reports she was doing well until yesterday.  Pt was making progress with AAROM and progressing into gravity assisted flexion full ROM in supine.   Objective Measures   Objective Measures Objective Measure 1;Objective Measure 2;Objective Measure 3;Objective Measure 4   Objective Measure 1   Objective Measure Symptoms   Details L shoulder and neck.   Objective Measure 2   Objective Measure Biceps Load   Details not tested today   Objective Measure 3   Objective Measure ROM   Details 70 degree AROM flexion.  Full PROM all motions.   Objective Measure 4   Objective Measure Resisted Motion   Details Pain resisted L shoulder IR & ER   Ultrasound   Ultrasound Minutes (59819) 10   Skilled Intervention Applicaiton, parameter setting   Treatment Detail Continuous US to the L shoulder joint line and musculature 1.0mHz, 1.0w/cm2 x 8 mins.  2 mins for set up and clean up.   Patient Response/Progress Feels ok   Progress No adverse reactions.   Treatment Interventions (PT)   Interventions Neuromuscular Re-education;Therapeutic Procedure/Exercise   Therapeutic Procedure/Exercise   Therapeutic Procedures: strength, endurance, ROM, flexibillity minutes (22613) 20   Ther Proc 1 - Details PROM all planes of motion for L shoulder.  Performed to end range in all planes.  Discussed progression of AAROM to AROM with gravity eliminated then slowly increasing height of adjustable bed with shoulder flexion.   Skilled Intervention Exercises   Manual Therapy   Manual Therapy: Mobilization, MFR, MLD, friction massage minutes (81446) 10   Patient Response/Progress Increased soreness, increased ROM   Treatment Detail Mobilization of R GH inferior, anterior, and posterior directions grade III-IV.   Education   Learner/Method Patient   Education Comments Horizontal breathing, 90-90 positioning with pillows and towels every 1 hour, consider not chewing gum x 15 seconds every hour, using R hand to stroke jaws   Plan   Homework Positions of relief to decrease L shoulder irritability.   Home program NEW: Prone bilat scap retraction with towel under L humeral head, 1x15 every other day; propping bilat arms on pillows when sitting in chair to offload shoulder soft tissue PRN. (Patient declines written form of new exercises and states she will remember. PAUSE: Countertop shoulder abduction, supine shoulder flexion AAROM.   Updates to plan of care added 4 more sessions for advancing program   Plan for next session positioning and calming of symptoms -  position (check), check horizontal breathing, continue PROM of shoulder   Total Session Time   Timed Code Treatment Minutes 40   Total Treatment Time (sum of timed and untimed services) 40   Session Number   Authorization status CERT REQUIRED         DISCHARGE  Reason for Discharge: Patient has not made expected progress due to interrupted treatment attendance.    Equipment Issued: none.    Discharge Plan: Patient to continue home program.    Referring Provider:  Kaylynn Rivers
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
## 2023-10-18 ENCOUNTER — E-VISIT (OUTPATIENT)
Dept: FAMILY MEDICINE | Facility: CLINIC | Age: 63
End: 2023-10-18
Payer: COMMERCIAL

## 2023-10-18 DIAGNOSIS — Z12.11 SCREEN FOR COLON CANCER: ICD-10-CM

## 2023-10-18 DIAGNOSIS — Z12.31 ENCOUNTER FOR SCREENING MAMMOGRAM FOR BREAST CANCER: Primary | ICD-10-CM

## 2023-10-18 DIAGNOSIS — D36.9 ADENOMATOUS POLYP: ICD-10-CM

## 2023-10-18 PROCEDURE — 99422 OL DIG E/M SVC 11-20 MIN: CPT | Performed by: PHYSICIAN ASSISTANT

## 2023-10-23 ENCOUNTER — APPOINTMENT (OUTPATIENT)
Dept: CT IMAGING | Facility: CLINIC | Age: 63
End: 2023-10-23
Attending: FAMILY MEDICINE
Payer: COMMERCIAL

## 2023-10-23 ENCOUNTER — HOSPITAL ENCOUNTER (EMERGENCY)
Facility: CLINIC | Age: 63
Discharge: HOME | End: 2023-10-23
Attending: FAMILY MEDICINE | Admitting: FAMILY MEDICINE
Payer: COMMERCIAL

## 2023-10-23 ENCOUNTER — HOSPITAL ENCOUNTER (OUTPATIENT)
Facility: CLINIC | Age: 63
End: 2023-10-23
Attending: SURGERY | Admitting: SURGERY
Payer: COMMERCIAL

## 2023-10-23 ENCOUNTER — TELEPHONE (OUTPATIENT)
Dept: GASTROENTEROLOGY | Facility: CLINIC | Age: 63
End: 2023-10-23

## 2023-10-23 VITALS
HEART RATE: 58 BPM | BODY MASS INDEX: 27.12 KG/M2 | RESPIRATION RATE: 16 BRPM | TEMPERATURE: 98.6 F | SYSTOLIC BLOOD PRESSURE: 114 MMHG | DIASTOLIC BLOOD PRESSURE: 65 MMHG | OXYGEN SATURATION: 100 % | WEIGHT: 163 LBS

## 2023-10-23 DIAGNOSIS — K29.00 ACUTE GASTRITIS WITHOUT HEMORRHAGE, UNSPECIFIED GASTRITIS TYPE: ICD-10-CM

## 2023-10-23 DIAGNOSIS — R11.2 NAUSEA AND VOMITING, UNSPECIFIED VOMITING TYPE: ICD-10-CM

## 2023-10-23 LAB
ALBUMIN SERPL BCG-MCNC: 4 G/DL (ref 3.5–5.2)
ALP SERPL-CCNC: 69 U/L (ref 35–104)
ALT SERPL W P-5'-P-CCNC: 14 U/L (ref 0–50)
ANION GAP SERPL CALCULATED.3IONS-SCNC: 12 MMOL/L (ref 7–15)
AST SERPL W P-5'-P-CCNC: 18 U/L (ref 0–45)
BASO+EOS+MONOS # BLD AUTO: NORMAL 10*3/UL
BASO+EOS+MONOS NFR BLD AUTO: NORMAL %
BASOPHILS # BLD AUTO: 0.1 10E3/UL (ref 0–0.2)
BASOPHILS NFR BLD AUTO: 1 %
BILIRUB SERPL-MCNC: 0.7 MG/DL
BUN SERPL-MCNC: 10.6 MG/DL (ref 8–23)
CALCIUM SERPL-MCNC: 9.2 MG/DL (ref 8.8–10.2)
CHLORIDE SERPL-SCNC: 103 MMOL/L (ref 98–107)
CREAT SERPL-MCNC: 1.07 MG/DL (ref 0.51–0.95)
DEPRECATED HCO3 PLAS-SCNC: 25 MMOL/L (ref 22–29)
EGFRCR SERPLBLD CKD-EPI 2021: 58 ML/MIN/1.73M2
EOSINOPHIL # BLD AUTO: 0.1 10E3/UL (ref 0–0.7)
EOSINOPHIL NFR BLD AUTO: 2 %
ERYTHROCYTE [DISTWIDTH] IN BLOOD BY AUTOMATED COUNT: 12.5 % (ref 10–15)
GLUCOSE SERPL-MCNC: 117 MG/DL (ref 70–99)
HCT VFR BLD AUTO: 41.2 % (ref 35–47)
HGB BLD-MCNC: 13.7 G/DL (ref 11.7–15.7)
IMM GRANULOCYTES # BLD: 0 10E3/UL
IMM GRANULOCYTES NFR BLD: 0 %
LIPASE SERPL-CCNC: 26 U/L (ref 13–60)
LYMPHOCYTES # BLD AUTO: 1.7 10E3/UL (ref 0.8–5.3)
LYMPHOCYTES NFR BLD AUTO: 28 %
MCH RBC QN AUTO: 28.1 PG (ref 26.5–33)
MCHC RBC AUTO-ENTMCNC: 33.3 G/DL (ref 31.5–36.5)
MCV RBC AUTO: 85 FL (ref 78–100)
MONOCYTES # BLD AUTO: 0.3 10E3/UL (ref 0–1.3)
MONOCYTES NFR BLD AUTO: 5 %
NEUTROPHILS # BLD AUTO: 3.8 10E3/UL (ref 1.6–8.3)
NEUTROPHILS NFR BLD AUTO: 64 %
NRBC # BLD AUTO: 0 10E3/UL
NRBC BLD AUTO-RTO: 0 /100
PLATELET # BLD AUTO: 227 10E3/UL (ref 150–450)
POTASSIUM SERPL-SCNC: 4.1 MMOL/L (ref 3.4–5.3)
PROT SERPL-MCNC: 6.5 G/DL (ref 6.4–8.3)
RBC # BLD AUTO: 4.87 10E6/UL (ref 3.8–5.2)
SODIUM SERPL-SCNC: 140 MMOL/L (ref 135–145)
WBC # BLD AUTO: 6 10E3/UL (ref 4–11)

## 2023-10-23 PROCEDURE — 258N000003 HC RX IP 258 OP 636: Performed by: FAMILY MEDICINE

## 2023-10-23 PROCEDURE — 99285 EMERGENCY DEPT VISIT HI MDM: CPT | Mod: 25 | Performed by: FAMILY MEDICINE

## 2023-10-23 PROCEDURE — 99284 EMERGENCY DEPT VISIT MOD MDM: CPT | Performed by: FAMILY MEDICINE

## 2023-10-23 PROCEDURE — 96361 HYDRATE IV INFUSION ADD-ON: CPT | Performed by: FAMILY MEDICINE

## 2023-10-23 PROCEDURE — 96376 TX/PRO/DX INJ SAME DRUG ADON: CPT | Performed by: FAMILY MEDICINE

## 2023-10-23 PROCEDURE — 36415 COLL VENOUS BLD VENIPUNCTURE: CPT | Performed by: FAMILY MEDICINE

## 2023-10-23 PROCEDURE — 250N000011 HC RX IP 250 OP 636: Mod: JZ | Performed by: FAMILY MEDICINE

## 2023-10-23 PROCEDURE — 83690 ASSAY OF LIPASE: CPT | Performed by: FAMILY MEDICINE

## 2023-10-23 PROCEDURE — 96374 THER/PROPH/DIAG INJ IV PUSH: CPT | Performed by: FAMILY MEDICINE

## 2023-10-23 PROCEDURE — 85025 COMPLETE CBC W/AUTO DIFF WBC: CPT | Performed by: FAMILY MEDICINE

## 2023-10-23 PROCEDURE — 80053 COMPREHEN METABOLIC PANEL: CPT | Performed by: FAMILY MEDICINE

## 2023-10-23 PROCEDURE — 74176 CT ABD & PELVIS W/O CONTRAST: CPT

## 2023-10-23 RX ORDER — ONDANSETRON 4 MG/1
4 TABLET, ORALLY DISINTEGRATING ORAL EVERY 8 HOURS PRN
Qty: 10 TABLET | Refills: 0 | Status: SHIPPED | OUTPATIENT
Start: 2023-10-23 | End: 2023-10-26

## 2023-10-23 RX ORDER — PROCHLORPERAZINE MALEATE 10 MG
10 TABLET ORAL EVERY 6 HOURS PRN
Qty: 10 TABLET | Refills: 0 | Status: SHIPPED | OUTPATIENT
Start: 2023-10-23 | End: 2023-10-26

## 2023-10-23 RX ORDER — ONDANSETRON 2 MG/ML
4 INJECTION INTRAMUSCULAR; INTRAVENOUS EVERY 30 MIN PRN
Status: DISCONTINUED | OUTPATIENT
Start: 2023-10-23 | End: 2023-10-23 | Stop reason: HOSPADM

## 2023-10-23 RX ORDER — FAMOTIDINE 40 MG/1
40 TABLET, FILM COATED ORAL DAILY
Qty: 30 TABLET | Refills: 0 | Status: SHIPPED | OUTPATIENT
Start: 2023-10-23 | End: 2023-11-22

## 2023-10-23 RX ORDER — IOPAMIDOL 755 MG/ML
500 INJECTION, SOLUTION INTRAVASCULAR ONCE
Status: DISCONTINUED | OUTPATIENT
Start: 2023-10-23 | End: 2023-10-23

## 2023-10-23 RX ADMIN — SODIUM CHLORIDE 1000 ML: 9 INJECTION, SOLUTION INTRAVENOUS at 10:46

## 2023-10-23 RX ADMIN — ONDANSETRON 4 MG: 2 INJECTION INTRAMUSCULAR; INTRAVENOUS at 11:27

## 2023-10-23 RX ADMIN — ONDANSETRON 4 MG: 2 INJECTION INTRAMUSCULAR; INTRAVENOUS at 10:51

## 2023-10-23 ASSESSMENT — ACTIVITIES OF DAILY LIVING (ADL): ADLS_ACUITY_SCORE: 35
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
## 2023-10-23 NOTE — Clinical Note
Linda Walsh was seen and treated in our emergency department on 10/23/2023.  She may return to work on 10/30/2023.       If you have any questions or concerns, please don't hesitate to call.      Monico Chow MD

## 2023-10-23 NOTE — TELEPHONE ENCOUNTER
"Endoscopy Scheduling Screen    Have you had a positive Covid test in the last 14 days?  No    Are you active on MyChart?   Yes    What insurance is in the chart?  Other:  [employer]    Ordering/Referring Provider:   ACROLYN CASTORENA        (If ordering provider performs procedure, schedule with ordering provider unless otherwise instructed. )    BMI: Estimated body mass index is 27.12 kg/m  as calculated from the following:    Height as of 9/26/23: 1.651 m (5' 5\").    Weight as of an earlier encounter on 10/23/23: 73.9 kg (163 lb).     Sedation Ordered  MAC/deep sedation.   BMI<= 45 45 < BMI <= 48 48 < BMI < = 50  BMI > 50   No Restrictions No [facility]  No [facility]  [facility] with exceptions Hospital Only OR Only       Are you taking any prescription medications for pain 3 or more times per week?   Yes, prep only (RN Review required.)    Do you have a history of malignant hyperthermia or adverse reaction to anesthesia?  No    (Females) Are you currently pregnant?   No     Have you been diagnosed or told you have pulmonary hypertension?   No    Do you have an LVAD?  No    Have you been told you have moderate to severe sleep apnea?  No    Have you been told you have COPD, asthma, or any other lung disease?  No    Do you have any heart conditions?  No     Have you ever had an organ transplant?   No    Have you ever had or are you awaiting a heart or lung transplant?   No    Have you had a stroke or transient ischemic attack (TIA aka \"mini stroke\" in the last 6 months?   No    Have you been diagnosed with or been told you have cirrhosis of the liver?   No    Are you currently on dialysis?   No    Do you need assistance transferring?   No    BMI: Estimated body mass index is 27.12 kg/m  as calculated from the following:    Height as of 9/26/23: 1.651 m (5' 5\").    Weight as of an earlier encounter on 10/23/23: 73.9 kg (163 lb).     Is patients BMI > 40 and scheduling location [facility]?  No    Do you take an injectable medication for " weight loss or diabetes (excluding insulin)?  No    Do you take the medication Naltrexone?  No    Do you take blood thinners?  No       Prep   Are you currently on dialysis or do you have chronic kidney disease?  No    Do you have a diagnosis of diabetes?  No    Do you have a diagnosis of cystic fibrosis (CF)?  No    On a regular basis do you go 3 -5 days between bowel movements?  No    BMI > 40?  No    Preferred Pharmacy:        [facility] [place]  [address] [place] [address]  Phone: [phone] Fax: [phone]      Final Scheduling Details   Colonoscopy prep sent?  Golytely Extended Prep    Procedure scheduled  Colonoscopy    Surgeon:  Sarah     Date of procedure:  12/5     Pre-OP / PAC:   No - Not required for this site.    Location  [facility] - Patient preference.    Sedation   MAC/Deep Sedation - Per order.      Patient Reminders:   You will receive a call from a Nurse to review instructions and health history.  This assessment must be completed prior to your procedure.  Failure to complete the Nurse assessment may result in the procedure being cancelled.      On the day of your procedure, please designate an adult(s) who can drive you home stay with you for the next 24 hours. The medicines used in the exam will make you sleepy. You will not be able to drive.      You cannot take public transportation, ride share services, or non-medical taxi service without a responsible caregiver.  Medical transport services are allowed with the requirement that a responsible caregiver will receive you at your destination.  We require that drivers and caregivers are confirmed prior to your procedure.

## 2023-10-23 NOTE — DISCHARGE INSTRUCTIONS
1.  Please follow-up with your doctor to discuss a referral for your colonoscopy as she has suggested and I would also discussed a referral to GI.

## 2023-10-23 NOTE — ED TRIAGE NOTES
Patient c/o continued generalized weakness, nausea, dry heaves, abdominal spasms. Symptoms started in June. She is intermittently able to drink fluids but states she isn't able to eat.      Triage Assessment (Adult)       Row Name 10/23/23 1003          Triage Assessment    Airway WDL WDL        Respiratory WDL    Respiratory WDL WDL        Skin Circulation/Temperature WDL    Skin Circulation/Temperature WDL WDL                      no

## 2023-10-26 ENCOUNTER — VIRTUAL VISIT (OUTPATIENT)
Dept: FAMILY MEDICINE | Facility: CLINIC | Age: 63
End: 2023-10-26
Payer: COMMERCIAL

## 2023-10-26 DIAGNOSIS — R63.0 DECREASED APPETITE: ICD-10-CM

## 2023-10-26 DIAGNOSIS — R11.2 NAUSEA AND VOMITING, UNSPECIFIED VOMITING TYPE: Primary | ICD-10-CM

## 2023-10-26 DIAGNOSIS — J34.2 DEVIATED NASAL SEPTUM: ICD-10-CM

## 2023-10-26 DIAGNOSIS — R63.4 ABNORMAL WEIGHT LOSS: ICD-10-CM

## 2023-10-26 DIAGNOSIS — J32.9 SINUSITIS, UNSPECIFIED CHRONICITY, UNSPECIFIED LOCATION: ICD-10-CM

## 2023-10-26 DIAGNOSIS — R53.1 WEAKNESS GENERALIZED: ICD-10-CM

## 2023-10-26 DIAGNOSIS — R53.83 FATIGUE, UNSPECIFIED TYPE: ICD-10-CM

## 2023-10-26 DIAGNOSIS — Z12.11 SCREEN FOR COLON CANCER: ICD-10-CM

## 2023-10-26 PROCEDURE — 99214 OFFICE O/P EST MOD 30 MIN: CPT | Mod: 95 | Performed by: PHYSICIAN ASSISTANT

## 2023-10-26 RX ORDER — DOXYCYCLINE 100 MG/1
100 CAPSULE ORAL 2 TIMES DAILY
Qty: 20 CAPSULE | Refills: 0 | Status: SHIPPED | OUTPATIENT
Start: 2023-10-26 | End: 2023-12-20

## 2023-10-26 RX ORDER — ONDANSETRON 4 MG/1
4 TABLET, ORALLY DISINTEGRATING ORAL EVERY 8 HOURS PRN
Qty: 30 TABLET | Refills: 1 | Status: SHIPPED | OUTPATIENT
Start: 2023-10-26 | End: 2023-11-22

## 2023-10-26 RX ORDER — PROCHLORPERAZINE MALEATE 10 MG
10 TABLET ORAL EVERY 6 HOURS PRN
Qty: 30 TABLET | Refills: 1 | Status: SHIPPED | OUTPATIENT
Start: 2023-10-26 | End: 2023-11-22

## 2023-10-26 NOTE — PROGRESS NOTES
Mary is a 63 year old who is being evaluated via a billable telephone visit.      What phone number would you like to be contacted at? [phone]  How would you like to obtain your AVS? Dona    Distant Location (provider location):  Off-site    Assessment & Plan     Sinusitis, unspecified chronicity, unspecified location  Patient with symptoms of another sinusitis.  We will treat with doxycycline, she has tolerated this well in the past  - doxycycline hyclate (VIBRAMYCIN) 100 MG capsule; Take 1 capsule (100 mg) by mouth 2 times daily for 10 days    Deviated nasal septum  Likely contributing to her frequent sinusitis ENT recommended surgery though she is not well enough to proceed with that yet    Abnormal weight loss  Unknown etiology obviously decreased oral intake due to nausea is playing a role, she will continue to try to drink Ensure and eat what she is able to  - Adult GI  Referral - Consult Only; Future    Fatigue, unspecified type  Work-up to date has been negative, certainly poor nutrition is playing a role  - Adult GI  Referral - Consult Only; Future    Nausea and vomiting, unspecified vomiting type  Significant dry heaving nausea and vomiting, I would recommend urgent referral to GI.  Referral placed she is going to check into coverage for [place] [facility] and call to make an appointment later today  - Adult GI  Referral - Consult Only; Future  - ondansetron (ZOFRAN ODT) 4 MG ODT tab; Take 1 tablet (4 mg) by mouth every 8 hours as needed for nausea  - prochlorperazine (COMPAZINE) 10 MG tablet; Take 1 tablet (10 mg) by mouth every 6 hours as needed for nausea or vomiting    Decreased appetite  She will use her antinausea meds prior to eating and try to eat what she is able to tolerate to get some nutrition  - Adult GI  Referral - Consult Only; Future    Weakness generalized  Currently related to sinus infection as well as lack of oral intake    Screen for colon cancer  She is  scheduled for colonoscopy December but hopefully [facility] will be able to do her colonoscopy      This was completed as a telephone visit, I spent 15 minutes on the phone with patient     MED REC REQUIRED  Post Medication Reconciliation Status: discharge medications reconciled, continue medications without change    Follow-up plan is urgent referral to [facility] KRISHNA Hernandez is a 63 year old, presenting for the following health issues:  ER F/U        10/26/2023     7:39 AM   Additional Questions   Roomed by YK   Accompanied by self         10/26/2023     7:39 AM   Patient Reported Additional Medications   Patient reports taking the following new medications n/a       HPI     ED/UC Followup:    Facility:  [facility]  Date of visit: 10/23/23  Reason for visit: Nausea and vomiting  Current Status: Still feel the same. Does not feel good, feels tired, cannot function. Can eat a little with medication for nausea.     Patient has not felt well since July.  She has undergone a lot of testing which to date is all been negative.  She has had CT scans, upper GI endoscopy, and laboratory work-up all without explanation for her weight loss and now progressively worsening nausea and vomiting.  Even the thought of food makes her dry heave and that she has been on antinausea medication.  She states she constantly is dry heaving.  She was in the ER as above she takes both Zofran and Compazine in order to try to be able to eat or drink anything.  She may be able to tolerate toast and sometimes an Ensure she is trying to drink plenty of water but that is difficult because that nauseates her as well.  She is not having any diarrhea.  She was able to schedule for colonoscopy but it is not until December 5.  She is never hungry she does not feel like eating.  She states that her stomach feels like it is going to go into a spasm.  It was recommended to her in the ER  that she restart her omeprazole which she has done for that does not seem to be helpful.    Also, she believes that she is developing another sinus infection.  She started having purulent discharge and sinus pain and pressure again 5 days ago that is progressively worsening.  She wonders if the purulent discharge is contributing to her nausea and vomiting.  She does not tend to do best with doxycycline.  She has penicillin allergies      Review of Systems   Constitutional, HEENT, cardiovascular, pulmonary, gi and gu systems are negative, except as otherwise noted.      Objective           Vitals:  No vitals were obtained today due to virtual visit.    Physical Exam   alert, fatigued, and dry heaves frequently throughout our visit  PSYCH: Alert and oriented times 3; coherent speech, normal   rate and volume, able to articulate logical thoughts, able   to abstract reason, no tangential thoughts, no hallucinations   or delusions  Her affect is normal, she sounds fatigued  RESP: No cough, no audible wheezing, able to talk in full sentences  Remainder of exam unable to be completed due to telephone visits    Admission on 10/23/2023, Discharged on 10/23/2023   Component Date Value Ref Range Status    Sodium 10/23/2023 140  135 - 145 mmol/L Final    Reference intervals for this test were updated on 09/26/2023 to more accurately reflect our healthy population. There may be differences in the flagging of prior results with similar values performed with this method. Interpretation of those prior results can be made in the context of the updated reference intervals.     Potassium 10/23/2023 4.1  3.4 - 5.3 mmol/L Final    Carbon Dioxide (CO2) 10/23/2023 25  22 - 29 mmol/L Final    Anion Gap 10/23/2023 12  7 - 15 mmol/L Final    Urea Nitrogen 10/23/2023 10.6  8.0 - 23.0 mg/dL Final    Creatinine 10/23/2023 1.07 (H)  0.51 - 0.95 mg/dL Final    GFR Estimate 10/23/2023 58 (L)  >60 mL/min/1.73m2 Final    Calcium 10/23/2023 9.2  8.8  - 10.2 mg/dL Final    Chloride 10/23/2023 103  98 - 107 mmol/L Final    Glucose 10/23/2023 117 (H)  70 - 99 mg/dL Final    Alkaline Phosphatase 10/23/2023 69  35 - 104 U/L Final    AST 10/23/2023 18  0 - 45 U/L Final    Reference intervals for this test were updated on 6/12/2023 to more accurately reflect our healthy population. There may be differences in the flagging of prior results with similar values performed with this method. Interpretation of those prior results can be made in the context of the updated reference intervals.    ALT 10/23/2023 14  0 - 50 U/L Final    Reference intervals for this test were updated on 6/12/2023 to more accurately reflect our healthy population. There may be differences in the flagging of prior results with similar values performed with this method. Interpretation of those prior results can be made in the context of the updated reference intervals.      Protein Total 10/23/2023 6.5  6.4 - 8.3 g/dL Final    Albumin 10/23/2023 4.0  3.5 - 5.2 g/dL Final    Bilirubin Total 10/23/2023 0.7  <=1.2 mg/dL Final    Lipase 10/23/2023 26  13 - 60 U/L Final    WBC Count 10/23/2023 6.0  4.0 - 11.0 10e3/uL Final    RBC Count 10/23/2023 4.87  3.80 - 5.20 10e6/uL Final    Hemoglobin 10/23/2023 13.7  11.7 - 15.7 g/dL Final    Hematocrit 10/23/2023 41.2  35.0 - 47.0 % Final    MCV 10/23/2023 85  78 - 100 fL Final    MCH 10/23/2023 28.1  26.5 - 33.0 pg Final    MCHC 10/23/2023 33.3  31.5 - 36.5 g/dL Final    RDW 10/23/2023 12.5  10.0 - 15.0 % Final    Platelet Count 10/23/2023 227  150 - 450 10e3/uL Final    % Neutrophils 10/23/2023 64  % Final    % Lymphocytes 10/23/2023 28  % Final    % Monocytes 10/23/2023 5  % Final    % Eosinophils 10/23/2023 2  % Final    % Basophils 10/23/2023 1  % Final    % Immature Granulocytes 10/23/2023 0  % Final    NRBCs per 100 WBC 10/23/2023 0  <1 /100 Final    Absolute Neutrophils 10/23/2023 3.8  1.6 - 8.3 10e3/uL Final    Absolute Lymphocytes 10/23/2023 1.7  0.8 -  5.3 10e3/uL Final    Absolute Monocytes 10/23/2023 0.3  0.0 - 1.3 10e3/uL Final    Absolute Eosinophils 10/23/2023 0.1  0.0 - 0.7 10e3/uL Final    Absolute Basophils 10/23/2023 0.1  0.0 - 0.2 10e3/uL Final    Absolute Immature Granulocytes 10/23/2023 0.0  <=0.4 10e3/uL Final    Absolute NRBCs 10/23/2023 0.0  10e3/uL Final     CT ABDOMEN PELVIS WITHOUT CONTRAST 10/23/2023 11:16 AM     CLINICAL HISTORY: Abdominal pain. Vomiting, abdominal pain   TECHNIQUE: CT scan of the abdomen and pelvis was performed without IV  contrast. Multiplanar reformats were obtained. Dose reduction  techniques were used.  CONTRAST: None.     COMPARISON: June 12, 2023     FINDINGS:   LOWER CHEST: No infiltrates or effusions.     HEPATOBILIARY: No significant mass or bile duct dilatation.  Cholecystectomy.      PANCREAS: No significant mass, duct dilatation, or inflammatory  change.     SPLEEN: Normal size.     ADRENAL GLANDS: No significant nodules.     KIDNEYS/BLADDER: No significant mass, stones, or hydronephrosis.     BOWEL: No obstruction. Stable minimal increased attenuation in the  small bowel mesentery which is stable and may be related to chronic  change.     VASCULATURE: No abdominal aortic aneurysm.     PELVIC ORGANS: No pelvic masses.     OTHER: No free air or free fluid.     MUSCULOSKELETAL: No frankly destructive bony lesions.                                                                      IMPRESSION:   1.  Minimal increased attenuation of the small bowel mesentery, stable  since 2015 of uncertain to doubtful clinical significance.  2.  Otherwise no acute process demonstrated.     ARMANDO TAPIA MD           Phone call duration: 15 minutes

## 2023-11-06 ENCOUNTER — VIRTUAL VISIT (OUTPATIENT)
Dept: PSYCHOLOGY | Facility: CLINIC | Age: 63
End: 2023-11-06
Payer: COMMERCIAL

## 2023-11-06 DIAGNOSIS — F43.10 POST TRAUMATIC STRESS DISORDER (PTSD): ICD-10-CM

## 2023-11-06 DIAGNOSIS — F33.2 SEVERE RECURRENT MAJOR DEPRESSION WITHOUT PSYCHOTIC FEATURES (H): Primary | ICD-10-CM

## 2023-11-06 DIAGNOSIS — F41.1 GENERALIZED ANXIETY DISORDER: ICD-10-CM

## 2023-11-06 PROCEDURE — 90837 PSYTX W PT 60 MINUTES: CPT | Mod: 95 | Performed by: COUNSELOR

## 2023-11-09 ENCOUNTER — TRANSFERRED RECORDS (OUTPATIENT)
Dept: HEALTH INFORMATION MANAGEMENT | Facility: CLINIC | Age: 63
End: 2023-11-09
Payer: COMMERCIAL
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
## 2023-11-13 ENCOUNTER — TRANSFERRED RECORDS (OUTPATIENT)
Dept: HEALTH INFORMATION MANAGEMENT | Facility: CLINIC | Age: 63
End: 2023-11-13
Payer: COMMERCIAL

## 2023-11-22 ENCOUNTER — HOSPITAL ENCOUNTER (EMERGENCY)
Facility: CLINIC | Age: 63
Discharge: HOME | End: 2023-11-22
Attending: STUDENT IN AN ORGANIZED HEALTH CARE EDUCATION/TRAINING PROGRAM | Admitting: STUDENT IN AN ORGANIZED HEALTH CARE EDUCATION/TRAINING PROGRAM
Payer: COMMERCIAL

## 2023-11-22 ENCOUNTER — MYC REFILL (OUTPATIENT)
Dept: FAMILY MEDICINE | Facility: CLINIC | Age: 63
End: 2023-11-22

## 2023-11-22 VITALS
TEMPERATURE: 98.1 F | HEART RATE: 60 BPM | RESPIRATION RATE: 18 BRPM | DIASTOLIC BLOOD PRESSURE: 58 MMHG | OXYGEN SATURATION: 98 % | HEIGHT: 65 IN | SYSTOLIC BLOOD PRESSURE: 110 MMHG | BODY MASS INDEX: 27.12 KG/M2

## 2023-11-22 DIAGNOSIS — R11.2 NAUSEA AND VOMITING, UNSPECIFIED VOMITING TYPE: ICD-10-CM

## 2023-11-22 DIAGNOSIS — R11.0 NAUSEA: ICD-10-CM

## 2023-11-22 DIAGNOSIS — U07.1 COVID-19 VIRUS INFECTION: ICD-10-CM

## 2023-11-22 DIAGNOSIS — R63.0 DECREASED APPETITE: ICD-10-CM

## 2023-11-22 DIAGNOSIS — R10.9 ABDOMINAL DISCOMFORT: ICD-10-CM

## 2023-11-22 LAB
ALBUMIN SERPL BCG-MCNC: 4.3 G/DL (ref 3.5–5.2)
ALBUMIN UR-MCNC: NEGATIVE MG/DL
ALP SERPL-CCNC: 90 U/L (ref 40–150)
ALT SERPL W P-5'-P-CCNC: 33 U/L (ref 0–50)
ANION GAP SERPL CALCULATED.3IONS-SCNC: 14 MMOL/L (ref 7–15)
APPEARANCE UR: CLEAR
AST SERPL W P-5'-P-CCNC: 29 U/L (ref 0–45)
BACTERIA #/AREA URNS HPF: ABNORMAL /HPF
BASOPHILS # BLD AUTO: 0 10E3/UL (ref 0–0.2)
BASOPHILS NFR BLD AUTO: 0 %
BILIRUB SERPL-MCNC: 0.8 MG/DL
BILIRUB UR QL STRIP: NEGATIVE
BUN SERPL-MCNC: 11.1 MG/DL (ref 8–23)
CALCIUM SERPL-MCNC: 9.4 MG/DL (ref 8.8–10.2)
CHLORIDE SERPL-SCNC: 103 MMOL/L (ref 98–107)
COLOR UR AUTO: YELLOW
CREAT SERPL-MCNC: 0.93 MG/DL (ref 0.51–0.95)
DEPRECATED HCO3 PLAS-SCNC: 21 MMOL/L (ref 22–29)
EGFRCR SERPLBLD CKD-EPI 2021: 69 ML/MIN/1.73M2
EOSINOPHIL # BLD AUTO: 0 10E3/UL (ref 0–0.7)
EOSINOPHIL NFR BLD AUTO: 1 %
ERYTHROCYTE [DISTWIDTH] IN BLOOD BY AUTOMATED COUNT: 12.3 % (ref 10–15)
GLUCOSE SERPL-MCNC: 98 MG/DL (ref 70–99)
GLUCOSE UR STRIP-MCNC: NEGATIVE MG/DL
HCT VFR BLD AUTO: 43.1 % (ref 35–47)
HGB BLD-MCNC: 14.8 G/DL (ref 11.7–15.7)
HGB UR QL STRIP: NEGATIVE
HYALINE CASTS: 1 /LPF
IMM GRANULOCYTES # BLD: 0 10E3/UL
IMM GRANULOCYTES NFR BLD: 0 %
KETONES UR STRIP-MCNC: 5 MG/DL
LEUKOCYTE ESTERASE UR QL STRIP: NEGATIVE
LIPASE SERPL-CCNC: 95 U/L (ref 13–60)
LYMPHOCYTES # BLD AUTO: 1.7 10E3/UL (ref 0.8–5.3)
LYMPHOCYTES NFR BLD AUTO: 32 %
MCH RBC QN AUTO: 28.6 PG (ref 26.5–33)
MCHC RBC AUTO-ENTMCNC: 34.3 G/DL (ref 31.5–36.5)
MCV RBC AUTO: 83 FL (ref 78–100)
MONOCYTES # BLD AUTO: 0.4 10E3/UL (ref 0–1.3)
MONOCYTES NFR BLD AUTO: 7 %
MUCOUS THREADS #/AREA URNS LPF: PRESENT /LPF
NEUTROPHILS # BLD AUTO: 3.2 10E3/UL (ref 1.6–8.3)
NEUTROPHILS NFR BLD AUTO: 60 %
NITRATE UR QL: NEGATIVE
NRBC # BLD AUTO: 0 10E3/UL
NRBC BLD AUTO-RTO: 0 /100
PH UR STRIP: 7 [PH] (ref 5–7)
PLATELET # BLD AUTO: 211 10E3/UL (ref 150–450)
POTASSIUM SERPL-SCNC: 4.1 MMOL/L (ref 3.4–5.3)
PROT SERPL-MCNC: 7 G/DL (ref 6.4–8.3)
RBC # BLD AUTO: 5.17 10E6/UL (ref 3.8–5.2)
RBC URINE: <1 /HPF
SODIUM SERPL-SCNC: 138 MMOL/L (ref 135–145)
SP GR UR STRIP: 1.01 (ref 1–1.03)
SQUAMOUS EPITHELIAL: 7 /HPF
UROBILINOGEN UR STRIP-MCNC: NORMAL MG/DL
WBC # BLD AUTO: 5.4 10E3/UL (ref 4–11)
WBC URINE: 3 /HPF

## 2023-11-22 PROCEDURE — 80053 COMPREHEN METABOLIC PANEL: CPT | Performed by: STUDENT IN AN ORGANIZED HEALTH CARE EDUCATION/TRAINING PROGRAM

## 2023-11-22 PROCEDURE — 36415 COLL VENOUS BLD VENIPUNCTURE: CPT | Performed by: STUDENT IN AN ORGANIZED HEALTH CARE EDUCATION/TRAINING PROGRAM

## 2023-11-22 PROCEDURE — 99284 EMERGENCY DEPT VISIT MOD MDM: CPT | Mod: 25 | Performed by: STUDENT IN AN ORGANIZED HEALTH CARE EDUCATION/TRAINING PROGRAM

## 2023-11-22 PROCEDURE — 85025 COMPLETE CBC W/AUTO DIFF WBC: CPT | Performed by: STUDENT IN AN ORGANIZED HEALTH CARE EDUCATION/TRAINING PROGRAM

## 2023-11-22 PROCEDURE — 83690 ASSAY OF LIPASE: CPT | Performed by: STUDENT IN AN ORGANIZED HEALTH CARE EDUCATION/TRAINING PROGRAM

## 2023-11-22 PROCEDURE — 81001 URINALYSIS AUTO W/SCOPE: CPT | Performed by: STUDENT IN AN ORGANIZED HEALTH CARE EDUCATION/TRAINING PROGRAM

## 2023-11-22 PROCEDURE — 96374 THER/PROPH/DIAG INJ IV PUSH: CPT | Performed by: STUDENT IN AN ORGANIZED HEALTH CARE EDUCATION/TRAINING PROGRAM

## 2023-11-22 PROCEDURE — 258N000003 HC RX IP 258 OP 636: Performed by: STUDENT IN AN ORGANIZED HEALTH CARE EDUCATION/TRAINING PROGRAM

## 2023-11-22 PROCEDURE — 99284 EMERGENCY DEPT VISIT MOD MDM: CPT | Performed by: STUDENT IN AN ORGANIZED HEALTH CARE EDUCATION/TRAINING PROGRAM

## 2023-11-22 PROCEDURE — 96361 HYDRATE IV INFUSION ADD-ON: CPT | Performed by: STUDENT IN AN ORGANIZED HEALTH CARE EDUCATION/TRAINING PROGRAM

## 2023-11-22 PROCEDURE — 250N000011 HC RX IP 250 OP 636: Mod: JZ | Performed by: STUDENT IN AN ORGANIZED HEALTH CARE EDUCATION/TRAINING PROGRAM

## 2023-11-22 RX ORDER — PROCHLORPERAZINE MALEATE 10 MG
10 TABLET ORAL EVERY 6 HOURS PRN
Qty: 30 TABLET | Refills: 1 | Status: SHIPPED | OUTPATIENT
Start: 2023-11-22

## 2023-11-22 RX ORDER — ONDANSETRON 4 MG/1
4 TABLET, ORALLY DISINTEGRATING ORAL EVERY 8 HOURS PRN
Qty: 30 TABLET | Refills: 1 | Status: SHIPPED | OUTPATIENT
Start: 2023-11-22 | End: 2024-02-20

## 2023-11-22 RX ORDER — LINACLOTIDE 290 UG/1
290 CAPSULE, GELATIN COATED ORAL DAILY PRN
COMMUNITY
Start: 2023-04-03 | End: 2024-09-23

## 2023-11-22 RX ORDER — ONDANSETRON 2 MG/ML
8 INJECTION INTRAMUSCULAR; INTRAVENOUS ONCE
Status: COMPLETED | OUTPATIENT
Start: 2023-11-22 | End: 2023-11-22

## 2023-11-22 RX ORDER — NALOXONE HYDROCHLORIDE 4 MG/.1ML
4 SPRAY NASAL
COMMUNITY

## 2023-11-22 RX ADMIN — SODIUM CHLORIDE 1000 ML: 9 INJECTION, SOLUTION INTRAVENOUS at 11:42

## 2023-11-22 RX ADMIN — ONDANSETRON 8 MG: 2 INJECTION INTRAMUSCULAR; INTRAVENOUS at 12:24

## 2023-11-22 ASSESSMENT — ACTIVITIES OF DAILY LIVING (ADL): ADLS_ACUITY_SCORE: 35

## 2023-11-22 ASSESSMENT — ENCOUNTER SYMPTOMS: NAUSEA: 1
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
## 2023-11-22 NOTE — ED TRIAGE NOTES
Pt reports she started not feeling well on Thursday, took a home covid test on Saturday that was positive. Pt states she has been taking zofran and compazine at home without improvements. States she hasn't been vomiting but can't eat or hold liquids down.       Triage Assessment (Adult)       Row Name 11/22/23 1124          Triage Assessment    Airway WDL WDL        Respiratory WDL    Respiratory WDL WDL        Cardiac WDL    Cardiac WDL WDL

## 2023-11-22 NOTE — DISCHARGE INSTRUCTIONS
Please continue to follow-up with your primary care provider as needed.  Continue Zofran and Compazine as needed for nausea.  Goal is to maintain oral fluids.  If you feel like her mouth is dry with associated any signs of weakness or recurrent vomiting or you may require IV fluids please return for reevaluation.

## 2023-11-22 NOTE — MEDICATION SCRIBE - ADMISSION MEDICATION HISTORY
Medication Scribe Admission Medication History    Admission medication history is complete. The information provided in this note is only as accurate as the sources available at the time of the update.    Information Source(s): Patient and CareEverywhere/SureScripts via in-person    Pertinent Information: n/a    Changes made to PTA medication list:  Added: voltaren gel, narcan  Deleted: flonase, nebulizer, miralax scheduled, sudafed  Changed: lipitor to bedtime, gabapentin to BID, Linzess to prn    Medication Affordability:  Not including over the counter (OTC) medications, was there a time in the past 3 months when you did not take your medications as prescribed because of cost?: No    Allergies reviewed with patient and updates made in EHR: yes    Medication History Completed By: LUISITO ALTAMIRANO 11/22/2023 1:08 PM    PTA Med List   Medication Sig Last Dose    albuterol (PROAIR HFA/PROVENTIL HFA/VENTOLIN HFA) 108 (90 Base) MCG/ACT inhaler Inhale 2 puffs into the lungs every 6 hours as needed for shortness of breath or wheezing Past Week at unkn    atorvastatin (LIPITOR) 20 MG tablet Take 1 tablet (20 mg) by mouth daily (Patient taking differently: Take 20 mg by mouth at bedtime) 11/14/2023 at hs    bisacodyl (DULCOLAX) 10 MG suppository UNWRAP AND INSERT 1 SUPPOSITORY(10 MG) RECTALLY DAILY. DISCONTINUE IF HAVING BOWEL MOVEMENTS Strength: 10 mg (Patient taking differently: Place 10 mg rectally daily as needed for constipation) 11/21/2023 at am    diclofenac (VOLTAREN) 1 % topical gel Apply 4 g topically 2 times daily Past Week at unkn    fentaNYL (DURAGESIC) 25 mcg/hr 72 hr patch Place 1 patch onto the skin every 72 hours remove old patch. 11/20/2023 at rt upper chest    gabapentin (NEURONTIN) 300 MG capsule Take 900 mg by mouth 2 times daily 11/21/2023 at hs    hydrOXYzine (ATARAX) 10 MG tablet Take 1 tablet (10 mg) by mouth every 4 hours as needed for anxiety Past Month at unkn    LINZESS 290 MCG capsule Take 290  mcg by mouth daily as needed (constipation) More than a month at unkn    omeprazole (PRILOSEC) 40 MG DR capsule Take 1 capsule (40 mg) by mouth daily 11/22/2023 at am    ondansetron (ZOFRAN ODT) 4 MG ODT tab Take 1 tablet (4 mg) by mouth every 8 hours as needed for nausea 11/21/2023 at 2200    oxyCODONE (ROXICODONE) 5 MG tablet Take 1-3 tablets (5-15 mg) by mouth every 3 hours as needed for pain (Moderate to Severe) 11/22/2023 at am 5mg    prochlorperazine (COMPAZINE) 10 MG tablet Take 1 tablet (10 mg) by mouth every 6 hours as needed for nausea or vomiting 11/22/2023 at 0600    traZODone (DESYREL) 50 MG tablet Take 3 tablets (150 mg) by mouth At Bedtime (Patient taking differently: Take 50 mg by mouth at bedtime) 11/21/2023 at hs

## 2023-12-04 ENCOUNTER — VIRTUAL VISIT (OUTPATIENT)
Dept: PSYCHOLOGY | Facility: CLINIC | Age: 63
End: 2023-12-04
Payer: COMMERCIAL

## 2023-12-04 ENCOUNTER — TELEPHONE (OUTPATIENT)
Dept: GASTROENTEROLOGY | Facility: CLINIC | Age: 63
End: 2023-12-04
Payer: COMMERCIAL

## 2023-12-04 DIAGNOSIS — F33.2 SEVERE RECURRENT MAJOR DEPRESSION WITHOUT PSYCHOTIC FEATURES (H): Primary | ICD-10-CM

## 2023-12-04 DIAGNOSIS — F43.10 POST TRAUMATIC STRESS DISORDER (PTSD): ICD-10-CM

## 2023-12-04 DIAGNOSIS — F41.1 GENERALIZED ANXIETY DISORDER: ICD-10-CM

## 2023-12-04 PROCEDURE — 90834 PSYTX W PT 45 MINUTES: CPT | Mod: 95 | Performed by: COUNSELOR

## 2023-12-04 NOTE — TELEPHONE ENCOUNTER
Caller: Writer to patient  Reason for Reschedule/Cancellation (please be detailed, any staff messages or encounters to note?): COVID+      Prior to reschedule please review:  Ordering Provider: Jonna Gaitan PA-C   Sedation per order: MAC  Does patient have any ASC Exclusions, please identify?: No      Notes on Cancelled Procedure:  Procedure: Lower Endoscopy [Colonoscopy]   Date: 12/5/2023  Location: [facility]; [address] [place] [address] , [place] [address]  Surgeon: Smith      Rescheduled: NoLvm to call back.     CASE IN DEPOT

## 2023-12-04 NOTE — TELEPHONE ENCOUNTER
----- Message from Amandeep Renteria RN sent at 12/4/2023 11:58 AM CST -----  Regarding: reschedule  Pt needs to reschedule her 12/5 colonoscopy. She is covid +

## 2023-12-04 NOTE — PROGRESS NOTES
"        [facility]                                     Progress Note    Patient Name: Linda Walsh  Date: 12/4/23         Service Type: Individual      Session Start Time: 12:30pm  Session End Time: 1:20pm     Session Length: 50    Session #: 96    Attendees: Client      Service Modality:  Phone Visit:      Provider verified identity through the following two step process.  Patient provided:  Patient is known previously to provider    The patient has been notified of the following:      \"We have found that certain health care needs can be provided without the need for a face to face visit.  This service lets us provide the care you need with a phone conversation.       I will have full access to your [facility] medical record during this entire phone call.   I will be taking notes for your medical record.      Since this is like an office visit, we will bill your insurance company for this service.       There are potential benefits and risks of telephone visits (e.g. limits to patient confidentiality) that differ from in-person visits.?Confidentiality still applies for telephone services, and nobody will record the visit.  It is important to be in a quiet, private space that is free of distractions (including cell phone or other devices) during the visit.??      If during the course of the call I believe a telephone visit is not appropriate, you will not be charged for this service\"     Consent has been obtained for this service by care team member: Yes     DATA  Interactive Complexity: No  Crisis: No        Progress Since Last Session (Related to Symptoms / Goals / Homework):   Symptoms: No change .    Homework: Completed in session      Episode of Care Goals: Minimal progress - ACTION (Actively working towards change); Intervened by reinforcing change plan / affirming steps taken     Current / Ongoing Stressors and Concerns:   The client stated she's still been ill.   She stated she " doesn't have energy to do anything, can't eat because her stomach feels so icky. She's been forcing herself to work still because they need the money. Continues to struggle with her  and still thinking of leaving him.        Treatment Objective(s) Addressed in This Session:   use thought-stopping strategy daily to reduce intrusive thoughts       Intervention:`   Talked about her potentially leaving her  and how she could do this logistically and emotionally. Reflected on the pain she feels everyday physically and emotionally.         Assessments completed prior to visit:  The following assessments were completed by patient for this visit:  PHQA:        No data to display              GAD7:       12/30/2019     1:39 PM 2/26/2020    11:18 AM 5/27/2020     3:23 PM 7/28/2020    10:37 AM 2/24/2021     1:26 PM 7/13/2022    10:16 AM 6/9/2023    10:20 AM   MARLIN-7 SCORE   Total Score 19 (severe anxiety)   19 (severe anxiety) 16 (severe anxiety) 21 (severe anxiety) 13 (moderate anxiety)   Total Score 19 19 21 19 16 21 13     PROMIS 10-Global Health (all questions and answers displayed):        No data to display                  ASSESSMENT: Current Emotional / Mental Status (status of significant symptoms):   Risk status (Self / Other harm or suicidal ideation)   Patient denies current fears or concerns for personal safety.   Patient denies current or recent suicidal ideation or behaviors.   Patient denies current or recent homicidal ideation or behaviors.   Patient denies current or recent self injurious behavior or ideation.   Patient denies other safety concerns.   Patient reports there has been no change in risk factors since their last session.     Patient reports there has been no change in protective factors since their last session.     Recommended that patient call 911 or go to the local ED should there be a change in any of these risk factors.     Appearance:   Appropriate    Eye Contact:   Good     Psychomotor Behavior: Normal    Attitude:   Cooperative    Orientation:   All   Speech    Rate / Production: Normal/ Responsive    Volume:  Normal    Mood:    Normal   Affect:    Appropriate    Thought Content:  Clear    Thought Form:  Coherent  Logical    Insight:    Good      Medication Review:   No changes to current psychiatric medication(s)     Medication Compliance:   Yes     Changes in Health Issues:   None reported     Chemical Use Review:   Substance Use: Chemical use reviewed, no active concerns identified      Tobacco Use: No change in amount of tobacco use since last session.  Patient declined discussion at this time    Diagnosis:  1. 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 Generalized anxiety disorder    3. Post traumatic stress disorder (PTSD)        Collateral Reports Completed:   Not Applicable    PLAN: (Patient Tasks / Therapist Tasks / Other)  Client to work on self care.  Consider pros and cons of leaving her .         Nasrin Valladares, [facility]                                                         ______________________________________________________________________    Individual Treatment Plan    Patient's Name: Linda Walsh  YOB: 1960    Date of Creation: 7/11/22  Date Treatment Plan Last Reviewed/Revised: 10/10/23    DSM5 Diagnoses: 296.33 (F33.2) Major Depressive Disorder, Recurrent Episode, Severe _, 300.02 (F41.1) Generalized Anxiety Disorder or 309.81 (F43.10) Posttraumatic Stress Disorder (includes Posttraumatic Stress Disorder for Children 6 Years and Younger)  Without dissociative symptoms  Psychosocial / Contextual Factors: CRPS  PROMIS (reviewed every 90 days):     Referral / Collaboration:  Referral to another professional/service is not indicated at this time..    Anticipated number of session for this episode of care: on-going  Anticipation frequency of session: Monthly  Anticipated Duration of each session: 38-52 minutes/53+  minutes  Treatment plan will be reviewed in 90 days or when goals have been changed.       MeasurableTreatment Goal(s) related to diagnosis / functional impairment(s)  Goal 1: Client will decrease thoughts of wanting to be dead from 10 out of 10 opportunities to at most 9 out of 10 opportunities for at least 3 consecutive weeks as evidenced by client report and a decrease in symptom report as evidenced by PhQ9 scores and GAD7 scores.    Objective #A (Client Action)    Client will  Client will look at and read safety plan at least once a day and follow safety plan when thoughts and urges come up.  Status: Continued - Date(s): 10/10/23    Intervention(s)  Therapist will teach emotional regulation skills. distress tolerance skills, interpersonal effectiveness skills, emotion regluation skills, mindfulness skills, radical acceptance. Therapist will develop safety plan with the client.     Objective #B  Client will  Client will agree to call the therapist or after hours crisis line if noticing intense suicidal thoughts for skills coaching.   Status: Continued - Date(s): 10/10/23    Intervention(s)  Therapist will  Therapist will be available as much as possible during work hours for the client to contact and give the client several crisis resources.         Goal 2: Client will increase the use of skills (emotion regulation, distress tolerance, communication skill) from 1 out of 10 opportunities to at least 2 out of 10 opportunities for at least 3 consecutive weeks as evidenced by client report and a decrease in symptom report as evidenced by PhQ9 scores and GAD7 scores.     Objective #A (Client Action)    Status: Continued - Date(s): 10/10/23    Client will Increase interest, engagement, and pleasure in doing things  Decrease frequency and intensity of feeling down, depressed, hopeless  Improve diet, appetite, mindful eating, and / or meal planning  Identify negative self-talk and behaviors: challenge core beliefs,  myths, and actions  Improve concentration, focus, and mindfulness in daily activities   Feel less fidgety, restless or slow in daily activities / interpersonal interactions  Decrease thoughts that you'd be better off dead or of suicide / self-harm.    Intervention(s)  Therapist will teach emotional regulation skills. distress tolerance skills, interpersonal effectiveness skills, emotion regluation skills, mindfulness skills, radical acceptance. Therapist will teach client how to ID body cues for anxiety, anxiety reduction techniques, how to ID triggers for depression and anxiety- decrease reactivity/eliminate, lifestyle changes to reduce depression and anxiety, communication skills, explore cognitive beliefs and help client to develop healthy cognitive patterns and beliefs.    Objective #B  Client will  Client will learn and  practice DBT skills daily. .    Status: Continued - Date(s): 10/10/23    Intervention(s)  Therapist will  Therapist will Therapist will teach emotional regulation skills. distress tolerance skills, interpersonal effectiveness skills, emotion regluation skills, mindfulness skills, radical acceptance. .      Goal 3: Client will decrease anxious symptoms and reactions to triggers from 9 out of 10 opportunities to at most 8 out of 10 opportunities for at least 3 consecutive weeks as evidenced by client report and a decrease in symptom report as evidenced by PhQ9 scores and GAD7 scores.    Objective #A (Client Action)    Status: Continued - Date(s): 10/10/23    Client will  Client will use cognitive strategies identified in therapy to challenge anxious thoughts.    Intervention(s)  Therapist will  Therapist will teach emotional recognition/identification. emotion regulation skills, coping skills, mindfulness skills.    Objective #B  Client will  Client will use thought-stopping strategy daily to reduce intrusive thoughts for at least 3 consecutive weeks as evidenced by client report and a decrease  "in symptom report as evidenced by PhQ9 scores and GAD7 scores.       Status: Continued - Date(s): 10/10/23    Intervention(s)  Therapist will teach emotional regulation skills. distress tolerance skills, interpersonal effectiveness skills, emotion regluation skills, mindfulness skills, radical acceptance. Therapist will teach client how to ID body cues for anxiety, anxiety reduction techniques, how to ID triggers for depression and anxiety- decrease reactivity/eliminate, lifestyle changes to reduce depression and anxiety, communication skills, explore cognitive beliefs and help client to develop healthy cognitive patterns and beliefs.    Objective #C  Client will  Client will learn 5 crisis survival skills during the Distress Tolerance Module (6-8 weeks) for at least 3 consecutive weeks as evidenced by client report and a decrease in symptom report as evidenced by PhQ9 scores and GAD7 scores.  Status: Continued - Date(s): 10/10/23    Intervention(s)  Therapist will teach emotional regulation skills. distress tolerance skills, interpersonal effectiveness skills, emotion regluation skills, mindfulness skills, radical acceptance.      Client has reviewed and agreed to the above plan.      Nasrin Valladares, [facility]        Linda Walsh     SAFETY PLAN:  Step 1: Warning signs / cues (Thoughts, images, mood, situation, behavior) that a crisis may be developing:  Thoughts: \"I don't matter\", \"People would be better off without me\", \"I'm a burden\", \"I can't do this anymore\", \"I just want this to end\" and \"Nothing makes it better\"  Images: obsessive thoughts of death or dying: car accident, using a gun and flashbacks  Thinking Processes: ruminations (can't stop thinking about my problems): court case, pain, racing thoughts, highly critical and negative thoughts: \"I can't do anything, I have to suffer everyday and go to work and other people have it so easy.\"  and paranoia: that the government is watching me  Mood: worsening " "depression, hopelessness, helplessness, intense anger, intense worry, agitation, disinhibited (not caring about things or consequences) and mood swings  Behaviors: isolating/withdrawing , can't stop crying, not taking care of myself, not taking care of my responsibilities, sleeping too much and not sleeping enough  Situations: legal issues: current court case, pain, trauma , financial stress and medical condition / diagnosis: CRPS    Step 2: Coping strategies - Things I can do to take my mind off of my problems without contacting another person (relaxation technique, physical activity):  Distress Tolerance Strategies:  arts and crafts: with her residents, play with my pet , pray, change body temperature (ice pack/cold water) , paced breathing/progressive muscle relaxation and puzzles, games on ipad  Physical Activities: deep breathing  Focus on helpful thoughts:  \"Ride the wave\", think about happy memories: when the grandkids were born, going camping, when the boys were little and remind myself of what is important to me: grandkids, family, the residents  Step 3: People and social settings that provide distraction:   Name: Alpesh  Phone: [phone]   Name: Velasquez  Phone:    Name: Thaddeus  Phone:   work   Step 4: Remind myself of people and things that are important to me and worth living for:    My family, my residents    Step 5: When I am in crisis, I can ask these people to help me use my safety plan:   Name: Alpesh  Phone: [phone]   Name: Velasquez  Phone: (number in phone)   Name: Thaddeus  Phone: (number in phone)  Step 6: Making the environment safe:   be around others  Step 7: Professionals or agencies I can contact during a crisis:  [facility] Number: [phone]  Suicide Prevention Lifeline: 3-488-300-TALK (6312)  Crisis Text Line Service (available 24 hours a day, 7 days a week): Text [place] to 122360  Local Crisis Services:     Call 911 or go to my nearest emergency department.   I helped " develop this safety plan and agree to use it when needed.  I have been given a copy of this plan.      Client signature _________________________________________________________________  Today s date: 7/11/22  Adapted from Safety Plan Template 2008 Griselda Perez and Livan Cutler is reprinted with the express permission of the authors.  No portion of the Safety Plan Template may be reproduced without the express, written permission.  You can contact the authors at [email or web address] or [email or web address].

## 2023-12-08 NOTE — PROGRESS NOTES
SUBJECTIVE:  Linda Walsh is a 63 year old female who is seen as self referral for left shoulder pain that started  that started/occurred 2 years ago.   Cause: unknown, but got worse after a lawn chair broke and fell onto outstretched arem, that made it worse 1.5 years ago.    Present symptoms: pain with ADL's (dressing),  pain with overhead activities,  pain reaching behind back,  positional night pain,  pain lifting,  weakness with lifting,  poping/snapping used to occur--would feel like it popped out and she'd have to let it hang down.   Severe pain with movements.    Pain location: anterior and posterior  Associated symptoms: radiating pain to biceps.  Has neck pain but doesn't think the neck causes the shoulder pain.     Treatment up to this point:  She has had 4 corticosteroid injections into the shoulder. I'm not sure if these were intra-articular or subacromial.  The injections helped for several months when they were image guided.   Has had a lot of physical therapy  She had partial thickness tears in the rotator cuff on an MRI in summer 23 she reports at [facility].        Significant Orthopedic past medical history:   chronic neck and low back pain. Sees pain management.  History of right total hip arthroplasty  History of left ankle fusion after open reduction and internal fixation and revision of distal fibular nonunion.     Past Medical History:   Diagnosis Date    Anxiety     Asthma     Backache, unspecified     Esophageal reflux     Generalized anxiety disorder     Medical cannabis use 05/08/2017    used very short time and stopped    Mild intermittent asthma     Asthma, allergy induced    PONV (postoperative nausea and vomiting)     Reflex sympathetic dystrophy of left lower extremity        Past Surgical History:   Procedure Laterality Date    ARTHROPLASTY HIP Right 10/5/2021    Procedure: Right total hip replacement;  Surgeon: Velasquez Stewart DO;  Location: [facility] OR    COLONOSCOPY  6/29/2011     Procedure:COMBINED COLONOSCOPY, SINGLE BIOPSY/POLYPECTOMY BY BIOPSY; Surgeon:JENIFER LANDA; Location:[facility]    COLONOSCOPY  6/29/2011    Procedure:COMBINED COLONOSCOPY, REMOVE TUMOR/POLYP/LESION BY SNARE; Surgeon:JENIFER LANDA; Location:[facility]    ESOPHAGOSCOPY, GASTROSCOPY, DUODENOSCOPY (EGD), COMBINED  8/23/2011    Procedure:COMBINED ESOPHAGOSCOPY, GASTROSCOPY, DUODENOSCOPY (EGD), BIOPSY SINGLE OR MULTIPLE; ESOPHAGOGASTRODUODENOSCOPY WITH      ESOPHAGOSCOPY, GASTROSCOPY, DUODENOSCOPY (EGD), COMBINED N/A 6/26/2023    Procedure: Esophagoscopy, gastroscopy, duodenoscopy, combined;  Surgeon: Marcus Griffith MD;  Location: [facility]    HC INJECTION ANES AGENT AND/OR STERIOD, SCIATIC NERVE  04/16/13    [facility]    HYSTERECTOMY      fibroids, no h/o previous abn paps    HYSTERECTOMY, PAP NO LONGER INDICATED      LAPAROSCOPIC CHOLECYSTECTOMY  3/19/2014    Procedure: LAPAROSCOPIC CHOLECYSTECTOMY;  Laparoscopic Cholecystectomy;  Surgeon: Marcus Griffith MD;  Location: [facility] OR    NERVE BLOCK SYMPATHETIC LUMBAR  08/19/2014    [facility]    OPEN REDUCTION INTERNAL FIXATION ANKLE  9/17/2011    Procedure:OPEN REDUCTION INTERNAL FIXATION ANKLE; Open Reduction Internal Fixation Left Ankle; Surgeon:ADONAY SHRESHTA; Location:[facility]    OPEN REDUCTION INTERNAL FIXATION ANKLE  6/6/12    Left ankle.  [facility]    ZZC NONSPECIFIC PROCEDURE      Hysterectomy       REVIEW OF SYSTEMS:  CONSTITUTIONAL:  NEGATIVE for fever, chills, change in weight  INTEGUMENTARY/SKIN:  NEGATIVE for worrisome rashes, moles or lesions  EYES:  NEGATIVE for vision changes or irritation  ENT/MOUTH:  NEGATIVE for ear, mouth and throat problems  RESP:  NEGATIVE for significant cough or SOB  BREAST:  NEGATIVE for masses, tenderness or discharge  CV:  NEGATIVE for chest pain, palpitations or peripheral edema  GI:  NEGATIVE for nausea, abdominal pain, heartburn, or change in bowel habits  :  Negative   MUSCULOSKELETAL:  See  HPI above  NEURO:  NEGATIVE for weakness, dizziness or paresthesias  ENDOCRINE:  NEGATIVE for temperature intolerance, skin/hair changes  HEME/ALLERGY/IMMUNE:  NEGATIVE for bleeding problems  PSYCHIATRIC:  NEGATIVE for changes in mood or affect    OBJECTIVE:  /76 (BP Location: Left arm, Patient Position: Sitting, Cuff Size: Adult Regular)   Pulse 90   LMP  (LMP Unknown)   SpO2 98%      GENERAL: healthy, alert and no distress  GAIT: normal   SKIN: no suspicious lesions or rashes  NEURO: Normal strength and tone, mentation intact and speech normal  VASCULAR:  normal pulses and cappillary refill   PSYCH:  mentation appears normal and affect normal/bright    MUSCULOSKELETAL:    NECK:  Cervical range of motion: limited pain into trapezius,    Sensory deficits:  none  Motor deficits:  none    SHOULDER:  Shoulder Inspection: no swelling, bruising, discoloration, or obvious deformity or asymmetry  no atrophy  Tender: diffuse    Range of Motion:   Active:forward flexion 45 degrees, internal rotation  hip pocket   Passive: forward flexion 120** severe pain degrees, external rotation  60 degrees  Impingement: all grade 2 positive  Strength: forward flexion 5-/5, painful, limited by pain, External rotation 5/5, painful  belly press --cannot do.       XR SHOULDER LEFT G/E 3 VIEWS 2/20/2023   Mild  degenerative changes in the glenohumeral joint  have slightly progressed in the interval.  Mild degenerative changes in the  acromioclavicular joint. Type 2 acromion.     ASSESSMENT    ICD-10-CM    1. Primary osteoarthritis of left shoulder  M19.012       Mild osteoarthritis left glenohumeral joint  She has severe pain   Possible labral tears.   Possible partial thickness rotator cuff tear, vs complete, including subscap.  Physical exam is suboptimal as well because of the amount of pain that she has.  We have incomplete information about the tests that she has had and the injections that she has had.      PLAN:   I do not  have enough information to recommend surgery.  We talked about possibly getting a new MRI versus having her get another injection.  She is quite miserable so she is in favor of having a another steroid injection.  I have suggested an image guided corticosteroid injection in the left shoulder.  She should pay close attention to the amount of pain relief and the location of which pains are relieved by this injection.  I have asked her to return to the office in a few weeks if the injection is working to have reexamination.  It would be helpful if we can get any notes and at least the MRI results from [facility].        .JORGE Diaz MD  Dept. Orthopedic Surgery  [facility]

## 2023-12-11 ENCOUNTER — TRANSFERRED RECORDS (OUTPATIENT)
Dept: HEALTH INFORMATION MANAGEMENT | Facility: CLINIC | Age: 63
End: 2023-12-11
Payer: COMMERCIAL

## 2023-12-13 ENCOUNTER — OFFICE VISIT (OUTPATIENT)
Dept: ORTHOPEDICS | Facility: CLINIC | Age: 63
End: 2023-12-13
Payer: COMMERCIAL

## 2023-12-13 VITALS — OXYGEN SATURATION: 98 % | DIASTOLIC BLOOD PRESSURE: 76 MMHG | HEART RATE: 90 BPM | SYSTOLIC BLOOD PRESSURE: 112 MMHG

## 2023-12-13 DIAGNOSIS — M75.112 NONTRAUMATIC INCOMPLETE TEAR OF LEFT ROTATOR CUFF: ICD-10-CM

## 2023-12-13 DIAGNOSIS — M19.012 PRIMARY OSTEOARTHRITIS OF LEFT SHOULDER: Primary | ICD-10-CM

## 2023-12-13 PROCEDURE — 99213 OFFICE O/P EST LOW 20 MIN: CPT | Performed by: ORTHOPAEDIC SURGERY

## 2023-12-13 ASSESSMENT — PAIN SCALES - GENERAL: PAINLEVEL: SEVERE PAIN (6)

## 2023-12-13 NOTE — LETTER
12/13/2023         RE: Linda Walsh  [id number] [facility] [id number]        Dear Colleague,    Thank you for referring your patient, Linda Walsh, to the [facility]. Please see a copy of my visit note below.    SUBJECTIVE:  Linda Walsh is a 63 year old female who is seen as self referral for left shoulder pain that started  that started/occurred 2 years ago.   Cause: unknown, but got worse after a lawn chair broke and fell onto outstretched arem, that made it worse 1.5 years ago.    Present symptoms: pain with ADL's (dressing),  pain with overhead activities,  pain reaching behind back,  positional night pain,  pain lifting,  weakness with lifting,  poping/snapping used to occur--would feel like it popped out and she'd have to let it hang down.   Severe pain with movements.    Pain location: anterior and posterior  Associated symptoms: radiating pain to biceps.  Has neck pain but doesn't think the neck causes the shoulder pain.     Treatment up to this point:  She has had 4 corticosteroid injections into the shoulder. I'm not sure if these were intra-articular or subacromial.  The injections helped for several months when they were image guided.   Has had a lot of physical therapy  She had partial thickness tears in the rotator cuff on an MRI in summer 23 she reports at [facility].        Significant Orthopedic past medical history:   chronic neck and low back pain. Sees pain management.  History of right total hip arthroplasty  History of left ankle fusion after open reduction and internal fixation and revision of distal fibular nonunion.     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        Past Surgical History:   Procedure Laterality Date     ARTHROPLASTY HIP Right 10/5/2021    Procedure: Right total hip replacement;  Surgeon: Velasquez Stewart DO;  Location: [facility]     COLONOSCOPY  6/29/2011    Procedure:COMBINED COLONOSCOPY, SINGLE BIOPSY/POLYPECTOMY BY BIOPSY; Surgeon:JENIFER LANDA; Location:[facility]     COLONOSCOPY  6/29/2011    Procedure:COMBINED COLONOSCOPY, REMOVE TUMOR/POLYP/LESION BY SNARE; Surgeon:JENIFER LANDA; Location:[facility]     ESOPHAGOSCOPY, GASTROSCOPY, DUODENOSCOPY (EGD), COMBINED  8/23/2011    Procedure:COMBINED ESOPHAGOSCOPY, GASTROSCOPY, DUODENOSCOPY (EGD), BIOPSY SINGLE OR MULTIPLE; ESOPHAGOGASTRODUODENOSCOPY WITH       ESOPHAGOSCOPY, GASTROSCOPY, DUODENOSCOPY (EGD), COMBINED N/A 6/26/2023    Procedure: Esophagoscopy, gastroscopy, duodenoscopy, combined;  Surgeon: Marcus Griffith MD;  Location: [facility]     HC INJECTION ANES AGENT AND/OR STERIOD, SCIATIC NERVE  04/16/13    [facility]     HYSTERECTOMY      fibroids, no h/o previous abn paps     HYSTERECTOMY, PAP NO LONGER INDICATED       LAPAROSCOPIC CHOLECYSTECTOMY  3/19/2014    Procedure: LAPAROSCOPIC CHOLECYSTECTOMY;  Laparoscopic Cholecystectomy;  Surgeon: Marcus Griffith MD;  Location: [facility]     NERVE BLOCK SYMPATHETIC LUMBAR  08/19/2014    [facility]     OPEN REDUCTION INTERNAL FIXATION ANKLE  9/17/2011    Procedure:OPEN REDUCTION INTERNAL FIXATION ANKLE; Open Reduction Internal Fixation Left Ankle; Surgeon:ADONAY SHRESTHA; Location:[facility]     OPEN REDUCTION INTERNAL FIXATION ANKLE  6/6/12    Left ankle.  [facility]     ZZC NONSPECIFIC PROCEDURE      Hysterectomy       REVIEW OF SYSTEMS:  CONSTITUTIONAL:  NEGATIVE for fever, chills, change in weight  INTEGUMENTARY/SKIN:  NEGATIVE for worrisome rashes, moles or lesions  EYES:  NEGATIVE for vision changes or irritation  ENT/MOUTH:  NEGATIVE for ear, mouth and throat  problems  RESP:  NEGATIVE for significant cough or SOB  BREAST:  NEGATIVE for masses, tenderness or discharge  CV:  NEGATIVE for chest pain, palpitations or peripheral edema  GI:  NEGATIVE for nausea, abdominal pain, heartburn, or change in bowel habits  :  Negative   MUSCULOSKELETAL:  See HPI above  NEURO:  NEGATIVE for weakness, dizziness or paresthesias  ENDOCRINE:  NEGATIVE for temperature intolerance, skin/hair changes  HEME/ALLERGY/IMMUNE:  NEGATIVE for bleeding problems  PSYCHIATRIC:  NEGATIVE for changes in mood or affect    OBJECTIVE:  /76 (BP Location: Left arm, Patient Position: Sitting, Cuff Size: Adult Regular)   Pulse 90   LMP  (LMP Unknown)   SpO2 98%      GENERAL: healthy, alert and no distress  GAIT: normal   SKIN: no suspicious lesions or rashes  NEURO: Normal strength and tone, mentation intact and speech normal  VASCULAR:  normal pulses and cappillary refill   PSYCH:  mentation appears normal and affect normal/bright    MUSCULOSKELETAL:    NECK:  Cervical range of motion: limited pain into trapezius,    Sensory deficits:  none  Motor deficits:  none    SHOULDER:  Shoulder Inspection: no swelling, bruising, discoloration, or obvious deformity or asymmetry  no atrophy  Tender: diffuse    Range of Motion:   Active:forward flexion 45 degrees, internal rotation  hip pocket   Passive: forward flexion 120** severe pain degrees, external rotation  60 degrees  Impingement: all grade 2 positive  Strength: forward flexion 5-/5, painful, limited by pain, External rotation 5/5, painful  belly press --cannot do.       XR SHOULDER LEFT G/E 3 VIEWS 2/20/2023   Mild  degenerative changes in the glenohumeral joint  have slightly progressed in the interval.  Mild degenerative changes in the  acromioclavicular joint. Type 2 acromion.     ASSESSMENT    ICD-10-CM    1. Primary osteoarthritis of left shoulder  M19.012       Mild osteoarthritis left glenohumeral joint  She has severe pain   Possible labral  tears.   Possible partial thickness rotator cuff tear, vs complete, including subscap.  Physical exam is suboptimal as well because of the amount of pain that she has.  We have incomplete information about the tests that she has had and the injections that she has had.      PLAN:   I do not have enough information to recommend surgery.  We talked about possibly getting a new MRI versus having her get another injection.  She is quite miserable so she is in favor of having a another steroid injection.  I have suggested an image guided corticosteroid injection in the left shoulder.  She should pay close attention to the amount of pain relief and the location of which pains are relieved by this injection.  I have asked her to return to the office in a few weeks if the injection is working to have reexamination.  It would be helpful if we can get any notes and at least the MRI results from [facility].        .JORGE Diaz MD  Dept. Orthopedic Surgery  [facility]      Again, thank you for allowing me to participate in the care of your patient.        Sincerely,        Curt Diaz MD

## 2023-12-20 ENCOUNTER — E-VISIT (OUTPATIENT)
Dept: FAMILY MEDICINE | Facility: CLINIC | Age: 63
End: 2023-12-20
Payer: COMMERCIAL

## 2023-12-20 DIAGNOSIS — J32.9 SINUSITIS, UNSPECIFIED CHRONICITY, UNSPECIFIED LOCATION: ICD-10-CM

## 2023-12-20 PROCEDURE — 99421 OL DIG E/M SVC 5-10 MIN: CPT | Performed by: PHYSICIAN ASSISTANT

## 2023-12-20 RX ORDER — DOXYCYCLINE 100 MG/1
100 CAPSULE ORAL 2 TIMES DAILY
Qty: 20 CAPSULE | Refills: 0 | Status: SHIPPED | OUTPATIENT
Start: 2023-12-20 | End: 2024-08-19

## 2023-12-22 DIAGNOSIS — G47.00 INSOMNIA, UNSPECIFIED TYPE: ICD-10-CM

## 2023-12-26 RX ORDER — TRAZODONE HYDROCHLORIDE 50 MG/1
TABLET, FILM COATED ORAL
Qty: 30 TABLET | Refills: 1 | OUTPATIENT
Start: 2023-12-26

## 2024-01-04 ENCOUNTER — TRANSFERRED RECORDS (OUTPATIENT)
Dept: HEALTH INFORMATION MANAGEMENT | Facility: CLINIC | Age: 64
End: 2024-01-04
Payer: COMMERCIAL

## 2024-01-08 ENCOUNTER — TRANSFERRED RECORDS (OUTPATIENT)
Dept: HEALTH INFORMATION MANAGEMENT | Facility: CLINIC | Age: 64
End: 2024-01-08
Payer: COMMERCIAL

## 2024-01-15 ENCOUNTER — VIRTUAL VISIT (OUTPATIENT)
Dept: PSYCHOLOGY | Facility: CLINIC | Age: 64
End: 2024-01-15
Payer: COMMERCIAL

## 2024-01-15 DIAGNOSIS — F43.10 POST TRAUMATIC STRESS DISORDER (PTSD): ICD-10-CM

## 2024-01-15 DIAGNOSIS — F33.2 SEVERE RECURRENT MAJOR DEPRESSION WITHOUT PSYCHOTIC FEATURES (H): Primary | ICD-10-CM

## 2024-01-15 DIAGNOSIS — F41.1 GENERALIZED ANXIETY DISORDER: ICD-10-CM

## 2024-01-15 PROCEDURE — 90834 PSYTX W PT 45 MINUTES: CPT | Mod: 93 | Performed by: COUNSELOR

## 2024-01-15 NOTE — PROGRESS NOTES
"        [facility]                                     Progress Note    Patient Name: Linda Walsh  Date: 1/15/24         Service Type: Individual      Session Start Time: 12:30pm  Session End Time: 1:20pm     Session Length: 50    Session #: 97    Attendees: Client      Service Modality:  Phone Visit:      Provider verified identity through the following two step process.  Patient provided:  Patient is known previously to provider    The patient has been notified of the following:      \"We have found that certain health care needs can be provided without the need for a face to face visit.  This service lets us provide the care you need with a phone conversation.       I will have full access to your [facility] medical record during this entire phone call.   I will be taking notes for your medical record.      Since this is like an office visit, we will bill your insurance company for this service.       There are potential benefits and risks of telephone visits (e.g. limits to patient confidentiality) that differ from in-person visits.?Confidentiality still applies for telephone services, and nobody will record the visit.  It is important to be in a quiet, private space that is free of distractions (including cell phone or other devices) during the visit.??      If during the course of the call I believe a telephone visit is not appropriate, you will not be charged for this service\"     Consent has been obtained for this service by care team member: Yes     DATA  Interactive Complexity: No  Crisis: No        Progress Since Last Session (Related to Symptoms / Goals / Homework):   Symptoms: No change .    Homework: Completed in session      Episode of Care Goals: Minimal progress - ACTION (Actively working towards change); Intervened by reinforcing change plan / affirming steps taken     Current / Ongoing Stressors and Concerns:   The client stated she found out her  is " interacting with [employer] websites and messaging on dating websites. Stated she's doing a lot of rumination lately and it makes her feel terrible.   Her mom found out she has some kind of cancer from a cyst they found in her uterus.   The client stated she also has to get shoulder replacement surgery.        Treatment Objective(s) Addressed in This Session:   use thought-stopping strategy daily to reduce intrusive thoughts       Intervention:`   Talked about her potentially leaving her  and how she could do this logistically and emotionally. Reflected on the pain she feels everyday physically and emotionally. Talked about how to reduce rumination and how to interrupt it.          Assessments completed prior to visit:  The following assessments were completed by patient for this visit:  PHQA:        No data to display              GAD7:       12/30/2019     1:39 PM 2/26/2020    11:18 AM 5/27/2020     3:23 PM 7/28/2020    10:37 AM 2/24/2021     1:26 PM 7/13/2022    10:16 AM 6/9/2023    10:20 AM   MARLIN-7 SCORE   Total Score 19 (severe anxiety)   19 (severe anxiety) 16 (severe anxiety) 21 (severe anxiety) 13 (moderate anxiety)   Total Score 19 19 21 19 16 21 13     PROMIS 10-Global Health (all questions and answers displayed):        No data to display                  ASSESSMENT: Current Emotional / Mental Status (status of significant symptoms):   Risk status (Self / Other harm or suicidal ideation)   Patient denies current fears or concerns for personal safety.   Patient denies current or recent suicidal ideation or behaviors.   Patient denies current or recent homicidal ideation or behaviors.   Patient denies current or recent self injurious behavior or ideation.   Patient denies other safety concerns.   Patient reports there has been no change in risk factors since their last session.     Patient reports there has been no change in protective factors since their last session.     Recommended that patient call 911  or go to the local ED should there be a change in any of these risk factors.     Appearance:   Appropriate    Eye Contact:   Good    Psychomotor Behavior: Normal    Attitude:   Cooperative    Orientation:   All   Speech    Rate / Production: Normal/ Responsive    Volume:  Normal    Mood:    Normal   Affect:    Appropriate    Thought Content:  Clear    Thought Form:  Coherent  Logical    Insight:    Good      Medication Review:   No changes to current psychiatric medication(s)     Medication Compliance:   Yes     Changes in Health Issues:   None reported     Chemical Use Review:   Substance Use: Chemical use reviewed, no active concerns identified      Tobacco Use: No change in amount of tobacco use since last session.  Patient declined discussion at this time    Diagnosis:  1. 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 Generalized anxiety disorder    3. Post traumatic stress disorder (PTSD)        Collateral Reports Completed:   Not Applicable    PLAN: (Patient Tasks / Therapist Tasks / Other)  Client to work on self care.  Consider pros and cons of leaving her .         Nasrin Valladares, [facility]                                                         ______________________________________________________________________    Individual Treatment Plan    Patient's Name: Linda Walsh  YOB: 1960    Date of Creation: 7/11/22  Date Treatment Plan Last Reviewed/Revised: 1/15/24    DSM5 Diagnoses: 296.33 (F33.2) Major Depressive Disorder, Recurrent Episode, Severe _, 300.02 (F41.1) Generalized Anxiety Disorder or 309.81 (F43.10) Posttraumatic Stress Disorder (includes Posttraumatic Stress Disorder for Children 6 Years and Younger)  Without dissociative symptoms  Psychosocial / Contextual Factors: CRPS  PROMIS (reviewed every 90 days):     Referral / Collaboration:  Referral to another professional/service is not indicated at this time..    Anticipated number of session for  this episode of care: on-going  Anticipation frequency of session: Monthly  Anticipated Duration of each session: 38-52 minutes/53+ minutes  Treatment plan will be reviewed in 90 days or when goals have been changed.       MeasurableTreatment Goal(s) related to diagnosis / functional impairment(s)  Goal 1: Client will decrease thoughts of wanting to be dead from 10 out of 10 opportunities to at most 9 out of 10 opportunities for at least 3 consecutive weeks as evidenced by client report and a decrease in symptom report as evidenced by PhQ9 scores and GAD7 scores.    Objective #A (Client Action)    Client will  Client will look at and read safety plan at least once a day and follow safety plan when thoughts and urges come up.  Status: Continued - Date(s): 1/15/24    Intervention(s)  Therapist will teach emotional regulation skills. distress tolerance skills, interpersonal effectiveness skills, emotion regluation skills, mindfulness skills, radical acceptance. Therapist will develop safety plan with the client.     Objective #B  Client will  Client will agree to call the therapist or after hours crisis line if noticing intense suicidal thoughts for skills coaching.   Status: Continued - Date(s): 1/15/24    Intervention(s)  Therapist will  Therapist will be available as much as possible during work hours for the client to contact and give the client several crisis resources.         Goal 2: Client will increase the use of skills (emotion regulation, distress tolerance, communication skill) from 1 out of 10 opportunities to at least 2 out of 10 opportunities for at least 3 consecutive weeks as evidenced by client report and a decrease in symptom report as evidenced by PhQ9 scores and GAD7 scores.     Objective #A (Client Action)    Status: Continued - Date(s): 1/15/24    Client will Increase interest, engagement, and pleasure in doing things  Decrease frequency and intensity of feeling down, depressed, hopeless  Improve  diet, appetite, mindful eating, and / or meal planning  Identify negative self-talk and behaviors: challenge core beliefs, myths, and actions  Improve concentration, focus, and mindfulness in daily activities   Feel less fidgety, restless or slow in daily activities / interpersonal interactions  Decrease thoughts that you'd be better off dead or of suicide / self-harm.    Intervention(s)  Therapist will teach emotional regulation skills. distress tolerance skills, interpersonal effectiveness skills, emotion regluation skills, mindfulness skills, radical acceptance. Therapist will teach client how to ID body cues for anxiety, anxiety reduction techniques, how to ID triggers for depression and anxiety- decrease reactivity/eliminate, lifestyle changes to reduce depression and anxiety, communication skills, explore cognitive beliefs and help client to develop healthy cognitive patterns and beliefs.    Objective #B  Client will  Client will learn and  practice DBT skills daily. .    Status: Continued - Date(s): 11/15/24    Intervention(s)  Therapist will  Therapist will Therapist will teach emotional regulation skills. distress tolerance skills, interpersonal effectiveness skills, emotion regluation skills, mindfulness skills, radical acceptance. .      Goal 3: Client will decrease anxious symptoms and reactions to triggers from 9 out of 10 opportunities to at most 8 out of 10 opportunities for at least 3 consecutive weeks as evidenced by client report and a decrease in symptom report as evidenced by PhQ9 scores and GAD7 scores.    Objective #A (Client Action)    Status: Continued - Date(s): 1/15/24    Client will  Client will use cognitive strategies identified in therapy to challenge anxious thoughts.    Intervention(s)  Therapist will  Therapist will teach emotional recognition/identification. emotion regulation skills, coping skills, mindfulness skills.    Objective #B  Client will  Client will use thought-stopping  "strategy daily to reduce intrusive thoughts for at least 3 consecutive weeks as evidenced by client report and a decrease in symptom report as evidenced by PhQ9 scores and GAD7 scores.       Status: Continued - Date(s): 1/15/24    Intervention(s)  Therapist will teach emotional regulation skills. distress tolerance skills, interpersonal effectiveness skills, emotion regluation skills, mindfulness skills, radical acceptance. Therapist will teach client how to ID body cues for anxiety, anxiety reduction techniques, how to ID triggers for depression and anxiety- decrease reactivity/eliminate, lifestyle changes to reduce depression and anxiety, communication skills, explore cognitive beliefs and help client to develop healthy cognitive patterns and beliefs.    Objective #C  Client will  Client will learn 5 crisis survival skills during the Distress Tolerance Module (6-8 weeks) for at least 3 consecutive weeks as evidenced by client report and a decrease in symptom report as evidenced by PhQ9 scores and GAD7 scores.  Status: Continued - Date(s): 1/15/24    Intervention(s)  Therapist will teach emotional regulation skills. distress tolerance skills, interpersonal effectiveness skills, emotion regluation skills, mindfulness skills, radical acceptance.      Client has reviewed and agreed to the above plan.      Nasrin Valladares, [facility]        Linda Walsh     SAFETY PLAN:  Step 1: Warning signs / cues (Thoughts, images, mood, situation, behavior) that a crisis may be developing:  Thoughts: \"I don't matter\", \"People would be better off without me\", \"I'm a burden\", \"I can't do this anymore\", \"I just want this to end\" and \"Nothing makes it better\"  Images: obsessive thoughts of death or dying: car accident, using a gun and flashbacks  Thinking Processes: ruminations (can't stop thinking about my problems): court case, pain, racing thoughts, highly critical and negative thoughts: \"I can't do anything, I have to suffer " "everyday and go to work and other people have it so easy.\"  and paranoia: that the government is watching me  Mood: worsening depression, hopelessness, helplessness, intense anger, intense worry, agitation, disinhibited (not caring about things or consequences) and mood swings  Behaviors: isolating/withdrawing , can't stop crying, not taking care of myself, not taking care of my responsibilities, sleeping too much and not sleeping enough  Situations: legal issues: current court case, pain, trauma , financial stress and medical condition / diagnosis: CRPS    Step 2: Coping strategies - Things I can do to take my mind off of my problems without contacting another person (relaxation technique, physical activity):  Distress Tolerance Strategies:  arts and crafts: with her residents, play with my pet , pray, change body temperature (ice pack/cold water) , paced breathing/progressive muscle relaxation and puzzles, games on ipad  Physical Activities: deep breathing  Focus on helpful thoughts:  \"Ride the wave\", think about happy memories: when the grandkids were born, going camping, when the boys were little and remind myself of what is important to me: grandkids, family, the residents  Step 3: People and social settings that provide distraction:   Name: Alpesh  Phone: [phone]   Name: Velasquez  Phone:    Name: Thaddeus  Phone:   work   Step 4: Remind myself of people and things that are important to me and worth living for:    My family, my residents    Step 5: When I am in crisis, I can ask these people to help me use my safety plan:   Name: Alpesh  Phone: [phone]   Name: Velasquez  Phone: (number in phone)   Name: Thaddeus  Phone: (number in phone)  Step 6: Making the environment safe:   be around others  Step 7: Professionals or agencies I can contact during a crisis:  [facility] Daytime Number: [phone]  Suicide Prevention Lifeline: 7-992-057-FNDE (4369)  Crisis Text Line Service (available 24 hours a " day, 7 days a week): Text [place] to 097567  Local Crisis Services:     Call 911 or go to my nearest emergency department.   I helped develop this safety plan and agree to use it when needed.  I have been given a copy of this plan.      Client signature _________________________________________________________________  Today s date: 7/11/22  Adapted from Safety Plan Template 2008 Griselda Perez and Livan Cutler is reprinted with the express permission of the authors.  No portion of the Safety Plan Template may be reproduced without the express, written permission.  You can contact the authors at bhs@[email or web address] or [email or web address].

## 2024-01-27 ENCOUNTER — HEALTH MAINTENANCE LETTER (OUTPATIENT)
Age: 64
End: 2024-01-27

## 2024-02-12 ENCOUNTER — VIRTUAL VISIT (OUTPATIENT)
Dept: PSYCHOLOGY | Facility: CLINIC | Age: 64
End: 2024-02-12
Payer: COMMERCIAL

## 2024-02-12 ENCOUNTER — TRANSFERRED RECORDS (OUTPATIENT)
Dept: HEALTH INFORMATION MANAGEMENT | Facility: CLINIC | Age: 64
End: 2024-02-12
Payer: COMMERCIAL

## 2024-02-12 DIAGNOSIS — F43.10 POST TRAUMATIC STRESS DISORDER (PTSD): ICD-10-CM

## 2024-02-12 DIAGNOSIS — F33.2 SEVERE RECURRENT MAJOR DEPRESSION WITHOUT PSYCHOTIC FEATURES (H): Primary | ICD-10-CM

## 2024-02-12 DIAGNOSIS — F41.1 GENERALIZED ANXIETY DISORDER: ICD-10-CM

## 2024-02-12 PROCEDURE — 90834 PSYTX W PT 45 MINUTES: CPT | Mod: FQ | Performed by: COUNSELOR

## 2024-02-12 NOTE — PROGRESS NOTES
[facility]                                     Progress Note    Patient Name: Linda Walsh  Date: 2/12/24         Service Type: Individual      Session Start Time: 12:30pm  Session End Time: 1:20pm     Session Length: 50    Session #: 98    Attendees: Client      Service Modality: telephone      Provider verified identity through the following two step process.  Patient provided:  Patient is known previously to provider     Telemedicine Visit: The patient's condition can be safely assessed and treated via synchronous audio and visual telemedicine encounter.       Reason for Telemedicine Visit: Services only offered telehealth     Originating Site (Patient Location): Patient's home     Distant Site (Provider Location): Provider Remote Setting- Home Office     Consent:  The patient/guardian has verbally consented to: the potential risks and benefits of telemedicine (video visit) versus in person care; bill my insurance or make self-payment for services provided; and responsibility for payment of non-covered services.        Mode of Communication:  telephone     As the provider I attest to compliance with applicable laws and regulations related to telemedicine.     DATA  Interactive Complexity: No  Crisis: No        Progress Since Last Session (Related to Symptoms / Goals / Homework):   Symptoms: No change .    Homework: Completed in session      Episode of Care Goals: Minimal progress - ACTION (Actively working towards change); Intervened by reinforcing change plan / affirming steps taken     Current / Ongoing Stressors and Concerns:   The client stated she's going to be moving in with with her parents at the end of March. Her  stated he wants someone who he can be affectionate with and he put in his notice with their landlord that he will be moving in a few months.   She stated she's still feeling so sick, can't eat or sleep.   Her brother is still in the hospital recovering.  They don't know what the prognosis is right now with the TBI, if he can even see, walk, eat, etc. They are working on all of these things.      Treatment Objective(s) Addressed in This Session:   use thought-stopping strategy daily to reduce intrusive thoughts       Intervention:`   Validated the client's fears with regard to her and her  splitting up. Processed through the process she's already been working on since they've talked about splitting up. Talked about boundaries she gets to have with him during this time and how to enforce them. Encouraged the client to continue asking for help of her loved ones.         Assessments completed prior to visit:  The following assessments were completed by patient for this visit:  PHQA:        No data to display              GAD7:       12/30/2019     1:39 PM 2/26/2020    11:18 AM 5/27/2020     3:23 PM 7/28/2020    10:37 AM 2/24/2021     1:26 PM 7/13/2022    10:16 AM 6/9/2023    10:20 AM   MARLIN-7 SCORE   Total Score 19 (severe anxiety)   19 (severe anxiety) 16 (severe anxiety) 21 (severe anxiety) 13 (moderate anxiety)   Total Score 19 19 21 19 16 21 13     PROMIS 10-Global Health (all questions and answers displayed):        No data to display                  ASSESSMENT: Current Emotional / Mental Status (status of significant symptoms):   Risk status (Self / Other harm or suicidal ideation)   Patient denies current fears or concerns for personal safety.   Patient denies current or recent suicidal ideation or behaviors.   Patient denies current or recent homicidal ideation or behaviors.   Patient denies current or recent self injurious behavior or ideation.   Patient denies other safety concerns.   Patient reports there has been no change in risk factors since their last session.     Patient reports there has been no change in protective factors since their last session.     Recommended that patient call 911 or go to the local ED should there be a change in any of  these risk factors.     Appearance:   Appropriate    Eye Contact:   Good    Psychomotor Behavior: Normal    Attitude:   Cooperative    Orientation:   All   Speech    Rate / Production: Normal/ Responsive    Volume:  Normal    Mood:    Normal   Affect:    Appropriate    Thought Content:  Clear    Thought Form:  Coherent  Logical    Insight:    Good      Medication Review:   No changes to current psychiatric medication(s)     Medication Compliance:   Yes     Changes in Health Issues:   None reported     Chemical Use Review:   Substance Use: Chemical use reviewed, no active concerns identified      Tobacco Use: No change in amount of tobacco use since last session.  Patient declined discussion at this time    Diagnosis:  1. 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 Generalized anxiety disorder    3. Post traumatic stress disorder (PTSD)          Collateral Reports Completed:   Not Applicable    PLAN: (Patient Tasks / Therapist Tasks / Other)  Client to work on self care and asking for help when needed.         Nasrin Valladares [facility]                                                         ______________________________________________________________________    Individual Treatment Plan    Patient's Name: Linda Walsh  YOB: 1960    Date of Creation: 7/11/22  Date Treatment Plan Last Reviewed/Revised: 1/15/24    DSM5 Diagnoses: 296.33 (F33.2) Major Depressive Disorder, Recurrent Episode, Severe _, 300.02 (F41.1) Generalized Anxiety Disorder or 309.81 (F43.10) Posttraumatic Stress Disorder (includes Posttraumatic Stress Disorder for Children 6 Years and Younger)  Without dissociative symptoms  Psychosocial / Contextual Factors: CRPS  PROMIS (reviewed every 90 days):     Referral / Collaboration:  Referral to another professional/service is not indicated at this time..    Anticipated number of session for this episode of care: on-going  Anticipation frequency of session:  Monthly  Anticipated Duration of each session: 38-52 minutes/53+ minutes  Treatment plan will be reviewed in 90 days or when goals have been changed.       MeasurableTreatment Goal(s) related to diagnosis / functional impairment(s)  Goal 1: Client will decrease thoughts of wanting to be dead from 10 out of 10 opportunities to at most 9 out of 10 opportunities for at least 3 consecutive weeks as evidenced by client report and a decrease in symptom report as evidenced by PhQ9 scores and GAD7 scores.    Objective #A (Client Action)    Client will  Client will look at and read safety plan at least once a day and follow safety plan when thoughts and urges come up.  Status: Continued - Date(s): 1/15/24    Intervention(s)  Therapist will teach emotional regulation skills. distress tolerance skills, interpersonal effectiveness skills, emotion regluation skills, mindfulness skills, radical acceptance. Therapist will develop safety plan with the client.     Objective #B  Client will  Client will agree to call the therapist or after hours crisis line if noticing intense suicidal thoughts for skills coaching.   Status: Continued - Date(s): 1/15/24    Intervention(s)  Therapist will  Therapist will be available as much as possible during work hours for the client to contact and give the client several crisis resources.         Goal 2: Client will increase the use of skills (emotion regulation, distress tolerance, communication skill) from 1 out of 10 opportunities to at least 2 out of 10 opportunities for at least 3 consecutive weeks as evidenced by client report and a decrease in symptom report as evidenced by PhQ9 scores and GAD7 scores.     Objective #A (Client Action)    Status: Continued - Date(s): 1/15/24    Client will Increase interest, engagement, and pleasure in doing things  Decrease frequency and intensity of feeling down, depressed, hopeless  Improve diet, appetite, mindful eating, and / or meal planning  Identify  negative self-talk and behaviors: challenge core beliefs, myths, and actions  Improve concentration, focus, and mindfulness in daily activities   Feel less fidgety, restless or slow in daily activities / interpersonal interactions  Decrease thoughts that you'd be better off dead or of suicide / self-harm.    Intervention(s)  Therapist will teach emotional regulation skills. distress tolerance skills, interpersonal effectiveness skills, emotion regluation skills, mindfulness skills, radical acceptance. Therapist will teach client how to ID body cues for anxiety, anxiety reduction techniques, how to ID triggers for depression and anxiety- decrease reactivity/eliminate, lifestyle changes to reduce depression and anxiety, communication skills, explore cognitive beliefs and help client to develop healthy cognitive patterns and beliefs.    Objective #B  Client will  Client will learn and  practice DBT skills daily. .    Status: Continued - Date(s): 11/15/24    Intervention(s)  Therapist will  Therapist will Therapist will teach emotional regulation skills. distress tolerance skills, interpersonal effectiveness skills, emotion regluation skills, mindfulness skills, radical acceptance. .      Goal 3: Client will decrease anxious symptoms and reactions to triggers from 9 out of 10 opportunities to at most 8 out of 10 opportunities for at least 3 consecutive weeks as evidenced by client report and a decrease in symptom report as evidenced by PhQ9 scores and GAD7 scores.    Objective #A (Client Action)    Status: Continued - Date(s): 1/15/24    Client will  Client will use cognitive strategies identified in therapy to challenge anxious thoughts.    Intervention(s)  Therapist will  Therapist will teach emotional recognition/identification. emotion regulation skills, coping skills, mindfulness skills.    Objective #B  Client will  Client will use thought-stopping strategy daily to reduce intrusive thoughts for at least 3  "consecutive weeks as evidenced by client report and a decrease in symptom report as evidenced by PhQ9 scores and GAD7 scores.       Status: Continued - Date(s): 1/15/24    Intervention(s)  Therapist will teach emotional regulation skills. distress tolerance skills, interpersonal effectiveness skills, emotion regluation skills, mindfulness skills, radical acceptance. Therapist will teach client how to ID body cues for anxiety, anxiety reduction techniques, how to ID triggers for depression and anxiety- decrease reactivity/eliminate, lifestyle changes to reduce depression and anxiety, communication skills, explore cognitive beliefs and help client to develop healthy cognitive patterns and beliefs.    Objective #C  Client will  Client will learn 5 crisis survival skills during the Distress Tolerance Module (6-8 weeks) for at least 3 consecutive weeks as evidenced by client report and a decrease in symptom report as evidenced by PhQ9 scores and GAD7 scores.  Status: Continued - Date(s): 1/15/24    Intervention(s)  Therapist will teach emotional regulation skills. distress tolerance skills, interpersonal effectiveness skills, emotion regluation skills, mindfulness skills, radical acceptance.      Client has reviewed and agreed to the above plan.      Nasrin Valladares [facility]        Linda Walsh     SAFETY PLAN:  Step 1: Warning signs / cues (Thoughts, images, mood, situation, behavior) that a crisis may be developing:  Thoughts: \"I don't matter\", \"People would be better off without me\", \"I'm a burden\", \"I can't do this anymore\", \"I just want this to end\" and \"Nothing makes it better\"  Images: obsessive thoughts of death or dying: car accident, using a gun and flashbacks  Thinking Processes: ruminations (can't stop thinking about my problems): court case, pain, racing thoughts, highly critical and negative thoughts: \"I can't do anything, I have to suffer everyday and go to work and other people have it so easy.\"  and " "paranoia: that the [employer] is watching me  Mood: worsening depression, hopelessness, helplessness, intense anger, intense worry, agitation, disinhibited (not caring about things or consequences) and mood swings  Behaviors: isolating/withdrawing , can't stop crying, not taking care of myself, not taking care of my responsibilities, sleeping too much and not sleeping enough  Situations: legal issues: current court case, pain, trauma , financial stress and medical condition / diagnosis: CRPS    Step 2: Coping strategies - Things I can do to take my mind off of my problems without contacting another person (relaxation technique, physical activity):  Distress Tolerance Strategies:  arts and crafts: with her residents, play with my pet , pray, change body temperature (ice pack/cold water) , paced breathing/progressive muscle relaxation and puzzles, games on ipad  Physical Activities: deep breathing  Focus on helpful thoughts:  \"Ride the wave\", think about happy memories: when the grandkids were born, going camping, when the boys were little and remind myself of what is important to me: grandkids, family, the residents  Step 3: People and social settings that provide distraction:   Name: Alpesh  Phone: [phone]   Name: Velasquez  Phone:    Name: Thaddeus  Phone:   work   Step 4: Remind myself of people and things that are important to me and worth living for:    My family, my residents    Step 5: When I am in crisis, I can ask these people to help me use my safety plan:   Name: Alpesh  Phone: [phone]   Name: Velasquez  Phone: (number in phone)   Name: Thaddeus  Phone: (number in phone)  Step 6: Making the environment safe:   be around others  Step 7: Professionals or agencies I can contact during a crisis:  [facility] Number: [phone]  Suicide Prevention Lifeline: 9-847-146-TALK (9907)  Crisis Text Line Service (available 24 hours a day, 7 days a week): Text [place] to 370136  Local Crisis Services: "     Call 911 or go to my nearest emergency department.   I helped develop this safety plan and agree to use it when needed.  I have been given a copy of this plan.      Client signature _________________________________________________________________  Today s date: 7/11/22  Adapted from Safety Plan Template 2008 Griselda Perez and Livan Cutler is reprinted with the express permission of the authors.  No portion of the Safety Plan Template may be reproduced without the express, written permission.  You can contact the authors at [email or web address] or [email or web address].

## 2024-02-20 ENCOUNTER — MYC REFILL (OUTPATIENT)
Dept: FAMILY MEDICINE | Facility: CLINIC | Age: 64
End: 2024-02-20
Payer: COMMERCIAL

## 2024-02-20 DIAGNOSIS — R11.2 NAUSEA AND VOMITING, UNSPECIFIED VOMITING TYPE: ICD-10-CM

## 2024-02-20 RX ORDER — ONDANSETRON 4 MG/1
4 TABLET, ORALLY DISINTEGRATING ORAL EVERY 8 HOURS PRN
Qty: 30 TABLET | Refills: 1 | Status: SHIPPED | OUTPATIENT
Start: 2024-02-20 | End: 2024-09-16

## 2024-03-11 ENCOUNTER — TRANSFERRED RECORDS (OUTPATIENT)
Dept: HEALTH INFORMATION MANAGEMENT | Facility: CLINIC | Age: 64
End: 2024-03-11
Payer: COMMERCIAL

## 2024-03-25 DIAGNOSIS — K59.00 CONSTIPATION, UNSPECIFIED CONSTIPATION TYPE: ICD-10-CM

## 2024-03-25 RX ORDER — BISACODYL 10 MG
SUPPOSITORY, RECTAL RECTAL
Qty: 12 SUPPOSITORY | Refills: 1 | Status: SHIPPED | OUTPATIENT
Start: 2024-03-25 | End: 2024-09-16

## 2024-04-08 ENCOUNTER — VIRTUAL VISIT (OUTPATIENT)
Dept: PSYCHOLOGY | Facility: CLINIC | Age: 64
End: 2024-04-08
Payer: COMMERCIAL

## 2024-04-08 ENCOUNTER — TRANSFERRED RECORDS (OUTPATIENT)
Dept: HEALTH INFORMATION MANAGEMENT | Facility: CLINIC | Age: 64
End: 2024-04-08
Payer: COMMERCIAL

## 2024-04-08 DIAGNOSIS — F43.10 POST TRAUMATIC STRESS DISORDER (PTSD): ICD-10-CM

## 2024-04-08 DIAGNOSIS — F33.2 SEVERE RECURRENT MAJOR DEPRESSION WITHOUT PSYCHOTIC FEATURES (H): Primary | ICD-10-CM

## 2024-04-08 DIAGNOSIS — F41.1 GENERALIZED ANXIETY DISORDER: ICD-10-CM

## 2024-04-08 PROCEDURE — 90834 PSYTX W PT 45 MINUTES: CPT | Mod: 93 | Performed by: COUNSELOR

## 2024-04-08 NOTE — PROGRESS NOTES
[facility]                                     Progress Note    Patient Name: Linda Walsh  Date: 4/8/24         Service Type: Individual      Session Start Time: 2:30pm  Session End Time: 3:20pm     Session Length: 50    Session #: 99    Attendees: Client      Service Modality: telephone      Provider verified identity through the following two step process.  Patient provided:  Patient is known previously to provider     Telemedicine Visit: The patient's condition can be safely assessed and treated via synchronous audio and visual telemedicine encounter.       Reason for Telemedicine Visit: Services only offered telehealth     Originating Site (Patient Location): Patient's home     Distant Site (Provider Location): Provider Remote Setting- Home Office     Consent:  The patient/guardian has verbally consented to: the potential risks and benefits of telemedicine (video visit) versus in person care; bill my insurance or make self-payment for services provided; and responsibility for payment of non-covered services.        Mode of Communication:  telephone     As the provider I attest to compliance with applicable laws and regulations related to telemedicine.     DATA  Interactive Complexity: No  Crisis: No        Progress Since Last Session (Related to Symptoms / Goals / Homework):   Symptoms: No change .    Homework: Completed in session      Episode of Care Goals: Minimal progress - ACTION (Actively working towards change); Intervened by reinforcing change plan / affirming steps taken     Current / Ongoing Stressors and Concerns:   The client stated she's moved in with her parents now. Her  presented her with divorce papers after they had agreed they would work through it all outside of the courts, now they are going through the system. She stated too that he's been texting her to come see their dogs and that he wants to try and work through things again. She stated she found  out more about his drug history and various instances when her life could have been on the line because of his drug involvement.   Client stated she's still working right now, working also on getting an apartment.     Treatment Objective(s) Addressed in This Session:   use thought-stopping strategy daily to reduce intrusive thoughts       Intervention:`   Worked on processing through emotions of grief and loss around the divorce and losing her dogs. She stated she's working hard on setting boundaries with her soon-to-be ex- and boundaries around visiting the dogs. Suggested the client look into divorce support groups or grief/loss groups for added supports.         Assessments completed prior to visit:  The following assessments were completed by patient for this visit:  PHQA:        No data to display              GAD7:       12/30/2019     1:39 PM 2/26/2020    11:18 AM 5/27/2020     3:23 PM 7/28/2020    10:37 AM 2/24/2021     1:26 PM 7/13/2022    10:16 AM 6/9/2023    10:20 AM   MARLIN-7 SCORE   Total Score 19 (severe anxiety)   19 (severe anxiety) 16 (severe anxiety) 21 (severe anxiety) 13 (moderate anxiety)   Total Score 19 19 21 19 16 21 13     PROMIS 10-Global Health (all questions and answers displayed):        No data to display                  ASSESSMENT: Current Emotional / Mental Status (status of significant symptoms):   Risk status (Self / Other harm or suicidal ideation)   Patient denies current fears or concerns for personal safety.   Patient denies current or recent suicidal ideation or behaviors.   Patient denies current or recent homicidal ideation or behaviors.   Patient denies current or recent self injurious behavior or ideation.   Patient denies other safety concerns.   Patient reports there has been no change in risk factors since their last session.     Patient reports there has been no change in protective factors since their last session.     Recommended that patient call 911 or go to  the local ED should there be a change in any of these risk factors.     Appearance:   Unable to assess due to phone session    Eye Contact:   Unable to assess due to phone session    Psychomotor Behavior: Unable to assess due to phone session    Attitude:   Cooperative    Orientation:   All   Speech    Rate / Production: Normal/ Responsive    Volume:  Normal    Mood:    Normal   Affect:    Unable to assess due to phone session    Thought Content:  Clear    Thought Form:  Coherent  Logical    Insight:    Good      Medication Review:   No changes to current psychiatric medication(s)     Medication Compliance:   Yes     Changes in Health Issues:   None reported     Chemical Use Review:   Substance Use: Chemical use reviewed, no active concerns identified      Tobacco Use: No change in amount of tobacco use since last session.  Patient declined discussion at this time    Diagnosis:  1. 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 Generalized anxiety disorder    3. Post traumatic stress disorder (PTSD)          Collateral Reports Completed:   Not Applicable    PLAN: (Patient Tasks / Therapist Tasks / Other)  Client to work on self care and asking for help when needed. Look into divorce support groups.         Nasrin Valladares, [facility]                                                         ______________________________________________________________________    Individual Treatment Plan    Patient's Name: Linda Walsh  YOB: 1960    Date of Creation: 7/11/22  Date Treatment Plan Last Reviewed/Revised: 1/15/24    DSM5 Diagnoses: 296.33 (F33.2) Major Depressive Disorder, Recurrent Episode, Severe _, 300.02 (F41.1) Generalized Anxiety Disorder or 309.81 (F43.10) Posttraumatic Stress Disorder (includes Posttraumatic Stress Disorder for Children 6 Years and Younger)  Without dissociative symptoms  Psychosocial / Contextual Factors: CRPS  PROMIS (reviewed every 90 days):     Referral /  Collaboration:  Referral to another professional/service is not indicated at this time..    Anticipated number of session for this episode of care: on-going  Anticipation frequency of session: Monthly  Anticipated Duration of each session: 38-52 minutes/53+ minutes  Treatment plan will be reviewed in 90 days or when goals have been changed.       MeasurableTreatment Goal(s) related to diagnosis / functional impairment(s)  Goal 1: Client will decrease thoughts of wanting to be dead from 10 out of 10 opportunities to at most 9 out of 10 opportunities for at least 3 consecutive weeks as evidenced by client report and a decrease in symptom report as evidenced by PhQ9 scores and GAD7 scores.    Objective #A (Client Action)    Client will  Client will look at and read safety plan at least once a day and follow safety plan when thoughts and urges come up.  Status: Continued - Date(s): 1/15/24    Intervention(s)  Therapist will teach emotional regulation skills. distress tolerance skills, interpersonal effectiveness skills, emotion regluation skills, mindfulness skills, radical acceptance. Therapist will develop safety plan with the client.     Objective #B  Client will  Client will agree to call the therapist or after hours crisis line if noticing intense suicidal thoughts for skills coaching.   Status: Continued - Date(s): 1/15/24    Intervention(s)  Therapist will  Therapist will be available as much as possible during work hours for the client to contact and give the client several crisis resources.         Goal 2: Client will increase the use of skills (emotion regulation, distress tolerance, communication skill) from 1 out of 10 opportunities to at least 2 out of 10 opportunities for at least 3 consecutive weeks as evidenced by client report and a decrease in symptom report as evidenced by PhQ9 scores and GAD7 scores.     Objective #A (Client Action)    Status: Continued - Date(s): 1/15/24    Client will Increase  interest, engagement, and pleasure in doing things  Decrease frequency and intensity of feeling down, depressed, hopeless  Improve diet, appetite, mindful eating, and / or meal planning  Identify negative self-talk and behaviors: challenge core beliefs, myths, and actions  Improve concentration, focus, and mindfulness in daily activities   Feel less fidgety, restless or slow in daily activities / interpersonal interactions  Decrease thoughts that you'd be better off dead or of suicide / self-harm.    Intervention(s)  Therapist will teach emotional regulation skills. distress tolerance skills, interpersonal effectiveness skills, emotion regluation skills, mindfulness skills, radical acceptance. Therapist will teach client how to ID body cues for anxiety, anxiety reduction techniques, how to ID triggers for depression and anxiety- decrease reactivity/eliminate, lifestyle changes to reduce depression and anxiety, communication skills, explore cognitive beliefs and help client to develop healthy cognitive patterns and beliefs.    Objective #B  Client will  Client will learn and  practice DBT skills daily. .    Status: Continued - Date(s): 11/15/24    Intervention(s)  Therapist will  Therapist will Therapist will teach emotional regulation skills. distress tolerance skills, interpersonal effectiveness skills, emotion regluation skills, mindfulness skills, radical acceptance. .      Goal 3: Client will decrease anxious symptoms and reactions to triggers from 9 out of 10 opportunities to at most 8 out of 10 opportunities for at least 3 consecutive weeks as evidenced by client report and a decrease in symptom report as evidenced by PhQ9 scores and GAD7 scores.    Objective #A (Client Action)    Status: Continued - Date(s): 1/15/24    Client will  Client will use cognitive strategies identified in therapy to challenge anxious thoughts.    Intervention(s)  Therapist will  Therapist will teach emotional  "recognition/identification. emotion regulation skills, coping skills, mindfulness skills.    Objective #B  Client will  Client will use thought-stopping strategy daily to reduce intrusive thoughts for at least 3 consecutive weeks as evidenced by client report and a decrease in symptom report as evidenced by PhQ9 scores and GAD7 scores.       Status: Continued - Date(s): 1/15/24    Intervention(s)  Therapist will teach emotional regulation skills. distress tolerance skills, interpersonal effectiveness skills, emotion regluation skills, mindfulness skills, radical acceptance. Therapist will teach client how to ID body cues for anxiety, anxiety reduction techniques, how to ID triggers for depression and anxiety- decrease reactivity/eliminate, lifestyle changes to reduce depression and anxiety, communication skills, explore cognitive beliefs and help client to develop healthy cognitive patterns and beliefs.    Objective #C  Client will  Client will learn 5 crisis survival skills during the Distress Tolerance Module (6-8 weeks) for at least 3 consecutive weeks as evidenced by client report and a decrease in symptom report as evidenced by PhQ9 scores and GAD7 scores.  Status: Continued - Date(s): 1/15/24    Intervention(s)  Therapist will teach emotional regulation skills. distress tolerance skills, interpersonal effectiveness skills, emotion regluation skills, mindfulness skills, radical acceptance.      Client has reviewed and agreed to the above plan.      Nasrin Valladares [facility]        Linda Walsh     SAFETY PLAN:  Step 1: Warning signs / cues (Thoughts, images, mood, situation, behavior) that a crisis may be developing:  Thoughts: \"I don't matter\", \"People would be better off without me\", \"I'm a burden\", \"I can't do this anymore\", \"I just want this to end\" and \"Nothing makes it better\"  Images: obsessive thoughts of death or dying: car accident, using a gun and flashbacks  Thinking Processes: ruminations (can't " "stop thinking about my problems): court case, pain, racing thoughts, highly critical and negative thoughts: \"I can't do anything, I have to suffer everyday and go to work and other people have it so easy.\"  and paranoia: that the [employer] is watching me  Mood: worsening depression, hopelessness, helplessness, intense anger, intense worry, agitation, disinhibited (not caring about things or consequences) and mood swings  Behaviors: isolating/withdrawing , can't stop crying, not taking care of myself, not taking care of my responsibilities, sleeping too much and not sleeping enough  Situations: legal issues: current court case, pain, trauma , financial stress and medical condition / diagnosis: CRPS    Step 2: Coping strategies - Things I can do to take my mind off of my problems without contacting another person (relaxation technique, physical activity):  Distress Tolerance Strategies:  arts and crafts: with her residents, play with my pet , pray, change body temperature (ice pack/cold water) , paced breathing/progressive muscle relaxation and puzzles, games on ipad  Physical Activities: deep breathing  Focus on helpful thoughts:  \"Ride the wave\", think about happy memories: when the grandkids were born, going camping, when the boys were little and remind myself of what is important to me: grandkids, family, the residents  Step 3: People and social settings that provide distraction:   Name: Alpesh  Phone: [phone]   Name: Velasquez  Phone:    Name: Thaddeus  Phone:   work   Step 4: Remind myself of people and things that are important to me and worth living for:    My family, my residents    Step 5: When I am in crisis, I can ask these people to help me use my safety plan:   Name: Alpesh  Phone: [phone]   Name: Velasquez  Phone: (number in phone)   Name: Thaddeus  Phone: (number in phone)  Step 6: Making the environment safe:   be around others  Step 7: Professionals or agencies I can contact during a crisis:  Th[place] " [facility] Number: [phone]  Suicide Prevention Lifeline: 1-552-834-TALK (6449)  Crisis Text Line Service (available 24 hours a day, 7 days a week): Text [place] to 104051  Local Crisis Services:     Call 911 or go to my nearest emergency department.   I helped develop this safety plan and agree to use it when needed.  I have been given a copy of this plan.      Client signature _________________________________________________________________  Today s date: 7/11/22  Adapted from Safety Plan Template 2008 Griselda Perez and Livan Cutler is reprinted with the express permission of the authors.  No portion of the Safety Plan Template may be reproduced without the express, written permission.  You can contact the authors at [email or web address] or [email or web address].

## 2024-05-06 ENCOUNTER — TRANSFERRED RECORDS (OUTPATIENT)
Dept: HEALTH INFORMATION MANAGEMENT | Facility: CLINIC | Age: 64
End: 2024-05-06
Payer: COMMERCIAL

## 2024-05-13 ENCOUNTER — VIRTUAL VISIT (OUTPATIENT)
Dept: PSYCHOLOGY | Facility: CLINIC | Age: 64
End: 2024-05-13
Payer: COMMERCIAL

## 2024-05-13 DIAGNOSIS — F33.2 SEVERE RECURRENT MAJOR DEPRESSION WITHOUT PSYCHOTIC FEATURES (H): Primary | ICD-10-CM

## 2024-05-13 DIAGNOSIS — F43.10 POST TRAUMATIC STRESS DISORDER (PTSD): ICD-10-CM

## 2024-05-13 DIAGNOSIS — F41.1 GENERALIZED ANXIETY DISORDER: ICD-10-CM

## 2024-05-13 PROCEDURE — 90837 PSYTX W PT 60 MINUTES: CPT | Mod: 93 | Performed by: COUNSELOR
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
## 2024-06-03 DIAGNOSIS — K21.9 GASTROESOPHAGEAL REFLUX DISEASE WITHOUT ESOPHAGITIS: ICD-10-CM

## 2024-06-03 RX ORDER — OMEPRAZOLE 40 MG/1
40 CAPSULE, DELAYED RELEASE ORAL DAILY
Qty: 90 CAPSULE | Refills: 1 | Status: SHIPPED | OUTPATIENT
Start: 2024-06-03

## 2024-06-04 ENCOUNTER — HOSPITAL ENCOUNTER (EMERGENCY)
Facility: CLINIC | Age: 64
Discharge: HOME | End: 2024-06-04
Attending: FAMILY MEDICINE | Admitting: FAMILY MEDICINE
Payer: COMMERCIAL

## 2024-06-04 VITALS
OXYGEN SATURATION: 98 % | HEIGHT: 65 IN | BODY MASS INDEX: 25.33 KG/M2 | RESPIRATION RATE: 20 BRPM | TEMPERATURE: 97.9 F | SYSTOLIC BLOOD PRESSURE: 140 MMHG | HEART RATE: 92 BPM | DIASTOLIC BLOOD PRESSURE: 81 MMHG | WEIGHT: 152 LBS

## 2024-06-04 DIAGNOSIS — M54.16 LUMBAR RADICULOPATHY: ICD-10-CM

## 2024-06-04 PROCEDURE — 99284 EMERGENCY DEPT VISIT MOD MDM: CPT | Performed by: FAMILY MEDICINE

## 2024-06-04 PROCEDURE — 96372 THER/PROPH/DIAG INJ SC/IM: CPT | Performed by: FAMILY MEDICINE

## 2024-06-04 PROCEDURE — 250N000011 HC RX IP 250 OP 636: Performed by: FAMILY MEDICINE

## 2024-06-04 RX ORDER — METHYLPREDNISOLONE 4 MG
TABLET, DOSE PACK ORAL
Qty: 21 TABLET | Refills: 0 | Status: SHIPPED | OUTPATIENT
Start: 2024-06-04 | End: 2024-08-19

## 2024-06-04 RX ORDER — KETOROLAC TROMETHAMINE 30 MG/ML
60 INJECTION, SOLUTION INTRAMUSCULAR; INTRAVENOUS ONCE
Status: COMPLETED | OUTPATIENT
Start: 2024-06-04 | End: 2024-06-04

## 2024-06-04 RX ORDER — METHOCARBAMOL 750 MG/1
750 TABLET, FILM COATED ORAL 4 TIMES DAILY PRN
Qty: 30 TABLET | Refills: 0 | Status: SHIPPED | OUTPATIENT
Start: 2024-06-04 | End: 2024-08-19

## 2024-06-04 RX ADMIN — KETOROLAC TROMETHAMINE 60 MG: 30 INJECTION, SOLUTION INTRAMUSCULAR at 13:17

## 2024-06-04 ASSESSMENT — COLUMBIA-SUICIDE SEVERITY RATING SCALE - C-SSRS
2. HAVE YOU ACTUALLY HAD ANY THOUGHTS OF KILLING YOURSELF IN THE PAST MONTH?: NO
6. HAVE YOU EVER DONE ANYTHING, STARTED TO DO ANYTHING, OR PREPARED TO DO ANYTHING TO END YOUR LIFE?: NO
1. IN THE PAST MONTH, HAVE YOU WISHED YOU WERE DEAD OR WISHED YOU COULD GO TO SLEEP AND NOT WAKE UP?: NO

## 2024-06-04 ASSESSMENT — ACTIVITIES OF DAILY LIVING (ADL): ADLS_ACUITY_SCORE: 36
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
## 2024-06-04 NOTE — ED TRIAGE NOTES
Reports sitting on a  Saturday and having back pain shooting down right leg since Saturday night.

## 2024-06-06 ENCOUNTER — TELEPHONE (OUTPATIENT)
Dept: FAMILY MEDICINE | Facility: OTHER | Age: 64
End: 2024-06-06
Payer: COMMERCIAL

## 2024-06-06 ENCOUNTER — VIRTUAL VISIT (OUTPATIENT)
Dept: PSYCHOLOGY | Facility: CLINIC | Age: 64
End: 2024-06-06
Payer: COMMERCIAL

## 2024-06-06 DIAGNOSIS — F41.1 GENERALIZED ANXIETY DISORDER: ICD-10-CM

## 2024-06-06 DIAGNOSIS — F43.10 POST TRAUMATIC STRESS DISORDER (PTSD): ICD-10-CM

## 2024-06-06 DIAGNOSIS — F33.2 SEVERE RECURRENT MAJOR DEPRESSION WITHOUT PSYCHOTIC FEATURES (H): Primary | ICD-10-CM

## 2024-06-06 PROCEDURE — 90837 PSYTX W PT 60 MINUTES: CPT | Mod: 93 | Performed by: COUNSELOR

## 2024-06-06 NOTE — TELEPHONE ENCOUNTER
Patient was seen in the ER on 6/4/24.  They ordered an MRI but they ordered it as routine and she needs this changed so she can get this done quicker.    Her pain is still at a 10 out of 10 despite steroids and pain medications.    She is hoping to have this scan done on Monday.    Please advise.    Quita Montana RN on 6/6/2024 at 3:02 PM
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
## 2024-06-07 ENCOUNTER — HOSPITAL ENCOUNTER (OUTPATIENT)
Dept: MRI IMAGING | Facility: CLINIC | Age: 64
Discharge: HOME | End: 2024-06-07
Attending: FAMILY MEDICINE | Admitting: FAMILY MEDICINE
Payer: COMMERCIAL

## 2024-06-07 DIAGNOSIS — M54.16 LUMBAR RADICULOPATHY: ICD-10-CM

## 2024-06-07 PROCEDURE — 72148 MRI LUMBAR SPINE W/O DYE: CPT

## 2024-06-07 NOTE — TELEPHONE ENCOUNTER
I have not seen patient in quite some time and not for this concern. She may want to call the ER to see if they will change it.  They only order stat or priority if it is necessary. She should be able to find MRI sooner if she is willing to travel further for this.     Tomasa Medina PA-C

## 2024-06-12 ENCOUNTER — TRANSFERRED RECORDS (OUTPATIENT)
Dept: HEALTH INFORMATION MANAGEMENT | Facility: CLINIC | Age: 64
End: 2024-06-12
Payer: COMMERCIAL

## 2024-06-13 ENCOUNTER — TRANSFERRED RECORDS (OUTPATIENT)
Dept: HEALTH INFORMATION MANAGEMENT | Facility: CLINIC | Age: 64
End: 2024-06-13
Payer: COMMERCIAL

## 2024-06-24 ENCOUNTER — VIRTUAL VISIT (OUTPATIENT)
Dept: PSYCHOLOGY | Facility: CLINIC | Age: 64
End: 2024-06-24
Payer: COMMERCIAL

## 2024-06-24 DIAGNOSIS — F33.2 SEVERE RECURRENT MAJOR DEPRESSION WITHOUT PSYCHOTIC FEATURES (H): Primary | ICD-10-CM

## 2024-06-24 DIAGNOSIS — F43.10 POST TRAUMATIC STRESS DISORDER (PTSD): ICD-10-CM

## 2024-06-24 DIAGNOSIS — F41.1 GENERALIZED ANXIETY DISORDER: ICD-10-CM

## 2024-06-24 PROCEDURE — 90837 PSYTX W PT 60 MINUTES: CPT | Mod: 93 | Performed by: COUNSELOR

## 2024-06-24 NOTE — PROGRESS NOTES
[facility]                                     Progress Note    Patient Name: Linda Walsh  Date: 6/24/24         Service Type: Individual      Session Start Time: 10:30am  Session End Time:11:30am      Session Length: 60    Session #: 102    Attendees: Client    Extended Session (53+ minutes): PROLONGED SERVICE IN THE OUTPATIENT SETTING REQUIRING DIRECTPATIENT CONTACT BEYOND THE USUAL SERVICE:    - Patient's presenting concerns require more intensive intervention than could be completed within the usual service     Service Modality: telephone      Provider verified identity through the following two step process.  Patient provided:  Patient is known previously to provider     Telemedicine Visit: The patient's condition can be safely assessed and treated via synchronous audio and visual telemedicine encounter.       Reason for Telemedicine Visit: Services only offered telehealth     Originating Site (Patient Location): Patient's home     Distant Site (Provider Location): Provider Remote Setting- Home Office     Consent:  The patient/guardian has verbally consented to: the potential risks and benefits of telemedicine (video visit) versus in person care; bill my insurance or make self-payment for services provided; and responsibility for payment of non-covered services.        Mode of Communication:  telephone     As the provider I attest to compliance with applicable laws and regulations related to telemedicine.     DATA  Interactive Complexity: No  Crisis: No        Progress Since Last Session (Related to Symptoms / Goals / Homework):   Symptoms: Worsening .    Homework: Completed in session      Episode of Care Goals: Minimal progress - ACTION (Actively working towards change); Intervened by reinforcing change plan / affirming steps taken     Current / Ongoing Stressors and Concerns:   The client stated she threw her back out. Hasn't been able to walk without her cane, hard to get  "dressed or get her shoes on, isn't sleeping well or at all. Stated she's \"sick of all of this and not being able to do anything.\" Stated she can't take time to heal because she has to work and has to do things for her parents.      Treatment Objective(s) Addressed in This Session:   use thought-stopping strategy daily to reduce intrusive thoughts       Intervention:   Assess for safety: client does not endorse any plan or intent around suicidal ideation. Discussed safety plan. Client agrees to safety. Discussed the barriers the client is feeling are getting in the way of her being able to heal and take care of herself. Stated she's having to work so much no matter what and do things for her parents despite her pain. Talked about how to ask for help but also how to let go of the cognitive distortions she has around asking for help.         Assessments completed prior to visit:  The following assessments were completed by patient for this visit:  PHQA:        No data to display              GAD7:       12/30/2019     1:39 PM 2/26/2020    11:18 AM 5/27/2020     3:23 PM 7/28/2020    10:37 AM 2/24/2021     1:26 PM 7/13/2022    10:16 AM 6/9/2023    10:20 AM   MARLIN-7 SCORE   Total Score 19 (severe anxiety)   19 (severe anxiety) 16 (severe anxiety) 21 (severe anxiety) 13 (moderate anxiety)   Total Score 19 19 21 19 16 21 13     PROMIS 10-Global Health (all questions and answers displayed):        No data to display                  ASSESSMENT: Current Emotional / Mental Status (status of significant symptoms):   Risk status (Self / Other harm or suicidal ideation)   Patient denies current fears or concerns for personal safety.   Patient denies current or recent suicidal ideation or behaviors.   Patient denies current or recent homicidal ideation or behaviors.   Patient denies current or recent self injurious behavior or ideation.   Patient denies other safety concerns.   Patient reports there has been no change in risk " factors since their last session.     Patient reports there has been no change in protective factors since their last session.     Recommended that patient call 911 or go to the local ED should there be a change in any of these risk factors.     Appearance:   Unable to assess due to phone session    Eye Contact:   Unable to assess due to phone session    Psychomotor Behavior: Unable to assess due to phone session    Attitude:   Cooperative    Orientation:   All   Speech    Rate / Production: Normal/ Responsive    Volume:  Normal    Mood:    Normal   Affect:    Unable to assess due to phone session    Thought Content:  Clear    Thought Form:  Coherent  Logical    Insight:    Good      Medication Review:   No changes to current psychiatric medication(s)     Medication Compliance:   Yes     Changes in Health Issues:   None reported     Chemical Use Review:   Substance Use: Chemical use reviewed, no active concerns identified      Tobacco Use: No change in amount of tobacco use since last session.  Patient declined discussion at this time    Diagnosis:  1. 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 Generalized anxiety disorder    3. Post traumatic stress disorder (PTSD)          Collateral Reports Completed:   Not Applicable    PLAN: (Patient Tasks / Therapist Tasks / Other)  Client to work on self care and asking for help when needed.Continue to maintain boundaries with her ex.         Nasrin , [facility]                                                         ______________________________________________________________________    Individual Treatment Plan    Patient's Name: Linda Walsh  YOB: 1960    Date of Creation: 7/11/22  Date Treatment Plan Last Reviewed/Revised: 5/13/24    DSM5 Diagnoses: 296.33 (F33.2) Major Depressive Disorder, Recurrent Episode, Severe _, 300.02 (F41.1) Generalized Anxiety Disorder or 309.81 (F43.10) Posttraumatic Stress Disorder (includes  Posttraumatic Stress Disorder for Children 6 Years and Younger)  Without dissociative symptoms  Psychosocial / Contextual Factors: CRPS  PROMIS (reviewed every 90 days):     Referral / Collaboration:  Referral to another professional/service is not indicated at this time..    Anticipated number of session for this episode of care: on-going  Anticipation frequency of session: Monthly  Anticipated Duration of each session: 38-52 minutes/53+ minutes  Treatment plan will be reviewed in 90 days or when goals have been changed.       MeasurableTreatment Goal(s) related to diagnosis / functional impairment(s)  Goal 1: Client will decrease thoughts of wanting to be dead from 10 out of 10 opportunities to at most 9 out of 10 opportunities for at least 3 consecutive weeks as evidenced by client report and a decrease in symptom report as evidenced by PhQ9 scores and GAD7 scores.    Objective #A (Client Action)    Client will  Client will look at and read safety plan at least once a day and follow safety plan when thoughts and urges come up.  Status: Continued - Date(s): 5/13/24    Intervention(s)  Therapist will teach emotional regulation skills. distress tolerance skills, interpersonal effectiveness skills, emotion regluation skills, mindfulness skills, radical acceptance. Therapist will develop safety plan with the client.     Objective #B  Client will  Client will agree to call the therapist or after hours crisis line if noticing intense suicidal thoughts for skills coaching.   Status: Continued - Date(s): 5/13/24    Intervention(s)  Therapist will  Therapist will be available as much as possible during work hours for the client to contact and give the client several crisis resources.         Goal 2: Client will increase the use of skills (emotion regulation, distress tolerance, communication skill) from 1 out of 10 opportunities to at least 2 out of 10 opportunities for at least 3 consecutive weeks as evidenced by client  report and a decrease in symptom report as evidenced by PhQ9 scores and GAD7 scores.     Objective #A (Client Action)    Status: Continued - Date(s): 5/13/24    Client will Increase interest, engagement, and pleasure in doing things  Decrease frequency and intensity of feeling down, depressed, hopeless  Improve diet, appetite, mindful eating, and / or meal planning  Identify negative self-talk and behaviors: challenge core beliefs, myths, and actions  Improve concentration, focus, and mindfulness in daily activities   Feel less fidgety, restless or slow in daily activities / interpersonal interactions  Decrease thoughts that you'd be better off dead or of suicide / self-harm.    Intervention(s)  Therapist will teach emotional regulation skills. distress tolerance skills, interpersonal effectiveness skills, emotion regluation skills, mindfulness skills, radical acceptance. Therapist will teach client how to ID body cues for anxiety, anxiety reduction techniques, how to ID triggers for depression and anxiety- decrease reactivity/eliminate, lifestyle changes to reduce depression and anxiety, communication skills, explore cognitive beliefs and help client to develop healthy cognitive patterns and beliefs.    Objective #B  Client will  Client will learn and  practice DBT skills daily. .    Status: Continued - Date(s): 5/13/24    Intervention(s)  Therapist will  Therapist will Therapist will teach emotional regulation skills. distress tolerance skills, interpersonal effectiveness skills, emotion regluation skills, mindfulness skills, radical acceptance. .      Goal 3: Client will decrease anxious symptoms and reactions to triggers from 9 out of 10 opportunities to at most 8 out of 10 opportunities for at least 3 consecutive weeks as evidenced by client report and a decrease in symptom report as evidenced by PhQ9 scores and GAD7 scores.    Objective #A (Client Action)    Status: Continued - Date(s): 5/13/24    Client will   "Client will use cognitive strategies identified in therapy to challenge anxious thoughts.    Intervention(s)  Therapist will  Therapist will teach emotional recognition/identification. emotion regulation skills, coping skills, mindfulness skills.    Objective #B  Client will  Client will use thought-stopping strategy daily to reduce intrusive thoughts for at least 3 consecutive weeks as evidenced by client report and a decrease in symptom report as evidenced by PhQ9 scores and GAD7 scores.       Status: Continued - Date(s): 5/13/24    Intervention(s)  Therapist will teach emotional regulation skills. distress tolerance skills, interpersonal effectiveness skills, emotion regluation skills, mindfulness skills, radical acceptance. Therapist will teach client how to ID body cues for anxiety, anxiety reduction techniques, how to ID triggers for depression and anxiety- decrease reactivity/eliminate, lifestyle changes to reduce depression and anxiety, communication skills, explore cognitive beliefs and help client to develop healthy cognitive patterns and beliefs.    Objective #C  Client will  Client will learn 5 crisis survival skills during the Distress Tolerance Module (6-8 weeks) for at least 3 consecutive weeks as evidenced by client report and a decrease in symptom report as evidenced by PhQ9 scores and GAD7 scores.  Status: Continued - Date(s): 5/13/24    Intervention(s)  Therapist will teach emotional regulation skills. distress tolerance skills, interpersonal effectiveness skills, emotion regluation skills, mindfulness skills, radical acceptance.      Client has reviewed and agreed to the above plan.      Nasrin [facility]ada[facility]        Linda Walsh     SAFETY PLAN:  Step 1: Warning signs / cues (Thoughts, images, mood, situation, behavior) that a crisis may be developing:  Thoughts: \"I don't matter\", \"People would be better off without me\", \"I'm a burden\", \"I can't do this anymore\", \"I just want this to end\" " "and \"Nothing makes it better\"  Images: obsessive thoughts of death or dying: car accident, using a gun and flashbacks  Thinking Processes: ruminations (can't stop thinking about my problems): court case, pain, racing thoughts, highly critical and negative thoughts: \"I can't do anything, I have to suffer everyday and go to work and other people have it so easy.\"  and paranoia: that the government is watching me  Mood: worsening depression, hopelessness, helplessness, intense anger, intense worry, agitation, disinhibited (not caring about things or consequences) and mood swings  Behaviors: isolating/withdrawing , can't stop crying, not taking care of myself, not taking care of my responsibilities, sleeping too much and not sleeping enough  Situations: legal issues: current court case, pain, trauma , financial stress and medical condition / diagnosis: CRPS    Step 2: Coping strategies - Things I can do to take my mind off of my problems without contacting another person (relaxation technique, physical activity):  Distress Tolerance Strategies:  arts and crafts: with her residents, play with my pet , pray, change body temperature (ice pack/cold water) , paced breathing/progressive muscle relaxation and puzzles, games on ipad  Physical Activities: deep breathing  Focus on helpful thoughts:  \"Ride the wave\", think about happy memories: when the grandkids were born, going camping, when the boys were little and remind myself of what is important to me: grandkids, family, the residents  Step 3: People and social settings that provide distraction:   Name: Alpesh  Phone: [phone]   Name: Velasquez  Phone:    Name: Thaddeus  Phone:   work   Step 4: Remind myself of people and things that are important to me and worth living for:    My family, my residents    Step 5: When I am in crisis, I can ask these people to help me use my safety plan:   Name: Alpesh  Phone: [phone]   Name: Velasquez  Phone: (number in phone)   Name: " Thaddeus  Phone: (number in phone)  Step 6: Making the environment safe:   be around others  Step 7: Professionals or agencies I can contact during a crisis:  [facility] Daytime Number: [phone]  Suicide Prevention Lifeline: 5-300-772-UYNI (4608)  Crisis Text Line Service (available 24 hours a day, 7 days a week): Text [place] to 251669  Local Crisis Services:     Call 911 or go to my nearest emergency department.   I helped develop this safety plan and agree to use it when needed.  I have been given a copy of this plan.      Client signature _________________________________________________________________  Today s date: 7/11/22  Adapted from Safety Plan Template 2008 Griselda Perez and Livan Cutler is reprinted with the express permission of the authors.  No portion of the Safety Plan Template may be reproduced without the express, written permission.  You can contact the authors at [email or web address] or [email or web address].

## 2024-07-08 ENCOUNTER — TRANSFERRED RECORDS (OUTPATIENT)
Dept: HEALTH INFORMATION MANAGEMENT | Facility: CLINIC | Age: 64
End: 2024-07-08
Payer: COMMERCIAL

## 2024-07-15 ENCOUNTER — HOSPITAL ENCOUNTER (OUTPATIENT)
Dept: GENERAL RADIOLOGY | Facility: CLINIC | Age: 64
Discharge: HOME | End: 2024-07-15
Attending: PHYSICIAN ASSISTANT | Admitting: PHYSICIAN ASSISTANT
Payer: COMMERCIAL

## 2024-07-15 DIAGNOSIS — M54.17 RADICULOPATHY, LUMBOSACRAL REGION: ICD-10-CM

## 2024-07-15 PROCEDURE — 72100 X-RAY EXAM L-S SPINE 2/3 VWS: CPT

## 2024-07-29 ENCOUNTER — TELEPHONE (OUTPATIENT)
Dept: FAMILY MEDICINE | Facility: OTHER | Age: 64
End: 2024-07-29
Payer: COMMERCIAL

## 2024-07-29 NOTE — TELEPHONE ENCOUNTER
Reason for Call:  Appointment Request    Patient requesting this type of appt:  Pt is feeing run down, tired, and has had weightloss    Requested provider:  Providers Adam or Arnie    Reason patient unable to be scheduled: Not within requested timeframe    When does patient want to be seen/preferred time:  Has availability on Monday or Thursday and would like to get in asap.    Comments:      [facility] or would you prefer to receive a phone call?:   Patient would prefer a phone call   Okay to leave a detailed message?: Yes at Cell number on file:    Telephone Information:   Mobile [phone]       Call taken on 7/29/2024 at 2:44 PM by Sherry Henry

## 2024-07-30 ENCOUNTER — MYC REFILL (OUTPATIENT)
Dept: FAMILY MEDICINE | Facility: OTHER | Age: 64
End: 2024-07-30
Payer: COMMERCIAL

## 2024-07-31 RX ORDER — GABAPENTIN 300 MG/1
900 CAPSULE ORAL 2 TIMES DAILY
Refills: 3 | OUTPATIENT
Start: 2024-07-31

## 2024-07-31 NOTE — TELEPHONE ENCOUNTER
This is being filled by a specialist outside of [place], this should go to them to refill    Tomasa Medina PA-C

## 2024-08-05 ENCOUNTER — VIRTUAL VISIT (OUTPATIENT)
Dept: PSYCHOLOGY | Facility: CLINIC | Age: 64
End: 2024-08-05
Payer: COMMERCIAL

## 2024-08-05 ENCOUNTER — TRANSFERRED RECORDS (OUTPATIENT)
Dept: HEALTH INFORMATION MANAGEMENT | Facility: CLINIC | Age: 64
End: 2024-08-05
Payer: COMMERCIAL

## 2024-08-05 DIAGNOSIS — F33.2 SEVERE RECURRENT MAJOR DEPRESSION WITHOUT PSYCHOTIC FEATURES (H): Primary | ICD-10-CM

## 2024-08-05 DIAGNOSIS — F43.10 POST TRAUMATIC STRESS DISORDER (PTSD): ICD-10-CM

## 2024-08-05 DIAGNOSIS — F41.1 GENERALIZED ANXIETY DISORDER: ICD-10-CM

## 2024-08-05 PROCEDURE — 90832 PSYTX W PT 30 MINUTES: CPT | Mod: 93 | Performed by: COUNSELOR

## 2024-08-05 NOTE — PROGRESS NOTES
[facility]                                     Progress Note    Patient Name: Linda Walsh  Date: 8/5/24         Service Type: Individual      Session Start Time: 12:30pm  Session End Time: 1:00pm     Session Length: 30    Session #: 103    Attendees: Client      Service Modality: telephone      Provider verified identity through the following two step process.  Patient provided:  Patient is known previously to provider     Telemedicine Visit: The patient's condition can be safely assessed and treated via synchronous audio and visual telemedicine encounter.       Reason for Telemedicine Visit: Services only offered telehealth     Originating Site (Patient Location): Patient's home     Distant Site (Provider Location): Provider Remote Setting- Home Office     Consent:  The patient/guardian has verbally consented to: the potential risks and benefits of telemedicine (video visit) versus in person care; bill my insurance or make self-payment for services provided; and responsibility for payment of non-covered services.        Mode of Communication:  telephone     As the provider I attest to compliance with applicable laws and regulations related to telemedicine.     DATA  Interactive Complexity: No  Crisis: No        Progress Since Last Session (Related to Symptoms / Goals / Homework):   Symptoms: No change .    Homework: Completed in session      Episode of Care Goals: Minimal progress - ACTION (Actively working towards change); Intervened by reinforcing change plan / affirming steps taken     Current / Ongoing Stressors and Concerns:   The client stated her brother was transferred to [facility] in [place].   Her parents are helping her get a .   Client cut the session early because she had family visiting.      Treatment Objective(s) Addressed in This Session:   use thought-stopping strategy daily to reduce intrusive thoughts       Intervention:   Client stated things  continue to be extremely stressful with the divorce proceedings. Her parents are helping her get a  now. Talked about how she's working on decreasing anxiety and managing depression symptoms. Client cut the session short so she could visit with family while they are in town.          Assessments completed prior to visit:  The following assessments were completed by patient for this visit:  PHQA:        No data to display              GAD7:       12/30/2019     1:39 PM 2/26/2020    11:18 AM 5/27/2020     3:23 PM 7/28/2020    10:37 AM 2/24/2021     1:26 PM 7/13/2022    10:16 AM 6/9/2023    10:20 AM   MARLIN-7 SCORE   Total Score 19 (severe anxiety)   19 (severe anxiety) 16 (severe anxiety) 21 (severe anxiety) 13 (moderate anxiety)   Total Score 19 19 21 19 16 21 13     PROMIS 10-Global Health (all questions and answers displayed):        No data to display                  ASSESSMENT: Current Emotional / Mental Status (status of significant symptoms):   Risk status (Self / Other harm or suicidal ideation)   Patient denies current fears or concerns for personal safety.   Patient denies current or recent suicidal ideation or behaviors.   Patient denies current or recent homicidal ideation or behaviors.   Patient denies current or recent self injurious behavior or ideation.   Patient denies other safety concerns.   Patient reports there has been no change in risk factors since their last session.     Patient reports there has been no change in protective factors since their last session.     Recommended that patient call 911 or go to the local ED should there be a change in any of these risk factors.     Appearance:   Unable to assess due to phone session    Eye Contact:   Unable to assess due to phone session    Psychomotor Behavior: Unable to assess due to phone session    Attitude:   Cooperative    Orientation:   All   Speech    Rate / Production: Normal/ Responsive    Volume:  Normal     Mood:    Normal   Affect:    Unable to assess due to phone session    Thought Content:  Clear    Thought Form:  Coherent  Logical    Insight:    Good      Medication Review:   No changes to current psychiatric medication(s)     Medication Compliance:   Yes     Changes in Health Issues:   None reported     Chemical Use Review:   Substance Use: Chemical use reviewed, no active concerns identified      Tobacco Use: No change in amount of tobacco use since last session.  Patient declined discussion at this time    Diagnosis:  1. 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 Generalized anxiety disorder    3. Post traumatic stress disorder (PTSD)          Collateral Reports Completed:   Not Applicable    PLAN: (Patient Tasks / Therapist Tasks / Other)  Client to work on self care and practicing distress tolerance skills.         Nasrin Valladares, [facility]                                                         ______________________________________________________________________    Individual Treatment Plan    Patient's Name: Linda Walsh  YOB: 1960    Date of Creation: 7/11/22  Date Treatment Plan Last Reviewed/Revised: 5/13/24    DSM5 Diagnoses: 296.33 (F33.2) Major Depressive Disorder, Recurrent Episode, Severe _, 300.02 (F41.1) Generalized Anxiety Disorder or 309.81 (F43.10) Posttraumatic Stress Disorder (includes Posttraumatic Stress Disorder for Children 6 Years and Younger)  Without dissociative symptoms  Psychosocial / Contextual Factors: CRPS  PROMIS (reviewed every 90 days):     Referral / Collaboration:  Referral to another professional/service is not indicated at this time..    Anticipated number of session for this episode of care: on-going  Anticipation frequency of session: Monthly  Anticipated Duration of each session: 38-52 minutes/53+ minutes  Treatment plan will be reviewed in 90 days or when goals have been changed.       MeasurableTreatment Goal(s) related to  diagnosis / functional impairment(s)  Goal 1: Client will decrease thoughts of wanting to be dead from 10 out of 10 opportunities to at most 9 out of 10 opportunities for at least 3 consecutive weeks as evidenced by client report and a decrease in symptom report as evidenced by PhQ9 scores and GAD7 scores.    Objective #A (Client Action)    Client will  Client will look at and read safety plan at least once a day and follow safety plan when thoughts and urges come up.  Status: Continued - Date(s): 5/13/24    Intervention(s)  Therapist will teach emotional regulation skills. distress tolerance skills, interpersonal effectiveness skills, emotion regluation skills, mindfulness skills, radical acceptance. Therapist will develop safety plan with the client.     Objective #B  Client will  Client will agree to call the therapist or after hours crisis line if noticing intense suicidal thoughts for skills coaching.   Status: Continued - Date(s): 5/13/24    Intervention(s)  Therapist will  Therapist will be available as much as possible during work hours for the client to contact and give the client several crisis resources.         Goal 2: Client will increase the use of skills (emotion regulation, distress tolerance, communication skill) from 1 out of 10 opportunities to at least 2 out of 10 opportunities for at least 3 consecutive weeks as evidenced by client report and a decrease in symptom report as evidenced by PhQ9 scores and GAD7 scores.     Objective #A (Client Action)    Status: Continued - Date(s): 5/13/24    Client will Increase interest, engagement, and pleasure in doing things  Decrease frequency and intensity of feeling down, depressed, hopeless  Improve diet, appetite, mindful eating, and / or meal planning  Identify negative self-talk and behaviors: challenge core beliefs, myths, and actions  Improve concentration, focus, and mindfulness in daily activities   Feel less fidgety, restless or slow in daily  activities / interpersonal interactions  Decrease thoughts that you'd be better off dead or of suicide / self-harm.    Intervention(s)  Therapist will teach emotional regulation skills. distress tolerance skills, interpersonal effectiveness skills, emotion regluation skills, mindfulness skills, radical acceptance. Therapist will teach client how to ID body cues for anxiety, anxiety reduction techniques, how to ID triggers for depression and anxiety- decrease reactivity/eliminate, lifestyle changes to reduce depression and anxiety, communication skills, explore cognitive beliefs and help client to develop healthy cognitive patterns and beliefs.    Objective #B  Client will  Client will learn and  practice DBT skills daily. .    Status: Continued - Date(s): 5/13/24    Intervention(s)  Therapist will  Therapist will Therapist will teach emotional regulation skills. distress tolerance skills, interpersonal effectiveness skills, emotion regluation skills, mindfulness skills, radical acceptance. .      Goal 3: Client will decrease anxious symptoms and reactions to triggers from 9 out of 10 opportunities to at most 8 out of 10 opportunities for at least 3 consecutive weeks as evidenced by client report and a decrease in symptom report as evidenced by PhQ9 scores and GAD7 scores.    Objective #A (Client Action)    Status: Continued - Date(s): 5/13/24    Client will  Client will use cognitive strategies identified in therapy to challenge anxious thoughts.    Intervention(s)  Therapist will  Therapist will teach emotional recognition/identification. emotion regulation skills, coping skills, mindfulness skills.    Objective #B  Client will  Client will use thought-stopping strategy daily to reduce intrusive thoughts for at least 3 consecutive weeks as evidenced by client report and a decrease in symptom report as evidenced by PhQ9 scores and GAD7 scores.       Status: Continued - Date(s): 5/13/24    Intervention(s)  Therapist  "will teach emotional regulation skills. distress tolerance skills, interpersonal effectiveness skills, emotion regluation skills, mindfulness skills, radical acceptance. Therapist will teach client how to ID body cues for anxiety, anxiety reduction techniques, how to ID triggers for depression and anxiety- decrease reactivity/eliminate, lifestyle changes to reduce depression and anxiety, communication skills, explore cognitive beliefs and help client to develop healthy cognitive patterns and beliefs.    Objective #C  Client will  Client will learn 5 crisis survival skills during the Distress Tolerance Module (6-8 weeks) for at least 3 consecutive weeks as evidenced by client report and a decrease in symptom report as evidenced by PhQ9 scores and GAD7 scores.  Status: Continued - Date(s): 5/13/24    Intervention(s)  Therapist will teach emotional regulation skills. distress tolerance skills, interpersonal effectiveness skills, emotion regluation skills, mindfulness skills, radical acceptance.      Client has reviewed and agreed to the above plan.      Nasrin Valladares [facility]        Linda Walsh     SAFETY PLAN:  Step 1: Warning signs / cues (Thoughts, images, mood, situation, behavior) that a crisis may be developing:  Thoughts: \"I don't matter\", \"People would be better off without me\", \"I'm a burden\", \"I can't do this anymore\", \"I just want this to end\" and \"Nothing makes it better\"  Images: obsessive thoughts of death or dying: car accident, using a gun and flashbacks  Thinking Processes: ruminations (can't stop thinking about my problems): court case, pain, racing thoughts, highly critical and negative thoughts: \"I can't do anything, I have to suffer everyday and go to work and other people have it so easy.\"  and paranoia: that the [place] is watching me  Mood: worsening depression, hopelessness, helplessness, intense anger, intense worry, agitation, disinhibited (not caring about things or consequences) and " "mood swings  Behaviors: isolating/withdrawing , can't stop crying, not taking care of myself, not taking care of my responsibilities, sleeping too much and not sleeping enough  Situations: legal issues: current court case, pain, trauma , financial stress and medical condition / diagnosis: CRPS    Step 2: Coping strategies - Things I can do to take my mind off of my problems without contacting another person (relaxation technique, physical activity):  Distress Tolerance Strategies:  arts and crafts: with her residents, play with my pet , pray, change body temperature (ice pack/cold water) , paced breathing/progressive muscle relaxation and puzzles, games on ipad  Physical Activities: deep breathing  Focus on helpful thoughts:  \"Ride the wave\", think about happy memories: when the grandkids were born, going camping, when the boys were little and remind myself of what is important to me: grandkids, family, the residents  Step 3: People and social settings that provide distraction:   Name: Alpesh  Phone: [phone]   Name: Velasquez  Phone:    Name: Thaddeus  Phone:   work   Step 4: Remind myself of people and things that are important to me and worth living for:    My family, my residents    Step 5: When I am in crisis, I can ask these people to help me use my safety plan:   Name: Alpesh  Phone: [phone]   Name: Velasquez  Phone: (number in phone)   Name: Thaddeus  Phone: (number in phone)  Step 6: Making the environment safe:   be around others  Step 7: Professionals or agencies I can contact during a crisis:  [facility] Daytime Number: [phone]  Suicide Prevention Lifeline: 5-965-033-TALK (7165)  Crisis Text Line Service (available 24 hours a day, 7 days a week): Text [place] to 344303  Local Crisis Services:     Call 911 or go to my nearest emergency department.   I helped develop this safety plan and agree to use it when needed.  I have been given a copy of this plan.      Client signature " _________________________________________________________________  Today s date: 7/11/22  Adapted from Safety Plan Template 2008 Griselda Perez and Livan Cutler is reprinted with the express permission of the authors.  No portion of the Safety Plan Template may be reproduced without the express, written permission.  You can contact the authors at [email or web address] or [email or web address].

## 2024-08-14 ENCOUNTER — TRANSFERRED RECORDS (OUTPATIENT)
Dept: HEALTH INFORMATION MANAGEMENT | Facility: CLINIC | Age: 64
End: 2024-08-14
Payer: COMMERCIAL

## 2024-08-19 ENCOUNTER — VIRTUAL VISIT (OUTPATIENT)
Dept: PSYCHOLOGY | Facility: CLINIC | Age: 64
End: 2024-08-19
Payer: COMMERCIAL

## 2024-08-19 ENCOUNTER — OFFICE VISIT (OUTPATIENT)
Dept: FAMILY MEDICINE | Facility: OTHER | Age: 64
End: 2024-08-19
Payer: COMMERCIAL

## 2024-08-19 VITALS
WEIGHT: 148.5 LBS | DIASTOLIC BLOOD PRESSURE: 76 MMHG | OXYGEN SATURATION: 96 % | RESPIRATION RATE: 16 BRPM | SYSTOLIC BLOOD PRESSURE: 130 MMHG | HEIGHT: 65 IN | BODY MASS INDEX: 24.74 KG/M2 | TEMPERATURE: 98.4 F | HEART RATE: 83 BPM

## 2024-08-19 DIAGNOSIS — R63.4 WEIGHT LOSS: ICD-10-CM

## 2024-08-19 DIAGNOSIS — E63.9 POOR NUTRITION: ICD-10-CM

## 2024-08-19 DIAGNOSIS — R11.10 VOMITING, UNSPECIFIED VOMITING TYPE, UNSPECIFIED WHETHER NAUSEA PRESENT: ICD-10-CM

## 2024-08-19 DIAGNOSIS — E55.9 VITAMIN D DEFICIENCY, UNSPECIFIED: ICD-10-CM

## 2024-08-19 DIAGNOSIS — K29.50 CHRONIC GASTRITIS WITHOUT BLEEDING, UNSPECIFIED GASTRITIS TYPE: ICD-10-CM

## 2024-08-19 DIAGNOSIS — J45.40 MODERATE PERSISTENT ASTHMA WITHOUT COMPLICATION: ICD-10-CM

## 2024-08-19 DIAGNOSIS — Z12.11 SCREEN FOR COLON CANCER: ICD-10-CM

## 2024-08-19 DIAGNOSIS — J32.9 SINUSITIS, UNSPECIFIED CHRONICITY, UNSPECIFIED LOCATION: Primary | ICD-10-CM

## 2024-08-19 DIAGNOSIS — E78.5 HYPERLIPIDEMIA, UNSPECIFIED HYPERLIPIDEMIA TYPE: ICD-10-CM

## 2024-08-19 DIAGNOSIS — F33.2 SEVERE RECURRENT MAJOR DEPRESSION WITHOUT PSYCHOTIC FEATURES (H): Primary | ICD-10-CM

## 2024-08-19 DIAGNOSIS — F43.10 POST TRAUMATIC STRESS DISORDER (PTSD): ICD-10-CM

## 2024-08-19 DIAGNOSIS — Z87.891 PERSONAL HISTORY OF TOBACCO USE, PRESENTING HAZARDS TO HEALTH: ICD-10-CM

## 2024-08-19 DIAGNOSIS — F41.1 GENERALIZED ANXIETY DISORDER: ICD-10-CM

## 2024-08-19 LAB
ALBUMIN SERPL BCG-MCNC: 4.5 G/DL (ref 3.5–5.2)
ALP SERPL-CCNC: 66 U/L (ref 40–150)
ALT SERPL W P-5'-P-CCNC: 9 U/L (ref 0–50)
ANION GAP SERPL CALCULATED.3IONS-SCNC: 13 MMOL/L (ref 7–15)
AST SERPL W P-5'-P-CCNC: 19 U/L (ref 0–45)
BILIRUB SERPL-MCNC: 1.2 MG/DL
BUN SERPL-MCNC: 9.7 MG/DL (ref 8–23)
CALCIUM SERPL-MCNC: 9.8 MG/DL (ref 8.8–10.4)
CHLORIDE SERPL-SCNC: 100 MMOL/L (ref 98–107)
CHOLEST SERPL-MCNC: 245 MG/DL
CREAT SERPL-MCNC: 0.96 MG/DL (ref 0.51–0.95)
EGFRCR SERPLBLD CKD-EPI 2021: 66 ML/MIN/1.73M2
ERYTHROCYTE [DISTWIDTH] IN BLOOD BY AUTOMATED COUNT: 11.6 % (ref 10–15)
FASTING STATUS PATIENT QL REPORTED: NO
FASTING STATUS PATIENT QL REPORTED: NO
FERRITIN SERPL-MCNC: 258 NG/ML (ref 11–328)
FOLATE SERPL-MCNC: 18.1 NG/ML (ref 4.6–34.8)
GLUCOSE SERPL-MCNC: 102 MG/DL (ref 70–99)
HCO3 SERPL-SCNC: 24 MMOL/L (ref 22–29)
HCT VFR BLD AUTO: 43.6 % (ref 35–47)
HDLC SERPL-MCNC: 67 MG/DL
HGB BLD-MCNC: 14.8 G/DL (ref 11.7–15.7)
IRON BINDING CAPACITY (ROCHE): 273 UG/DL (ref 240–430)
IRON SATN MFR SERPL: 33 % (ref 15–46)
IRON SERPL-MCNC: 91 UG/DL (ref 37–145)
LDLC SERPL CALC-MCNC: 154 MG/DL
MAGNESIUM SERPL-MCNC: 2.1 MG/DL (ref 1.7–2.3)
MCH RBC QN AUTO: 29.3 PG (ref 26.5–33)
MCHC RBC AUTO-ENTMCNC: 33.9 G/DL (ref 31.5–36.5)
MCV RBC AUTO: 86 FL (ref 78–100)
NONHDLC SERPL-MCNC: 178 MG/DL
PLATELET # BLD AUTO: 253 10E3/UL (ref 150–450)
POTASSIUM SERPL-SCNC: 4 MMOL/L (ref 3.4–5.3)
PROT SERPL-MCNC: 7.3 G/DL (ref 6.4–8.3)
RBC # BLD AUTO: 5.05 10E6/UL (ref 3.8–5.2)
SODIUM SERPL-SCNC: 137 MMOL/L (ref 135–145)
TRIGL SERPL-MCNC: 118 MG/DL
WBC # BLD AUTO: 7.2 10E3/UL (ref 4–11)

## 2024-08-19 PROCEDURE — 82746 ASSAY OF FOLIC ACID SERUM: CPT | Performed by: PHYSICIAN ASSISTANT

## 2024-08-19 PROCEDURE — 85027 COMPLETE CBC AUTOMATED: CPT | Performed by: PHYSICIAN ASSISTANT

## 2024-08-19 PROCEDURE — 82728 ASSAY OF FERRITIN: CPT | Performed by: PHYSICIAN ASSISTANT

## 2024-08-19 PROCEDURE — 99214 OFFICE O/P EST MOD 30 MIN: CPT | Performed by: PHYSICIAN ASSISTANT

## 2024-08-19 PROCEDURE — G2211 COMPLEX E/M VISIT ADD ON: HCPCS | Performed by: PHYSICIAN ASSISTANT

## 2024-08-19 PROCEDURE — 90834 PSYTX W PT 45 MINUTES: CPT | Mod: 93 | Performed by: COUNSELOR

## 2024-08-19 PROCEDURE — 36415 COLL VENOUS BLD VENIPUNCTURE: CPT | Performed by: PHYSICIAN ASSISTANT

## 2024-08-19 PROCEDURE — 83540 ASSAY OF IRON: CPT | Performed by: PHYSICIAN ASSISTANT

## 2024-08-19 PROCEDURE — 83550 IRON BINDING TEST: CPT | Performed by: PHYSICIAN ASSISTANT

## 2024-08-19 PROCEDURE — 82607 VITAMIN B-12: CPT | Performed by: PHYSICIAN ASSISTANT

## 2024-08-19 PROCEDURE — 80061 LIPID PANEL: CPT | Performed by: PHYSICIAN ASSISTANT

## 2024-08-19 PROCEDURE — 83735 ASSAY OF MAGNESIUM: CPT | Performed by: PHYSICIAN ASSISTANT

## 2024-08-19 PROCEDURE — 80053 COMPREHEN METABOLIC PANEL: CPT | Performed by: PHYSICIAN ASSISTANT

## 2024-08-19 PROCEDURE — 82306 VITAMIN D 25 HYDROXY: CPT | Performed by: PHYSICIAN ASSISTANT

## 2024-08-19 RX ORDER — DOXYCYCLINE 100 MG/1
100 CAPSULE ORAL 2 TIMES DAILY
Qty: 20 CAPSULE | Refills: 0 | Status: SHIPPED | OUTPATIENT
Start: 2024-08-19 | End: 2024-09-23

## 2024-08-19 RX ORDER — ALBUTEROL SULFATE 90 UG/1
2 AEROSOL, METERED RESPIRATORY (INHALATION) EVERY 6 HOURS PRN
Qty: 18 G | Refills: 3 | Status: SHIPPED | OUTPATIENT
Start: 2024-08-19

## 2024-08-19 ASSESSMENT — PATIENT HEALTH QUESTIONNAIRE - PHQ9
SUM OF ALL RESPONSES TO PHQ QUESTIONS 1-9: 16
10. IF YOU CHECKED OFF ANY PROBLEMS, HOW DIFFICULT HAVE THESE PROBLEMS MADE IT FOR YOU TO DO YOUR WORK, TAKE CARE OF THINGS AT HOME, OR GET ALONG WITH OTHER PEOPLE: SOMEWHAT DIFFICULT
SUM OF ALL RESPONSES TO PHQ QUESTIONS 1-9: 16
SUM OF ALL RESPONSES TO PHQ QUESTIONS 1-9: 16
10. IF YOU CHECKED OFF ANY PROBLEMS, HOW DIFFICULT HAVE THESE PROBLEMS MADE IT FOR YOU TO DO YOUR WORK, TAKE CARE OF THINGS AT HOME, OR GET ALONG WITH OTHER PEOPLE: SOMEWHAT DIFFICULT
SUM OF ALL RESPONSES TO PHQ QUESTIONS 1-9: 16

## 2024-08-19 ASSESSMENT — ANXIETY QUESTIONNAIRES
GAD7 TOTAL SCORE: 15
5. BEING SO RESTLESS THAT IT IS HARD TO SIT STILL: SEVERAL DAYS
7. FEELING AFRAID AS IF SOMETHING AWFUL MIGHT HAPPEN: SEVERAL DAYS
4. TROUBLE RELAXING: NEARLY EVERY DAY
2. NOT BEING ABLE TO STOP OR CONTROL WORRYING: NEARLY EVERY DAY
IF YOU CHECKED OFF ANY PROBLEMS ON THIS QUESTIONNAIRE, HOW DIFFICULT HAVE THESE PROBLEMS MADE IT FOR YOU TO DO YOUR WORK, TAKE CARE OF THINGS AT HOME, OR GET ALONG WITH OTHER PEOPLE: SOMEWHAT DIFFICULT
6. BECOMING EASILY ANNOYED OR IRRITABLE: SEVERAL DAYS
3. WORRYING TOO MUCH ABOUT DIFFERENT THINGS: NEARLY EVERY DAY
1. FEELING NERVOUS, ANXIOUS, OR ON EDGE: NEARLY EVERY DAY
GAD7 TOTAL SCORE: 15
8. IF YOU CHECKED OFF ANY PROBLEMS, HOW DIFFICULT HAVE THESE MADE IT FOR YOU TO DO YOUR WORK, TAKE CARE OF THINGS AT HOME, OR GET ALONG WITH OTHER PEOPLE?: SOMEWHAT DIFFICULT
7. FEELING AFRAID AS IF SOMETHING AWFUL MIGHT HAPPEN: SEVERAL DAYS
GAD7 TOTAL SCORE: 15

## 2024-08-19 ASSESSMENT — ASTHMA QUESTIONNAIRES
QUESTION_3 LAST FOUR WEEKS HOW OFTEN DID YOUR ASTHMA SYMPTOMS (WHEEZING, COUGHING, SHORTNESS OF BREATH, CHEST TIGHTNESS OR PAIN) WAKE YOU UP AT NIGHT OR EARLIER THAN USUAL IN THE MORNING: NOT AT ALL
QUESTION_5 LAST FOUR WEEKS HOW WOULD YOU RATE YOUR ASTHMA CONTROL: COMPLETELY CONTROLLED
QUESTION_2 LAST FOUR WEEKS HOW OFTEN HAVE YOU HAD SHORTNESS OF BREATH: ONCE OR TWICE A WEEK
ACT_TOTALSCORE: 22
QUESTION_1 LAST FOUR WEEKS HOW MUCH OF THE TIME DID YOUR ASTHMA KEEP YOU FROM GETTING AS MUCH DONE AT WORK, SCHOOL OR AT HOME: NONE OF THE TIME
ACT_TOTALSCORE: 22
QUESTION_4 LAST FOUR WEEKS HOW OFTEN HAVE YOU USED YOUR RESCUE INHALER OR NEBULIZER MEDICATION (SUCH AS ALBUTEROL): TWO OR THREE TIMES PER WEEK

## 2024-08-19 ASSESSMENT — PAIN SCALES - GENERAL: PAINLEVEL: EXTREME PAIN (8)

## 2024-08-19 ASSESSMENT — ENCOUNTER SYMPTOMS: FATIGUE: 1

## 2024-08-19 NOTE — PROGRESS NOTES
Assessment & Plan     Sinusitis, unspecified chronicity, unspecified location  - Symptoms consistent with bacterial sinusitis, has tolerated Doxycycline in the past  - Encouraged to continue with nasal saline rinses 3 times per day, FLonase 2 sprays each nostril nightly and then add in Astepro 1 spray each nostril twice daily when having more allergy symptoms.  - doxycycline hyclate (VIBRAMYCIN) 100 MG capsule; Take 1 capsule (100 mg) by mouth 2 times daily    Poor nutrition  Chronic gastritis without bleeding, unspecified gastritis type  Vomiting, unspecified vomiting type, unspecified whether nausea present  Vitamin D deficiency, unspecified  Weight loss  Concerned with her mental health and stress and current nutritional status. Discussed ways to try and get more micronutrients in and protein to try and help her maintain weight. We discussed how food can help her with chronic conditions as well including her pain, mental health, etc.  She is going to start on supplemental vitamins to try and cover what she might be missing, will check for any deficiencies in the meantime.    Discussed importance of twice daily Omeprazole and will need repeat Endoscopy in a few years due to new founds Barretts.  She is overdue for colonoscopy so this was ordered and did order CT chest to evaluate for weight loss since this was the one area that was not assessed when her symptoms first started  - Iron and iron binding capacity; Future  - Ferritin; Future  - Vitamin B12; Future  - Vitamin D Deficiency; Future  - Folate; Future  - Magnesium; Future  - CBC with platelets; Future  - Comprehensive metabolic panel (BMP + Alb, Alk Phos, ALT, AST, Total. Bili, TP); Future  - Iron and iron binding capacity  - Ferritin  - Vitamin B12  - Vitamin D Deficiency  - Folate  - Magnesium  - CBC with platelets  - Comprehensive metabolic panel (BMP + Alb, Alk Phos, ALT, AST, Total. Bili, TP)      Hyperlipidemia, unspecified hyperlipidemia type  Has  been off medication, rechecking lab.   - Lipid panel reflex to direct LDL Non-fasting; Future  - Lipid panel reflex to direct LDL Non-fasting      Moderate persistent asthma without complication  Stable, uses this pRN  - albuterol (PROAIR HFA/PROVENTIL HFA/VENTOLIN HFA) 108 (90 Base) MCG/ACT inhaler; Inhale 2 puffs into the lungs every 6 hours as needed for shortness of breath or wheezing    Personal history of tobacco use, presenting hazards to health  Recommended CT chest, she has contrast reaction so doing without contrast.  She had CT Abdomen/pelvis with the weight loss symptoms but never chest and she has hx of tobacco use and current use so this is important to evaluate for any potential malignancy causing problems  - CT Chest w/o Contrast; Future    Screen for colon cancer  Screening is overdue  - Colonoscopy Screening  Referral; Future      Options for treatment and follow-up care were reviewed with the patient and/or guardian. Patient and/or guardian engaged in the decision making process and verbalized understanding of the options discussed and agreed with the final plan.      Sal Hernandez is a 64 year old, presenting for the following health issues:  Sinus Problem, Fatigue, and Weight Loss      8/19/2024     3:58 PM   Additional Questions   Roomed by Tracey   Accompanied by self     Sinus Problem     Fatigue  Associated symptoms include fatigue.   History of Present Illness       Reason for visit:  Sinus    She eats 0-1 servings of fruits and vegetables daily.She consumes 0 sweetened beverage(s) daily.She exercises with enough effort to increase her heart rate 10 to 19 minutes per day.  She exercises with enough effort to increase her heart rate 3 or less days per week.   She is taking medications regularly.         Acute Illness  Acute illness concerns: sinus problem/fatigue  Onset/Duration: 8 weeks ago  Symptoms:  Fever: No  Chills/Sweats: No  Headache (location?): YES - forehead   Sinus  "Pressure: YES  Conjunctivitis:  No  Ear Pain: no  Rhinorrhea: No  Congestion: YES  Sore Throat: YES - from post nasal drip  Cough: no  Wheeze: No  Decreased Appetite: YES  Nausea: YES  Vomiting: No  Diarrhea: No  Dysuria/Freq.: No  Dysuria or Hematuria: No  Fatigue/Achiness: YES  Sick/Strep Exposure: No  Therapies tried and outcome: Sudafed, Flonase, hot packs - helps for a short time     - Has still had a lot of stress, did move out but living in basement of parents house, eating very little per day, has been more regular with her Omeprazole but notices it when she misses it      Review of Systems  Constitutional, HEENT, cardiovascular, pulmonary, GI, , musculoskeletal, neuro, skin, endocrine and psych systems are negative, except as otherwise noted.      Objective    /76   Pulse 83   Temp 98.4  F (36.9  C) (Temporal)   Resp 16   Ht 1.651 m (5' 5\")   Wt 67.4 kg (148 lb 8 oz)   LMP  (LMP Unknown)   SpO2 96%   BMI 24.71 kg/m    Body mass index is 24.71 kg/m .  Physical Exam   GENERAL: alert and no distress  RESP: lungs clear to auscultation - no rales, rhonchi or wheezes  CV: regular rate and rhythm, normal S1 S2, no S3 or S4, no murmur, click or rub, no peripheral edema  MS: no gross musculoskeletal defects noted, no edema  PSYCH: mentation appears normal, affect normal/bright    No results found for any visits on 08/19/24.        Signed Electronically by: Tomasa Medina PA-C    "

## 2024-08-19 NOTE — PROGRESS NOTES
"[facility]                                     Progress Note    Patient Name: Linda Walsh  Date: 8/19/24         Service Type: Individual      Session Start Time: 2:30pm  Session End Time: 3:20pm     Session Length: 50    Session #: 104    Attendees: Client      Service Modality: telephone      Provider verified identity through the following two step process.  Patient provided:  Patient is known previously to provider     Telemedicine Visit: The patient's condition can be safely assessed and treated via synchronous audio and visual telemedicine encounter.       Reason for Telemedicine Visit: Services only offered telehealth     Originating Site (Patient Location): Patient's home     Distant Site (Provider Location): Provider Remote Setting- Home Office     Consent:  The patient/guardian has verbally consented to: the potential risks and benefits of telemedicine (video visit) versus in person care; bill my insurance or make self-payment for services provided; and responsibility for payment of non-covered services.        Mode of Communication:  telephone     As the provider I attest to compliance with applicable laws and regulations related to telemedicine.     DATA  Interactive Complexity: No  Crisis: No        Progress Since Last Session (Related to Symptoms / Goals / Homework):   Symptoms: No change .    Homework: Completed in session      Episode of Care Goals: Minimal progress - ACTION (Actively working towards change); Intervened by reinforcing change plan / affirming steps taken     Current / Ongoing Stressors and Concerns:   The client stated she's still trying to \"keep [her] sanity\" with her ex.   Took off work to go with some girl friends to an event.      Treatment Objective(s) Addressed in This Session:   use thought-stopping strategy daily to reduce intrusive thoughts       Intervention:   Client stated things continue to be extremely stressful with the divorce " "proceedings. Talked about the grief she feels around the divorce and how \"he threw their family away.\" Talked about how the client is nervous about going to the event with friends and has been trying to back out of it. Discussed the anxiety she feels and encouraged her to go and not make excuses. Talked through how the client used to be much more social but how she's been feeling less because of her soon-to-be ex-.          Assessments completed prior to visit:  The following assessments were completed by patient for this visit:  PHQA:        No data to display              GAD7:       12/30/2019     1:39 PM 2/26/2020    11:18 AM 5/27/2020     3:23 PM 7/28/2020    10:37 AM 2/24/2021     1:26 PM 7/13/2022    10:16 AM 6/9/2023    10:20 AM   MARLIN-7 SCORE   Total Score 19 (severe anxiety)   19 (severe anxiety) 16 (severe anxiety) 21 (severe anxiety) 13 (moderate anxiety)   Total Score 19 19 21 19 16 21 13     PROMIS 10-Global Health (all questions and answers displayed):        No data to display                  ASSESSMENT: Current Emotional / Mental Status (status of significant symptoms):   Risk status (Self / Other harm or suicidal ideation)   Patient denies current fears or concerns for personal safety.   Patient denies current or recent suicidal ideation or behaviors.   Patient denies current or recent homicidal ideation or behaviors.   Patient denies current or recent self injurious behavior or ideation.   Patient denies other safety concerns.   Patient reports there has been no change in risk factors since their last session.     Patient reports there has been no change in protective factors since their last session.     Recommended that patient call 911 or go to the local ED should there be a change in any of these risk factors.     Appearance:   Unable to assess due to phone session    Eye Contact:   Unable to assess due to phone session    Psychomotor Behavior: Unable to assess due to phone session "    Attitude:   Cooperative    Orientation:   All   Speech    Rate / Production: Normal/ Responsive    Volume:  Normal    Mood:    Normal   Affect:    Unable to assess due to phone session    Thought Content:  Clear    Thought Form:  Coherent  Logical    Insight:    Good      Medication Review:   No changes to current psychiatric medication(s)     Medication Compliance:   Yes     Changes in Health Issues:   None reported     Chemical Use Review:   Substance Use: Chemical use reviewed, no active concerns identified      Tobacco Use: No change in amount of tobacco use since last session.  Patient declined discussion at this time    Diagnosis:  1. 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 Generalized anxiety disorder    3. Post traumatic stress disorder (PTSD)          Collateral Reports Completed:   Not Applicable    PLAN: (Patient Tasks / Therapist Tasks / Other)  Client to work on self care and practicing distress tolerance skills.         Nasrin Valladares, [facility]                                                         ______________________________________________________________________    Individual Treatment Plan    Patient's Name: Linda Walsh  YOB: 1960    Date of Creation: 7/11/22  Date Treatment Plan Last Reviewed/Revised: 8/19/24    DSM5 Diagnoses: 296.33 (F33.2) Major Depressive Disorder, Recurrent Episode, Severe _, 300.02 (F41.1) Generalized Anxiety Disorder or 309.81 (F43.10) Posttraumatic Stress Disorder (includes Posttraumatic Stress Disorder for Children 6 Years and Younger)  Without dissociative symptoms  Psychosocial / Contextual Factors: CRPS  PROMIS (reviewed every 90 days):     Referral / Collaboration:  Referral to another professional/service is not indicated at this time.    Anticipated number of session for this episode of care: on-going  Anticipation frequency of session: Monthly  Anticipated Duration of each session: 38-52 minutes/53+  minutes  Treatment plan will be reviewed in 90 days or when goals have been changed.       MeasurableTreatment Goal(s) related to diagnosis / functional impairment(s)  Goal 1: Client will decrease thoughts of wanting to be dead from 10 out of 10 opportunities to at most 9 out of 10 opportunities for at least 3 consecutive weeks as evidenced by client report and a decrease in symptom report as evidenced by PhQ9 scores and GAD7 scores.    Objective #A (Client Action)    Client will  Client will look at and read safety plan at least once a day and follow safety plan when thoughts and urges come up.  Status: Continued - Date(s): 8/19/24    Intervention(s)  Therapist will teach emotional regulation skills. distress tolerance skills, interpersonal effectiveness skills, emotion regluation skills, mindfulness skills, radical acceptance. Therapist will develop safety plan with the client.     Objective #B  Client will  Client will agree to call the therapist or after hours crisis line if noticing intense suicidal thoughts for skills coaching.   Status: Continued - Date(s): 8/19/24    Intervention(s)  Therapist will  Therapist will be available as much as possible during work hours for the client to contact and give the client several crisis resources.         Goal 2: Client will increase the use of skills (emotion regulation, distress tolerance, communication skill) from 1 out of 10 opportunities to at least 2 out of 10 opportunities for at least 3 consecutive weeks as evidenced by client report and a decrease in symptom report as evidenced by PhQ9 scores and GAD7 scores.     Objective #A (Client Action)    Status: Continued - Date(s): 8/19/24    Client will Increase interest, engagement, and pleasure in doing things  Decrease frequency and intensity of feeling down, depressed, hopeless  Improve diet, appetite, mindful eating, and / or meal planning  Identify negative self-talk and behaviors: challenge core beliefs, myths,  and actions  Improve concentration, focus, and mindfulness in daily activities   Feel less fidgety, restless or slow in daily activities / interpersonal interactions  Decrease thoughts that you'd be better off dead or of suicide / self-harm.    Intervention(s)  Therapist will teach emotional regulation skills. distress tolerance skills, interpersonal effectiveness skills, emotion regluation skills, mindfulness skills, radical acceptance. Therapist will teach client how to ID body cues for anxiety, anxiety reduction techniques, how to ID triggers for depression and anxiety- decrease reactivity/eliminate, lifestyle changes to reduce depression and anxiety, communication skills, explore cognitive beliefs and help client to develop healthy cognitive patterns and beliefs.    Objective #B  Client will  Client will learn and  practice DBT skills daily. .    Status: Continued - Date(s): 8/19/24    Intervention(s)  Therapist will  Therapist will Therapist will teach emotional regulation skills. distress tolerance skills, interpersonal effectiveness skills, emotion regluation skills, mindfulness skills, radical acceptance. .      Goal 3: Client will decrease anxious symptoms and reactions to triggers from 9 out of 10 opportunities to at most 8 out of 10 opportunities for at least 3 consecutive weeks as evidenced by client report and a decrease in symptom report as evidenced by PhQ9 scores and GAD7 scores.    Objective #A (Client Action)    Status: Continued - Date(s): 8/19/24    Client will  Client will use cognitive strategies identified in therapy to challenge anxious thoughts.    Intervention(s)  Therapist will  Therapist will teach emotional recognition/identification. emotion regulation skills, coping skills, mindfulness skills.    Objective #B  Client will  Client will use thought-stopping strategy daily to reduce intrusive thoughts for at least 3 consecutive weeks as evidenced by client report and a decrease in symptom  "report as evidenced by PhQ9 scores and GAD7 scores.       Status: Continued - Date(s): 8/19/24    Intervention(s)  Therapist will teach emotional regulation skills. distress tolerance skills, interpersonal effectiveness skills, emotion regluation skills, mindfulness skills, radical acceptance. Therapist will teach client how to ID body cues for anxiety, anxiety reduction techniques, how to ID triggers for depression and anxiety- decrease reactivity/eliminate, lifestyle changes to reduce depression and anxiety, communication skills, explore cognitive beliefs and help client to develop healthy cognitive patterns and beliefs.    Objective #C  Client will  Client will learn 5 crisis survival skills during the Distress Tolerance Module (6-8 weeks) for at least 3 consecutive weeks as evidenced by client report and a decrease in symptom report as evidenced by PhQ9 scores and GAD7 scores.  Status: Continued - Date(s): 8/19/24    Intervention(s)  Therapist will teach emotional regulation skills. distress tolerance skills, interpersonal effectiveness skills, emotion regluation skills, mindfulness skills, radical acceptance.      Client has reviewed and agreed to the above plan.      Nasrin Valladares, [facility]        Linda Walsh     SAFETY PLAN:  Step 1: Warning signs / cues (Thoughts, images, mood, situation, behavior) that a crisis may be developing:  Thoughts: \"I don't matter\", \"People would be better off without me\", \"I'm a burden\", \"I can't do this anymore\", \"I just want this to end\" and \"Nothing makes it better\"  Images: obsessive thoughts of death or dying: car accident, using a gun and flashbacks  Thinking Processes: ruminations (can't stop thinking about my problems): court case, pain, racing thoughts, highly critical and negative thoughts: \"I can't do anything, I have to suffer everyday and go to work and other people have it so easy.\"  and paranoia: that the government is watching me  Mood: worsening depression, " "hopelessness, helplessness, intense anger, intense worry, agitation, disinhibited (not caring about things or consequences) and mood swings  Behaviors: isolating/withdrawing , can't stop crying, not taking care of myself, not taking care of my responsibilities, sleeping too much and not sleeping enough  Situations: legal issues: current court case, pain, trauma , financial stress and medical condition / diagnosis: CRPS    Step 2: Coping strategies - Things I can do to take my mind off of my problems without contacting another person (relaxation technique, physical activity):  Distress Tolerance Strategies:  arts and crafts: with her residents, play with my pet , pray, change body temperature (ice pack/cold water) , paced breathing/progressive muscle relaxation and puzzles, games on ipad  Physical Activities: deep breathing  Focus on helpful thoughts:  \"Ride the wave\", think about happy memories: when the grandkids were born, going camping, when the boys were little and remind myself of what is important to me: grandkids, family, the residents  Step 3: People and social settings that provide distraction:   Name: Alpesh  Phone: [phone]   Name: Velasquez  Phone:    Name: Thaddeus  Phone:   work   Step 4: Remind myself of people and things that are important to me and worth living for:    My family, my residents    Step 5: When I am in crisis, I can ask these people to help me use my safety plan:   Name: Alpesh  Phone: [phone]   Name: Velasquez  Phone: (number in phone)   Name: Thaddeus  Phone: (number in phone)  Step 6: Making the environment safe:   be around others  Step 7: Professionals or agencies I can contact during a crisis:  [facility] Daytime Number: [phone]  Suicide Prevention Lifeline: 5-650-444-TALK (1575)  Crisis Text Line Service (available 24 hours a day, 7 days a week): Text [place] to 736604  Local Crisis Services:     Call 911 or go to my nearest emergency department.   I helped develop " this safety plan and agree to use it when needed.  I have been given a copy of this plan.      Client signature _________________________________________________________________  Today s date: 7/11/22  Adapted from Safety Plan Template 2008 Griselda Perez and Livan Cutler is reprinted with the express permission of the authors.  No portion of the Safety Plan Template may be reproduced without the express, written permission.  You can contact the authors at [email or web address] or [email or web address].

## 2024-08-20 ENCOUNTER — MYC REFILL (OUTPATIENT)
Dept: FAMILY MEDICINE | Facility: OTHER | Age: 64
End: 2024-08-20
Payer: COMMERCIAL

## 2024-08-20 DIAGNOSIS — E78.5 HYPERLIPIDEMIA LDL GOAL <160: ICD-10-CM

## 2024-08-20 LAB
VIT B12 SERPL-MCNC: 421 PG/ML (ref 232–1245)
VIT D+METAB SERPL-MCNC: 38 NG/ML (ref 20–50)

## 2024-08-20 RX ORDER — ATORVASTATIN CALCIUM 20 MG/1
20 TABLET, FILM COATED ORAL DAILY
Qty: 90 TABLET | Refills: 2 | Status: SHIPPED | OUTPATIENT
Start: 2024-08-20

## 2024-09-05 DIAGNOSIS — G47.00 INSOMNIA, UNSPECIFIED TYPE: ICD-10-CM

## 2024-09-05 RX ORDER — TRAZODONE HYDROCHLORIDE 50 MG/1
150 TABLET, FILM COATED ORAL AT BEDTIME
Qty: 15 TABLET | Refills: 5 | Status: CANCELLED | OUTPATIENT
Start: 2024-09-05

## 2024-09-06 NOTE — TELEPHONE ENCOUNTER
RN Triage    Patient Contact    Attempt # 1    RN did attempt to reach patient. No answer. Message left for patient to call the clinic back and ask to speak to a triage nurse.     Saundra Machado RN on 9/6/2024 at 1:51 PM

## 2024-09-06 NOTE — TELEPHONE ENCOUNTER
Who has been filling this for her so far? What dose is she taking?  She is due for wellness visit I would recommend seeing her in 2 months.     Tomasa Medina PA-C

## 2024-09-09 ENCOUNTER — VIRTUAL VISIT (OUTPATIENT)
Dept: PSYCHOLOGY | Facility: CLINIC | Age: 64
End: 2024-09-09
Payer: COMMERCIAL

## 2024-09-09 DIAGNOSIS — F43.10 POST TRAUMATIC STRESS DISORDER (PTSD): ICD-10-CM

## 2024-09-09 DIAGNOSIS — F41.1 GENERALIZED ANXIETY DISORDER: ICD-10-CM

## 2024-09-09 DIAGNOSIS — F33.2 SEVERE RECURRENT MAJOR DEPRESSION WITHOUT PSYCHOTIC FEATURES (H): Primary | ICD-10-CM

## 2024-09-09 PROCEDURE — 90834 PSYTX W PT 45 MINUTES: CPT | Mod: 93 | Performed by: COUNSELOR

## 2024-09-09 NOTE — PROGRESS NOTES
"[facility]                                     Progress Note    Patient Name: Linda Walsh  Date: 9/9/24         Service Type: Individual      Session Start Time: 12:30pm  Session End Time: 1:20pm     Session Length: 50    Session #: 105    Attendees: Client      Service Modality: telephone      Provider verified identity through the following two step process.  Patient provided:  Patient is known previously to provider     Telemedicine Visit: The patient's condition can be safely assessed and treated via synchronous audio and visual telemedicine encounter.       Reason for Telemedicine Visit: Services only offered telehealth     Originating Site (Patient Location): Patient's home     Distant Site (Provider Location): Provider Remote Setting- Home Office     Consent:  The patient/guardian has verbally consented to: the potential risks and benefits of telemedicine (video visit) versus in person care; bill my insurance or make self-payment for services provided; and responsibility for payment of non-covered services.        Mode of Communication:  telephone     As the provider I attest to compliance with applicable laws and regulations related to telemedicine.     DATA  Interactive Complexity: No  Crisis: No        Progress Since Last Session (Related to Symptoms / Goals / Homework):   Symptoms: No change .    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   Stated she did go to the event with the friends.   Stated her dad had an \"episode\" recently. He couldn't form his words, was agitated. Client stated he had a seizure. A few weeks later he had an appointment with his GP and recommended him go see a " neurologist.      Treatment Objective(s) Addressed in This Session:   use thought-stopping strategy daily to reduce intrusive thoughts       Intervention:   Client continues to feel high levels of stress due to the pending divorce and ongoing conflict with her . Educated the client about the stages of grief she continues to experience. Worked with the client to identify negative self talk and myths in the narrative she tells herself that keeps her stuck.          Assessments completed prior to visit:  The following assessments were completed by patient for this visit:  PHQA:        No data to display              GAD7:       2/26/2020    11:18 AM 5/27/2020     3:23 PM 7/28/2020    10:37 AM 2/24/2021     1:26 PM 7/13/2022    10:16 AM 6/9/2023    10:20 AM 8/19/2024     3:52 PM   MARLIN-7 SCORE   Total Score   19 (severe anxiety) 16 (severe anxiety) 21 (severe anxiety) 13 (moderate anxiety) 15 (severe anxiety)   Total Score 19 21 19 16 21 13 15     PROMIS 10-Global Health (all questions and answers displayed):        No data to display                  ASSESSMENT: Current Emotional / Mental Status (status of significant symptoms):   Risk status (Self / Other harm or suicidal ideation)   Patient denies current fears or concerns for personal safety.   Patient denies current or recent suicidal ideation or behaviors.   Patient denies current or recent homicidal ideation or behaviors.   Patient denies current or recent self injurious behavior or ideation.   Patient denies other safety concerns.   Patient reports there has been no change in risk factors since their last session.     Patient reports there has been no change in protective factors since their last session.     Recommended that patient call 911 or go to the local ED should there be a change in any of these risk factors.     Appearance:   Unable to assess due to phone session    Eye Contact:   Unable to assess due to phone session    Psychomotor  Behavior: Unable to assess due to phone session    Attitude:   Cooperative    Orientation:   All   Speech    Rate / Production: Normal/ Responsive    Volume:  Normal    Mood:    Normal   Affect:    Unable to assess due to phone session    Thought Content:  Clear    Thought Form:  Coherent  Logical    Insight:    Good      Medication Review:   No changes to current psychiatric medication(s)     Medication Compliance:   Yes     Changes in Health Issues:   None reported     Chemical Use Review:   Substance Use: Chemical use reviewed, no active concerns identified      Tobacco Use: No change in amount of tobacco use since last session.  Patient declined discussion at this time    Diagnosis:  1. 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 Generalized anxiety disorder    3. Post traumatic stress disorder (PTSD)          Collateral Reports Completed:   Not Applicable    PLAN: (Patient Tasks / Therapist Tasks / Other)  Client to work on self care and practicing distress tolerance skills.   Work on identifying cognitive beliefs and working to challenge them.         Nasrin Valladares [facility]                                                         ______________________________________________________________________    Individual Treatment Plan    Patient's Name: Linda Walsh  YOB: 1960    Date of Creation: 7/11/22  Date Treatment Plan Last Reviewed/Revised: 8/19/24    DSM5 Diagnoses: 296.33 (F33.2) Major Depressive Disorder, Recurrent Episode, Severe _, 300.02 (F41.1) Generalized Anxiety Disorder or 309.81 (F43.10) Posttraumatic Stress Disorder (includes Posttraumatic Stress Disorder for Children 6 Years and Younger)  Without dissociative symptoms  Psychosocial / Contextual Factors: CRPS  PROMIS (reviewed every 90 days):     Referral / Collaboration:  Referral to another professional/service is not indicated at this time.    Anticipated number of session for this episode of care:  on-going  Anticipation frequency of session: Monthly  Anticipated Duration of each session: 38-52 minutes/53+ minutes  Treatment plan will be reviewed in 90 days or when goals have been changed.       MeasurableTreatment Goal(s) related to diagnosis / functional impairment(s)  Goal 1: Client will decrease thoughts of wanting to be dead from 10 out of 10 opportunities to at most 9 out of 10 opportunities for at least 3 consecutive weeks as evidenced by client report and a decrease in symptom report as evidenced by PhQ9 scores and GAD7 scores.    Objective #A (Client Action)    Client will  Client will look at and read safety plan at least once a day and follow safety plan when thoughts and urges come up.  Status: Continued - Date(s): 8/19/24    Intervention(s)  Therapist will teach emotional regulation skills. distress tolerance skills, interpersonal effectiveness skills, emotion regluation skills, mindfulness skills, radical acceptance. Therapist will develop safety plan with the client.     Objective #B  Client will  Client will agree to call the therapist or after hours crisis line if noticing intense suicidal thoughts for skills coaching.   Status: Continued - Date(s): 8/19/24    Intervention(s)  Therapist will  Therapist will be available as much as possible during work hours for the client to contact and give the client several crisis resources.         Goal 2: Client will increase the use of skills (emotion regulation, distress tolerance, communication skill) from 1 out of 10 opportunities to at least 2 out of 10 opportunities for at least 3 consecutive weeks as evidenced by client report and a decrease in symptom report as evidenced by PhQ9 scores and GAD7 scores.     Objective #A (Client Action)    Status: Continued - Date(s): 8/19/24    Client will Increase interest, engagement, and pleasure in doing things  Decrease frequency and intensity of feeling down, depressed, hopeless  Improve diet, appetite,  mindful eating, and / or meal planning  Identify negative self-talk and behaviors: challenge core beliefs, myths, and actions  Improve concentration, focus, and mindfulness in daily activities   Feel less fidgety, restless or slow in daily activities / interpersonal interactions  Decrease thoughts that you'd be better off dead or of suicide / self-harm.    Intervention(s)  Therapist will teach emotional regulation skills. distress tolerance skills, interpersonal effectiveness skills, emotion regluation skills, mindfulness skills, radical acceptance. Therapist will teach client how to ID body cues for anxiety, anxiety reduction techniques, how to ID triggers for depression and anxiety- decrease reactivity/eliminate, lifestyle changes to reduce depression and anxiety, communication skills, explore cognitive beliefs and help client to develop healthy cognitive patterns and beliefs.    Objective #B  Client will  Client will learn and  practice DBT skills daily. .    Status: Continued - Date(s): 8/19/24    Intervention(s)  Therapist will  Therapist will Therapist will teach emotional regulation skills. distress tolerance skills, interpersonal effectiveness skills, emotion regluation skills, mindfulness skills, radical acceptance. .      Goal 3: Client will decrease anxious symptoms and reactions to triggers from 9 out of 10 opportunities to at most 8 out of 10 opportunities for at least 3 consecutive weeks as evidenced by client report and a decrease in symptom report as evidenced by PhQ9 scores and GAD7 scores.    Objective #A (Client Action)    Status: Continued - Date(s): 8/19/24    Client will  Client will use cognitive strategies identified in therapy to challenge anxious thoughts.    Intervention(s)  Therapist will  Therapist will teach emotional recognition/identification. emotion regulation skills, coping skills, mindfulness skills.    Objective #B  Client will  Client will use thought-stopping strategy daily to  "reduce intrusive thoughts for at least 3 consecutive weeks as evidenced by client report and a decrease in symptom report as evidenced by PhQ9 scores and GAD7 scores.       Status: Continued - Date(s): 8/19/24    Intervention(s)  Therapist will teach emotional regulation skills. distress tolerance skills, interpersonal effectiveness skills, emotion regluation skills, mindfulness skills, radical acceptance. Therapist will teach client how to ID body cues for anxiety, anxiety reduction techniques, how to ID triggers for depression and anxiety- decrease reactivity/eliminate, lifestyle changes to reduce depression and anxiety, communication skills, explore cognitive beliefs and help client to develop healthy cognitive patterns and beliefs.    Objective #C  Client will  Client will learn 5 crisis survival skills during the Distress Tolerance Module (6-8 weeks) for at least 3 consecutive weeks as evidenced by client report and a decrease in symptom report as evidenced by PhQ9 scores and GAD7 scores.  Status: Continued - Date(s): 8/19/24    Intervention(s)  Therapist will teach emotional regulation skills. distress tolerance skills, interpersonal effectiveness skills, emotion regluation skills, mindfulness skills, radical acceptance.      Client has reviewed and agreed to the above plan.      Nasrin Valladares, [facility]        Linda Walsh     SAFETY PLAN:  Step 1: Warning signs / cues (Thoughts, images, mood, situation, behavior) that a crisis may be developing:  Thoughts: \"I don't matter\", \"People would be better off without me\", \"I'm a burden\", \"I can't do this anymore\", \"I just want this to end\" and \"Nothing makes it better\"  Images: obsessive thoughts of death or dying: car accident, using a gun and flashbacks  Thinking Processes: ruminations (can't stop thinking about my problems): court case, pain, racing thoughts, highly critical and negative thoughts: \"I can't do anything, I have to suffer everyday and go to work " "and other people have it so easy.\"  and paranoia: that the [employer] is watching me  Mood: worsening depression, hopelessness, helplessness, intense anger, intense worry, agitation, disinhibited (not caring about things or consequences) and mood swings  Behaviors: isolating/withdrawing , can't stop crying, not taking care of myself, not taking care of my responsibilities, sleeping too much and not sleeping enough  Situations: legal issues: current court case, pain, trauma , financial stress and medical condition / diagnosis: CRPS    Step 2: Coping strategies - Things I can do to take my mind off of my problems without contacting another person (relaxation technique, physical activity):  Distress Tolerance Strategies:  arts and crafts: with her residents, play with my pet , pray, change body temperature (ice pack/cold water) , paced breathing/progressive muscle relaxation and puzzles, games on ipad  Physical Activities: deep breathing  Focus on helpful thoughts:  \"Ride the wave\", think about happy memories: when the grandkids were born, going camping, when the boys were little and remind myself of what is important to me: grandkids, family, the residents  Step 3: People and social settings that provide distraction:   Name: Alpesh  Phone: [phone]   Name: Velasquez  Phone:    Name: Thaddeus  Phone:   work   Step 4: Remind myself of people and things that are important to me and worth living for:    My family, my residents    Step 5: When I am in crisis, I can ask these people to help me use my safety plan:   Name: Alpesh  Phone: [phone]   Name: Velasquez  Phone: (number in phone)   Name: Thaddeus  Phone: (number in phone)  Step 6: Making the environment safe:   be around others  Step 7: Professionals or agencies I can contact during a crisis:  [facility] Daytime Number: [phone]  Suicide Prevention Lifeline: 3-194-017-GKKF (5826)  Crisis Text Line Service (available 24 hours a day, 7 days a week): Text " [place] to 530749  Local Crisis Services:     Call 911 or go to my nearest emergency department.   I helped develop this safety plan and agree to use it when needed.  I have been given a copy of this plan.      Client signature _________________________________________________________________  Today s date: 7/11/22  Adapted from Safety Plan Template 2008 Griselda Perez and Livan Cutler is reprinted with the express permission of the authors.  No portion of the Safety Plan Template may be reproduced without the express, written permission.  You can contact the authors at [email or web address] or [email or web address].

## 2024-09-09 NOTE — TELEPHONE ENCOUNTER
RN spoke with patient. Patient is aware this was sent to the wrong provider and would contact pharmacy to inform them to send to correct provider.     AASHISH PengN, RN

## 2024-09-12 ENCOUNTER — TRANSFERRED RECORDS (OUTPATIENT)
Dept: HEALTH INFORMATION MANAGEMENT | Facility: CLINIC | Age: 64
End: 2024-09-12
Payer: COMMERCIAL

## 2024-09-16 ENCOUNTER — MYC REFILL (OUTPATIENT)
Dept: FAMILY MEDICINE | Facility: CLINIC | Age: 64
End: 2024-09-16
Payer: COMMERCIAL

## 2024-09-16 ENCOUNTER — MYC REFILL (OUTPATIENT)
Dept: FAMILY MEDICINE | Facility: OTHER | Age: 64
End: 2024-09-16
Payer: COMMERCIAL

## 2024-09-16 DIAGNOSIS — K59.00 CONSTIPATION, UNSPECIFIED CONSTIPATION TYPE: ICD-10-CM

## 2024-09-16 DIAGNOSIS — R11.2 NAUSEA AND VOMITING, UNSPECIFIED VOMITING TYPE: ICD-10-CM

## 2024-09-16 RX ORDER — BISACODYL 10 MG
SUPPOSITORY, RECTAL RECTAL
Qty: 12 SUPPOSITORY | Refills: 6 | Status: SHIPPED | OUTPATIENT
Start: 2024-09-16

## 2024-09-16 RX ORDER — ONDANSETRON 4 MG/1
4 TABLET, ORALLY DISINTEGRATING ORAL EVERY 8 HOURS PRN
Qty: 30 TABLET | Refills: 6 | Status: SHIPPED | OUTPATIENT
Start: 2024-09-16

## 2024-09-17 RX ORDER — LINACLOTIDE 290 UG/1
290 CAPSULE, GELATIN COATED ORAL DAILY PRN
OUTPATIENT
Start: 2024-09-17

## 2024-09-17 NOTE — TELEPHONE ENCOUNTER
I have not filled this for her.  Looks like NP did years ago.  She needs to determine who she is going to be seeing for primary care    Tomasa Medina PA-C

## 2024-09-17 NOTE — TELEPHONE ENCOUNTER
Patient informed via mychart of note below. 3 day reminder if not read to send letter.   Jayde Jauregui MA

## 2024-09-23 ENCOUNTER — VIRTUAL VISIT (OUTPATIENT)
Dept: PSYCHOLOGY | Facility: CLINIC | Age: 64
End: 2024-09-23
Payer: COMMERCIAL

## 2024-09-23 ENCOUNTER — VIRTUAL VISIT (OUTPATIENT)
Dept: FAMILY MEDICINE | Facility: OTHER | Age: 64
End: 2024-09-23
Payer: COMMERCIAL

## 2024-09-23 DIAGNOSIS — F33.2 SEVERE RECURRENT MAJOR DEPRESSION WITHOUT PSYCHOTIC FEATURES (H): Primary | ICD-10-CM

## 2024-09-23 DIAGNOSIS — G47.00 INSOMNIA, UNSPECIFIED TYPE: ICD-10-CM

## 2024-09-23 DIAGNOSIS — F41.1 GENERALIZED ANXIETY DISORDER: ICD-10-CM

## 2024-09-23 DIAGNOSIS — K59.00 CONSTIPATION, UNSPECIFIED CONSTIPATION TYPE: Primary | ICD-10-CM

## 2024-09-23 DIAGNOSIS — R11.2 NAUSEA AND VOMITING, UNSPECIFIED VOMITING TYPE: ICD-10-CM

## 2024-09-23 DIAGNOSIS — F43.10 POST TRAUMATIC STRESS DISORDER (PTSD): ICD-10-CM

## 2024-09-23 PROCEDURE — 99441 PR PHYSICIAN TELEPHONE EVALUATION 5-10 MIN: CPT | Mod: 93 | Performed by: PHYSICIAN ASSISTANT

## 2024-09-23 PROCEDURE — 90834 PSYTX W PT 45 MINUTES: CPT | Mod: 93 | Performed by: COUNSELOR

## 2024-09-23 RX ORDER — LINACLOTIDE 290 UG/1
290 CAPSULE, GELATIN COATED ORAL DAILY PRN
Qty: 90 CAPSULE | Refills: 1 | Status: SHIPPED | OUTPATIENT
Start: 2024-09-23

## 2024-09-23 RX ORDER — TRAZODONE HYDROCHLORIDE 50 MG/1
50 TABLET, FILM COATED ORAL AT BEDTIME
Qty: 90 TABLET | Refills: 1 | Status: SHIPPED | OUTPATIENT
Start: 2024-09-23

## 2024-09-23 NOTE — PROGRESS NOTES
[facility]                                     Progress Note    Patient Name: Linda Walsh  Date: 24         Service Type: Individual      Session Start Time: 12:30pm  Session End Time: 1:20pm     Session Length: 50    Session #: 106    Attendees: Client      Service Modality: telephone      Provider verified identity through the following two step process.  Patient provided:  Patient is known previously to provider     Telemedicine Visit: The patient's condition can be safely assessed and treated via synchronous audio and visual telemedicine encounter.       Reason for Telemedicine Visit: Services only offered telehealth     Originating Site (Patient Location): Patient's home     Distant Site (Provider Location): Provider Remote Setting- Home Office     Consent:  The patient/guardian has verbally consented to: the potential risks and benefits of telemedicine (video visit) versus in person care; bill my insurance or make self-payment for services provided; and responsibility for payment of non-covered services.        Mode of Communication:  telephone     As the provider I attest to compliance with applicable laws and regulations related to telemedicine.     DATA  Interactive Complexity: No  Crisis: No        Progress Since Last Session (Related to Symptoms / Goals / Homework):   Symptoms: No change .    Homework: Completed in session      Episode of Care Goals: Minimal progress - ACTION (Actively working towards change); Intervened by reinforcing change plan / affirming steps taken     Current / Ongoing Stressors and Concerns:   The client stated she's been working on a lot of things at the house for her parents.   Continued divorce stress and other family stressors. One of her cousins  suddenly recently.      Treatment Objective(s) Addressed in This Session:   use thought-stopping strategy daily to reduce intrusive thoughts       Intervention:   Client continues to feel high  levels of stress due to the pending divorce and ongoing conflict with her . Educated the client about the stages of grief she continues to experience. Worked with the client to identify negative self talk and myths in the narrative she tells herself that keeps her stuck.  Client talked about how she's been doing a lot of chores for her parents that make it so she's in a lot of pain. Encouraged the client to slow that down and say no if she needs to to them. Continuing to work on self esteem and self advocating.         Assessments completed prior to visit:  The following assessments were completed by patient for this visit:  PHQA:        No data to display              GAD7:       2/26/2020    11:18 AM 5/27/2020     3:23 PM 7/28/2020    10:37 AM 2/24/2021     1:26 PM 7/13/2022    10:16 AM 6/9/2023    10:20 AM 8/19/2024     3:52 PM   MARLIN-7 SCORE   Total Score   19 (severe anxiety) 16 (severe anxiety) 21 (severe anxiety) 13 (moderate anxiety) 15 (severe anxiety)   Total Score 19 21 19 16 21 13 15     PROMIS 10-Global Health (all questions and answers displayed):        No data to display                  ASSESSMENT: Current Emotional / Mental Status (status of significant symptoms):   Risk status (Self / Other harm or suicidal ideation)   Patient denies current fears or concerns for personal safety.   Patient denies current or recent suicidal ideation or behaviors.   Patient denies current or recent homicidal ideation or behaviors.   Patient denies current or recent self injurious behavior or ideation.   Patient denies other safety concerns.   Patient reports there has been no change in risk factors since their last session.     Patient reports there has been no change in protective factors since their last session.     Recommended that patient call 911 or go to the local ED should there be a change in any of these risk factors.     Appearance:   Unable to assess due to phone session    Eye Contact:   Unable to  assess due to phone session    Psychomotor Behavior: Unable to assess due to phone session    Attitude:   Cooperative    Orientation:   All   Speech    Rate / Production: Normal/ Responsive    Volume:  Normal    Mood:    Normal   Affect:    Unable to assess due to phone session    Thought Content:  Clear    Thought Form:  Coherent  Logical    Insight:    Good      Medication Review:   No changes to current psychiatric medication(s)     Medication Compliance:   Yes     Changes in Health Issues:   None reported     Chemical Use Review:   Substance Use: Chemical use reviewed, no active concerns identified      Tobacco Use: No change in amount of tobacco use since last session.  Patient declined discussion at this time    Diagnosis:  1. 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 Generalized anxiety disorder    3. Post traumatic stress disorder (PTSD)          Collateral Reports Completed:   Not Applicable    PLAN: (Patient Tasks / Therapist Tasks / Other)  Client to work on self care and practicing distress tolerance skills.   Work on identifying cognitive beliefs and working to challenge them.   Work on self advocating.         Nasrin Valladares [facility]                                                         ______________________________________________________________________    Individual Treatment Plan    Patient's Name: Linda Walsh  YOB: 1960    Date of Creation: 7/11/22  Date Treatment Plan Last Reviewed/Revised: 8/19/24    DSM5 Diagnoses: 296.33 (F33.2) Major Depressive Disorder, Recurrent Episode, Severe _, 300.02 (F41.1) Generalized Anxiety Disorder or 309.81 (F43.10) Posttraumatic Stress Disorder (includes Posttraumatic Stress Disorder for Children 6 Years and Younger)  Without dissociative symptoms  Psychosocial / Contextual Factors: CRPS  PROMIS (reviewed every 90 days):     Referral / Collaboration:  Referral to another professional/service is not indicated at this  time.    Anticipated number of session for this episode of care: on-going  Anticipation frequency of session: Monthly  Anticipated Duration of each session: 38-52 minutes/53+ minutes  Treatment plan will be reviewed in 90 days or when goals have been changed.       MeasurableTreatment Goal(s) related to diagnosis / functional impairment(s)  Goal 1: Client will decrease thoughts of wanting to be dead from 10 out of 10 opportunities to at most 9 out of 10 opportunities for at least 3 consecutive weeks as evidenced by client report and a decrease in symptom report as evidenced by PhQ9 scores and GAD7 scores.    Objective #A (Client Action)    Client will  Client will look at and read safety plan at least once a day and follow safety plan when thoughts and urges come up.  Status: Continued - Date(s): 8/19/24    Intervention(s)  Therapist will teach emotional regulation skills. distress tolerance skills, interpersonal effectiveness skills, emotion regluation skills, mindfulness skills, radical acceptance. Therapist will develop safety plan with the client.     Objective #B  Client will  Client will agree to call the therapist or after hours crisis line if noticing intense suicidal thoughts for skills coaching.   Status: Continued - Date(s): 8/19/24    Intervention(s)  Therapist will  Therapist will be available as much as possible during work hours for the client to contact and give the client several crisis resources.         Goal 2: Client will increase the use of skills (emotion regulation, distress tolerance, communication skill) from 1 out of 10 opportunities to at least 2 out of 10 opportunities for at least 3 consecutive weeks as evidenced by client report and a decrease in symptom report as evidenced by PhQ9 scores and GAD7 scores.     Objective #A (Client Action)    Status: Continued - Date(s): 8/19/24    Client will Increase interest, engagement, and pleasure in doing things  Decrease frequency and intensity of  feeling down, depressed, hopeless  Improve diet, appetite, mindful eating, and / or meal planning  Identify negative self-talk and behaviors: challenge core beliefs, myths, and actions  Improve concentration, focus, and mindfulness in daily activities   Feel less fidgety, restless or slow in daily activities / interpersonal interactions  Decrease thoughts that you'd be better off dead or of suicide / self-harm.    Intervention(s)  Therapist will teach emotional regulation skills. distress tolerance skills, interpersonal effectiveness skills, emotion regluation skills, mindfulness skills, radical acceptance. Therapist will teach client how to ID body cues for anxiety, anxiety reduction techniques, how to ID triggers for depression and anxiety- decrease reactivity/eliminate, lifestyle changes to reduce depression and anxiety, communication skills, explore cognitive beliefs and help client to develop healthy cognitive patterns and beliefs.    Objective #B  Client will  Client will learn and  practice DBT skills daily. .    Status: Continued - Date(s): 8/19/24    Intervention(s)  Therapist will  Therapist will Therapist will teach emotional regulation skills. distress tolerance skills, interpersonal effectiveness skills, emotion regluation skills, mindfulness skills, radical acceptance. .      Goal 3: Client will decrease anxious symptoms and reactions to triggers from 9 out of 10 opportunities to at most 8 out of 10 opportunities for at least 3 consecutive weeks as evidenced by client report and a decrease in symptom report as evidenced by PhQ9 scores and GAD7 scores.    Objective #A (Client Action)    Status: Continued - Date(s): 8/19/24    Client will  Client will use cognitive strategies identified in therapy to challenge anxious thoughts.    Intervention(s)  Therapist will  Therapist will teach emotional recognition/identification. emotion regulation skills, coping skills, mindfulness skills.    Objective  "#B  Client will  Client will use thought-stopping strategy daily to reduce intrusive thoughts for at least 3 consecutive weeks as evidenced by client report and a decrease in symptom report as evidenced by PhQ9 scores and GAD7 scores.       Status: Continued - Date(s): 8/19/24    Intervention(s)  Therapist will teach emotional regulation skills. distress tolerance skills, interpersonal effectiveness skills, emotion regluation skills, mindfulness skills, radical acceptance. Therapist will teach client how to ID body cues for anxiety, anxiety reduction techniques, how to ID triggers for depression and anxiety- decrease reactivity/eliminate, lifestyle changes to reduce depression and anxiety, communication skills, explore cognitive beliefs and help client to develop healthy cognitive patterns and beliefs.    Objective #C  Client will  Client will learn 5 crisis survival skills during the Distress Tolerance Module (6-8 weeks) for at least 3 consecutive weeks as evidenced by client report and a decrease in symptom report as evidenced by PhQ9 scores and GAD7 scores.  Status: Continued - Date(s): 8/19/24    Intervention(s)  Therapist will teach emotional regulation skills. distress tolerance skills, interpersonal effectiveness skills, emotion regluation skills, mindfulness skills, radical acceptance.      Client has reviewed and agreed to the above plan.      Nasrin Valladares [facility]        Linda Walsh     SAFETY PLAN:  Step 1: Warning signs / cues (Thoughts, images, mood, situation, behavior) that a crisis may be developing:  Thoughts: \"I don't matter\", \"People would be better off without me\", \"I'm a burden\", \"I can't do this anymore\", \"I just want this to end\" and \"Nothing makes it better\"  Images: obsessive thoughts of death or dying: car accident, using a gun and flashbacks  Thinking Processes: ruminations (can't stop thinking about my problems): court case, pain, racing thoughts, highly critical and negative " "thoughts: \"I can't do anything, I have to suffer everyday and go to work and other people have it so easy.\"  and paranoia: that the government is watching me  Mood: worsening depression, hopelessness, helplessness, intense anger, intense worry, agitation, disinhibited (not caring about things or consequences) and mood swings  Behaviors: isolating/withdrawing , can't stop crying, not taking care of myself, not taking care of my responsibilities, sleeping too much and not sleeping enough  Situations: legal issues: current court case, pain, trauma , financial stress and medical condition / diagnosis: CRPS    Step 2: Coping strategies - Things I can do to take my mind off of my problems without contacting another person (relaxation technique, physical activity):  Distress Tolerance Strategies:  arts and crafts: with her residents, play with my pet , pray, change body temperature (ice pack/cold water) , paced breathing/progressive muscle relaxation and puzzles, games on ipad  Physical Activities: deep breathing  Focus on helpful thoughts:  \"Ride the wave\", think about happy memories: when the grandkids were born, going camping, when the boys were little and remind myself of what is important to me: grandkids, family, the residents  Step 3: People and social settings that provide distraction:   Name: Alpesh  Phone: [phone]   Name: Velasquez  Phone:    Name: Thaddeus  Phone:   work   Step 4: Remind myself of people and things that are important to me and worth living for:    My family, my residents    Step 5: When I am in crisis, I can ask these people to help me use my safety plan:   Name: Alpesh  Phone: [phone]   Name: Velasquez  Phone: (number in phone)   Name: Thaddeus  Phone: (number in phone)  Step 6: Making the environment safe:   be around others  Step 7: Professionals or agencies I can contact during a crisis:  [facility] Number: [phone]  Suicide Prevention Lifeline: 8-478-416-TALK " (6997)  Crisis Text Line Service (available 24 hours a day, 7 days a week): Text [place] to 161309  Local Crisis Services:     Call 911 or go to my nearest emergency department.   I helped develop this safety plan and agree to use it when needed.  I have been given a copy of this plan.      Client signature _________________________________________________________________  Today s date: 7/11/22  Adapted from Safety Plan Template 2008 Griselda Perez and Livan Cutler is reprinted with the express permission of the authors.  No portion of the Safety Plan Template may be reproduced without the express, written permission.  You can contact the authors at [email or web address] or [email or web address].

## 2024-09-23 NOTE — PROGRESS NOTES
Mary is a 64 year old who is being evaluated via a billable telephone visit.    What phone number would you like to be contacted at? [phone]   How would you like to obtain your AVS? Dona  Originating Location (pt. Location): Home  Distant Location (provider location):  Off-site    Assessment & Plan     Constipation, unspecified constipation type  Not severe, not having to take daily still using the Biscodyl suppositories, ok to refill but just had sent by previous PCP.  Refilled Linzess, refilling PRn.   - LINZESS 290 MCG capsule; Take 1 capsule (290 mcg) by mouth daily as needed (constipation).    Insomnia, unspecified type  Still struggles but trazodone helps, [facility] had been filling but they noted this should come from PCP.   - traZODone (DESYREL) 50 MG tablet; Take 1 tablet (50 mg) by mouth at bedtime.    Nausea and vomiting, unspecified vomiting type  Has enough Compazine and Zofran for now.  Refill when needed.     Generalized anxiety disorder  Consider Propranolol to use PRN for anxiety  Continue with counseling.       Follow-up 3 months for Wellness Visit    Options for treatment and follow-up care were reviewed with the patient and/or guardian. Patient and/or guardian engaged in the decision making process and verbalized understanding of the options discussed and agreed with the final plan.      Subjective   Mary is a 64 year old, presenting for the following health issues:  No chief complaint on file.    HPI     Linzess:  - Used to take it daily but now only taking it when having issues.   - one Rx usually lasts her a few months.   - She currently also trying to increase fruits/vegetables, she is drinking water, she just doesn't generally admits to not eating well. Increasing in yogurts, granola.    - Other treatments: Suppositories Bisacodyl (doesn't tolerate oral biscodyl).   - [facility] has been continuing with managing her pain management. They refilled her Trazodone (taking 1 at night)  -  Anti-nausea medications - depends, doesn't take them daily, can go a whole month with tout them.   - Hydroxyzine doesn't really take those.       Review of Systems  Constitutional, HEENT, cardiovascular, pulmonary, GI, , musculoskeletal, neuro, skin, endocrine and psych systems are negative, except as otherwise noted.      Objective           Vitals:  No vitals were obtained today due to virtual visit.    Physical Exam   General: Alert and no distress //Respiratory: No audible wheeze, cough, or shortness of breath // Psychiatric:  Appropriate affect, tone, and pace of words            Phone call duration: 9:05 minutes  Signed Electronically by: Tomasa Medina PA-C

## 2024-10-07 ENCOUNTER — TRANSFERRED RECORDS (OUTPATIENT)
Dept: HEALTH INFORMATION MANAGEMENT | Facility: CLINIC | Age: 64
End: 2024-10-07
Payer: COMMERCIAL

## 2024-10-14 ENCOUNTER — VIRTUAL VISIT (OUTPATIENT)
Dept: PSYCHOLOGY | Facility: CLINIC | Age: 64
End: 2024-10-14
Payer: COMMERCIAL

## 2024-10-14 DIAGNOSIS — F41.1 GENERALIZED ANXIETY DISORDER: ICD-10-CM

## 2024-10-14 DIAGNOSIS — F43.10 POST TRAUMATIC STRESS DISORDER (PTSD): ICD-10-CM

## 2024-10-14 DIAGNOSIS — F33.2 SEVERE RECURRENT MAJOR DEPRESSION WITHOUT PSYCHOTIC FEATURES (H): Primary | ICD-10-CM

## 2024-10-14 PROCEDURE — 90834 PSYTX W PT 45 MINUTES: CPT | Mod: 93 | Performed by: COUNSELOR

## 2024-10-14 NOTE — PROGRESS NOTES
[facility]                                     Progress Note    Patient Name: Linda Walsh  Date: 10/14/24         Service Type: Individual      Session Start Time: 1:30pm  Session End Time: 2:20pm     Session Length: 50    Session #: 107    Attendees: Client      Service Modality: telephone      Provider verified identity through the following two step process.  Patient provided:  Patient is known previously to provider     Telemedicine Visit: The patient's condition can be safely assessed and treated via synchronous audio and visual telemedicine encounter.       Reason for Telemedicine Visit: Services only offered telehealth     Originating Site (Patient Location): Patient's home     Distant Site (Provider Location): Provider Remote Setting- Home Office     Consent:  The patient/guardian has verbally consented to: the potential risks and benefits of telemedicine (video visit) versus in person care; bill my insurance or make self-payment for services provided; and responsibility for payment of non-covered services.        Mode of Communication:  telephone     As the provider I attest to compliance with applicable laws and regulations related to telemedicine.     DATA  Interactive Complexity: No  Crisis: No        Progress Since Last Session (Related to Symptoms / Goals / Homework):   Symptoms: No change .    Homework: Completed in session      Episode of Care Goals: Minimal progress - ACTION (Actively working towards change); Intervened by reinforcing change plan / affirming steps taken     Current / Ongoing Stressors and Concerns:   The client stated she's been sick and her mom has been really triggering her.   Not being able to sleep well.      Treatment Objective(s) Addressed in This Session:   use thought-stopping strategy daily to reduce intrusive thoughts       Intervention:   Talked about how uncomfortable it is for the client when people/coworkers are nice to her. Educated  about trauma responses and how the client can begin to let people in and take compliments. Validated the client's freeze responses with her parents when her mom verbally accosts her instead of asking her for the help she wants. Processed feelings about her impending divorce.         Assessments completed prior to visit:  The following assessments were completed by patient for this visit:  PHQA:        No data to display              GAD7:       2/26/2020    11:18 AM 5/27/2020     3:23 PM 7/28/2020    10:37 AM 2/24/2021     1:26 PM 7/13/2022    10:16 AM 6/9/2023    10:20 AM 8/19/2024     3:52 PM   MARLIN-7 SCORE   Total Score   19 (severe anxiety) 16 (severe anxiety) 21 (severe anxiety) 13 (moderate anxiety) 15 (severe anxiety)   Total Score 19 21 19 16 21 13 15     PROMIS 10-Global Health (all questions and answers displayed):        No data to display                  ASSESSMENT: Current Emotional / Mental Status (status of significant symptoms):   Risk status (Self / Other harm or suicidal ideation)   Patient denies current fears or concerns for personal safety.   Patient denies current or recent suicidal ideation or behaviors.   Patient denies current or recent homicidal ideation or behaviors.   Patient denies current or recent self injurious behavior or ideation.   Patient denies other safety concerns.   Patient reports there has been no change in risk factors since their last session.     Patient reports there has been no change in protective factors since their last session.     Recommended that patient call 911 or go to the local ED should there be a change in any of these risk factors.     Appearance:   Unable to assess due to phone session    Eye Contact:   Unable to assess due to phone session    Psychomotor Behavior: Unable to assess due to phone session    Attitude:   Cooperative    Orientation:   All   Speech    Rate / Production: Normal/ Responsive    Volume:  Normal    Mood:    Normal   Affect:    Unable  to assess due to phone session    Thought Content:  Clear    Thought Form:  Coherent  Logical    Insight:    Good      Medication Review:   No changes to current psychiatric medication(s)     Medication Compliance:   Yes     Changes in Health Issues:   None reported     Chemical Use Review:   Substance Use: Chemical use reviewed, no active concerns identified      Tobacco Use: No change in amount of tobacco use since last session.  Patient declined discussion at this time    Diagnosis:  1. 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 Generalized anxiety disorder    3. Post traumatic stress disorder (PTSD)          Collateral Reports Completed:   Not Applicable    PLAN: (Patient Tasks / Therapist Tasks / Other)  Client to work on self care and practicing distress tolerance skills.           Nasrin Valladares, [facility]                                                         ______________________________________________________________________    Individual Treatment Plan    Patient's Name: Linda Walsh  YOB: 1960    Date of Creation: 7/11/22  Date Treatment Plan Last Reviewed/Revised: 10/14/24    DSM5 Diagnoses: 296.33 (F33.2) Major Depressive Disorder, Recurrent Episode, Severe _, 300.02 (F41.1) Generalized Anxiety Disorder or 309.81 (F43.10) Posttraumatic Stress Disorder (includes Posttraumatic Stress Disorder for Children 6 Years and Younger)  Without dissociative symptoms  Psychosocial / Contextual Factors: CRPS  PROMIS (reviewed every 90 days):     Referral / Collaboration:  Referral to another professional/service is not indicated at this time.    Anticipated number of session for this episode of care: on-going  Anticipation frequency of session: Monthly  Anticipated Duration of each session: 38-52 minutes/53+ minutes  Treatment plan will be reviewed in 90 days or when goals have been changed.       MeasurableTreatment Goal(s) related to diagnosis / functional  impairment(s)  Goal 1: Client will decrease thoughts of wanting to be dead from 10 out of 10 opportunities to at most 9 out of 10 opportunities for at least 3 consecutive weeks as evidenced by client report and a decrease in symptom report as evidenced by PhQ9 scores and GAD7 scores.    Objective #A (Client Action)    Client will  Client will look at and read safety plan at least once a day and follow safety plan when thoughts and urges come up.  Status: Continued - Date(s): 10/14/24    Intervention(s)  Therapist will teach emotional regulation skills. distress tolerance skills, interpersonal effectiveness skills, emotion regluation skills, mindfulness skills, radical acceptance. Therapist will develop safety plan with the client.     Objective #B  Client will  Client will agree to call the therapist or after hours crisis line if noticing intense suicidal thoughts for skills coaching.   Status: Continued - Date(s): 10/14/24    Intervention(s)  Therapist will  Therapist will be available as much as possible during work hours for the client to contact and give the client several crisis resources.         Goal 2: Client will increase the use of skills (emotion regulation, distress tolerance, communication skill) from 1 out of 10 opportunities to at least 2 out of 10 opportunities for at least 3 consecutive weeks as evidenced by client report and a decrease in symptom report as evidenced by PhQ9 scores and GAD7 scores.     Objective #A (Client Action)    Status: Continued - Date(s): 10/14/24    Client will Increase interest, engagement, and pleasure in doing things  Decrease frequency and intensity of feeling down, depressed, hopeless  Improve diet, appetite, mindful eating, and / or meal planning  Identify negative self-talk and behaviors: challenge core beliefs, myths, and actions  Improve concentration, focus, and mindfulness in daily activities   Feel less fidgety, restless or slow in daily activities /  interpersonal interactions  Decrease thoughts that you'd be better off dead or of suicide / self-harm.    Intervention(s)  Therapist will teach emotional regulation skills. distress tolerance skills, interpersonal effectiveness skills, emotion regluation skills, mindfulness skills, radical acceptance. Therapist will teach client how to ID body cues for anxiety, anxiety reduction techniques, how to ID triggers for depression and anxiety- decrease reactivity/eliminate, lifestyle changes to reduce depression and anxiety, communication skills, explore cognitive beliefs and help client to develop healthy cognitive patterns and beliefs.    Objective #B  Client will  Client will learn and  practice DBT skills daily. .    Status: Continued - Date(s): 10/14/24    Intervention(s)  Therapist will  Therapist will Therapist will teach emotional regulation skills. distress tolerance skills, interpersonal effectiveness skills, emotion regluation skills, mindfulness skills, radical acceptance. .      Goal 3: Client will decrease anxious symptoms and reactions to triggers from 9 out of 10 opportunities to at most 8 out of 10 opportunities for at least 3 consecutive weeks as evidenced by client report and a decrease in symptom report as evidenced by PhQ9 scores and GAD7 scores.    Objective #A (Client Action)    Status: Continued - Date(s): 10/14/24    Client will  Client will use cognitive strategies identified in therapy to challenge anxious thoughts.    Intervention(s)  Therapist will  Therapist will teach emotional recognition/identification. emotion regulation skills, coping skills, mindfulness skills.    Objective #B  Client will  Client will use thought-stopping strategy daily to reduce intrusive thoughts for at least 3 consecutive weeks as evidenced by client report and a decrease in symptom report as evidenced by PhQ9 scores and GAD7 scores.       Status: Continued - Date(s):  10/14/24    Intervention(s)  Therapist will  "teach emotional regulation skills. distress tolerance skills, interpersonal effectiveness skills, emotion regluation skills, mindfulness skills, radical acceptance. Therapist will teach client how to ID body cues for anxiety, anxiety reduction techniques, how to ID triggers for depression and anxiety- decrease reactivity/eliminate, lifestyle changes to reduce depression and anxiety, communication skills, explore cognitive beliefs and help client to develop healthy cognitive patterns and beliefs.    Objective #C  Client will  Client will learn 5 crisis survival skills during the Distress Tolerance Module (6-8 weeks) for at least 3 consecutive weeks as evidenced by client report and a decrease in symptom report as evidenced by PhQ9 scores and GAD7 scores.  Status: Continued - Date(s): 10/14/24    Intervention(s)  Therapist will teach emotional regulation skills. distress tolerance skills, interpersonal effectiveness skills, emotion regluation skills, mindfulness skills, radical acceptance.      Client has reviewed and agreed to the above plan.      Nasrin Valladares [facility]        Linda Walsh     SAFETY PLAN:  Step 1: Warning signs / cues (Thoughts, images, mood, situation, behavior) that a crisis may be developing:  Thoughts: \"I don't matter\", \"People would be better off without me\", \"I'm a burden\", \"I can't do this anymore\", \"I just want this to end\" and \"Nothing makes it better\"  Images: obsessive thoughts of death or dying: car accident, using a gun and flashbacks  Thinking Processes: ruminations (can't stop thinking about my problems): court case, pain, racing thoughts, highly critical and negative thoughts: \"I can't do anything, I have to suffer everyday and go to work and other people have it so easy.\"  and paranoia: that the [employer] is watching me  Mood: worsening depression, hopelessness, helplessness, intense anger, intense worry, agitation, disinhibited (not caring about things or consequences) and " "mood swings  Behaviors: isolating/withdrawing , can't stop crying, not taking care of myself, not taking care of my responsibilities, sleeping too much and not sleeping enough  Situations: legal issues: current court case, pain, trauma , financial stress and medical condition / diagnosis: CRPS    Step 2: Coping strategies - Things I can do to take my mind off of my problems without contacting another person (relaxation technique, physical activity):  Distress Tolerance Strategies:  arts and crafts: with her residents, play with my pet , pray, change body temperature (ice pack/cold water) , paced breathing/progressive muscle relaxation and puzzles, games on ipad  Physical Activities: deep breathing  Focus on helpful thoughts:  \"Ride the wave\", think about happy memories: when the grandkids were born, going camping, when the boys were little and remind myself of what is important to me: grandkids, family, the residents  Step 3: People and social settings that provide distraction:   Name: Alpesh  Phone: [phone]   Name: Velasquez  Phone:    Name: Thaddeus  Phone:   work   Step 4: Remind myself of people and things that are important to me and worth living for:    My family, my residents    Step 5: When I am in crisis, I can ask these people to help me use my safety plan:   Name: Alpesh  Phone: [phone]   Name: Velasquez  Phone: (number in phone)   Name: Thaddeus  Phone: (number in phone)  Step 6: Making the environment safe:   be around others  Step 7: Professionals or agencies I can contact during a crisis:  [facility] Daytime Number: [phone]  Suicide Prevention Lifeline: 6-516-984-TALK (0919)  Crisis Text Line Service (available 24 hours a day, 7 days a week): Text [place] to 408864  Local Crisis Services:     Call 911 or go to my nearest emergency department.   I helped develop this safety plan and agree to use it when needed.  I have been given a copy of this plan.      Client signature " _________________________________________________________________  Today s date: 7/11/22  Adapted from Safety Plan Template 2008 Griselda Perez and Livan Cutler is reprinted with the express permission of the authors.  No portion of the Safety Plan Template may be reproduced without the express, written permission.  You can contact the authors at [email or web address] or [email or web address].

## 2024-10-25 ENCOUNTER — MYC MEDICAL ADVICE (OUTPATIENT)
Dept: FAMILY MEDICINE | Facility: OTHER | Age: 64
End: 2024-10-25
Payer: COMMERCIAL

## 2024-10-25 DIAGNOSIS — R09.81 SINUS CONGESTION: ICD-10-CM

## 2024-10-25 NOTE — TELEPHONE ENCOUNTER
Patient request for flonase refill.     Per 8/19/24 visit notes:    Sinusitis, unspecified chronicity, unspecified location  - Symptoms consistent with bacterial sinusitis, has tolerated Doxycycline in the past  - Encouraged to continue with nasal saline rinses 3 times per day, FLonase 2 sprays each nostril nightly and then add in Astepro 1 spray each nostril twice daily when having more allergy symptoms.    Alma Delia Taveras, RN, BSN

## 2024-10-27 RX ORDER — FLUTICASONE PROPIONATE 50 MCG
2 SPRAY, SUSPENSION (ML) NASAL DAILY
Qty: 16 G | Refills: 3 | Status: SHIPPED | OUTPATIENT
Start: 2024-10-27

## 2024-10-28 ENCOUNTER — VIRTUAL VISIT (OUTPATIENT)
Dept: PSYCHOLOGY | Facility: CLINIC | Age: 64
End: 2024-10-28
Payer: COMMERCIAL

## 2024-10-28 DIAGNOSIS — F33.2 SEVERE RECURRENT MAJOR DEPRESSION WITHOUT PSYCHOTIC FEATURES (H): Primary | ICD-10-CM

## 2024-10-28 DIAGNOSIS — F41.1 GENERALIZED ANXIETY DISORDER: ICD-10-CM

## 2024-10-28 DIAGNOSIS — F43.10 POST TRAUMATIC STRESS DISORDER (PTSD): ICD-10-CM

## 2024-10-28 PROCEDURE — 90834 PSYTX W PT 45 MINUTES: CPT | Mod: 93 | Performed by: COUNSELOR

## 2024-10-28 NOTE — PROGRESS NOTES
[facility]                                     Progress Note    Patient Name: Linda Walsh  Date: 10/28/24         Service Type: Individual      Session Start Time: 12:30pm  Session End Time: 1:20pm     Session Length: 50    Session #: 108    Attendees: Client      Service Modality: telephone      Provider verified identity through the following two step process.  Patient provided:  Patient is known previously to provider     Telemedicine Visit: The patient's condition can be safely assessed and treated via synchronous audio and visual telemedicine encounter.       Reason for Telemedicine Visit: Services only offered telehealth     Originating Site (Patient Location): Patient's home     Distant Site (Provider Location): Provider Remote Setting- Home Office     Consent:  The patient/guardian has verbally consented to: the potential risks and benefits of telemedicine (video visit) versus in person care; bill my insurance or make self-payment for services provided; and responsibility for payment of non-covered services.        Mode of Communication:  telephone     As the provider I attest to compliance with applicable laws and regulations related to telemedicine.     DATA  Interactive Complexity: No  Crisis: No        Progress Since Last Session (Related to Symptoms / Goals / Homework):   Symptoms: No change .    Homework: Completed in session      Episode of Care Goals: Minimal progress - ACTION (Actively working towards change); Intervened by reinforcing change plan / affirming steps taken     Current / Ongoing Stressors and Concerns:   The client stated she's getting sick again. Has been working a lot. Been I a lot of pain.   Continued issues with ex and preparing for upcoming court date of Nov. 8th.      Treatment Objective(s) Addressed in This Session:   use thought-stopping strategy daily to reduce intrusive thoughts       Intervention:   Talked about her fears regarding her  upcoming court date. Worked on mindfulness she needs to impermanent to be more effective with her ex with boundaries. Processed recent interactions with her parents and the frustrations she's having there.          Assessments completed prior to visit:  The following assessments were completed by patient for this visit:  PHQA:        No data to display              GAD7:       2/26/2020    11:18 AM 5/27/2020     3:23 PM 7/28/2020    10:37 AM 2/24/2021     1:26 PM 7/13/2022    10:16 AM 6/9/2023    10:20 AM 8/19/2024     3:52 PM   MARLIN-7 SCORE   Total Score   19 (severe anxiety) 16 (severe anxiety) 21 (severe anxiety) 13 (moderate anxiety) 15 (severe anxiety)   Total Score 19 21 19 16 21 13 15     PROMIS 10-Global Health (all questions and answers displayed):        No data to display                  ASSESSMENT: Current Emotional / Mental Status (status of significant symptoms):   Risk status (Self / Other harm or suicidal ideation)   Patient denies current fears or concerns for personal safety.   Patient denies current or recent suicidal ideation or behaviors.   Patient denies current or recent homicidal ideation or behaviors.   Patient denies current or recent self injurious behavior or ideation.   Patient denies other safety concerns.   Patient reports there has been no change in risk factors since their last session.     Patient reports there has been no change in protective factors since their last session.     Recommended that patient call 911 or go to the local ED should there be a change in any of these risk factors.     Appearance:   Unable to assess due to phone session    Eye Contact:   Unable to assess due to phone session    Psychomotor Behavior: Unable to assess due to phone session    Attitude:   Cooperative    Orientation:   All   Speech    Rate / Production: Normal/ Responsive    Volume:  Normal    Mood:    Normal   Affect:    Unable to assess due to phone session    Thought Content:  Clear    Thought  Form:  Coherent  Logical    Insight:    Good      Medication Review:   No changes to current psychiatric medication(s)     Medication Compliance:   Yes     Changes in Health Issues:   None reported     Chemical Use Review:   Substance Use: Chemical use reviewed, no active concerns identified      Tobacco Use: No change in amount of tobacco use since last session.  Patient declined discussion at this time    Diagnosis:  1. 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 Generalized anxiety disorder    3. Post traumatic stress disorder (PTSD)          Collateral Reports Completed:   Not Applicable    PLAN: (Patient Tasks / Therapist Tasks / Other)  Client to work on self care and practicing distress tolerance skills and self care.           Nasrin Valladares, [facility]                                                         ______________________________________________________________________    Individual Treatment Plan    Patient's Name: Linda Walsh  YOB: 1960    Date of Creation: 7/11/22  Date Treatment Plan Last Reviewed/Revised: 10/14/24    DSM5 Diagnoses: 296.33 (F33.2) Major Depressive Disorder, Recurrent Episode, Severe _, 300.02 (F41.1) Generalized Anxiety Disorder or 309.81 (F43.10) Posttraumatic Stress Disorder (includes Posttraumatic Stress Disorder for Children 6 Years and Younger)  Without dissociative symptoms  Psychosocial / Contextual Factors: CRPS  PROMIS (reviewed every 90 days):     Referral / Collaboration:  Referral to another professional/service is not indicated at this time.    Anticipated number of session for this episode of care: on-going  Anticipation frequency of session: Monthly  Anticipated Duration of each session: 38-52 minutes/53+ minutes  Treatment plan will be reviewed in 90 days or when goals have been changed.       MeasurableTreatment Goal(s) related to diagnosis / functional impairment(s)  Goal 1: Client will decrease thoughts of wanting to be  dead from 10 out of 10 opportunities to at most 9 out of 10 opportunities for at least 3 consecutive weeks as evidenced by client report and a decrease in symptom report as evidenced by PhQ9 scores and GAD7 scores.    Objective #A (Client Action)    Client will  Client will look at and read safety plan at least once a day and follow safety plan when thoughts and urges come up.  Status: Continued - Date(s): 10/14/24    Intervention(s)  Therapist will teach emotional regulation skills. distress tolerance skills, interpersonal effectiveness skills, emotion regluation skills, mindfulness skills, radical acceptance. Therapist will develop safety plan with the client.     Objective #B  Client will  Client will agree to call the therapist or after hours crisis line if noticing intense suicidal thoughts for skills coaching.   Status: Continued - Date(s): 10/14/24    Intervention(s)  Therapist will  Therapist will be available as much as possible during work hours for the client to contact and give the client several crisis resources.         Goal 2: Client will increase the use of skills (emotion regulation, distress tolerance, communication skill) from 1 out of 10 opportunities to at least 2 out of 10 opportunities for at least 3 consecutive weeks as evidenced by client report and a decrease in symptom report as evidenced by PhQ9 scores and GAD7 scores.     Objective #A (Client Action)    Status: Continued - Date(s): 10/14/24    Client will Increase interest, engagement, and pleasure in doing things  Decrease frequency and intensity of feeling down, depressed, hopeless  Improve diet, appetite, mindful eating, and / or meal planning  Identify negative self-talk and behaviors: challenge core beliefs, myths, and actions  Improve concentration, focus, and mindfulness in daily activities   Feel less fidgety, restless or slow in daily activities / interpersonal interactions  Decrease thoughts that you'd be better off dead or of  suicide / self-harm.    Intervention(s)  Therapist will teach emotional regulation skills. distress tolerance skills, interpersonal effectiveness skills, emotion regluation skills, mindfulness skills, radical acceptance. Therapist will teach client how to ID body cues for anxiety, anxiety reduction techniques, how to ID triggers for depression and anxiety- decrease reactivity/eliminate, lifestyle changes to reduce depression and anxiety, communication skills, explore cognitive beliefs and help client to develop healthy cognitive patterns and beliefs.    Objective #B  Client will  Client will learn and  practice DBT skills daily. .    Status: Continued - Date(s): 10/14/24    Intervention(s)  Therapist will  Therapist will Therapist will teach emotional regulation skills. distress tolerance skills, interpersonal effectiveness skills, emotion regluation skills, mindfulness skills, radical acceptance. .      Goal 3: Client will decrease anxious symptoms and reactions to triggers from 9 out of 10 opportunities to at most 8 out of 10 opportunities for at least 3 consecutive weeks as evidenced by client report and a decrease in symptom report as evidenced by PhQ9 scores and GAD7 scores.    Objective #A (Client Action)    Status: Continued - Date(s): 10/14/24    Client will  Client will use cognitive strategies identified in therapy to challenge anxious thoughts.    Intervention(s)  Therapist will  Therapist will teach emotional recognition/identification. emotion regulation skills, coping skills, mindfulness skills.    Objective #B  Client will  Client will use thought-stopping strategy daily to reduce intrusive thoughts for at least 3 consecutive weeks as evidenced by client report and a decrease in symptom report as evidenced by PhQ9 scores and GAD7 scores.       Status: Continued - Date(s):  10/14/24    Intervention(s)  Therapist will teach emotional regulation skills. distress tolerance skills, interpersonal  "effectiveness skills, emotion regluation skills, mindfulness skills, radical acceptance. Therapist will teach client how to ID body cues for anxiety, anxiety reduction techniques, how to ID triggers for depression and anxiety- decrease reactivity/eliminate, lifestyle changes to reduce depression and anxiety, communication skills, explore cognitive beliefs and help client to develop healthy cognitive patterns and beliefs.    Objective #C  Client will  Client will learn 5 crisis survival skills during the Distress Tolerance Module (6-8 weeks) for at least 3 consecutive weeks as evidenced by client report and a decrease in symptom report as evidenced by PhQ9 scores and GAD7 scores.  Status: Continued - Date(s): 10/14/24    Intervention(s)  Therapist will teach emotional regulation skills. distress tolerance skills, interpersonal effectiveness skills, emotion regluation skills, mindfulness skills, radical acceptance.      Client has reviewed and agreed to the above plan.      Nasrin Valladares, [facility]        Linda Walsh     SAFETY PLAN:  Step 1: Warning signs / cues (Thoughts, images, mood, situation, behavior) that a crisis may be developing:  Thoughts: \"I don't matter\", \"People would be better off without me\", \"I'm a burden\", \"I can't do this anymore\", \"I just want this to end\" and \"Nothing makes it better\"  Images: obsessive thoughts of death or dying: car accident, using a gun and flashbacks  Thinking Processes: ruminations (can't stop thinking about my problems): court case, pain, racing thoughts, highly critical and negative thoughts: \"I can't do anything, I have to suffer everyday and go to work and other people have it so easy.\"  and paranoia: that the [employer] is watching me  Mood: worsening depression, hopelessness, helplessness, intense anger, intense worry, agitation, disinhibited (not caring about things or consequences) and mood swings  Behaviors: isolating/withdrawing , can't stop crying, not taking " "care of myself, not taking care of my responsibilities, sleeping too much and not sleeping enough  Situations: legal issues: current court case, pain, trauma , financial stress and medical condition / diagnosis: CRPS    Step 2: Coping strategies - Things I can do to take my mind off of my problems without contacting another person (relaxation technique, physical activity):  Distress Tolerance Strategies:  arts and crafts: with her residents, play with my pet , pray, change body temperature (ice pack/cold water) , paced breathing/progressive muscle relaxation and puzzles, games on ipad  Physical Activities: deep breathing  Focus on helpful thoughts:  \"Ride the wave\", think about happy memories: when the grandkids were born, going camping, when the boys were little and remind myself of what is important to me: grandkids, family, the residents  Step 3: People and social settings that provide distraction:   Name: Alpesh  Phone: [phone]   Name: Velasquez  Phone:    Name: Thaddeus  Phone:   work   Step 4: Remind myself of people and things that are important to me and worth living for:    My family, my residents    Step 5: When I am in crisis, I can ask these people to help me use my safety plan:   Name: Alpesh  Phone: [phone]   Name: Velasquez  Phone: (number in phone)   Name: Thaddeus  Phone: (number in phone)  Step 6: Making the environment safe:   be around others  Step 7: Professionals or agencies I can contact during a crisis:  [facility] Number: [phone]  Suicide Prevention Lifeline: 0-520-493-DMQP (2686)  Crisis Text Line Service (available 24 hours a day, 7 days a week): Text [place] to 968652  Local Crisis Services:     Call 911 or go to my nearest emergency department.   I helped develop this safety plan and agree to use it when needed.  I have been given a copy of this plan.      Client signature _________________________________________________________________  Today s date: 7/11/22  Adapted " from Safety Plan Template 2008 Griselda Perez and Livan Cutler is reprinted with the express permission of the authors.  No portion of the Safety Plan Template may be reproduced without the express, written permission.  You can contact the authors at [email or web address] or [email or web address].

## 2024-11-02 ENCOUNTER — HEALTH MAINTENANCE LETTER (OUTPATIENT)
Age: 64
End: 2024-11-02

## 2024-11-04 ENCOUNTER — TRANSFERRED RECORDS (OUTPATIENT)
Dept: HEALTH INFORMATION MANAGEMENT | Facility: CLINIC | Age: 64
End: 2024-11-04
Payer: COMMERCIAL

## 2024-11-12 ENCOUNTER — MYC REFILL (OUTPATIENT)
Dept: FAMILY MEDICINE | Facility: CLINIC | Age: 64
End: 2024-11-12
Payer: COMMERCIAL

## 2024-11-12 ENCOUNTER — MYC REFILL (OUTPATIENT)
Dept: FAMILY MEDICINE | Facility: OTHER | Age: 64
End: 2024-11-12
Payer: COMMERCIAL

## 2024-11-12 DIAGNOSIS — K59.00 CONSTIPATION, UNSPECIFIED CONSTIPATION TYPE: ICD-10-CM

## 2024-11-12 DIAGNOSIS — E78.5 HYPERLIPIDEMIA LDL GOAL <160: ICD-10-CM

## 2024-11-12 DIAGNOSIS — R11.2 NAUSEA AND VOMITING, UNSPECIFIED VOMITING TYPE: ICD-10-CM

## 2024-11-13 RX ORDER — ATORVASTATIN CALCIUM 20 MG/1
20 TABLET, FILM COATED ORAL DAILY
Qty: 90 TABLET | Refills: 0 | Status: SHIPPED | OUTPATIENT
Start: 2024-11-13

## 2024-11-13 RX ORDER — BISACODYL 10 MG
SUPPOSITORY, RECTAL RECTAL
Qty: 12 SUPPOSITORY | Refills: 6 | Status: SHIPPED | OUTPATIENT
Start: 2024-11-13

## 2024-11-13 RX ORDER — ONDANSETRON 4 MG/1
4 TABLET, ORALLY DISINTEGRATING ORAL EVERY 8 HOURS PRN
Qty: 30 TABLET | Refills: 1 | Status: SHIPPED | OUTPATIENT
Start: 2024-11-13

## 2024-11-25 ENCOUNTER — VIRTUAL VISIT (OUTPATIENT)
Dept: PSYCHOLOGY | Facility: CLINIC | Age: 64
End: 2024-11-25
Payer: COMMERCIAL

## 2024-11-25 DIAGNOSIS — F43.10 POST TRAUMATIC STRESS DISORDER (PTSD): ICD-10-CM

## 2024-11-25 DIAGNOSIS — F33.2 SEVERE RECURRENT MAJOR DEPRESSION WITHOUT PSYCHOTIC FEATURES (H): Primary | ICD-10-CM

## 2024-11-25 DIAGNOSIS — F41.1 GENERALIZED ANXIETY DISORDER: ICD-10-CM

## 2024-11-25 PROCEDURE — 90834 PSYTX W PT 45 MINUTES: CPT | Mod: 93 | Performed by: COUNSELOR

## 2024-11-25 NOTE — PROGRESS NOTES
[facility]                                     Progress Note    Patient Name: Linda Walsh  Date: 11/25/24         Service Type: Individual      Session Start Time: 10:30am  Session End Time: 11:20am     Session Length: 50    Session #: 109    Attendees: Client      Service Modality: telephone      Provider verified identity through the following two step process.  Patient provided:  Patient is known previously to provider     Telemedicine Visit: The patient's condition can be safely assessed and treated via synchronous audio and visual telemedicine encounter.       Reason for Telemedicine Visit: Services only offered telehealth     Originating Site (Patient Location): Patient's home     Distant Site (Provider Location): Provider Remote Setting- Home Office     Consent:  The patient/guardian has verbally consented to: the potential risks and benefits of telemedicine (video visit) versus in person care; bill my insurance or make self-payment for services provided; and responsibility for payment of non-covered services.        Mode of Communication:  telephone     As the provider I attest to compliance with applicable laws and regulations related to telemedicine.     DATA  Interactive Complexity: No  Crisis: No        Progress Since Last Session (Related to Symptoms / Goals / Homework):   Symptoms: No change .    Homework: Completed in session      Episode of Care Goals: Minimal progress - ACTION (Actively working towards change); Intervened by reinforcing change plan / affirming steps taken     Current / Ongoing Stressors and Concerns:   The client stated she's been working a lot.  Stated her ex is moving and she recently found out. She's worried she won't be able to see her dogs again once he moves.     Treatment Objective(s) Addressed in This Session:   use thought-stopping strategy daily to reduce intrusive thoughts       Intervention:   Talked about her fears regarding her ex moving  and the grief she's feeling about possibly not being able to see her dogs. Reinforced her effective communication skills she's been working on utilizing with him and how she's been working on taking her power back.         Assessments completed prior to visit:  The following assessments were completed by patient for this visit:  PHQA:        No data to display              GAD7:       2/26/2020    11:18 AM 5/27/2020     3:23 PM 7/28/2020    10:37 AM 2/24/2021     1:26 PM 7/13/2022    10:16 AM 6/9/2023    10:20 AM 8/19/2024     3:52 PM   MARLIN-7 SCORE   Total Score   19 (severe anxiety) 16 (severe anxiety) 21 (severe anxiety) 13 (moderate anxiety) 15 (severe anxiety)   Total Score 19 21 19 16 21 13 15     PROMIS 10-Global Health (all questions and answers displayed):        No data to display                  ASSESSMENT: Current Emotional / Mental Status (status of significant symptoms):   Risk status (Self / Other harm or suicidal ideation)   Patient denies current fears or concerns for personal safety.   Patient denies current or recent suicidal ideation or behaviors.   Patient denies current or recent homicidal ideation or behaviors.   Patient denies current or recent self injurious behavior or ideation.   Patient denies other safety concerns.   Patient reports there has been no change in risk factors since their last session.     Patient reports there has been no change in protective factors since their last session.     Recommended that patient call 911 or go to the local ED should there be a change in any of these risk factors.     Appearance:   Unable to assess due to phone session    Eye Contact:   Unable to assess due to phone session    Psychomotor Behavior: Unable to assess due to phone session    Attitude:   Cooperative    Orientation:   All   Speech    Rate / Production: Normal/ Responsive    Volume:  Normal    Mood:    Normal   Affect:    Unable to assess due to phone session    Thought Content:  Clear     Thought Form:  Coherent  Logical    Insight:    Good      Medication Review:   No changes to current psychiatric medication(s)     Medication Compliance:   Yes     Changes in Health Issues:   None reported     Chemical Use Review:   Substance Use: Chemical use reviewed, no active concerns identified      Tobacco Use: No change in amount of tobacco use since last session.  Patient declined discussion at this time    Diagnosis:  1. 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 Generalized anxiety disorder    3. Post traumatic stress disorder (PTSD)          Collateral Reports Completed:   Not Applicable    PLAN: (Patient Tasks / Therapist Tasks / Other)  Client to work on self care and practicing distress tolerance skills and self care.           Nasrin Valladares, [facility]                                                         ______________________________________________________________________    Individual Treatment Plan    Patient's Name: Linda Walsh  YOB: 1960    Date of Creation: 7/11/22  Date Treatment Plan Last Reviewed/Revised: 10/14/24    DSM5 Diagnoses: 296.33 (F33.2) Major Depressive Disorder, Recurrent Episode, Severe _, 300.02 (F41.1) Generalized Anxiety Disorder or 309.81 (F43.10) Posttraumatic Stress Disorder (includes Posttraumatic Stress Disorder for Children 6 Years and Younger)  Without dissociative symptoms  Psychosocial / Contextual Factors: CRPS  PROMIS (reviewed every 90 days):     Referral / Collaboration:  Referral to another professional/service is not indicated at this time.    Anticipated number of session for this episode of care: on-going  Anticipation frequency of session: Monthly  Anticipated Duration of each session: 38-52 minutes/53+ minutes  Treatment plan will be reviewed in 90 days or when goals have been changed.       MeasurableTreatment Goal(s) related to diagnosis / functional impairment(s)  Goal 1: Client will decrease thoughts of  wanting to be dead from 10 out of 10 opportunities to at most 9 out of 10 opportunities for at least 3 consecutive weeks as evidenced by client report and a decrease in symptom report as evidenced by PhQ9 scores and GAD7 scores.    Objective #A (Client Action)    Client will  Client will look at and read safety plan at least once a day and follow safety plan when thoughts and urges come up.  Status: Continued - Date(s): 10/14/24    Intervention(s)  Therapist will teach emotional regulation skills. distress tolerance skills, interpersonal effectiveness skills, emotion regluation skills, mindfulness skills, radical acceptance. Therapist will develop safety plan with the client.     Objective #B  Client will  Client will agree to call the therapist or after hours crisis line if noticing intense suicidal thoughts for skills coaching.   Status: Continued - Date(s): 10/14/24    Intervention(s)  Therapist will  Therapist will be available as much as possible during work hours for the client to contact and give the client several crisis resources.         Goal 2: Client will increase the use of skills (emotion regulation, distress tolerance, communication skill) from 1 out of 10 opportunities to at least 2 out of 10 opportunities for at least 3 consecutive weeks as evidenced by client report and a decrease in symptom report as evidenced by PhQ9 scores and GAD7 scores.     Objective #A (Client Action)    Status: Continued - Date(s): 10/14/24    Client will Increase interest, engagement, and pleasure in doing things  Decrease frequency and intensity of feeling down, depressed, hopeless  Improve diet, appetite, mindful eating, and / or meal planning  Identify negative self-talk and behaviors: challenge core beliefs, myths, and actions  Improve concentration, focus, and mindfulness in daily activities   Feel less fidgety, restless or slow in daily activities / interpersonal interactions  Decrease thoughts that you'd be better  off dead or of suicide / self-harm.    Intervention(s)  Therapist will teach emotional regulation skills. distress tolerance skills, interpersonal effectiveness skills, emotion regluation skills, mindfulness skills, radical acceptance. Therapist will teach client how to ID body cues for anxiety, anxiety reduction techniques, how to ID triggers for depression and anxiety- decrease reactivity/eliminate, lifestyle changes to reduce depression and anxiety, communication skills, explore cognitive beliefs and help client to develop healthy cognitive patterns and beliefs.    Objective #B  Client will  Client will learn and  practice DBT skills daily. .    Status: Continued - Date(s): 10/14/24    Intervention(s)  Therapist will  Therapist will Therapist will teach emotional regulation skills. distress tolerance skills, interpersonal effectiveness skills, emotion regluation skills, mindfulness skills, radical acceptance. .      Goal 3: Client will decrease anxious symptoms and reactions to triggers from 9 out of 10 opportunities to at most 8 out of 10 opportunities for at least 3 consecutive weeks as evidenced by client report and a decrease in symptom report as evidenced by PhQ9 scores and GAD7 scores.    Objective #A (Client Action)    Status: Continued - Date(s): 10/14/24    Client will  Client will use cognitive strategies identified in therapy to challenge anxious thoughts.    Intervention(s)  Therapist will  Therapist will teach emotional recognition/identification. emotion regulation skills, coping skills, mindfulness skills.    Objective #B  Client will  Client will use thought-stopping strategy daily to reduce intrusive thoughts for at least 3 consecutive weeks as evidenced by client report and a decrease in symptom report as evidenced by PhQ9 scores and GAD7 scores.       Status: Continued - Date(s):  10/14/24    Intervention(s)  Therapist will teach emotional regulation skills. distress tolerance skills,  "interpersonal effectiveness skills, emotion regluation skills, mindfulness skills, radical acceptance. Therapist will teach client how to ID body cues for anxiety, anxiety reduction techniques, how to ID triggers for depression and anxiety- decrease reactivity/eliminate, lifestyle changes to reduce depression and anxiety, communication skills, explore cognitive beliefs and help client to develop healthy cognitive patterns and beliefs.    Objective #C  Client will  Client will learn 5 crisis survival skills during the Distress Tolerance Module (6-8 weeks) for at least 3 consecutive weeks as evidenced by client report and a decrease in symptom report as evidenced by PhQ9 scores and GAD7 scores.  Status: Continued - Date(s): 10/14/24    Intervention(s)  Therapist will teach emotional regulation skills. distress tolerance skills, interpersonal effectiveness skills, emotion regluation skills, mindfulness skills, radical acceptance.      Client has reviewed and agreed to the above plan.      Nasrin Valladares, [facility]        Linda Walsh     SAFETY PLAN:  Step 1: Warning signs / cues (Thoughts, images, mood, situation, behavior) that a crisis may be developing:  Thoughts: \"I don't matter\", \"People would be better off without me\", \"I'm a burden\", \"I can't do this anymore\", \"I just want this to end\" and \"Nothing makes it better\"  Images: obsessive thoughts of death or dying: car accident, using a gun and flashbacks  Thinking Processes: ruminations (can't stop thinking about my problems): court case, pain, racing thoughts, highly critical and negative thoughts: \"I can't do anything, I have to suffer everyday and go to work and other people have it so easy.\"  and paranoia: that the [employer] is watching me  Mood: worsening depression, hopelessness, helplessness, intense anger, intense worry, agitation, disinhibited (not caring about things or consequences) and mood swings  Behaviors: isolating/withdrawing , can't stop " "crying, not taking care of myself, not taking care of my responsibilities, sleeping too much and not sleeping enough  Situations: legal issues: current court case, pain, trauma , financial stress and medical condition / diagnosis: CRPS    Step 2: Coping strategies - Things I can do to take my mind off of my problems without contacting another person (relaxation technique, physical activity):  Distress Tolerance Strategies:  arts and crafts: with her residents, play with my pet , pray, change body temperature (ice pack/cold water) , paced breathing/progressive muscle relaxation and puzzles, games on ipad  Physical Activities: deep breathing  Focus on helpful thoughts:  \"Ride the wave\", think about happy memories: when the grandkids were born, going camping, when the boys were little and remind myself of what is important to me: grandkids, family, the residents  Step 3: People and social settings that provide distraction:   Name: Alpesh  Phone: [phone]   Name: Velasquez  Phone:    Name: Thaddeus  Phone:   work   Step 4: Remind myself of people and things that are important to me and worth living for:    My family, my residents    Step 5: When I am in crisis, I can ask these people to help me use my safety plan:   Name: Alpesh  Phone: [phone]   Name: Velasquez  Phone: (number in phone)   Name: Thaddeus  Phone: (number in phone)  Step 6: Making the environment safe:   be around others  Step 7: Professionals or agencies I can contact during a crisis:  [facility] Daytime Number: [phone]  Suicide Prevention Lifeline: 9-722-166-RYLF (1604)  Crisis Text Line Service (available 24 hours a day, 7 days a week): Text [place] to 893056  Local Crisis Services:     Call 911 or go to my nearest emergency department.   I helped develop this safety plan and agree to use it when needed.  I have been given a copy of this plan.      Client signature _________________________________________________________________  Today s " date: 7/11/22  Adapted from Safety Plan Template 2008 Griselda Perez and Livan Cutler is reprinted with the express permission of the authors.  No portion of the Safety Plan Template may be reproduced without the express, written permission.  You can contact the authors at [email or web address] or [email or web address].

## 2024-11-26 DIAGNOSIS — J45.40 MODERATE PERSISTENT ASTHMA WITHOUT COMPLICATION: ICD-10-CM

## 2024-11-26 RX ORDER — ALBUTEROL SULFATE 90 UG/1
INHALANT RESPIRATORY (INHALATION)
Qty: 18 G | Refills: 0 | Status: SHIPPED | OUTPATIENT
Start: 2024-11-26

## 2024-11-27 DIAGNOSIS — K21.9 GASTROESOPHAGEAL REFLUX DISEASE WITHOUT ESOPHAGITIS: ICD-10-CM

## 2024-11-27 RX ORDER — OMEPRAZOLE 40 MG/1
40 CAPSULE, DELAYED RELEASE ORAL DAILY
Qty: 90 CAPSULE | Refills: 0 | Status: SHIPPED | OUTPATIENT
Start: 2024-11-27

## 2024-12-06 ENCOUNTER — TRANSFERRED RECORDS (OUTPATIENT)
Dept: HEALTH INFORMATION MANAGEMENT | Facility: CLINIC | Age: 64
End: 2024-12-06
Payer: COMMERCIAL

## 2024-12-09 ENCOUNTER — VIRTUAL VISIT (OUTPATIENT)
Dept: PSYCHOLOGY | Facility: CLINIC | Age: 64
End: 2024-12-09
Payer: COMMERCIAL

## 2024-12-09 DIAGNOSIS — F43.10 POST TRAUMATIC STRESS DISORDER (PTSD): ICD-10-CM

## 2024-12-09 DIAGNOSIS — F33.2 SEVERE RECURRENT MAJOR DEPRESSION WITHOUT PSYCHOTIC FEATURES (H): Primary | ICD-10-CM

## 2024-12-09 DIAGNOSIS — F41.1 GENERALIZED ANXIETY DISORDER: ICD-10-CM

## 2024-12-09 PROCEDURE — 90837 PSYTX W PT 60 MINUTES: CPT | Mod: 93 | Performed by: COUNSELOR
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
## 2025-01-06 ENCOUNTER — TRANSFERRED RECORDS (OUTPATIENT)
Dept: HEALTH INFORMATION MANAGEMENT | Facility: CLINIC | Age: 65
End: 2025-01-06
Payer: COMMERCIAL

## 2025-01-15 ENCOUNTER — NURSE TRIAGE (OUTPATIENT)
Dept: FAMILY MEDICINE | Facility: OTHER | Age: 65
End: 2025-01-15

## 2025-01-15 NOTE — TELEPHONE ENCOUNTER
S-(situation): Patient is calling and stated she was diagnosed with influenza A on 1/3/2025 through a visit to the [facility].  She stated she was given an antibiotic for her sinus congestion.  Patient verbalized she is still feeling very tired.  She stated she feels she may be getting dehydrated as she is having difficulty wanting to eat or drink because of nausea.  Patient verbalized she went to [facility] today to  a couple of items, and felt exhausted upon returning.      B-(background): Patient diagnosed 11 days ago with Influenza A     A-(assessment): sinus infection with influenza A diagnosis     R-(recommendations): Per RN protocol, advised patient of home care.  Advised patient to seek to schedule an office visit when experiencing symptoms as per protocol.  Advised patient to seek urgent/ emergency care for worsening symptoms per protocol.  Patient stated understanding.  Patient stated she will continue to do home care until she feels she is getting worse.    Reason for Disposition   Influenza diagnosed by doctor (or NP/PA) and no complications    Additional Information   Negative: SEVERE difficulty breathing (e.g., struggling for each breath, speaks in single words)   Negative: Bluish (or gray) lips or face   Negative: Shock suspected (e.g., cold/pale/clammy skin, too weak to stand, low BP, rapid pulse)   Negative: Sounds like a life-threatening emergency to the triager   Negative: Asthma attack (coughing, wheezing) is main concern and previously diagnosed with asthma OR using asthma medicines   Negative: Chest pain  (Exception: MILD central chest pain, present only when coughing.)   Negative: Headache and stiff neck (can't touch chin to chest)   Negative: Difficulty breathing that is not severe and not relieved by cleaning out the nose   Negative: Patient sounds very sick or weak to the triager   Negative: Fever present > 3 days (72 hours)   Negative: Fever returns after gone for over 24  "hours and symptoms worse or not improved   Negative: Using nasal washes and pain medicine > 24 hours and sinus pain (around cheekbone or eye) persists   Negative: Earache   Negative: Fever > 104 F (40 C)   Negative: Patient wants to be seen   Negative: Nasal discharge present > 10 days   Negative: Cough present > 3 weeks    Answer Assessment - Initial Assessment Questions  1. DIAGNOSIS CONFIRMATION: \"When was the influenza diagnosed?\" \"By whom?\" \"Did you get a test for it?\"      Urgent care in [place] on 1/3/2025    2. MEDICINES: \"Were you prescribed any medications for the influenza?\"  (e.g., zanamivir [Relenza], oseltamavir [Tamiflu]).       Antibiotic for sinus infection     3. ONSET of SYMPTOMS: \"When did your symptoms start?\"      Around New years, 2025    4. SYMPTOMS: \"What symptoms are you most concerned about?\" (e.g., runny nose, stuffy nose, sore throat, cough, breathing difficulty, fever)      Stuffy nose, sore throat from cough, cough, SOB with activity    5. COUGH: \"How bad is the cough?\"      Moderate - has inhaler    6. FEVER: \"Do you have a fever?\" If Yes, ask: \"What is your temperature, how was it measured, and when did it start?\"      No    7. RESPIRATORY DISTRESS: \"Are you having any trouble breathing?\" If Yes, ask: \"Describe your breathing.\"       SOB with activity    8. FLU VACCINE: \"Did you receive a flu shot this year?\" (e.g., seasonal influenza, H1N1)      No    9. PREGNANCY: \"Is there any chance you are pregnant?\" \"When was your last menstrual period?\"      NA    10. HIGH RISK for COMPLICATIONS: \"Do you have any heart or lung problems? Do you have a weakened immune system?\" (e.g., CHF, COPD, asthma, HIV positive, chemotherapy, renal failure, diabetes mellitus, sickle cell anemia)        asthma    Protocols used: Influenza (Flu) Follow-up Call-[facility]-[place]  Catie Patiño RN    "

## 2025-01-27 ENCOUNTER — HOSPITAL ENCOUNTER (EMERGENCY)
Facility: CLINIC | Age: 65
Discharge: HOME | End: 2025-01-27
Attending: NURSE PRACTITIONER | Admitting: NURSE PRACTITIONER
Payer: COMMERCIAL

## 2025-01-27 ENCOUNTER — APPOINTMENT (OUTPATIENT)
Dept: GENERAL RADIOLOGY | Facility: CLINIC | Age: 65
End: 2025-01-27
Attending: STUDENT IN AN ORGANIZED HEALTH CARE EDUCATION/TRAINING PROGRAM
Payer: COMMERCIAL

## 2025-01-27 VITALS
WEIGHT: 151.7 LBS | HEIGHT: 65 IN | RESPIRATION RATE: 24 BRPM | HEART RATE: 82 BPM | TEMPERATURE: 98 F | OXYGEN SATURATION: 98 % | DIASTOLIC BLOOD PRESSURE: 76 MMHG | BODY MASS INDEX: 25.27 KG/M2 | SYSTOLIC BLOOD PRESSURE: 120 MMHG

## 2025-01-27 DIAGNOSIS — J20.9 ACUTE BRONCHITIS WITH SYMPTOMS > 10 DAYS: ICD-10-CM

## 2025-01-27 LAB
FLUAV RNA SPEC QL NAA+PROBE: NEGATIVE
FLUBV RNA RESP QL NAA+PROBE: NEGATIVE
RSV RNA SPEC NAA+PROBE: NEGATIVE
SARS-COV-2 RNA RESP QL NAA+PROBE: NEGATIVE

## 2025-01-27 PROCEDURE — 99284 EMERGENCY DEPT VISIT MOD MDM: CPT | Mod: 25 | Performed by: NURSE PRACTITIONER

## 2025-01-27 PROCEDURE — 99284 EMERGENCY DEPT VISIT MOD MDM: CPT | Performed by: NURSE PRACTITIONER

## 2025-01-27 PROCEDURE — 71046 X-RAY EXAM CHEST 2 VIEWS: CPT

## 2025-01-27 PROCEDURE — 87637 SARSCOV2&INF A&B&RSV AMP PRB: CPT | Performed by: NURSE PRACTITIONER

## 2025-01-27 RX ORDER — AZITHROMYCIN 250 MG/1
TABLET, FILM COATED ORAL
Qty: 6 TABLET | Refills: 0 | Status: SHIPPED | OUTPATIENT
Start: 2025-01-27 | End: 2025-02-01

## 2025-01-27 RX ORDER — PREDNISONE 20 MG/1
TABLET ORAL
Qty: 10 TABLET | Refills: 0 | Status: SHIPPED | OUTPATIENT
Start: 2025-01-27

## 2025-01-27 ASSESSMENT — ACTIVITIES OF DAILY LIVING (ADL): ADLS_ACUITY_SCORE: 47
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
## 2025-01-27 NOTE — ED TRIAGE NOTES
PT presents with c/o flu-like symptoms. Productive cough and body aches, headaches that started about a month now. Tested positive for Influenza A on January 6, 2025  in [place] and was prescribed Doxycyline for 5 days.

## 2025-01-27 NOTE — DISCHARGE INSTRUCTIONS
Prednisone 40 mg daily for 5 days.  Z-Ankush per package instructions.    Recheck for any worsening symptoms or persistent symptoms lasting longer than 7 days.

## 2025-01-31 ENCOUNTER — MYC REFILL (OUTPATIENT)
Dept: FAMILY MEDICINE | Facility: OTHER | Age: 65
End: 2025-01-31

## 2025-01-31 DIAGNOSIS — J45.40 MODERATE PERSISTENT ASTHMA WITHOUT COMPLICATION: ICD-10-CM

## 2025-02-01 ENCOUNTER — HEALTH MAINTENANCE LETTER (OUTPATIENT)
Age: 65
End: 2025-02-01

## 2025-02-03 RX ORDER — ALBUTEROL SULFATE 90 UG/1
INHALANT RESPIRATORY (INHALATION)
Qty: 18 G | Refills: 0 | Status: SHIPPED | OUTPATIENT
Start: 2025-02-03

## 2025-02-03 NOTE — TELEPHONE ENCOUNTER
Prescription approved per [facility] Refill Protocol.  Elle Childress RN  [facility]

## 2025-02-06 DIAGNOSIS — E78.5 HYPERLIPIDEMIA LDL GOAL <160: ICD-10-CM

## 2025-02-06 RX ORDER — ATORVASTATIN CALCIUM 20 MG/1
20 TABLET, FILM COATED ORAL DAILY
Qty: 90 TABLET | Refills: 0 | Status: SHIPPED | OUTPATIENT
Start: 2025-02-06

## 2025-02-06 NOTE — TELEPHONE ENCOUNTER
Prescription approved per [facility] Refill Protocol.  Elle Childress RN  [facility]

## 2025-02-07 ENCOUNTER — OFFICE VISIT (OUTPATIENT)
Dept: FAMILY MEDICINE | Facility: CLINIC | Age: 65
End: 2025-02-07
Payer: COMMERCIAL

## 2025-02-07 VITALS
HEIGHT: 65 IN | RESPIRATION RATE: 20 BRPM | HEART RATE: 103 BPM | SYSTOLIC BLOOD PRESSURE: 120 MMHG | TEMPERATURE: 97.6 F | OXYGEN SATURATION: 99 % | DIASTOLIC BLOOD PRESSURE: 80 MMHG | WEIGHT: 150 LBS | BODY MASS INDEX: 24.99 KG/M2

## 2025-02-07 DIAGNOSIS — G93.32 CHRONIC FATIGUE SYNDROME: Primary | ICD-10-CM

## 2025-02-07 DIAGNOSIS — J32.9 SINUSITIS, UNSPECIFIED CHRONICITY, UNSPECIFIED LOCATION: ICD-10-CM

## 2025-02-07 LAB
CRP SERPL-MCNC: 5.26 MG/L
ERYTHROCYTE [SEDIMENTATION RATE] IN BLOOD BY WESTERGREN METHOD: 31 MM/HR (ref 0–30)
MONOCYTES NFR BLD AUTO: NEGATIVE %
TSH SERPL DL<=0.005 MIU/L-ACNC: 0.99 UIU/ML (ref 0.3–4.2)

## 2025-02-07 PROCEDURE — 86308 HETEROPHILE ANTIBODY SCREEN: CPT | Performed by: FAMILY MEDICINE

## 2025-02-07 PROCEDURE — 86140 C-REACTIVE PROTEIN: CPT | Performed by: FAMILY MEDICINE

## 2025-02-07 PROCEDURE — 85652 RBC SED RATE AUTOMATED: CPT | Performed by: FAMILY MEDICINE

## 2025-02-07 PROCEDURE — 36415 COLL VENOUS BLD VENIPUNCTURE: CPT | Performed by: FAMILY MEDICINE

## 2025-02-07 PROCEDURE — 84443 ASSAY THYROID STIM HORMONE: CPT | Performed by: FAMILY MEDICINE

## 2025-02-07 PROCEDURE — 99214 OFFICE O/P EST MOD 30 MIN: CPT | Performed by: FAMILY MEDICINE

## 2025-02-07 ASSESSMENT — ASTHMA QUESTIONNAIRES
QUESTION_4 LAST FOUR WEEKS HOW OFTEN HAVE YOU USED YOUR RESCUE INHALER OR NEBULIZER MEDICATION (SUCH AS ALBUTEROL): NOT AT ALL
QUESTION_3 LAST FOUR WEEKS HOW OFTEN DID YOUR ASTHMA SYMPTOMS (WHEEZING, COUGHING, SHORTNESS OF BREATH, CHEST TIGHTNESS OR PAIN) WAKE YOU UP AT NIGHT OR EARLIER THAN USUAL IN THE MORNING: ONCE OR TWICE
EMERGENCY_ROOM_LAST_YEAR_TOTAL: ONE
ACT_TOTALSCORE: 20
QUESTION_5 LAST FOUR WEEKS HOW WOULD YOU RATE YOUR ASTHMA CONTROL: COMPLETELY CONTROLLED
QUESTION_2 LAST FOUR WEEKS HOW OFTEN HAVE YOU HAD SHORTNESS OF BREATH: THREE TO SIX TIMES A WEEK
ACT_TOTALSCORE: 20
QUESTION_1 LAST FOUR WEEKS HOW MUCH OF THE TIME DID YOUR ASTHMA KEEP YOU FROM GETTING AS MUCH DONE AT WORK, SCHOOL OR AT HOME: SOME OF THE TIME

## 2025-02-07 ASSESSMENT — PAIN SCALES - GENERAL: PAINLEVEL_OUTOF10: SEVERE PAIN (8)
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
## 2025-02-10 ENCOUNTER — VIRTUAL VISIT (OUTPATIENT)
Dept: FAMILY MEDICINE | Facility: OTHER | Age: 65
End: 2025-02-10
Payer: COMMERCIAL

## 2025-02-10 DIAGNOSIS — M62.81 GENERALIZED MUSCLE WEAKNESS: Primary | ICD-10-CM

## 2025-02-10 DIAGNOSIS — R41.89 BRAIN FOG: ICD-10-CM

## 2025-02-10 PROCEDURE — 98005 SYNCH AUDIO-VIDEO EST LOW 20: CPT | Performed by: PHYSICIAN ASSISTANT

## 2025-02-10 ASSESSMENT — ENCOUNTER SYMPTOMS: FATIGUE: 1

## 2025-02-10 NOTE — PROGRESS NOTES
Mary is a 64 year old who is being evaluated via a billable video visit.    How would you like to obtain your AVS? MyChart  If the video visit is dropped, the invitation should be resent by: Text to cell phone: [phone]  Will anyone else be joining your video visit? No      Assessment & Plan       Generalized muscle weakness  Brain fog  Unfortunately she recently had a viral infection and since then has been struggling pretty severely with weakness of her muscles, easily fatigued when active and brain fog.  When she is laying down she is generally ok but when up and moving around it is severe. She has continued to work but it has been a struggle.  Her lab work recently done by Dr. Lujan did reveal some elevated inflammatory markers.  Suspect that viral infections have lead to complications which could include myositis versus rhabdomyolysis and cognitive concerns as well.  Will obtain some muscle markers to see if there is muscle breakdown, make sure her liver function is normal and recheck to see if the inflammatory markers are going up, down or stable. Will also check EBV specifically as monospot was negative.  Will also check UA for any signs of protein.  Encouraged her to work on staying hydrated and increasing protein in her diet to help her with healing.  Consider MRI of brain if persistent symptoms. Did consider meningitis but she has some headaches without severe pain and can move neck normally and has had multiple visits since so less likely this is the cause.   - EBV Capsid Antibody IgM; Future  - EBV Capsid Antibody IgG; Future  - CRP, inflammation; Future  - ESR: Erythrocyte sedimentation rate; Future  - Comprehensive metabolic panel (BMP + Alb, Alk Phos, ALT, AST, Total. Bili, TP); Future  - CBC with platelets and differential; Future  - Lactate Dehydrogenase; Future  - CK total; Future  - UA Macroscopic with reflex to Microscopic and Culture - Lab Collect; Future                    Subjective    Mary is a 64 year old, presenting for the following health issues:  Generalized Weakness, Fatigue, Tingling, and brain fog      2/10/2025    10:47 AM   Additional Questions   Roomed by Ifeoma DAVIS     Video Start Time: 11:09 AM    Generalized Weakness  Associated symptoms include fatigue.   Fatigue  Associated symptoms include fatigue.        - She has been sick with brain fog, her legs will get tingly and vibrate.   - When she lays down it isn't as bad but then when she gets up then the brain fog is severe.   - This has only been going on since influenza.   - Her muscles were very sore. She missed 8 days while she had influenza A.  She had bronchitis after that.    - The longer the day goes on the worse it gets.   - water - getting in 3 bottles in and then drinks some body armors per day.  She is also trying to drink her father's protein shakes.        Review of Systems  Constitutional, HEENT, cardiovascular, pulmonary, GI, , musculoskeletal, neuro, skin, endocrine and psych systems are negative, except as otherwise noted.      Objective           Vitals:  No vitals were obtained today due to virtual visit.    Physical Exam   GENERAL: alert and no distress  EYES: Eyes grossly normal to inspection.  No discharge or erythema, or obvious scleral/conjunctival abnormalities.  RESP: No audible wheeze, cough, or visible cyanosis.    SKIN: Visible skin clear. No significant rash, abnormal pigmentation or lesions.  NEURO: Cranial nerves grossly intact.  Mentation and speech appropriate for age.  PSYCH: Appropriate affect, tone, and pace of words          Video-Visit Details    Type of service:  Video Visit   Video End Time:11:24 AM  Originating Location (pt. Location): Home    Distant Location (provider location):  Off-site  Platform used for Video Visit: Aubree  Signed Electronically by: Tomasa Medina PA-C

## 2025-02-21 ENCOUNTER — HOSPITAL ENCOUNTER (OUTPATIENT)
Dept: CT IMAGING | Facility: CLINIC | Age: 65
Discharge: HOME | End: 2025-02-21
Attending: PHYSICIAN ASSISTANT | Admitting: PHYSICIAN ASSISTANT
Payer: COMMERCIAL

## 2025-02-21 DIAGNOSIS — R63.4 WEIGHT LOSS: ICD-10-CM

## 2025-02-21 DIAGNOSIS — J32.9 SINUSITIS, UNSPECIFIED CHRONICITY, UNSPECIFIED LOCATION: ICD-10-CM

## 2025-02-21 DIAGNOSIS — Z87.891 PERSONAL HISTORY OF TOBACCO USE, PRESENTING HAZARDS TO HEALTH: ICD-10-CM

## 2025-02-21 PROCEDURE — 71250 CT THORAX DX C-: CPT

## 2025-02-21 PROCEDURE — 70486 CT MAXILLOFACIAL W/O DYE: CPT

## 2025-02-23 ENCOUNTER — HOSPITAL ENCOUNTER (EMERGENCY)
Facility: CLINIC | Age: 65
Discharge: HOME | End: 2025-02-23
Payer: COMMERCIAL

## 2025-02-23 VITALS
HEART RATE: 78 BPM | SYSTOLIC BLOOD PRESSURE: 139 MMHG | WEIGHT: 150 LBS | DIASTOLIC BLOOD PRESSURE: 85 MMHG | BODY MASS INDEX: 24.96 KG/M2 | RESPIRATION RATE: 17 BRPM | TEMPERATURE: 98.8 F | OXYGEN SATURATION: 98 %

## 2025-02-23 DIAGNOSIS — J32.0 CHRONIC MAXILLARY SINUSITIS: ICD-10-CM

## 2025-02-23 DIAGNOSIS — J32.2 CHRONIC ETHMOIDAL SINUSITIS: ICD-10-CM

## 2025-02-23 DIAGNOSIS — J01.01 ACUTE RECURRENT MAXILLARY SINUSITIS: ICD-10-CM

## 2025-02-23 DIAGNOSIS — R51.9 CHRONIC NONINTRACTABLE HEADACHE, UNSPECIFIED HEADACHE TYPE: ICD-10-CM

## 2025-02-23 DIAGNOSIS — R53.81 MALAISE AND FATIGUE: ICD-10-CM

## 2025-02-23 DIAGNOSIS — R53.83 MALAISE AND FATIGUE: ICD-10-CM

## 2025-02-23 DIAGNOSIS — G89.29 CHRONIC NONINTRACTABLE HEADACHE, UNSPECIFIED HEADACHE TYPE: ICD-10-CM

## 2025-02-23 LAB
ALBUMIN SERPL BCG-MCNC: 4.2 G/DL (ref 3.5–5.2)
ALP SERPL-CCNC: 73 U/L (ref 40–150)
ALT SERPL W P-5'-P-CCNC: 11 U/L (ref 0–50)
ANION GAP SERPL CALCULATED.3IONS-SCNC: 11 MMOL/L (ref 7–15)
AST SERPL W P-5'-P-CCNC: 17 U/L (ref 0–45)
BASOPHILS # BLD AUTO: 0.1 10E3/UL (ref 0–0.2)
BASOPHILS NFR BLD AUTO: 1 %
BILIRUB SERPL-MCNC: 0.8 MG/DL
BUN SERPL-MCNC: 6.4 MG/DL (ref 8–23)
CALCIUM SERPL-MCNC: 9.4 MG/DL (ref 8.8–10.4)
CHLORIDE SERPL-SCNC: 105 MMOL/L (ref 98–107)
CREAT SERPL-MCNC: 0.89 MG/DL (ref 0.51–0.95)
CRP SERPL-MCNC: <3 MG/L
EGFRCR SERPLBLD CKD-EPI 2021: 72 ML/MIN/1.73M2
EOSINOPHIL # BLD AUTO: 0.1 10E3/UL (ref 0–0.7)
EOSINOPHIL NFR BLD AUTO: 1 %
ERYTHROCYTE [DISTWIDTH] IN BLOOD BY AUTOMATED COUNT: 12.3 % (ref 10–15)
GLUCOSE SERPL-MCNC: 98 MG/DL (ref 70–99)
HCO3 SERPL-SCNC: 24 MMOL/L (ref 22–29)
HCT VFR BLD AUTO: 41.1 % (ref 35–47)
HGB BLD-MCNC: 14.3 G/DL (ref 11.7–15.7)
HOLD SPECIMEN: NORMAL
IMM GRANULOCYTES # BLD: 0 10E3/UL
IMM GRANULOCYTES NFR BLD: 0 %
LYMPHOCYTES # BLD AUTO: 1.9 10E3/UL (ref 0.8–5.3)
LYMPHOCYTES NFR BLD AUTO: 29 %
MCH RBC QN AUTO: 29.4 PG (ref 26.5–33)
MCHC RBC AUTO-ENTMCNC: 34.8 G/DL (ref 31.5–36.5)
MCV RBC AUTO: 84 FL (ref 78–100)
MONOCYTES # BLD AUTO: 0.4 10E3/UL (ref 0–1.3)
MONOCYTES NFR BLD AUTO: 5 %
NEUTROPHILS # BLD AUTO: 4.1 10E3/UL (ref 1.6–8.3)
NEUTROPHILS NFR BLD AUTO: 63 %
NRBC # BLD AUTO: 0 10E3/UL
NRBC BLD AUTO-RTO: 0 /100
PLATELET # BLD AUTO: 266 10E3/UL (ref 150–450)
POTASSIUM SERPL-SCNC: 4 MMOL/L (ref 3.4–5.3)
PROT SERPL-MCNC: 6.7 G/DL (ref 6.4–8.3)
RBC # BLD AUTO: 4.87 10E6/UL (ref 3.8–5.2)
SODIUM SERPL-SCNC: 140 MMOL/L (ref 135–145)
TSH SERPL DL<=0.005 MIU/L-ACNC: 1.92 UIU/ML (ref 0.3–4.2)
WBC # BLD AUTO: 6.5 10E3/UL (ref 4–11)

## 2025-02-23 PROCEDURE — 84443 ASSAY THYROID STIM HORMONE: CPT

## 2025-02-23 PROCEDURE — 87798 DETECT AGENT NOS DNA AMP: CPT

## 2025-02-23 PROCEDURE — 86618 LYME DISEASE ANTIBODY: CPT

## 2025-02-23 PROCEDURE — 99284 EMERGENCY DEPT VISIT MOD MDM: CPT

## 2025-02-23 PROCEDURE — 86038 ANTINUCLEAR ANTIBODIES: CPT

## 2025-02-23 PROCEDURE — 36415 COLL VENOUS BLD VENIPUNCTURE: CPT

## 2025-02-23 PROCEDURE — 85004 AUTOMATED DIFF WBC COUNT: CPT

## 2025-02-23 PROCEDURE — 82040 ASSAY OF SERUM ALBUMIN: CPT

## 2025-02-23 PROCEDURE — 86140 C-REACTIVE PROTEIN: CPT

## 2025-02-23 PROCEDURE — 80053 COMPREHEN METABOLIC PANEL: CPT

## 2025-02-23 RX ORDER — PREDNISONE 20 MG/1
TABLET ORAL
Qty: 10 TABLET | Refills: 0 | Status: SHIPPED | OUTPATIENT
Start: 2025-02-23

## 2025-02-23 RX ORDER — CEFPODOXIME PROXETIL 200 MG/1
200 TABLET, FILM COATED ORAL 2 TIMES DAILY
Qty: 20 TABLET | Refills: 0 | Status: SHIPPED | OUTPATIENT
Start: 2025-02-23 | End: 2025-03-05

## 2025-02-23 ASSESSMENT — ENCOUNTER SYMPTOMS
RHINORRHEA: 0
EYE REDNESS: 0
FEVER: 0
NECK STIFFNESS: 0
SORE THROAT: 0
NAUSEA: 0
FATIGUE: 1
ABDOMINAL PAIN: 0
DIARRHEA: 0
DYSURIA: 0
MYALGIAS: 0
HEMATURIA: 0
HEADACHES: 0
SHORTNESS OF BREATH: 0
APPETITE CHANGE: 0
DIZZINESS: 0
COUGH: 0
SINUS PRESSURE: 1
VOMITING: 0
ACTIVITY CHANGE: 0
CHILLS: 0
ARTHRALGIAS: 0

## 2025-02-23 ASSESSMENT — COLUMBIA-SUICIDE SEVERITY RATING SCALE - C-SSRS
6. HAVE YOU EVER DONE ANYTHING, STARTED TO DO ANYTHING, OR PREPARED TO DO ANYTHING TO END YOUR LIFE?: NO
1. IN THE PAST MONTH, HAVE YOU WISHED YOU WERE DEAD OR WISHED YOU COULD GO TO SLEEP AND NOT WAKE UP?: NO
2. HAVE YOU ACTUALLY HAD ANY THOUGHTS OF KILLING YOURSELF IN THE PAST MONTH?: NO

## 2025-02-23 ASSESSMENT — ACTIVITIES OF DAILY LIVING (ADL)
ADLS_ACUITY_SCORE: 46
ADLS_ACUITY_SCORE: 46

## 2025-02-23 NOTE — DISCHARGE INSTRUCTIONS
You are seen today for chronic sinusitis.  Also have fluid in your ears but it does not look infected.  I prescribed antibiotics that I sent to the pharmacy please follow directions on the bottle and take them as prescribed do not stop taking the antibiotics even if you feel better.  Please stop taking Sudafed.  Continue taking your Flonase daily. Please take 25mg benadryl at bedtime to help drain the fluid in the ears. I did run some labs today that you will need to follow-up with your primary care provider.  You can also see the results in your MyChart.  Please feel better soon.
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
## 2025-02-23 NOTE — ED TRIAGE NOTES
"Pt presents with headache. Pt states had recent head CT which shows sinus issues. Pt has been on 3 different antibiotics with no relief. Pt mentions she had influenza A in January. Recent chest CT also. Pt feels \"cloudy\" . Pt alert and orientated. Pt pain is in temples and back of head.      Triage Assessment (Adult)       Row Name 02/23/25 1156          Triage Assessment    Airway WDL WDL        Respiratory WDL    Respiratory WDL WDL        Skin Circulation/Temperature WDL    Skin Circulation/Temperature WDL WDL        Cardiac WDL    Cardiac WDL WDL        Peripheral/Neurovascular WDL    Peripheral Neurovascular WDL WDL        Cognitive/Neuro/Behavioral WDL    Cognitive/Neuro/Behavioral WDL WDL                     "

## 2025-02-24 LAB
A PHAGOCYTOPH DNA BLD QL NAA+PROBE: NOT DETECTED
B BURGDOR IGG+IGM SER QL: 0.07
BABESIA DNA BLD QL NAA+PROBE: NOT DETECTED
EHRLICHIA DNA SPEC QL NAA+PROBE: NOT DETECTED

## 2025-02-25 ENCOUNTER — TELEPHONE (OUTPATIENT)
Dept: NURSING | Facility: CLINIC | Age: 65
End: 2025-02-25
Payer: COMMERCIAL

## 2025-02-25 DIAGNOSIS — J45.40 MODERATE PERSISTENT ASTHMA WITHOUT COMPLICATION: ICD-10-CM

## 2025-02-25 LAB
ANA PAT SER IF-IMP: ABNORMAL
ANA SER QL IF: POSITIVE
ANA TITR SER IF: ABNORMAL {TITER}

## 2025-02-25 RX ORDER — ALBUTEROL SULFATE 90 UG/1
INHALANT RESPIRATORY (INHALATION)
Qty: 18 G | Refills: 2 | Status: SHIPPED | OUTPATIENT
Start: 2025-02-25

## 2025-02-25 NOTE — LETTER
February 25, 2025        Linda Walsh  [address]  [place] [address]          Dear Linda Walsh:    You were seen in the [facility] Emergency Department at [facility] on 2/23/2025.  We are unable to reach you by phone, so we are sending you this letter.     Please call [facility] Emergency Department lab result nurse at [phone], as we have information to relay to you.    Best time to call back is between 9AM and 5:30PM, 7 days a week.      Sincerely,     [facility] Emergency Department Lab Result RN  [phone]

## 2025-02-25 NOTE — TELEPHONE ENCOUNTER
[facility]    Reason for call: Lab Result Notification     Lab Result (including Rx patient on, if applicable).  If culture, copy of lab report at bottom.  Lab Result:       Patient's current Symptoms:   At 1:55PM, Left voicemail message requesting a call back to [facility] ED Lab Result RN at [phone]. RN is available every day between 9 a.m. and 5:30 p.m.   Letter sent    RN Recommendations/Instructions per [place] ED lab result protocol:   [facility] ED lab result protocol utilized: misc    Diaz Perry RN

## 2025-03-03 ENCOUNTER — LAB (OUTPATIENT)
Dept: LAB | Facility: OTHER | Age: 65
End: 2025-03-03
Payer: COMMERCIAL

## 2025-03-03 DIAGNOSIS — R53.81 MALAISE AND FATIGUE: ICD-10-CM

## 2025-03-03 DIAGNOSIS — M62.81 GENERALIZED MUSCLE WEAKNESS: ICD-10-CM

## 2025-03-03 DIAGNOSIS — R53.83 MALAISE AND FATIGUE: ICD-10-CM

## 2025-03-03 LAB
ALBUMIN SERPL BCG-MCNC: 4.4 G/DL (ref 3.5–5.2)
ALBUMIN UR-MCNC: NEGATIVE MG/DL
ALP SERPL-CCNC: 70 U/L (ref 40–150)
ALT SERPL W P-5'-P-CCNC: 16 U/L (ref 0–50)
ANION GAP SERPL CALCULATED.3IONS-SCNC: 14 MMOL/L (ref 7–15)
APPEARANCE UR: CLEAR
AST SERPL W P-5'-P-CCNC: 20 U/L (ref 0–45)
BASOPHILS # BLD AUTO: 0 10E3/UL (ref 0–0.2)
BASOPHILS NFR BLD AUTO: 0 %
BILIRUB SERPL-MCNC: 0.9 MG/DL
BILIRUB UR QL STRIP: NEGATIVE
BUN SERPL-MCNC: 9.2 MG/DL (ref 8–23)
CALCIUM SERPL-MCNC: 9.4 MG/DL (ref 8.8–10.4)
CHLORIDE SERPL-SCNC: 102 MMOL/L (ref 98–107)
CK SERPL-CCNC: 52 U/L (ref 26–192)
COLOR UR AUTO: YELLOW
CREAT SERPL-MCNC: 0.91 MG/DL (ref 0.51–0.95)
CRP SERPL-MCNC: <3 MG/L
EGFRCR SERPLBLD CKD-EPI 2021: 70 ML/MIN/1.73M2
EOSINOPHIL # BLD AUTO: 0.2 10E3/UL (ref 0–0.7)
EOSINOPHIL NFR BLD AUTO: 4 %
ERYTHROCYTE [DISTWIDTH] IN BLOOD BY AUTOMATED COUNT: 12.2 % (ref 10–15)
ERYTHROCYTE [SEDIMENTATION RATE] IN BLOOD BY WESTERGREN METHOD: 9 MM/HR (ref 0–30)
GLUCOSE SERPL-MCNC: 120 MG/DL (ref 70–99)
GLUCOSE UR STRIP-MCNC: NEGATIVE MG/DL
HCO3 SERPL-SCNC: 24 MMOL/L (ref 22–29)
HCT VFR BLD AUTO: 44.1 % (ref 35–47)
HGB BLD-MCNC: 14.7 G/DL (ref 11.7–15.7)
HGB UR QL STRIP: NEGATIVE
IMM GRANULOCYTES # BLD: 0 10E3/UL
IMM GRANULOCYTES NFR BLD: 1 %
KETONES UR STRIP-MCNC: NEGATIVE MG/DL
LDH SERPL L TO P-CCNC: 186 U/L (ref 0–250)
LEUKOCYTE ESTERASE UR QL STRIP: NEGATIVE
LYMPHOCYTES # BLD AUTO: 1.8 10E3/UL (ref 0.8–5.3)
LYMPHOCYTES NFR BLD AUTO: 32 %
MCH RBC QN AUTO: 29.2 PG (ref 26.5–33)
MCHC RBC AUTO-ENTMCNC: 33.3 G/DL (ref 31.5–36.5)
MCV RBC AUTO: 88 FL (ref 78–100)
MONOCYTES # BLD AUTO: 0.3 10E3/UL (ref 0–1.3)
MONOCYTES NFR BLD AUTO: 6 %
NEUTROPHILS # BLD AUTO: 3.4 10E3/UL (ref 1.6–8.3)
NEUTROPHILS NFR BLD AUTO: 58 %
NITRATE UR QL: NEGATIVE
PH UR STRIP: 6 [PH] (ref 5–7)
PLATELET # BLD AUTO: 257 10E3/UL (ref 150–450)
POTASSIUM SERPL-SCNC: 4.2 MMOL/L (ref 3.4–5.3)
PROT SERPL-MCNC: 6.7 G/DL (ref 6.4–8.3)
RBC # BLD AUTO: 5.03 10E6/UL (ref 3.8–5.2)
SODIUM SERPL-SCNC: 140 MMOL/L (ref 135–145)
SP GR UR STRIP: 1.02 (ref 1–1.03)
UROBILINOGEN UR STRIP-ACNC: 0.2 E.U./DL
WBC # BLD AUTO: 5.8 10E3/UL (ref 4–11)

## 2025-03-03 PROCEDURE — 86039 ANTINUCLEAR ANTIBODIES (ANA): CPT

## 2025-03-03 PROCEDURE — 85025 COMPLETE CBC W/AUTO DIFF WBC: CPT

## 2025-03-03 PROCEDURE — 86665 EPSTEIN-BARR CAPSID VCA: CPT

## 2025-03-03 PROCEDURE — 85652 RBC SED RATE AUTOMATED: CPT

## 2025-03-03 PROCEDURE — 80053 COMPREHEN METABOLIC PANEL: CPT

## 2025-03-03 PROCEDURE — 36415 COLL VENOUS BLD VENIPUNCTURE: CPT

## 2025-03-03 PROCEDURE — 82550 ASSAY OF CK (CPK): CPT

## 2025-03-03 PROCEDURE — 86140 C-REACTIVE PROTEIN: CPT

## 2025-03-03 PROCEDURE — 81003 URINALYSIS AUTO W/O SCOPE: CPT

## 2025-03-03 PROCEDURE — 86038 ANTINUCLEAR ANTIBODIES: CPT

## 2025-03-03 PROCEDURE — 83615 LACTATE (LD) (LDH) ENZYME: CPT

## 2025-03-04 LAB
ANA PAT SER IF-IMP: ABNORMAL
ANA SER QL IF: POSITIVE
ANA TITR SER IF: ABNORMAL {TITER}
EBV VCA IGG SER IA-ACNC: >750 U/ML
EBV VCA IGG SER IA-ACNC: POSITIVE

## 2025-03-05 DIAGNOSIS — K21.9 GASTROESOPHAGEAL REFLUX DISEASE WITHOUT ESOPHAGITIS: ICD-10-CM

## 2025-03-05 LAB
EBV VCA IGM SER IA-ACNC: <10 U/ML
EBV VCA IGM SER IA-ACNC: NORMAL

## 2025-03-05 RX ORDER — OMEPRAZOLE 40 MG/1
40 CAPSULE, DELAYED RELEASE ORAL DAILY
Qty: 90 CAPSULE | Refills: 0 | Status: SHIPPED | OUTPATIENT
Start: 2025-03-05

## 2025-03-07 ENCOUNTER — TELEPHONE (OUTPATIENT)
Dept: FAMILY MEDICINE | Facility: OTHER | Age: 65
End: 2025-03-07
Payer: COMMERCIAL

## 2025-03-07 NOTE — TELEPHONE ENCOUNTER
Reason for Call:  Appointment Request    Patient requesting this type of appt:  follow up from test results    Requested provider: Tomasa Medina    Reason patient unable to be scheduled: Not within requested timeframe    When does patient want to be seen/preferred time: 3-7 days    Comments: Patient received test results from recent labs and would like to discuss with pcp    [facility] or would you prefer to receive a phone call?:   Patient would prefer a phone call   Okay to leave a detailed message?: Yes at Home number on file [phone] (home)    Call taken on 3/7/2025 at 8:18 AM by Alpesh Hernandez

## 2025-03-07 NOTE — TELEPHONE ENCOUNTER
Called and LM for patient to call back. Please help schedule visit per note below.   Jayde Jauregui MA

## 2025-03-17 ENCOUNTER — TELEPHONE (OUTPATIENT)
Dept: NEUROLOGY | Facility: CLINIC | Age: 65
End: 2025-03-17

## 2025-03-17 ENCOUNTER — MYC MEDICAL ADVICE (OUTPATIENT)
Dept: FAMILY MEDICINE | Facility: OTHER | Age: 65
End: 2025-03-17

## 2025-03-17 NOTE — TELEPHONE ENCOUNTER
I called patient and scheduled her with the next available General neurologist. Patient was also placed on the wait list.

## 2025-03-17 NOTE — TELEPHONE ENCOUNTER
Health Call Center    Phone Message    May a detailed message be left on voicemail: yes     Reason for Call: Question regarding specialist protocol  Please follow protocols- only utilize this documentation for questions or concerns that are not clear in the protocol.  Contact clinic directly to clarify question(s) via phone or [employer] message.   Was Clinic Available: no  Question regarding protocol:  Fatigue, unspecified type [R53.83]  Brain fog [  Is there a referral for the requested specialist/specialty? yes  Name of referring provider: Tomasa Medina PA-C in [facility]    Patient requests [place] Location  Action Taken: [facility]    Travel Screening: Not Applicable     Date of Service:

## 2025-03-20 NOTE — PROGRESS NOTES
"ENT Consultation    Linda Walsh who is a 64 year old female seen in consultation at the request of emergency department.      History of Present Illness - Linda Walsh is a 64 year old female ***      There is no height or weight on file to calculate BMI.    {Weight Management Plan -- Delete if patient has a normal BMI:963765}    BP Readings from Last 1 Encounters:   25 139/85       {htnspecialty:450732}    Linda {IS:285643} a smoker/uses chewing tobacco.  Linda {QUITTIN::\"is not ready to quit\"}      Past Medical History -   Past Medical History:   Diagnosis Date    Anxiety     Asthma     Backache, unspecified     Esophageal reflux     Generalized anxiety disorder     Medical cannabis use 2017    used very short time and stopped    Mild intermittent asthma     Asthma, allergy induced    PONV (postoperative nausea and vomiting)     Reflex sympathetic dystrophy of left lower extremity        Current Medications -   Current Outpatient Medications:     albuterol (PROAIR HFA/PROVENTIL HFA/VENTOLIN HFA) 108 (90 Base) MCG/ACT inhaler, INHALE TWO PUFFS BY MOUTH EVERY 6 HOURS AS NEEDED FOR SHORTNESS OF BREATH OR WHEEZING, Disp: 18 g, Rfl: 2    atorvastatin (LIPITOR) 20 MG tablet, TAKE ONE TABLET BY MOUTH ONCE DAILY, Disp: 90 tablet, Rfl: 0    bisacodyl (DULCOLAX) 10 MG suppository, UNWRAP AND INSERT ONE SUPPOSITORY  RECTALLY DAILY. DISCONTINUE IF HAVING BOWEL MOVEMENTS, Disp: 12 suppository, Rfl: 6    diclofenac (VOLTAREN) 1 % topical gel, Apply 4 g topically 2 times daily, Disp: , Rfl:     fentaNYL (DURAGESIC) 25 mcg/hr 72 hr patch, Place 1 patch onto the skin every 72 hours remove old patch., Disp: 1 patch, Rfl: 0    fluticasone (FLONASE) 50 MCG/ACT nasal spray, Spray 2 sprays into both nostrils daily., Disp: 16 g, Rfl: 3    gabapentin (NEURONTIN) 300 MG capsule, Take 900 mg by mouth 2 times daily, Disp: , Rfl: 3    hydrOXYzine (ATARAX) 10 MG tablet, Take 1 tablet (10 mg) by mouth every 4 " hours as needed for anxiety, Disp: 30 tablet, Rfl: prn    LINZESS 290 MCG capsule, Take 1 capsule (290 mcg) by mouth daily as needed (constipation)., Disp: 90 capsule, Rfl: 1    NARCAN 4 MG/0.1ML nasal spray, Spray 4 mg into one nostril alternating nostrils once as needed for opioid reversal, Disp: , Rfl:     omeprazole (PRILOSEC) 40 MG DR capsule, TAKE ONE CAPSULE BY MOUTH ONCE DAILY, Disp: 90 capsule, Rfl: 0    ondansetron (ZOFRAN ODT) 4 MG ODT tab, Take 1 tablet (4 mg) by mouth every 8 hours as needed for nausea., Disp: 30 tablet, Rfl: 1    oxyCODONE (ROXICODONE) 5 MG tablet, Take 1-3 tablets (5-15 mg) by mouth every 3 hours as needed for pain (Moderate to Severe), Disp: 40 tablet, Rfl: 0    predniSONE (DELTASONE) 20 MG tablet, Take two tablets (= 40mg) each day for 5 (five) days, Disp: 10 tablet, Rfl: 0    predniSONE (DELTASONE) 20 MG tablet, Take two tablets (= 40mg) each day for 5 (five) days, Disp: 10 tablet, Rfl: 0    prochlorperazine (COMPAZINE) 10 MG tablet, Take 1 tablet (10 mg) by mouth every 6 hours as needed for nausea or vomiting, Disp: 30 tablet, Rfl: 1    traZODone (DESYREL) 50 MG tablet, Take 1 tablet (50 mg) by mouth at bedtime., Disp: 90 tablet, Rfl: 1    Allergies -   Allergies   Allergen Reactions    Contrast Dye Shortness Of Breath and Anaphylaxis    Penicillins Hives       Social History -   Social History     Socioeconomic History    Marital status:    Tobacco Use    Smoking status: Some Days     Current packs/day: 0.10     Average packs/day: 0.2 packs/day for 10.4 years (2.5 ttl pk-yrs)     Types: Cigarettes     Start date: 9/14/2011     Last attempt to quit: 9/14/2021     Passive exposure: Past    Smokeless tobacco: Never   Vaping Use    Vaping status: Never Used   Substance and Sexual Activity    Alcohol use: Yes     Comment: rare use     Drug use: Not Currently     Comment: Medical Cannabis    Sexual activity: Yes     Partners: Male     Birth control/protection: Female Surgical     " Comment: ramila   Other Topics Concern    Parent/sibling w/ CABG, MI or angioplasty before 65F 55M? Yes     Comment: mom at 62, and dad in his 50s     Social Drivers of Health     Interpersonal Safety: Low Risk  (8/19/2024)    Interpersonal Safety     Do you feel physically and emotionally safe where you currently live?: Yes     Within the past 12 months, have you been hit, slapped, kicked or otherwise physically hurt by someone?: No     Within the past 12 months, have you been humiliated or emotionally abused in other ways by your partner or ex-partner?: No       Family History -   Family History   Problem Relation Age of Onset    Heart Disease Mother         60's    Cardiovascular Mother         heart    Heart Disease Father         40's    Arthritis Father         RA    Other Cancer Father     Skin Cancer Father     Other - See Comments Father         \"mini stroke\"    Heart Surgery Father     Arthritis Paternal Grandmother         RA    Diabetes Son     Coronary Artery Disease Son     Depression Paternal Aunt         anxiety, too    Arthritis Paternal Aunt         RA       Review of Systems - As per HPI and PMHx, otherwise review of system review of the head and neck negative. Otherwise 10+ review of system is negative    Physical Exam  LMP  (LMP Unknown)   BMI: There is no height or weight on file to calculate BMI.    General - The patient is well nourished and well developed, and appears to have good nutritional status.  Alert and oriented to person and place, answers questions and cooperates with examination appropriately.    SKIN - No suspicious lesions or rashes.  Respiration - No respiratory distress.  Head and Face - Normocephalic and atraumatic, with no gross asymmetry noted of the contour of the facial features.  The facial nerve is intact, with strong symmetric movements.    Voice and Breathing - The patient was breathing comfortably without the use of accessory muscles. The patients voice was clear and " strong, and had appropriate pitch and quality.    Ears - Bilateral pinna and EACs with normal appearing overlying skin. Tympanic membrane intact with good mobility on pneumatic otoscopy bilaterally. Bony landmarks of the ossicular chain are normal. The tympanic membranes are normal in appearance. No retraction, perforation, or masses.  No fluid or purulence was seen in the external canal or the middle ear.     Eyes - Extraocular movements intact.  Sclera were not icteric or injected, conjunctiva were pink and moist.    Mouth - Examination of the oral cavity showed pink, healthy oral mucosa. No lesions or ulcerations noted.  The tongue was mobile and midline, and the dentition were in good condition.      Throat - The walls of the oropharynx were smooth, pink, moist, symmetric, and had no lesions or ulcerations.  The tonsillar pillars and soft palate were symmetric.  The uvula was midline on elevation.    Neck - Normal midline excursion of the laryngotracheal complex during swallowing.  Full range of motion on passive movement.  Palpation of the occipital, submental, submandibular, internal jugular chain, and supraclavicular nodes did not demonstrate any abnormal lymph nodes or masses.  The carotid pulse was palpable bilaterally.  Palpation of the thyroid was soft and smooth, with no nodules or goiter appreciated.  The trachea was mobile and midline.    Nose - External contour is symmetric, no gross deflection or scars.  Nasal mucosa is pink and moist with no abnormal mucus.  The septum was midline and non-obstructive, turbinates of normal size and position.  No polyps, masses, or purulence noted on examination.    Neuro - Nonfocal neuro exam is normal, CN 2 through 12 intact, normal gait and muscle tone.      Performed in clinic today:  [facility]      A/P - Linda Walsh is a 64 year old female ***      Steffen Messina MD    "Cuff Size: Adult Regular)   Pulse 101   Temp 97.2  F (36.2  C) (Temporal)   Ht 1.651 m (5' 5\")   Wt 68 kg (150 lb)   LMP  (LMP Unknown)   BMI 24.96 kg/m    BMI: Body mass index is 24.96 kg/m .    General - The patient is well nourished and well developed, and appears to have good nutritional status.  Alert and oriented to person and place, answers questions and cooperates with examination appropriately.    SKIN - No suspicious lesions or rashes.  Respiration - No respiratory distress.  Head and Face - Normocephalic and atraumatic, with no gross asymmetry noted of the contour of the facial features.  The facial nerve is intact, with strong symmetric movements.    Voice and Breathing - The patient was breathing comfortably without the use of accessory muscles. The patients voice was clear and strong, and had appropriate pitch and quality.    Ears - Bilateral pinna and EACs with normal appearing overlying skin. Tympanic membrane intact with good mobility on pneumatic otoscopy bilaterally. Bony landmarks of the ossicular chain are normal. The tympanic membranes are normal in appearance. No retraction, perforation, or masses.  No fluid or purulence was seen in the external canal or the middle ear.     Eyes - Extraocular movements intact.  Sclera were not icteric or injected, conjunctiva were pink and moist.    Mouth - Examination of the oral cavity showed pink, healthy oral mucosa. No lesions or ulcerations noted.  The tongue was mobile and midline, and the dentition were in good condition.      Throat - The walls of the oropharynx were smooth, pink, moist, symmetric, and had no lesions or ulcerations.  The tonsillar pillars and soft palate were symmetric.  The uvula was midline on elevation.    Neck - Normal midline excursion of the laryngotracheal complex during swallowing.  Full range of motion on passive movement.  Palpation of the occipital, submental, submandibular, internal jugular chain, and supraclavicular " nodes did not demonstrate any abnormal lymph nodes or masses.  The carotid pulse was palpable bilaterally.  Palpation of the thyroid was soft and smooth, with no nodules or goiter appreciated.  The trachea was mobile and midline.    Nose - External contour is symmetric, no gross deflection or scars.  Nasal mucosa is pink and moist with no abnormal mucus.  The septum was midline and non-obstructive, turbinates of normal size and position.  No polyps, masses, or purulence noted on examination.    Neuro - Nonfocal neuro exam is normal, CN 2 through 12 intact, normal gait and muscle tone.      Performed in clinic today:  To further evaluate the nasal cavity, I performed rigid nasal endoscopy.  I first sprayed the nasal cavity bilaterally with a mix of lidocaine and neosynephrine.  I then began on the left side using a 2.7mm, 30 degree rigid nasal endoscope.  The septum was deviated and the nasal airway was open.  No abnormal secretions, purulence, or polyps were noted. The left middle turbinate and middle meatus were clearly visualized and slightly edematous in appearance.  Looking up, the olfactory cleft was unobstructed.  Going further back, the sphenoethmoid recess was normal in appearance, with healthy appearing mucosa on the face of the sphenoid.  The nasopharynx was unremarkable, and the eustachian tube opening on this side was unobstructed.    I then turned my attention to the right side.  Once again, the septum was deviated, and the airway was open.  No abnormal secretions, purulence, polyps were noted.  The right middle turbinate and middle meatus were clearly visualized and quite edematous lateral wall of the nose in appearance.  Looking up, the olfactory cleft was unobstructed.  Going further back the right sphenoethmoid recess was normal in appearance, and eustachian tube opening was unobstructed.   Yellow - [id number] Mytamed      A/P - Linda Walsh is a 64 year old female with what appears to be chronic  mostly maxillary sinusitis and osteitis even though patient is not as symptomatic as the CT scan would imply.  Also little bit of ethmoid disease noted.  The rest of the sinuses appear to be clear.  She does have eustachian tube dysfunction symptoms may be related to that.  At this point would like to send her for allergy testing.  Will put her on prednisone 40 mg a day for 6 days to reduce inflammation in the sinuses as well as hopefully help her eustachian tube function.  Patient will also be started on budesonide irrigations.  Finally 1 month of doxycycline therapy will be starting.  Patient will follow-up in a couple months.      Steffen Messina MD

## 2025-03-23 ENCOUNTER — HOSPITAL ENCOUNTER (EMERGENCY)
Facility: CLINIC | Age: 65
Discharge: HOME | End: 2025-03-23
Attending: STUDENT IN AN ORGANIZED HEALTH CARE EDUCATION/TRAINING PROGRAM | Admitting: STUDENT IN AN ORGANIZED HEALTH CARE EDUCATION/TRAINING PROGRAM
Payer: COMMERCIAL

## 2025-03-23 VITALS
RESPIRATION RATE: 19 BRPM | WEIGHT: 149.1 LBS | HEART RATE: 85 BPM | BODY MASS INDEX: 24.84 KG/M2 | SYSTOLIC BLOOD PRESSURE: 120 MMHG | HEIGHT: 65 IN | DIASTOLIC BLOOD PRESSURE: 77 MMHG | TEMPERATURE: 97.5 F | OXYGEN SATURATION: 99 %

## 2025-03-23 DIAGNOSIS — R53.81 MALAISE AND FATIGUE: ICD-10-CM

## 2025-03-23 DIAGNOSIS — R53.83 MALAISE AND FATIGUE: ICD-10-CM

## 2025-03-23 DIAGNOSIS — F41.1 GENERALIZED ANXIETY DISORDER: ICD-10-CM

## 2025-03-23 PROCEDURE — 99283 EMERGENCY DEPT VISIT LOW MDM: CPT | Performed by: STUDENT IN AN ORGANIZED HEALTH CARE EDUCATION/TRAINING PROGRAM

## 2025-03-23 RX ORDER — METHYLPREDNISOLONE 4 MG/1
TABLET ORAL
Qty: 21 TABLET | Refills: 0 | Status: SHIPPED | OUTPATIENT
Start: 2025-03-23

## 2025-03-23 ASSESSMENT — ACTIVITIES OF DAILY LIVING (ADL): ADLS_ACUITY_SCORE: 46

## 2025-03-23 ASSESSMENT — ENCOUNTER SYMPTOMS: APPETITE CHANGE: 1

## 2025-03-23 NOTE — ED TRIAGE NOTES
PT presents with concerns of feeling sick since January of this year. PT reports that she tested positive for Influenza A on January,. Now reports that she is still has generalized weakness and respiratory symptoms unresolved since. PT has been on antibiotics and steroids and has completed these treatments but still feeling ill. Here for evaluation.

## 2025-03-23 NOTE — DISCHARGE INSTRUCTIONS
You are seen today for chronic malaise and weakness/fatigue.  We sent a message to your primary care physician for outpatient follow-up to consider other lab work to be can be added to current workup.  We sent a steroid prescription to your pharmacy.  If this helps please follow-up with your primary care as this may require more at higher doses.  Symptoms are very consistent with a EBV as well as AMISH possible autoimmune pathology in the background as well.  Please take positives but she can return if you have other acute concerns.
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
## 2025-03-24 ENCOUNTER — OFFICE VISIT (OUTPATIENT)
Dept: OTOLARYNGOLOGY | Facility: CLINIC | Age: 65
End: 2025-03-24
Payer: COMMERCIAL

## 2025-03-24 VITALS
WEIGHT: 150 LBS | HEIGHT: 65 IN | TEMPERATURE: 97.2 F | DIASTOLIC BLOOD PRESSURE: 70 MMHG | HEART RATE: 101 BPM | SYSTOLIC BLOOD PRESSURE: 118 MMHG | BODY MASS INDEX: 24.99 KG/M2

## 2025-03-24 DIAGNOSIS — J32.0 CHRONIC MAXILLARY SINUSITIS: ICD-10-CM

## 2025-03-24 DIAGNOSIS — R51.9 CHRONIC NONINTRACTABLE HEADACHE, UNSPECIFIED HEADACHE TYPE: ICD-10-CM

## 2025-03-24 DIAGNOSIS — J32.2 CHRONIC ETHMOIDAL SINUSITIS: ICD-10-CM

## 2025-03-24 DIAGNOSIS — G89.29 CHRONIC NONINTRACTABLE HEADACHE, UNSPECIFIED HEADACHE TYPE: ICD-10-CM

## 2025-03-24 DIAGNOSIS — J30.89 SEASONAL ALLERGIC RHINITIS DUE TO OTHER ALLERGIC TRIGGER: Primary | ICD-10-CM

## 2025-03-24 PROCEDURE — 31231 NASAL ENDOSCOPY DX: CPT | Mod: 52 | Performed by: OTOLARYNGOLOGY

## 2025-03-24 PROCEDURE — 99214 OFFICE O/P EST MOD 30 MIN: CPT | Mod: 25 | Performed by: OTOLARYNGOLOGY

## 2025-03-24 PROCEDURE — 1126F AMNT PAIN NOTED NONE PRSNT: CPT | Performed by: OTOLARYNGOLOGY

## 2025-03-24 PROCEDURE — 3078F DIAST BP <80 MM HG: CPT | Performed by: OTOLARYNGOLOGY

## 2025-03-24 PROCEDURE — 3074F SYST BP LT 130 MM HG: CPT | Performed by: OTOLARYNGOLOGY

## 2025-03-24 RX ORDER — PREDNISONE 20 MG/1
20 TABLET ORAL 2 TIMES DAILY
Qty: 12 TABLET | Refills: 0 | Status: SHIPPED | OUTPATIENT
Start: 2025-03-24 | End: 2025-03-30

## 2025-03-24 RX ORDER — DOXYCYCLINE 100 MG/1
100 CAPSULE ORAL 2 TIMES DAILY
Qty: 60 CAPSULE | Refills: 0 | Status: SHIPPED | OUTPATIENT
Start: 2025-03-24 | End: 2025-04-23

## 2025-03-24 RX ORDER — BUDESONIDE 0.5 MG/2ML
INHALANT ORAL
Qty: 120 ML | Refills: 1 | Status: SHIPPED | OUTPATIENT
Start: 2025-03-24

## 2025-03-24 ASSESSMENT — PAIN SCALES - GENERAL: PAINLEVEL_OUTOF10: NO PAIN (0)

## 2025-03-24 NOTE — LETTER
"3/24/2025      Linda Walsh  [address]  [facility] [address]      Dear Colleague,    Thank you for referring your patient, Linda Walsh, to the [facility]. Please see a copy of my visit note below.    ENT Consultation    Linda Walsh who is a 64 year old female seen in consultation at the request of emergency department.      History of Present Illness - Linda Walsh is a 64 year old female presents for evaluation of sinus and nasal problems.  She does get some seasonal allergies left perennial never was tested.  But she constantly has feeling of pressure \"fluid\" in her ears.  Uses Benadryl and Flonase which does help her nose and nasal breathing but does not seem to help \"fluid\" sensation in the ears.  Occasionally ears pop.  Gets occasional facial pressure no discharge.  Was never tested for allergies.  Sense of smell and taste appear to be intact.  In January she had pneumonia and bronchitis also high EBV titers and also had positive AMISH.  Has tried to get to see rheumatology but is on the wait list since it is very difficult to get in.        EXAM: CT SINUS W/O CONTRAST  LOCATION: [facility]  DATE: 2/21/2025     INDICATION:  Sinusitis, unspecified chronicity, unspecified location  COMPARISON: Sinus CT 12/01/2022.  TECHNIQUE: Routine without contrast. Multiplanar reformats. Dose reduction techniques were used.     FINDINGS:      FRONTAL SINUSES: Clear.     ETHMOID SINUSES: Moderate mucosal thickening in the anterior right ethmoid air cells and mild mucosal thickening in the anterior left ethmoid air cells.     SPHENOID SINUSES: Clear.      MAXILLARY SINUSES: Complete opacification of the bilateral maxillary sinuses. Diffuse bilateral hyperostosis of the maxillary sinus walls, suggesting chronic sinusitis. There is a focal area of dehiscence along the floor the right maxillary sinus,   similar to the prior CT. The floor of the left " maxillary sinus is intact.     NASAL CAVITY/SKULL BASE: Rightward bowing of the nasal septum without focal defect. Polypoid mucosal thickening in the right greater than left nasal cavity. The cribriform plate is intact and symmetric. The anterior clinoid processes are not pneumatized.     PARANASAL SINUS DRAINAGE PATHWAYS:  Opacification of the bilateral ostiomeatal units. The frontal and sphenoethmoidal recesses appear to remain patent.     NON-SINUS STRUCTURES: No abnormality of the visualized orbits or intracranial compartment. Mastoid air cells are clear.                                                                      IMPRESSION:  1.  Persistent opacification of the bilateral maxillary sinuses, with hyperostosis of the maxillary sinus walls, suggestive of chronic sinusitis.  2.  Opacification of the bilateral ostiomeatal units.  3.  Polypoid mucosal thickening involving the right greater than left nasal cavities.  4.  Mucosal thickening in the anterior ethmoid air cells, greater on the right.  5.  Unchanged focal dehiscence in the floor of the right maxillary sinus.    BP Readings from Last 1 Encounters:   03/24/25 118/70       BP noted to be well controlled today in office.     Linda IS a smoker/uses chewing tobacco.  Linda is ready to quit      Past Medical History -   Past Medical History:   Diagnosis Date     Anxiety      Asthma      Backache, unspecified      Esophageal reflux      Generalized anxiety disorder      Medical cannabis use 05/08/2017    used very short time and stopped     Mild intermittent asthma     Asthma, allergy induced     PONV (postoperative nausea and vomiting)      Reflex sympathetic dystrophy of left lower extremity        Current Medications -   Current Outpatient Medications:      albuterol (PROAIR HFA/PROVENTIL HFA/VENTOLIN HFA) 108 (90 Base) MCG/ACT inhaler, INHALE TWO PUFFS BY MOUTH EVERY 6 HOURS AS NEEDED FOR SHORTNESS OF BREATH OR WHEEZING, Disp: 18 g, Rfl: 2      atorvastatin (LIPITOR) 20 MG tablet, TAKE ONE TABLET BY MOUTH ONCE DAILY, Disp: 90 tablet, Rfl: 0     bisacodyl (DULCOLAX) 10 MG suppository, UNWRAP AND INSERT ONE SUPPOSITORY  RECTALLY DAILY. DISCONTINUE IF HAVING BOWEL MOVEMENTS, Disp: 12 suppository, Rfl: 6     diclofenac (VOLTAREN) 1 % topical gel, Apply 4 g topically 2 times daily, Disp: , Rfl:      fentaNYL (DURAGESIC) 25 mcg/hr 72 hr patch, Place 1 patch onto the skin every 72 hours remove old patch., Disp: 1 patch, Rfl: 0     fluticasone (FLONASE) 50 MCG/ACT nasal spray, Spray 2 sprays into both nostrils daily., Disp: 16 g, Rfl: 3     gabapentin (NEURONTIN) 300 MG capsule, Take 900 mg by mouth 2 times daily, Disp: , Rfl: 3     hydrOXYzine (ATARAX) 10 MG tablet, Take 1 tablet (10 mg) by mouth every 4 hours as needed for anxiety, Disp: 30 tablet, Rfl: prn     LINZESS 290 MCG capsule, Take 1 capsule (290 mcg) by mouth daily as needed (constipation)., Disp: 90 capsule, Rfl: 1     methylPREDNISolone (MEDROL DOSEPAK) 4 MG tablet therapy pack, Follow Package Directions, Disp: 21 tablet, Rfl: 0     NARCAN 4 MG/0.1ML nasal spray, Spray 4 mg into one nostril alternating nostrils once as needed for opioid reversal, Disp: , Rfl:      omeprazole (PRILOSEC) 40 MG DR capsule, TAKE ONE CAPSULE BY MOUTH ONCE DAILY, Disp: 90 capsule, Rfl: 0     ondansetron (ZOFRAN ODT) 4 MG ODT tab, Take 1 tablet (4 mg) by mouth every 8 hours as needed for nausea., Disp: 30 tablet, Rfl: 1     oxyCODONE (ROXICODONE) 5 MG tablet, Take 1-3 tablets (5-15 mg) by mouth every 3 hours as needed for pain (Moderate to Severe), Disp: 40 tablet, Rfl: 0     predniSONE (DELTASONE) 20 MG tablet, Take two tablets (= 40mg) each day for 5 (five) days, Disp: 10 tablet, Rfl: 0     predniSONE (DELTASONE) 20 MG tablet, Take two tablets (= 40mg) each day for 5 (five) days, Disp: 10 tablet, Rfl: 0     prochlorperazine (COMPAZINE) 10 MG tablet, Take 1 tablet (10 mg) by mouth every 6 hours as needed for nausea or  "vomiting, Disp: 30 tablet, Rfl: 1     traZODone (DESYREL) 50 MG tablet, Take 1 tablet (50 mg) by mouth at bedtime., Disp: 90 tablet, Rfl: 1    Allergies -   Allergies   Allergen Reactions     Contrast Dye Shortness Of Breath and Anaphylaxis     Penicillins Hives       Social History -   Social History     Socioeconomic History     Marital status:    Tobacco Use     Smoking status: Some Days     Current packs/day: 0.10     Average packs/day: 0.2 packs/day for 10.4 years (2.5 ttl pk-yrs)     Types: Cigarettes     Start date: 9/14/2011     Last attempt to quit: 9/14/2021     Passive exposure: Past     Smokeless tobacco: Never   Vaping Use     Vaping status: Never Used   Substance and Sexual Activity     Alcohol use: Yes     Comment: rare use      Drug use: Not Currently     Comment: Medical Cannabis     Sexual activity: Yes     Partners: Male     Birth control/protection: Female Surgical     Comment: hyst   Other Topics Concern     Parent/sibling w/ CABG, MI or angioplasty before 65F 55M? Yes     Comment: mom at 62, and dad in his 50s     Social Drivers of Health     Interpersonal Safety: Low Risk  (8/19/2024)    Interpersonal Safety      Do you feel physically and emotionally safe where you currently live?: Yes      Within the past 12 months, have you been hit, slapped, kicked or otherwise physically hurt by someone?: No      Within the past 12 months, have you been humiliated or emotionally abused in other ways by your partner or ex-partner?: No       Family History -   Family History   Problem Relation Age of Onset     Heart Disease Mother         60's     Cardiovascular Mother         heart     Heart Disease Father         40's     Arthritis Father         RA     Other Cancer Father      Skin Cancer Father      Other - See Comments Father         \"mini stroke\"     Heart Surgery Father      Arthritis Paternal Grandmother         RA     Diabetes Son      Coronary Artery Disease Son      Depression Paternal " "Aunt         anxiety, too     Arthritis Paternal Aunt         RA       Review of Systems - As per HPI and PMHx, otherwise review of system review of the head and neck negative. Otherwise 10+ review of system is negative    Physical Exam  /70 (BP Location: Right arm, Patient Position: Sitting, Cuff Size: Adult Regular)   Pulse 101   Temp 97.2  F (36.2  C) (Temporal)   Ht 1.651 m (5' 5\")   Wt 68 kg (150 lb)   LMP  (LMP Unknown)   BMI 24.96 kg/m    BMI: Body mass index is 24.96 kg/m .    General - The patient is well nourished and well developed, and appears to have good nutritional status.  Alert and oriented to person and place, answers questions and cooperates with examination appropriately.    SKIN - No suspicious lesions or rashes.  Respiration - No respiratory distress.  Head and Face - Normocephalic and atraumatic, with no gross asymmetry noted of the contour of the facial features.  The facial nerve is intact, with strong symmetric movements.    Voice and Breathing - The patient was breathing comfortably without the use of accessory muscles. The patients voice was clear and strong, and had appropriate pitch and quality.    Ears - Bilateral pinna and EACs with normal appearing overlying skin. Tympanic membrane intact with good mobility on pneumatic otoscopy bilaterally. Bony landmarks of the ossicular chain are normal. The tympanic membranes are normal in appearance. No retraction, perforation, or masses.  No fluid or purulence was seen in the external canal or the middle ear.     Eyes - Extraocular movements intact.  Sclera were not icteric or injected, conjunctiva were pink and moist.    Mouth - Examination of the oral cavity showed pink, healthy oral mucosa. No lesions or ulcerations noted.  The tongue was mobile and midline, and the dentition were in good condition.      Throat - The walls of the oropharynx were smooth, pink, moist, symmetric, and had no lesions or ulcerations.  The tonsillar " pillars and soft palate were symmetric.  The uvula was midline on elevation.    Neck - Normal midline excursion of the laryngotracheal complex during swallowing.  Full range of motion on passive movement.  Palpation of the occipital, submental, submandibular, internal jugular chain, and supraclavicular nodes did not demonstrate any abnormal lymph nodes or masses.  The carotid pulse was palpable bilaterally.  Palpation of the thyroid was soft and smooth, with no nodules or goiter appreciated.  The trachea was mobile and midline.    Nose - External contour is symmetric, no gross deflection or scars.  Nasal mucosa is pink and moist with no abnormal mucus.  The septum was midline and non-obstructive, turbinates of normal size and position.  No polyps, masses, or purulence noted on examination.    Neuro - Nonfocal neuro exam is normal, CN 2 through 12 intact, normal gait and muscle tone.      Performed in clinic today:  To further evaluate the nasal cavity, I performed rigid nasal endoscopy.  I first sprayed the nasal cavity bilaterally with a mix of lidocaine and neosynephrine.  I then began on the left side using a 2.7mm, 30 degree rigid nasal endoscope.  The septum was deviated and the nasal airway was open.  No abnormal secretions, purulence, or polyps were noted. The left middle turbinate and middle meatus were clearly visualized and slightly edematous in appearance.  Looking up, the olfactory cleft was unobstructed.  Going further back, the sphenoethmoid recess was normal in appearance, with healthy appearing mucosa on the face of the sphenoid.  The nasopharynx was unremarkable, and the eustachian tube opening on this side was unobstructed.    I then turned my attention to the right side.  Once again, the septum was deviated, and the airway was open.  No abnormal secretions, purulence, polyps were noted.  The right middle turbinate and middle meatus were clearly visualized and quite edematous lateral wall of the  nose in appearance.  Looking up, the olfactory cleft was unobstructed.  Going further back the right sphenoethmoid recess was normal in appearance, and eustachian tube opening was unobstructed.   Yellow - [id number] [place]amed      A/P - Linda Walsh is a 64 year old female with what appears to be chronic mostly maxillary sinusitis and osteitis even though patient is not as symptomatic as the CT scan would imply.  Also little bit of ethmoid disease noted.  The rest of the sinuses appear to be clear.  She does have eustachian tube dysfunction symptoms may be related to that.  At this point would like to send her for allergy testing.  Will put her on prednisone 40 mg a day for 6 days to reduce inflammation in the sinuses as well as hopefully help her eustachian tube function.  Patient will also be started on budesonide irrigations.  Finally 1 month of doxycycline therapy will be starting.  Patient will follow-up in a couple months.      Steffen Messina MD       Again, thank you for allowing me to participate in the care of your patient.        Sincerely,        Steffen Messina MD, MD    Electronically signed

## 2025-03-24 NOTE — PATIENT INSTRUCTIONS
[employer] [facility] is for any [facility] looking for free support to quit smoking, vaping or chewing.   [employer] [facility] will offer many quit support options and resources so [place] residents can continue to find the way to quit that works best for them.   Free support includes personalized coaching, email and text support, educational materials, and quit medication (nicotine patches, gum or lozenges) delivered by mail.     Contact [employer] [facility] at 1-800-QUIT-NOW or online at [employer] to receive support on your quit journey.

## 2025-04-07 ENCOUNTER — VIRTUAL VISIT (OUTPATIENT)
Dept: PSYCHOLOGY | Facility: CLINIC | Age: 65
End: 2025-04-07
Payer: COMMERCIAL

## 2025-04-07 DIAGNOSIS — F33.2 SEVERE RECURRENT MAJOR DEPRESSION WITHOUT PSYCHOTIC FEATURES (H): Primary | ICD-10-CM

## 2025-04-07 DIAGNOSIS — F41.1 GENERALIZED ANXIETY DISORDER: ICD-10-CM

## 2025-04-07 DIAGNOSIS — F43.10 POST TRAUMATIC STRESS DISORDER (PTSD): ICD-10-CM

## 2025-04-07 PROCEDURE — 90837 PSYTX W PT 60 MINUTES: CPT | Mod: 93 | Performed by: COUNSELOR

## 2025-04-07 NOTE — PROGRESS NOTES
[facility]                                     Progress Note    Patient Name: Linda Walsh  Date: 4/7/25         Service Type: Individual      Session Start Time: 1:35pm  Session End Time: 2:30pm     Session Length: 55    Session #: 111    Attendees: Client      Service Modality: telephone      Provider verified identity through the following two step process.  Patient provided:  Patient is known previously to provider     Telemedicine Visit: The patient's condition can be safely assessed and treated via synchronous audio and visual telemedicine encounter.       Reason for Telemedicine Visit: Services only offered telehealth     Originating Site (Patient Location): Patient's home     Distant Site (Provider Location): Provider Remote Setting- Home Office     Consent:  The patient/guardian has verbally consented to: the potential risks and benefits of telemedicine (video visit) versus in person care; bill my insurance or make self-payment for services provided; and responsibility for payment of non-covered services.        Mode of Communication:  telephone     As the provider I attest to compliance with applicable laws and regulations related to telemedicine.   There has been demonstrated improvement in functioning while patient has been engaged in psychotherapy/psychological service- if withdrawn the patient would deteriorate and/or relapse.        DATA  Interactive Complexity: No  Crisis: No        Progress Since Last Session (Related to Symptoms / Goals / Homework):   Symptoms: No change .    Homework: Completed in session      Episode of Care Goals: Minimal progress - ACTION (Actively working towards change); Intervened by reinforcing change plan / affirming steps taken     Current / Ongoing Stressors and Concerns:   The client stated she's been very ill on and off since Jan.   Her dad has been having seizures. Found that he was diagnosed with demential.   Her brother got influenza A  and pneumonia and since then has not been verbal,  more so comatose. Have been making  plans.      Treatment Objective(s) Addressed in This Session:   use thought-stopping strategy daily to reduce intrusive thoughts       Intervention:   Processed through recent stressors with family members and illness. Validated the intensity of the stress and how it's been affecting her. Client is doing well with being able to distance herself from her ex and accept some difficult realities with him. Continue to work on radical acceptance and stress reduction skills.         Assessments completed prior to visit:  The following assessments were completed by patient for this visit:  PHQA:        No data to display              GAD7:       2020    11:18 AM 2020     3:23 PM 2020    10:37 AM 2021     1:26 PM 2022    10:16 AM 2023    10:20 AM 2024     3:52 PM   MARLIN-7 SCORE   Total Score   19 (severe anxiety) 16 (severe anxiety) 21 (severe anxiety) 13 (moderate anxiety) 15 (severe anxiety)   Total Score 19 21 19 16 21 13 15     PROMIS 10-Global Health (all questions and answers displayed):        No data to display                  ASSESSMENT: Current Emotional / Mental Status (status of significant symptoms):   Risk status (Self / Other harm or suicidal ideation)   Patient denies current fears or concerns for personal safety.   Patient denies current or recent suicidal ideation or behaviors.   Patient denies current or recent homicidal ideation or behaviors.   Patient denies current or recent self injurious behavior or ideation.   Patient denies other safety concerns.   Patient reports there has been no change in risk factors since their last session.     Patient reports there has been no change in protective factors since their last session.     Recommended that patient call 911 or go to the local ED should there be a change in any of these risk factors.     Appearance:   Unable to assess due  to phone session    Eye Contact:   Unable to assess due to phone session    Psychomotor Behavior: Unable to assess due to phone session    Attitude:   Cooperative    Orientation:   All   Speech    Rate / Production: Normal/ Responsive    Volume:  Normal    Mood:    Normal   Affect:    Unable to assess due to phone session    Thought Content:  Clear    Thought Form:  Coherent  Logical    Insight:    Good      Medication Review:   No changes to current psychiatric medication(s)     Medication Compliance:   Yes     Changes in Health Issues:   None reported     Chemical Use Review:   Substance Use: Chemical use reviewed, no active concerns identified      Tobacco Use: No change in amount of tobacco use since last session.  Patient declined discussion at this time    Diagnosis:  1. 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 Generalized anxiety disorder    3. Post traumatic stress disorder (PTSD)          Collateral Reports Completed:   Not Applicable    PLAN: (Patient Tasks / Therapist Tasks / Other)  Continue to work on radical acceptance and stress reduction skills.           Nasrin Valladares [facility]                                                         ______________________________________________________________________    Individual Treatment Plan    Patient's Name: Linda Walsh  YOB: 1960    Date of Creation: 7/11/22  Date Treatment Plan Last Reviewed/Revised: 4/7/25    DSM5 Diagnoses: 296.33 (F33.2) Major Depressive Disorder, Recurrent Episode, Severe _, 300.02 (F41.1) Generalized Anxiety Disorder or 309.81 (F43.10) Posttraumatic Stress Disorder (includes Posttraumatic Stress Disorder for Children 6 Years and Younger)  Without dissociative symptoms  Psychosocial / Contextual Factors: CRPS  PROMIS (reviewed every 90 days):     Referral / Collaboration:  Referral to another professional/service is not indicated at this time.    Anticipated number of session for this  episode of care: on-going  Anticipation frequency of session: Monthly  Anticipated Duration of each session: 38-52 minutes/53+ minutes  Treatment plan will be reviewed in 90 days or when goals have been changed.       MeasurableTreatment Goal(s) related to diagnosis / functional impairment(s)  Goal 1: Client will decrease thoughts of wanting to be dead from 10 out of 10 opportunities to at most 9 out of 10 opportunities for at least 3 consecutive weeks as evidenced by client report and a decrease in symptom report as evidenced by PhQ9 scores and GAD7 scores.    Objective #A (Client Action)    Client will  Client will look at and read safety plan at least once a day and follow safety plan when thoughts and urges come up.  Status: Continued - Date(s): 4/7/25    Intervention(s)  Therapist will teach emotional regulation skills. distress tolerance skills, interpersonal effectiveness skills, emotion regluation skills, mindfulness skills, radical acceptance. Therapist will develop safety plan with the client.     Objective #B  Client will  Client will agree to call the therapist or after hours crisis line if noticing intense suicidal thoughts for skills coaching.   Status: Continued - Date(s): 4/7/25    Intervention(s)  Therapist will  Therapist will be available as much as possible during work hours for the client to contact and give the client several crisis resources.         Goal 2: Client will increase the use of skills (emotion regulation, distress tolerance, communication skill) from 1 out of 10 opportunities to at least 2 out of 10 opportunities for at least 3 consecutive weeks as evidenced by client report and a decrease in symptom report as evidenced by PhQ9 scores and GAD7 scores.     Objective #A (Client Action)    Status: Continued - Date(s): 4/7/25    Client will Increase interest, engagement, and pleasure in doing things  Decrease frequency and intensity of feeling down, depressed, hopeless  Improve diet,  appetite, mindful eating, and / or meal planning  Identify negative self-talk and behaviors: challenge core beliefs, myths, and actions  Improve concentration, focus, and mindfulness in daily activities   Feel less fidgety, restless or slow in daily activities / interpersonal interactions  Decrease thoughts that you'd be better off dead or of suicide / self-harm.    Intervention(s)  Therapist will teach emotional regulation skills. distress tolerance skills, interpersonal effectiveness skills, emotion regluation skills, mindfulness skills, radical acceptance. Therapist will teach client how to ID body cues for anxiety, anxiety reduction techniques, how to ID triggers for depression and anxiety- decrease reactivity/eliminate, lifestyle changes to reduce depression and anxiety, communication skills, explore cognitive beliefs and help client to develop healthy cognitive patterns and beliefs.    Objective #B  Client will  Client will learn and  practice DBT skills daily.     Status: Continued - Date(s): 4/7/25    Intervention(s)  Therapist will  Therapist will Therapist will teach emotional regulation skills. distress tolerance skills, interpersonal effectiveness skills, emotion regluation skills, mindfulness skills, radical acceptance. .      Goal 3: Client will decrease anxious symptoms and reactions to triggers from 9 out of 10 opportunities to at most 8 out of 10 opportunities for at least 3 consecutive weeks as evidenced by client report and a decrease in symptom report as evidenced by PhQ9 scores and GAD7 scores.    Objective #A (Client Action)    Status: Continued - Date(s): 4/7/25    Client will  Client will use cognitive strategies identified in therapy to challenge anxious thoughts.    Intervention(s)  Therapist will  Therapist will teach emotional recognition/identification. emotion regulation skills, coping skills, mindfulness skills.    Objective #B  Client will  Client will use thought-stopping strategy  "daily to reduce intrusive thoughts for at least 3 consecutive weeks as evidenced by client report and a decrease in symptom report as evidenced by PhQ9 scores and GAD7 scores.       Status: Continued - Date(s):  4/7/25    Intervention(s)  Therapist will teach emotional regulation skills. distress tolerance skills, interpersonal effectiveness skills, emotion regluation skills, mindfulness skills, radical acceptance. Therapist will teach client how to ID body cues for anxiety, anxiety reduction techniques, how to ID triggers for depression and anxiety- decrease reactivity/eliminate, lifestyle changes to reduce depression and anxiety, communication skills, explore cognitive beliefs and help client to develop healthy cognitive patterns and beliefs.    Objective #C  Client will  Client will learn 5 crisis survival skills during the Distress Tolerance Module (6-8 weeks) for at least 3 consecutive weeks as evidenced by client report and a decrease in symptom report as evidenced by PhQ9 scores and GAD7 scores.  Status: Continued - Date(s): 4/7/25    Intervention(s)  Therapist will teach emotional regulation skills. distress tolerance skills, interpersonal effectiveness skills, emotion regluation skills, mindfulness skills, radical acceptance.      Client has reviewed and agreed to the above plan.      Nasrin Valladares, [facility]        Linda Walsh     SAFETY PLAN:  Step 1: Warning signs / cues (Thoughts, images, mood, situation, behavior) that a crisis may be developing:  Thoughts: \"I don't matter\", \"People would be better off without me\", \"I'm a burden\", \"I can't do this anymore\", \"I just want this to end\" and \"Nothing makes it better\"  Images: obsessive thoughts of death or dying: car accident, using a gun and flashbacks  Thinking Processes: ruminations (can't stop thinking about my problems): court case, pain, racing thoughts, highly critical and negative thoughts: \"I can't do anything, I have to suffer everyday and go " "to work and other people have it so easy.\"  and paranoia: that the government is watching me  Mood: worsening depression, hopelessness, helplessness, intense anger, intense worry, agitation, disinhibited (not caring about things or consequences) and mood swings  Behaviors: isolating/withdrawing , can't stop crying, not taking care of myself, not taking care of my responsibilities, sleeping too much and not sleeping enough  Situations: legal issues: current court case, pain, trauma , financial stress and medical condition / diagnosis: CRPS    Step 2: Coping strategies - Things I can do to take my mind off of my problems without contacting another person (relaxation technique, physical activity):  Distress Tolerance Strategies:  arts and crafts: with her residents, play with my pet , pray, change body temperature (ice pack/cold water) , paced breathing/progressive muscle relaxation and puzzles, games on ipad  Physical Activities: deep breathing  Focus on helpful thoughts:  \"Ride the wave\", think about happy memories: when the grandkids were born, going camping, when the boys were little and remind myself of what is important to me: grandkids, family, the residents  Step 3: People and social settings that provide distraction:   Name: Alpesh  Phone: [phone]   Name: Velasquez  Phone:    Name: Thaddeus  Phone:   work   Step 4: Remind myself of people and things that are important to me and worth living for:    My family, my residents    Step 5: When I am in crisis, I can ask these people to help me use my safety plan:   Name: Alpesh  Phone: [phone]   Name: Velasquez  Phone: (number in phone)   Name: Thaddeus  Phone: (number in phone)  Step 6: Making the environment safe:   be around others  Step 7: Professionals or agencies I can contact during a crisis:  [facility] Daytime Number: [phone]  Suicide Prevention Lifeline: 0-161-315-UUWM (7702)  Crisis Text Line Service (available 24 hours a day, 7 days a " week): Text [place] to 564516  Local Crisis Services:     Call 911 or go to my nearest emergency department.   I helped develop this safety plan and agree to use it when needed.  I have been given a copy of this plan.      Client signature _________________________________________________________________  Today s date: 7/11/22  Adapted from Safety Plan Template 2008 Griselda Perez and Livan Cutler is reprinted with the express permission of the authors.  No portion of the Safety Plan Template may be reproduced without the express, written permission.  You can contact the authors at [email or web address] or [email or web address].

## 2025-04-11 ENCOUNTER — TRANSFERRED RECORDS (OUTPATIENT)
Dept: HEALTH INFORMATION MANAGEMENT | Facility: CLINIC | Age: 65
End: 2025-04-11
Payer: COMMERCIAL

## 2025-04-11 PROBLEM — M76.899 ENTHESOPATHY OF KNEE: Status: ACTIVE | Noted: 2025-04-11

## 2025-04-11 PROBLEM — M60.9 MYOSITIS: Status: ACTIVE | Noted: 2025-04-11

## 2025-04-11 PROBLEM — M46.1 SACROILIITIS, NOT ELSEWHERE CLASSIFIED: Status: ACTIVE | Noted: 2025-04-11

## 2025-04-11 PROBLEM — M47.814 THORACIC SPONDYLOSIS WITHOUT MYELOPATHY: Status: ACTIVE | Noted: 2025-04-11

## 2025-04-11 PROBLEM — M19.019 LOCALIZED, PRIMARY OSTEOARTHRITIS OF SHOULDER REGION: Status: ACTIVE | Noted: 2025-04-11

## 2025-04-11 PROBLEM — G90.529 REFLEX SYMPATHETIC DYSTROPHY OF LOWER EXTREMITY: Status: ACTIVE | Noted: 2025-04-11

## 2025-04-11 PROBLEM — M53.3 SACROCOCCYGEAL DISORDERS, NOT ELSEWHERE CLASSIFIED: Status: ACTIVE | Noted: 2025-04-11

## 2025-04-11 PROBLEM — M54.6 PAIN IN THORACIC SPINE: Status: ACTIVE | Noted: 2025-04-11

## 2025-04-11 PROBLEM — M54.17 LUMBOSACRAL RADICULOPATHY: Status: ACTIVE | Noted: 2025-04-11

## 2025-04-11 PROBLEM — M47.812 CERVICAL SPONDYLOSIS WITHOUT MYELOPATHY: Status: ACTIVE | Noted: 2025-04-11

## 2025-04-11 PROBLEM — F11.20 OPIOID DEPENDENCE (H): Status: ACTIVE | Noted: 2025-04-11

## 2025-04-11 PROBLEM — M47.817 LUMBOSACRAL SPONDYLOSIS WITHOUT MYELOPATHY: Status: ACTIVE | Noted: 2025-04-11

## 2025-04-16 DIAGNOSIS — J32.0 CHRONIC MAXILLARY SINUSITIS: ICD-10-CM

## 2025-04-16 DIAGNOSIS — J32.2 CHRONIC ETHMOIDAL SINUSITIS: ICD-10-CM

## 2025-04-16 RX ORDER — DOXYCYCLINE 100 MG/1
100 CAPSULE ORAL 2 TIMES DAILY
Qty: 60 CAPSULE | Refills: 0 | OUTPATIENT
Start: 2025-04-16 | End: 2025-05-16

## 2025-04-16 NOTE — TELEPHONE ENCOUNTER
----- Message from Lizz Sanchez sent at 10/30/2020 11:51 AM CDT -----  Regarding: Refill/ Schedule appt  Contact: Catalina Lovett  Pt needs refill on Rosuvastatin and levothyroxine  Send to [facility]  Pt# [phone]    Pt mother also says that she needs to schedule her an appt soon .      doxycycline  Last Written Prescription Date:  3/24/25  Last Fill Quantity: 60,  # refills: 0   Last office visit: 3/24/2025 with prescribing provider:  1 MONTH OF DOXYCYCLINE. Follow-up IN 3 MONTH.  Future Office Visit: 6/2/25  Next 5 appointments (look out 90 days)      Jun 13, 2025 11:20 AM  (Arrive by 11:00 AM)  Adult Preventative Visit with Tomasa Medina PA-C  [facility] [address] [place] [id number]  [phone]             Requested Prescriptions   Pending Prescriptions Disp Refills    doxycycline hyclate (VIBRAMYCIN) 100 MG capsule [Pharmacy Med Name: DOXYCYCLINE HYCLATE 100MG CAPS] 60 capsule 0     Sig: TAKE 1 CAPSULE (100 MG) BY MOUTH 2 TIMES DAILY.       There is no refill protocol information for this order          Refused. Patient needs to let us know if she needs a refill & why. This was ordered as a 1 month supply.    Sherry Goldstein RN on 4/16/2025 at 9:11 AM

## 2025-04-17 DIAGNOSIS — J32.2 CHRONIC ETHMOIDAL SINUSITIS: ICD-10-CM

## 2025-04-17 DIAGNOSIS — J32.0 CHRONIC MAXILLARY SINUSITIS: ICD-10-CM

## 2025-04-17 RX ORDER — DOXYCYCLINE 100 MG/1
100 CAPSULE ORAL 2 TIMES DAILY
Qty: 60 CAPSULE | Refills: 0 | OUTPATIENT
Start: 2025-04-17 | End: 2025-05-17

## 2025-04-17 NOTE — TELEPHONE ENCOUNTER
Patient returned call. She did not request the doxycycline refill. She is feeling much better since starting it though. Also doing saline nasal rinses. Refill refused for the 2nd time.    Sherry Goldstein RN on 4/17/2025 at 9:14 AM

## 2025-04-18 ENCOUNTER — ANCILLARY PROCEDURE (OUTPATIENT)
Dept: GENERAL RADIOLOGY | Facility: CLINIC | Age: 65
End: 2025-04-18
Attending: PHYSICIAN ASSISTANT
Payer: COMMERCIAL

## 2025-04-18 DIAGNOSIS — R53.83 MALAISE AND FATIGUE: ICD-10-CM

## 2025-04-18 DIAGNOSIS — R41.89 BRAIN FOG: ICD-10-CM

## 2025-04-18 DIAGNOSIS — R76.8 POSITIVE ANA (ANTINUCLEAR ANTIBODY): ICD-10-CM

## 2025-04-18 DIAGNOSIS — R53.81 MALAISE AND FATIGUE: ICD-10-CM

## 2025-04-18 PROCEDURE — 73110 X-RAY EXAM OF WRIST: CPT | Mod: TC | Performed by: RADIOLOGY

## 2025-04-18 PROCEDURE — 73130 X-RAY EXAM OF HAND: CPT | Mod: TC | Performed by: RADIOLOGY

## 2025-04-23 ENCOUNTER — E-VISIT (OUTPATIENT)
Dept: FAMILY MEDICINE | Facility: OTHER | Age: 65
End: 2025-04-23
Payer: COMMERCIAL

## 2025-04-23 DIAGNOSIS — J02.9 SORE THROAT: Primary | ICD-10-CM

## 2025-04-23 PROCEDURE — 99207 PR NON-BILLABLE SERV PER CHARTING: CPT | Performed by: PHYSICIAN ASSISTANT

## 2025-04-23 NOTE — PATIENT INSTRUCTIONS
Dear Linda Walsh,    We are sorry you are not feeling well. Based on the responses you provided, it is recommended that you be seen in-person in urgent care so we can better evaluate your symptoms. Please click here to find the nearest urgent care location to you.   You will not be charged for this Visit. Thank you for trusting us with your care.    Tomasa Medina PA-C

## 2025-05-01 DIAGNOSIS — E78.5 HYPERLIPIDEMIA LDL GOAL <160: ICD-10-CM

## 2025-05-01 RX ORDER — ATORVASTATIN CALCIUM 20 MG/1
20 TABLET, FILM COATED ORAL DAILY
Qty: 90 TABLET | Refills: 0 | Status: SHIPPED | OUTPATIENT
Start: 2025-05-01

## 2025-05-01 NOTE — TELEPHONE ENCOUNTER
LDL Cholesterol Calculated   Date Value Ref Range Status   08/19/2024 154 (H) <=100 mg/dL Final   04/24/2019 141 (H) <100 mg/dL Final     Comment:     Above desirable:  100-129 mg/dl  Borderline High:  130-159 mg/dL  High:             160-189 mg/dL  Very high:       >189 mg/dl

## 2025-05-05 ENCOUNTER — VIRTUAL VISIT (OUTPATIENT)
Dept: PSYCHOLOGY | Facility: CLINIC | Age: 65
End: 2025-05-05
Payer: COMMERCIAL

## 2025-05-05 DIAGNOSIS — F41.1 GENERALIZED ANXIETY DISORDER: ICD-10-CM

## 2025-05-05 DIAGNOSIS — F43.10 POST TRAUMATIC STRESS DISORDER (PTSD): ICD-10-CM

## 2025-05-05 DIAGNOSIS — F33.2 SEVERE RECURRENT MAJOR DEPRESSION WITHOUT PSYCHOTIC FEATURES (H): Primary | ICD-10-CM

## 2025-05-05 PROCEDURE — 90837 PSYTX W PT 60 MINUTES: CPT | Mod: 93 | Performed by: COUNSELOR

## 2025-05-05 NOTE — PROGRESS NOTES
[facility]                                     Progress Note    Patient Name: Linda Walsh  Date: 5/5/25         Service Type: Individual      Session Start Time: 1:30pm  Session End Time: 2:30pm     Session Length: 60    Session #: 112    Attendees: Client      Service Modality: telephone      Provider verified identity through the following two step process.  Patient provided:  Patient is known previously to provider     Telemedicine Visit: The patient's condition can be safely assessed and treated via synchronous audio and visual telemedicine encounter.       Reason for Telemedicine Visit: Services only offered telehealth     Originating Site (Patient Location): Patient's home     Distant Site (Provider Location): Provider Remote Setting- Home Office     Consent:  The patient/guardian has verbally consented to: the potential risks and benefits of telemedicine (video visit) versus in person care; bill my insurance or make self-payment for services provided; and responsibility for payment of non-covered services.        Mode of Communication:  telephone     As the provider I attest to compliance with applicable laws and regulations related to telemedicine.   There has been demonstrated improvement in functioning while patient has been engaged in psychotherapy/psychological service- if withdrawn the patient would deteriorate and/or relapse.        DATA  Interactive Complexity: No  Crisis: No        Progress Since Last Session (Related to Symptoms / Goals / Homework):   Symptoms: No change .    Homework: Completed in session      Episode of Care Goals: Minimal progress - ACTION (Actively working towards change); Intervened by reinforcing change plan / affirming steps taken     Current / Ongoing Stressors and Concerns:   The client stated within the past month her brother and father both passed away. Stated her mom has been diagnosed with dementia. Client didn't know until after her dad  was severely ill and she had to take over his care and now her mom's.   Still not sure what's going on with her health.  Still haven't finalized the divorce yet.      Treatment Objective(s) Addressed in This Session:   use thought-stopping strategy daily to reduce intrusive thoughts       Intervention:   Processed through recent stressors with family and the recent deaths. Validated the hopelessness and helplessness she's feeling around her health issues and her frustrations with her medical team. Reviewed stress reduction skills and continued self care tasks.        Assessments completed prior to visit:  The following assessments were completed by patient for this visit:  PHQA:        No data to display              GAD7:       2/26/2020    11:18 AM 5/27/2020     3:23 PM 7/28/2020    10:37 AM 2/24/2021     1:26 PM 7/13/2022    10:16 AM 6/9/2023    10:20 AM 8/19/2024     3:52 PM   MARLIN-7 SCORE   Total Score   19 (severe anxiety) 16 (severe anxiety) 21 (severe anxiety) 13 (moderate anxiety) 15 (severe anxiety)   Total Score 19 21 19 16 21 13 15     PROMIS 10-Global Health (all questions and answers displayed):        No data to display                  ASSESSMENT: Current Emotional / Mental Status (status of significant symptoms):   Risk status (Self / Other harm or suicidal ideation)   Patient denies current fears or concerns for personal safety.   Patient denies current or recent suicidal ideation or behaviors.   Patient denies current or recent homicidal ideation or behaviors.   Patient denies current or recent self injurious behavior or ideation.   Patient denies other safety concerns.   Patient reports there has been no change in risk factors since their last session.     Patient reports there has been no change in protective factors since their last session.     Recommended that patient call 911 or go to the local ED should there be a change in any of these risk factors.     Appearance:   Unable to assess due to  phone session    Eye Contact:   Unable to assess due to phone session    Psychomotor Behavior: Unable to assess due to phone session    Attitude:   Cooperative    Orientation:   All   Speech    Rate / Production: Normal/ Responsive    Volume:  Normal    Mood:    Normal   Affect:    Unable to assess due to phone session    Thought Content:  Clear    Thought Form:  Coherent  Logical    Insight:    Good      Medication Review:   No changes to current psychiatric medication(s)     Medication Compliance:   Yes     Changes in Health Issues:   None reported     Chemical Use Review:   Substance Use: Chemical use reviewed, no active concerns identified      Tobacco Use: No change in amount of tobacco use since last session.  Patient declined discussion at this time    Diagnosis:  1. 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 Generalized anxiety disorder    3. Post traumatic stress disorder (PTSD)          Collateral Reports Completed:   Not Applicable    PLAN: (Patient Tasks / Therapist Tasks / Other)  Continue to work on stress reduction skills.           Nasrin Valladares [facility]                                                         ______________________________________________________________________    Individual Treatment Plan    Patient's Name: Linda Walsh  YOB: 1960    Date of Creation: 7/11/22  Date Treatment Plan Last Reviewed/Revised: 4/7/25    DSM5 Diagnoses: 296.33 (F33.2) Major Depressive Disorder, Recurrent Episode, Severe _, 300.02 (F41.1) Generalized Anxiety Disorder or 309.81 (F43.10) Posttraumatic Stress Disorder (includes Posttraumatic Stress Disorder for Children 6 Years and Younger)  Without dissociative symptoms  Psychosocial / Contextual Factors: CRPS  PROMIS (reviewed every 90 days):     Referral / Collaboration:  Referral to another professional/service is not indicated at this time.    Anticipated number of session for this episode of care:  on-going  Anticipation frequency of session: Monthly  Anticipated Duration of each session: 38-52 minutes/53+ minutes  Treatment plan will be reviewed in 90 days or when goals have been changed.       MeasurableTreatment Goal(s) related to diagnosis / functional impairment(s)  Goal 1: Client will decrease thoughts of wanting to be dead from 10 out of 10 opportunities to at most 9 out of 10 opportunities for at least 3 consecutive weeks as evidenced by client report and a decrease in symptom report as evidenced by PhQ9 scores and GAD7 scores.    Objective #A (Client Action)    Client will  Client will look at and read safety plan at least once a day and follow safety plan when thoughts and urges come up.  Status: Continued - Date(s): 4/7/25    Intervention(s)  Therapist will teach emotional regulation skills. distress tolerance skills, interpersonal effectiveness skills, emotion regluation skills, mindfulness skills, radical acceptance. Therapist will develop safety plan with the client.     Objective #B  Client will  Client will agree to call the therapist or after hours crisis line if noticing intense suicidal thoughts for skills coaching.   Status: Continued - Date(s): 4/7/25    Intervention(s)  Therapist will  Therapist will be available as much as possible during work hours for the client to contact and give the client several crisis resources.         Goal 2: Client will increase the use of skills (emotion regulation, distress tolerance, communication skill) from 1 out of 10 opportunities to at least 2 out of 10 opportunities for at least 3 consecutive weeks as evidenced by client report and a decrease in symptom report as evidenced by PhQ9 scores and GAD7 scores.     Objective #A (Client Action)    Status: Continued - Date(s): 4/7/25    Client will Increase interest, engagement, and pleasure in doing things  Decrease frequency and intensity of feeling down, depressed, hopeless  Improve diet, appetite, mindful  eating, and / or meal planning  Identify negative self-talk and behaviors: challenge core beliefs, myths, and actions  Improve concentration, focus, and mindfulness in daily activities   Feel less fidgety, restless or slow in daily activities / interpersonal interactions  Decrease thoughts that you'd be better off dead or of suicide / self-harm.    Intervention(s)  Therapist will teach emotional regulation skills. distress tolerance skills, interpersonal effectiveness skills, emotion regluation skills, mindfulness skills, radical acceptance. Therapist will teach client how to ID body cues for anxiety, anxiety reduction techniques, how to ID triggers for depression and anxiety- decrease reactivity/eliminate, lifestyle changes to reduce depression and anxiety, communication skills, explore cognitive beliefs and help client to develop healthy cognitive patterns and beliefs.    Objective #B  Client will  Client will learn and  practice DBT skills daily.     Status: Continued - Date(s): 4/7/25    Intervention(s)  Therapist will  Therapist will Therapist will teach emotional regulation skills. distress tolerance skills, interpersonal effectiveness skills, emotion regluation skills, mindfulness skills, radical acceptance. .      Goal 3: Client will decrease anxious symptoms and reactions to triggers from 9 out of 10 opportunities to at most 8 out of 10 opportunities for at least 3 consecutive weeks as evidenced by client report and a decrease in symptom report as evidenced by PhQ9 scores and GAD7 scores.    Objective #A (Client Action)    Status: Continued - Date(s): 4/7/25    Client will  Client will use cognitive strategies identified in therapy to challenge anxious thoughts.    Intervention(s)  Therapist will  Therapist will teach emotional recognition/identification. emotion regulation skills, coping skills, mindfulness skills.    Objective #B  Client will  Client will use thought-stopping strategy daily to reduce  "intrusive thoughts for at least 3 consecutive weeks as evidenced by client report and a decrease in symptom report as evidenced by PhQ9 scores and GAD7 scores.       Status: Continued - Date(s):  4/7/25    Intervention(s)  Therapist will teach emotional regulation skills. distress tolerance skills, interpersonal effectiveness skills, emotion regluation skills, mindfulness skills, radical acceptance. Therapist will teach client how to ID body cues for anxiety, anxiety reduction techniques, how to ID triggers for depression and anxiety- decrease reactivity/eliminate, lifestyle changes to reduce depression and anxiety, communication skills, explore cognitive beliefs and help client to develop healthy cognitive patterns and beliefs.    Objective #C  Client will  Client will learn 5 crisis survival skills during the Distress Tolerance Module (6-8 weeks) for at least 3 consecutive weeks as evidenced by client report and a decrease in symptom report as evidenced by PhQ9 scores and GAD7 scores.  Status: Continued - Date(s): 4/7/25    Intervention(s)  Therapist will teach emotional regulation skills. distress tolerance skills, interpersonal effectiveness skills, emotion regluation skills, mindfulness skills, radical acceptance.      Client has reviewed and agreed to the above plan.      Nasrin Valladares, [facility]        Linda Walsh     SAFETY PLAN:  Step 1: Warning signs / cues (Thoughts, images, mood, situation, behavior) that a crisis may be developing:  Thoughts: \"I don't matter\", \"People would be better off without me\", \"I'm a burden\", \"I can't do this anymore\", \"I just want this to end\" and \"Nothing makes it better\"  Images: obsessive thoughts of death or dying: car accident, using a gun and flashbacks  Thinking Processes: ruminations (can't stop thinking about my problems): court case, pain, racing thoughts, highly critical and negative thoughts: \"I can't do anything, I have to suffer everyday and go to work and other " "people have it so easy.\"  and paranoia: that the [employer] is watching me  Mood: worsening depression, hopelessness, helplessness, intense anger, intense worry, agitation, disinhibited (not caring about things or consequences) and mood swings  Behaviors: isolating/withdrawing , can't stop crying, not taking care of myself, not taking care of my responsibilities, sleeping too much and not sleeping enough  Situations: legal issues: current court case, pain, trauma , financial stress and medical condition / diagnosis: CRPS    Step 2: Coping strategies - Things I can do to take my mind off of my problems without contacting another person (relaxation technique, physical activity):  Distress Tolerance Strategies:  arts and crafts: with her residents, play with my pet , pray, change body temperature (ice pack/cold water) , paced breathing/progressive muscle relaxation and puzzles, games on ipad  Physical Activities: deep breathing  Focus on helpful thoughts:  \"Ride the wave\", think about happy memories: when the grandkids were born, going camping, when the boys were little and remind myself of what is important to me: grandkids, family, the residents  Step 3: People and social settings that provide distraction:   Name: Alpesh  Phone: [phone]   Name: Velasquez  Phone:    Name: Thaddeus  Phone:   work   Step 4: Remind myself of people and things that are important to me and worth living for:    My family, my residents    Step 5: When I am in crisis, I can ask these people to help me use my safety plan:   Name: Alpesh  Phone: [phone]   Name: Velasquez  Phone: (number in phone)   Name: Thaddeus  Phone: (number in phone)  Step 6: Making the environment safe:   be around others  Step 7: Professionals or agencies I can contact during a crisis:  [facility] Number: [phone]  Suicide Prevention Lifeline: 1-520-151-MUSB (8825)  Crisis Text Line Service (available 24 hours a day, 7 days a week): Text [place] to " 704735  Local Crisis Services:     Call 911 or go to my nearest emergency department.   I helped develop this safety plan and agree to use it when needed.  I have been given a copy of this plan.      Client signature _________________________________________________________________  Today s date: 7/11/22  Adapted from Safety Plan Template 2008 Griselda Perez and Livan Cutler is reprinted with the express permission of the authors.  No portion of the Safety Plan Template may be reproduced without the express, written permission.  You can contact the authors at [email or web address] or [email or web address].

## 2025-05-09 PROBLEM — M79.7 FIBROMYALGIA: Status: ACTIVE | Noted: 2025-05-09

## 2025-05-09 PROBLEM — G89.29 CHRONIC PAIN: Status: ACTIVE | Noted: 2025-05-09

## 2025-05-13 ENCOUNTER — TRANSFERRED RECORDS (OUTPATIENT)
Dept: HEALTH INFORMATION MANAGEMENT | Facility: CLINIC | Age: 65
End: 2025-05-13
Payer: COMMERCIAL

## 2025-05-19 ENCOUNTER — VIRTUAL VISIT (OUTPATIENT)
Dept: PSYCHOLOGY | Facility: CLINIC | Age: 65
End: 2025-05-19
Payer: COMMERCIAL

## 2025-05-19 DIAGNOSIS — R93.89 ABNORMAL CHEST CT: Primary | ICD-10-CM

## 2025-05-19 DIAGNOSIS — F41.1 GENERALIZED ANXIETY DISORDER: ICD-10-CM

## 2025-05-19 DIAGNOSIS — F43.10 POST TRAUMATIC STRESS DISORDER (PTSD): ICD-10-CM

## 2025-05-19 DIAGNOSIS — F33.2 SEVERE RECURRENT MAJOR DEPRESSION WITHOUT PSYCHOTIC FEATURES (H): Primary | ICD-10-CM

## 2025-05-19 PROCEDURE — 90837 PSYTX W PT 60 MINUTES: CPT | Mod: 93 | Performed by: COUNSELOR

## 2025-05-19 NOTE — PROGRESS NOTES
[facility]                                     Progress Note    Patient Name: Linda Walsh  Date: 5/19/25         Service Type: Individual      Session Start Time: 1:30pm  Session End Time: 2:30pm     Session Length: 60    Session #: 113    Attendees: Client      Service Modality: telephone      Provider verified identity through the following two step process.  Patient provided:  Patient is known previously to provider     Telemedicine Visit: The patient's condition can be safely assessed and treated via synchronous audio and visual telemedicine encounter.       Reason for Telemedicine Visit: Services only offered telehealth     Originating Site (Patient Location): Patient's home     Distant Site (Provider Location): Provider Remote Setting- Home Office     Consent:  The patient/guardian has verbally consented to: the potential risks and benefits of telemedicine (video visit) versus in person care; bill my insurance or make self-payment for services provided; and responsibility for payment of non-covered services.        Mode of Communication:  telephone     As the provider I attest to compliance with applicable laws and regulations related to telemedicine.   There has been demonstrated improvement in functioning while patient has been engaged in psychotherapy/psychological service- if withdrawn the patient would deteriorate and/or relapse.        DATA  Interactive Complexity: No  Crisis: No        Progress Since Last Session (Related to Symptoms / Goals / Homework):   Symptoms: No change .    Homework: Completed in session      Episode of Care Goals: Minimal progress - ACTION (Actively working towards change); Intervened by reinforcing change plan / affirming steps taken     Current / Ongoing Stressors and Concerns:   The client stated mediation didn't go well.   Having to help her mom with her insurance and her mom's health issues.  Continuing to have her own health issues with no  solutions.      Treatment Objective(s) Addressed in This Session:   use thought-stopping strategy daily to reduce intrusive thoughts       Intervention:   Processed through recent stressors with mediation and her mom. Validated her frustration and feelings of helplessness. Educated client about caregiver fatigue and burnout with chronic illness management.        Assessments completed prior to visit:  The following assessments were completed by patient for this visit:  PHQA:        No data to display              GAD7:       2/26/2020    11:18 AM 5/27/2020     3:23 PM 7/28/2020    10:37 AM 2/24/2021     1:26 PM 7/13/2022    10:16 AM 6/9/2023    10:20 AM 8/19/2024     3:52 PM   MARLIN-7 SCORE   Total Score   19 (severe anxiety) 16 (severe anxiety) 21 (severe anxiety) 13 (moderate anxiety) 15 (severe anxiety)   Total Score 19 21 19 16 21 13 15     PROMIS 10-Global Health (all questions and answers displayed):        No data to display                  ASSESSMENT: Current Emotional / Mental Status (status of significant symptoms):   Risk status (Self / Other harm or suicidal ideation)   Patient denies current fears or concerns for personal safety.   Patient denies current or recent suicidal ideation or behaviors.   Patient denies current or recent homicidal ideation or behaviors.   Patient denies current or recent self injurious behavior or ideation.   Patient denies other safety concerns.   Patient reports there has been no change in risk factors since their last session.     Patient reports there has been no change in protective factors since their last session.     Recommended that patient call 911 or go to the local ED should there be a change in any of these risk factors.     Appearance:   Unable to assess due to phone session    Eye Contact:   Unable to assess due to phone session    Psychomotor Behavior: Unable to assess due to phone session    Attitude:   Cooperative    Orientation:   All   Speech    Rate /  Production: Normal/ Responsive    Volume:  Normal    Mood:    Normal   Affect:    Unable to assess due to phone session    Thought Content:  Clear    Thought Form:  Coherent  Logical    Insight:    Good      Medication Review:   No changes to current psychiatric medication(s)     Medication Compliance:   Yes     Changes in Health Issues:   None reported     Chemical Use Review:   Substance Use: Chemical use reviewed, no active concerns identified      Tobacco Use: No change in amount of tobacco use since last session.  Patient declined discussion at this time    Diagnosis:  1. 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 Generalized anxiety disorder    3. Post traumatic stress disorder (PTSD)          Collateral Reports Completed:   Not Applicable    PLAN: (Patient Tasks / Therapist Tasks / Other)  Continue to work on stress reduction skills and anxiety reduction skills.            Nasrin Valladares, [facility]                                                         ______________________________________________________________________    Individual Treatment Plan    Patient's Name: Linda Walsh  YOB: 1960    Date of Creation: 7/11/22  Date Treatment Plan Last Reviewed/Revised: 4/7/25    DSM5 Diagnoses: 296.33 (F33.2) Major Depressive Disorder, Recurrent Episode, Severe _, 300.02 (F41.1) Generalized Anxiety Disorder or 309.81 (F43.10) Posttraumatic Stress Disorder (includes Posttraumatic Stress Disorder for Children 6 Years and Younger)  Without dissociative symptoms  Psychosocial / Contextual Factors: CRPS  PROMIS (reviewed every 90 days):     Referral / Collaboration:  Referral to another professional/service is not indicated at this time.    Anticipated number of session for this episode of care: on-going  Anticipation frequency of session: Monthly  Anticipated Duration of each session: 38-52 minutes/53+ minutes  Treatment plan will be reviewed in 90 days or when goals have been  changed.       MeasurableTreatment Goal(s) related to diagnosis / functional impairment(s)  Goal 1: Client will decrease thoughts of wanting to be dead from 10 out of 10 opportunities to at most 9 out of 10 opportunities for at least 3 consecutive weeks as evidenced by client report and a decrease in symptom report as evidenced by PhQ9 scores and GAD7 scores.    Objective #A (Client Action)    Client will Client will look at and read safety plan at least once a day and follow safety plan when thoughts and urges come up.  Status: Continued - Date(s): 4/7/25    Intervention(s)  Therapist will teach emotional regulation skills. distress tolerance skills, interpersonal effectiveness skills, emotion regluation skills, mindfulness skills, radical acceptance. Therapist will develop safety plan with the client.     Objective #B  Client will Client will agree to call the therapist or after hours crisis line if noticing intense suicidal thoughts for skills coaching.   Status: Continued - Date(s): 4/7/25    Intervention(s)  Therapist will Therapist will be available as much as possible during work hours for the client to contact and give the client several crisis resources.         Goal 2: Client will increase the use of skills (emotion regulation, distress tolerance, communication skill) from 1 out of 10 opportunities to at least 2 out of 10 opportunities for at least 3 consecutive weeks as evidenced by client report and a decrease in symptom report as evidenced by PhQ9 scores and GAD7 scores.     Objective #A (Client Action)    Status: Continued - Date(s): 4/7/25    Client will Increase interest, engagement, and pleasure in doing things  Decrease frequency and intensity of feeling down, depressed, hopeless  Improve diet, appetite, mindful eating, and / or meal planning  Identify negative self-talk and behaviors: challenge core beliefs, myths, and actions  Improve concentration, focus, and mindfulness in daily activities    Feel less fidgety, restless or slow in daily activities / interpersonal interactions  Decrease thoughts that you'd be better off dead or of suicide / self-harm.    Intervention(s)  Therapist will teach emotional regulation skills. distress tolerance skills, interpersonal effectiveness skills, emotion regluation skills, mindfulness skills, radical acceptance. Therapist will teach client how to ID body cues for anxiety, anxiety reduction techniques, how to ID triggers for depression and anxiety- decrease reactivity/eliminate, lifestyle changes to reduce depression and anxiety, communication skills, explore cognitive beliefs and help client to develop healthy cognitive patterns and beliefs.    Objective #B  Client will Client will learn and  practice DBT skills daily.    Status: Continued - Date(s): 4/7/25    Intervention(s)  Therapist will Therapist will Therapist will teach emotional regulation skills. distress tolerance skills, interpersonal effectiveness skills, emotion regluation skills, mindfulness skills, radical acceptance..      Goal 3: Client will decrease anxious symptoms and reactions to triggers from 9 out of 10 opportunities to at most 8 out of 10 opportunities for at least 3 consecutive weeks as evidenced by client report and a decrease in symptom report as evidenced by PhQ9 scores and GAD7 scores.    Objective #A (Client Action)    Status: Continued - Date(s): 4/7/25    Client will Client will use cognitive strategies identified in therapy to challenge anxious thoughts.    Intervention(s)  Therapist will Therapist will teach emotional recognition/identification. emotion regulation skills, coping skills, mindfulness skills.    Objective #B  Client will Client will use thought-stopping strategy daily to reduce intrusive thoughts for at least 3 consecutive weeks as evidenced by client report and a decrease in symptom report as evidenced by PhQ9 scores and GAD7 scores.      Status: Continued - Date(s):   "4/7/25    Intervention(s)  Therapist will teach emotional regulation skills. distress tolerance skills, interpersonal effectiveness skills, emotion regluation skills, mindfulness skills, radical acceptance. Therapist will teach client how to ID body cues for anxiety, anxiety reduction techniques, how to ID triggers for depression and anxiety- decrease reactivity/eliminate, lifestyle changes to reduce depression and anxiety, communication skills, explore cognitive beliefs and help client to develop healthy cognitive patterns and beliefs.    Objective #C  Client will Client will learn 5 crisis survival skills during the Distress Tolerance Module (6-8 weeks) for at least 3 consecutive weeks as evidenced by client report and a decrease in symptom report as evidenced by PhQ9 scores and GAD7 scores.  Status: Continued - Date(s): 4/7/25    Intervention(s)  Therapist will teach emotional regulation skills. distress tolerance skills, interpersonal effectiveness skills, emotion regluation skills, mindfulness skills, radical acceptance.      Client has reviewed and agreed to the above plan.      Nasrin Valladares, [facility]        Linda Walsh     SAFETY PLAN:  Step 1: Warning signs / cues (Thoughts, images, mood, situation, behavior) that a crisis may be developing:  Thoughts: \"I don't matter\", \"People would be better off without me\", \"I'm a burden\", \"I can't do this anymore\", \"I just want this to end\" and \"Nothing makes it better\"  Images: obsessive thoughts of death or dying: car accident, using a gun and flashbacks  Thinking Processes: ruminations (can't stop thinking about my problems): court case, pain, racing thoughts, highly critical and negative thoughts: \"I can't do anything, I have to suffer everyday and go to work and other people have it so easy.\"  and paranoia: that the [place] is watching me  Mood: worsening depression, hopelessness, helplessness, intense anger, intense worry, agitation, disinhibited (not " "caring about things or consequences) and mood swings  Behaviors: isolating/withdrawing , can't stop crying, not taking care of myself, not taking care of my responsibilities, sleeping too much and not sleeping enough  Situations: legal issues: current court case, pain, trauma , financial stress and medical condition / diagnosis: CRPS   Step 2: Coping strategies - Things I can do to take my mind off of my problems without contacting another person (relaxation technique, physical activity):  Distress Tolerance Strategies:  arts and crafts: with her residents, play with my pet , pray, change body temperature (ice pack/cold water) , paced breathing/progressive muscle relaxation and puzzles, games on ipad  Physical Activities: deep breathing  Focus on helpful thoughts:  \"Ride the wave\", think about happy memories: when the grandkids were born, going camping, when the boys were little and remind myself of what is important to me: grandkids, family, the residents  Step 3: People and social settings that provide distraction:   Name: Alpesh  Phone: [phone]   Name: Vealsquez  Phone:    Name: Thaddeus  Phone:   work   Step 4: Remind myself of people and things that are important to me and worth living for:    My family, my residents    Step 5: When I am in crisis, I can ask these people to help me use my safety plan:   Name: Alpesh  Phone: [phone]   Name: Velasquez  Phone: (number in phone)   Name: Thaddeus  Phone: (number in phone)  Step 6: Making the environment safe:   be around others  Step 7: Professionals or agencies I can contact during a crisis:  [facility] Number: [phone]  Suicide Prevention Lifeline: 5-027-889-ROAC (6664)  Crisis Text Line Service (available 24 hours a day, 7 days a week): Text [place] to 781543  Local Crisis Services:     Call 911 or go to my nearest emergency department.   I helped develop this safety plan and agree to use it when needed.  I have been given a copy of this plan.  "     Client signature _________________________________________________________________  Today s date: 7/11/22  Adapted from Safety Plan Template 2008 Griselda Perez and Livan Cutler is reprinted with the express permission of the authors.  No portion of the Safety Plan Template may be reproduced without the express, written permission.  You can contact the authors at [email or web address] or [email or web address].

## 2025-06-09 DIAGNOSIS — J02.9 SORE THROAT: ICD-10-CM

## 2025-06-09 DIAGNOSIS — R11.2 NAUSEA AND VOMITING, UNSPECIFIED VOMITING TYPE: Primary | ICD-10-CM

## 2025-06-09 DIAGNOSIS — Z12.11 SCREEN FOR COLON CANCER: ICD-10-CM

## 2025-06-09 DIAGNOSIS — Z12.31 VISIT FOR SCREENING MAMMOGRAM: ICD-10-CM

## 2025-06-09 DIAGNOSIS — K21.9 GASTROESOPHAGEAL REFLUX DISEASE WITHOUT ESOPHAGITIS: ICD-10-CM

## 2025-06-10 ENCOUNTER — PATIENT OUTREACH (OUTPATIENT)
Dept: CARE COORDINATION | Facility: CLINIC | Age: 65
End: 2025-06-10

## 2025-06-12 ENCOUNTER — TRANSFERRED RECORDS (OUTPATIENT)
Dept: HEALTH INFORMATION MANAGEMENT | Facility: CLINIC | Age: 65
End: 2025-06-12
Payer: COMMERCIAL

## 2025-06-13 ENCOUNTER — HOSPITAL ENCOUNTER (OUTPATIENT)
Dept: CT IMAGING | Facility: CLINIC | Age: 65
End: 2025-06-13
Attending: PHYSICIAN ASSISTANT
Payer: COMMERCIAL

## 2025-06-23 ENCOUNTER — VIRTUAL VISIT (OUTPATIENT)
Dept: PSYCHOLOGY | Facility: CLINIC | Age: 65
End: 2025-06-23
Payer: COMMERCIAL

## 2025-06-23 DIAGNOSIS — F43.10 POST TRAUMATIC STRESS DISORDER (PTSD): ICD-10-CM

## 2025-06-23 DIAGNOSIS — F33.2 SEVERE RECURRENT MAJOR DEPRESSION WITHOUT PSYCHOTIC FEATURES (H): Primary | ICD-10-CM

## 2025-06-23 DIAGNOSIS — F41.1 GENERALIZED ANXIETY DISORDER: ICD-10-CM

## 2025-06-23 PROCEDURE — 90834 PSYTX W PT 45 MINUTES: CPT | Mod: 93 | Performed by: COUNSELOR

## 2025-06-23 NOTE — PROGRESS NOTES
[facility]                                     Progress Note    Patient Name: Linda Walsh  Date: 5/19/25         Service Type: Individual      Session Start Time: 3:30pm  Session End Time: 4:20pm     Session Length: 50    Session #: 113    Attendees: Client      Service Modality: telephone      Provider verified identity through the following two step process.  Patient provided:  Patient is known previously to provider     Telemedicine Visit: The patient's condition can be safely assessed and treated via synchronous audio and visual telemedicine encounter.       Reason for Telemedicine Visit: Services only offered telehealth     Originating Site (Patient Location): Patient's home     Distant Site (Provider Location): Provider Remote Setting- Home Office     Consent:  The patient/guardian has verbally consented to: the potential risks and benefits of telemedicine (video visit) versus in person care; bill my insurance or make self-payment for services provided; and responsibility for payment of non-covered services.        Mode of Communication:  telephone     As the provider I attest to compliance with applicable laws and regulations related to telemedicine.   There has been demonstrated improvement in functioning while patient has been engaged in psychotherapy/psychological service- if withdrawn the patient would deteriorate and/or relapse.        DATA  Interactive Complexity: No  Crisis: No        Progress Since Last Session (Related to Symptoms / Goals / Homework):   Symptoms: No change .    Homework: Completed in session      Episode of Care Goals: Minimal progress - ACTION (Actively working towards change); Intervened by reinforcing change plan / affirming steps taken     Current / Ongoing Stressors and Concerns:   The client and her mom are preparing for her dad's celebration of life this weekend.   Having to help her mom with her insurance and her mom's health  issues.  Continuing to have her own health issues with no solutions. Is having to find a new dentist to help with dentures.      Treatment Objective(s) Addressed in This Session:   use thought-stopping strategy daily to reduce intrusive thoughts       Intervention:   Processed through recent stressors with having to find a new provider, her mom's health, and the continued legal issues with the divorce. Stated her  left the firm and now she has to find a new one, hopefully not having to put down another retainer. Really stressed about finances. Client working on utilizing distraction skills when wanting to ruminate. Doing better with being able to redirect this since being on her new pain medication.         Assessments completed prior to visit:  The following assessments were completed by patient for this visit:  PHQA:        No data to display              GAD7:       2/26/2020    11:18 AM 5/27/2020     3:23 PM 7/28/2020    10:37 AM 2/24/2021     1:26 PM 7/13/2022    10:16 AM 6/9/2023    10:20 AM 8/19/2024     3:52 PM   MARLIN-7 SCORE   Total Score   19 (severe anxiety) 16 (severe anxiety) 21 (severe anxiety) 13 (moderate anxiety) 15 (severe anxiety)   Total Score 19 21 19 16 21 13 15     PROMIS 10-Global Health (all questions and answers displayed):        No data to display                  ASSESSMENT: Current Emotional / Mental Status (status of significant symptoms):   Risk status (Self / Other harm or suicidal ideation)   Patient denies current fears or concerns for personal safety.   Patient denies current or recent suicidal ideation or behaviors.   Patient denies current or recent homicidal ideation or behaviors.   Patient denies current or recent self injurious behavior or ideation.   Patient denies other safety concerns.   Patient reports there has been no change in risk factors since their last session.     Patient reports there has been no change in protective factors since their last session.      Recommended that patient call 911 or go to the local ED should there be a change in any of these risk factors.     Appearance:   Unable to assess due to phone session    Eye Contact:   Unable to assess due to phone session    Psychomotor Behavior: Unable to assess due to phone session    Attitude:   Cooperative    Orientation:   All   Speech    Rate / Production: Normal/ Responsive    Volume:  Normal    Mood:    Normal   Affect:    Unable to assess due to phone session    Thought Content:  Clear    Thought Form:  Coherent  Logical    Insight:    Good      Medication Review:   No changes to current psychiatric medication(s)     Medication Compliance:   Yes     Changes in Health Issues:   None reported     Chemical Use Review:   Substance Use: Chemical use reviewed, no active concerns identified      Tobacco Use: No change in amount of tobacco use since last session.  Patient declined discussion at this time    Diagnosis:  1. 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 Generalized anxiety disorder    3. Post traumatic stress disorder (PTSD)          Collateral Reports Completed:   Not Applicable    PLAN: (Patient Tasks / Therapist Tasks / Other)  Continue to work on stress reduction skills and anxiety reduction skills.            Nasrin Valladares [facility]                                                         ______________________________________________________________________    Individual Treatment Plan    Patient's Name: Linda Walsh  YOB: 1960    Date of Creation: 7/11/22  Date Treatment Plan Last Reviewed/Revised: 4/7/25    DSM5 Diagnoses: 296.33 (F33.2) Major Depressive Disorder, Recurrent Episode, Severe _, 300.02 (F41.1) Generalized Anxiety Disorder or 309.81 (F43.10) Posttraumatic Stress Disorder (includes Posttraumatic Stress Disorder for Children 6 Years and Younger)  Without dissociative symptoms  Psychosocial / Contextual Factors: CRPS  PROMIS (reviewed every  90 days):     Referral / Collaboration:  Referral to another professional/service is not indicated at this time.    Anticipated number of session for this episode of care: on-going  Anticipation frequency of session: Monthly  Anticipated Duration of each session: 38-52 minutes/53+ minutes  Treatment plan will be reviewed in 90 days or when goals have been changed.       MeasurableTreatment Goal(s) related to diagnosis / functional impairment(s)  Goal 1: Client will decrease thoughts of wanting to be dead from 10 out of 10 opportunities to at most 9 out of 10 opportunities for at least 3 consecutive weeks as evidenced by client report and a decrease in symptom report as evidenced by PhQ9 scores and GAD7 scores.    Objective #A (Client Action)    Client will Client will look at and read safety plan at least once a day and follow safety plan when thoughts and urges come up.  Status: Continued - Date(s): 4/7/25    Intervention(s)  Therapist will teach emotional regulation skills. distress tolerance skills, interpersonal effectiveness skills, emotion regluation skills, mindfulness skills, radical acceptance. Therapist will develop safety plan with the client.     Objective #B  Client will Client will agree to call the therapist or after hours crisis line if noticing intense suicidal thoughts for skills coaching.   Status: Continued - Date(s): 4/7/25    Intervention(s)  Therapist will Therapist will be available as much as possible during work hours for the client to contact and give the client several crisis resources.         Goal 2: Client will increase the use of skills (emotion regulation, distress tolerance, communication skill) from 1 out of 10 opportunities to at least 2 out of 10 opportunities for at least 3 consecutive weeks as evidenced by client report and a decrease in symptom report as evidenced by PhQ9 scores and GAD7 scores.     Objective #A (Client Action)    Status: Continued - Date(s): 4/7/25    Client  will Increase interest, engagement, and pleasure in doing things  Decrease frequency and intensity of feeling down, depressed, hopeless  Improve diet, appetite, mindful eating, and / or meal planning  Identify negative self-talk and behaviors: challenge core beliefs, myths, and actions  Improve concentration, focus, and mindfulness in daily activities   Feel less fidgety, restless or slow in daily activities / interpersonal interactions  Decrease thoughts that you'd be better off dead or of suicide / self-harm.    Intervention(s)  Therapist will teach emotional regulation skills. distress tolerance skills, interpersonal effectiveness skills, emotion regluation skills, mindfulness skills, radical acceptance. Therapist will teach client how to ID body cues for anxiety, anxiety reduction techniques, how to ID triggers for depression and anxiety- decrease reactivity/eliminate, lifestyle changes to reduce depression and anxiety, communication skills, explore cognitive beliefs and help client to develop healthy cognitive patterns and beliefs.    Objective #B  Client will Client will learn and  practice DBT skills daily.    Status: Continued - Date(s): 4/7/25    Intervention(s)  Therapist will Therapist will Therapist will teach emotional regulation skills. distress tolerance skills, interpersonal effectiveness skills, emotion regluation skills, mindfulness skills, radical acceptance..      Goal 3: Client will decrease anxious symptoms and reactions to triggers from 9 out of 10 opportunities to at most 8 out of 10 opportunities for at least 3 consecutive weeks as evidenced by client report and a decrease in symptom report as evidenced by PhQ9 scores and GAD7 scores.    Objective #A (Client Action)    Status: Continued - Date(s): 4/7/25    Client will Client will use cognitive strategies identified in therapy to challenge anxious thoughts.    Intervention(s)  Therapist will Therapist will teach emotional  "recognition/identification. emotion regulation skills, coping skills, mindfulness skills.    Objective #B  Client will Client will use thought-stopping strategy daily to reduce intrusive thoughts for at least 3 consecutive weeks as evidenced by client report and a decrease in symptom report as evidenced by PhQ9 scores and GAD7 scores.      Status: Continued - Date(s):  4/7/25    Intervention(s)  Therapist will teach emotional regulation skills. distress tolerance skills, interpersonal effectiveness skills, emotion regluation skills, mindfulness skills, radical acceptance. Therapist will teach client how to ID body cues for anxiety, anxiety reduction techniques, how to ID triggers for depression and anxiety- decrease reactivity/eliminate, lifestyle changes to reduce depression and anxiety, communication skills, explore cognitive beliefs and help client to develop healthy cognitive patterns and beliefs.    Objective #C  Client will Client will learn 5 crisis survival skills during the Distress Tolerance Module (6-8 weeks) for at least 3 consecutive weeks as evidenced by client report and a decrease in symptom report as evidenced by PhQ9 scores and GAD7 scores.  Status: Continued - Date(s): 4/7/25    Intervention(s)  Therapist will teach emotional regulation skills. distress tolerance skills, interpersonal effectiveness skills, emotion regluation skills, mindfulness skills, radical acceptance.      Client has reviewed and agreed to the above plan.      Nasrin Valladares [facility]        Linda Walsh     SAFETY PLAN:  Step 1: Warning signs / cues (Thoughts, images, mood, situation, behavior) that a crisis may be developing:  Thoughts: \"I don't matter\", \"People would be better off without me\", \"I'm a burden\", \"I can't do this anymore\", \"I just want this to end\" and \"Nothing makes it better\"  Images: obsessive thoughts of death or dying: car accident, using a gun and flashbacks  Thinking Processes: ruminations (can't stop " "thinking about my problems): court case, pain, racing thoughts, highly critical and negative thoughts: \"I can't do anything, I have to suffer everyday and go to work and other people have it so easy.\"  and paranoia: that the [employer] is watching me  Mood: worsening depression, hopelessness, helplessness, intense anger, intense worry, agitation, disinhibited (not caring about things or consequences) and mood swings  Behaviors: isolating/withdrawing , can't stop crying, not taking care of myself, not taking care of my responsibilities, sleeping too much and not sleeping enough  Situations: legal issues: current court case, pain, trauma , financial stress and medical condition / diagnosis: CRPS   Step 2: Coping strategies - Things I can do to take my mind off of my problems without contacting another person (relaxation technique, physical activity):  Distress Tolerance Strategies:  arts and crafts: with her residents, play with my pet , pray, change body temperature (ice pack/cold water) , paced breathing/progressive muscle relaxation and puzzles, games on ipad  Physical Activities: deep breathing  Focus on helpful thoughts:  \"Ride the wave\", think about happy memories: when the grandkids were born, going camping, when the boys were little and remind myself of what is important to me: grandkids, family, the residents  Step 3: People and social settings that provide distraction:   Name: Alpesh  Phone: [phone]   Name: Velasquez  Phone:    Name: Thaddeus  Phone:   work   Step 4: Remind myself of people and things that are important to me and worth living for:    My family, my residents    Step 5: When I am in crisis, I can ask these people to help me use my safety plan:   Name: Alpesh  Phone: [phone]   Name: Velasquez  Phone: (number in phone)   Name: Thaddeus  Phone: (number in phone)  Step 6: Making the environment safe:   be around others  Step 7: Professionals or agencies I can contact during a crisis:  [facility] " [facility] Number: [phone]  Suicide Prevention Lifeline: 6-550-053-TALK (7527)  Crisis Text Line Service (available 24 hours a day, 7 days a week): Text [place] to 040053  Local Crisis Services:     Call 911 or go to my nearest emergency department.   I helped develop this safety plan and agree to use it when needed.  I have been given a copy of this plan.      Client signature _________________________________________________________________  Today s date: 7/11/22  Adapted from Safety Plan Template 2008 Griselda Perez and Livan Cutler is reprinted with the express permission of the authors.  No portion of the Safety Plan Template may be reproduced without the express, written permission.  You can contact the authors at [email or web address] or [email or web address].

## 2025-07-07 ENCOUNTER — VIRTUAL VISIT (OUTPATIENT)
Dept: PSYCHOLOGY | Facility: CLINIC | Age: 65
End: 2025-07-07
Payer: COMMERCIAL

## 2025-07-07 DIAGNOSIS — F43.10 POST TRAUMATIC STRESS DISORDER (PTSD): ICD-10-CM

## 2025-07-07 DIAGNOSIS — F33.2 SEVERE RECURRENT MAJOR DEPRESSION WITHOUT PSYCHOTIC FEATURES (H): Primary | ICD-10-CM

## 2025-07-07 DIAGNOSIS — F41.1 GENERALIZED ANXIETY DISORDER: ICD-10-CM

## 2025-07-07 PROCEDURE — 90834 PSYTX W PT 45 MINUTES: CPT | Mod: 93 | Performed by: COUNSELOR

## 2025-07-07 NOTE — PROGRESS NOTES
[facility]                                     Progress Note    Patient Name: Linda Walsh  Date: 7/7/25         Service Type: Individual      Session Start Time: 12:30pm  Session End Time: 1:20pm     Session Length: 50    Session #: 114    Attendees: Client      Service Modality: telephone      Provider verified identity through the following two step process.  Patient provided:  Patient is known previously to provider     Telemedicine Visit: The patient's condition can be safely assessed and treated via synchronous audio and visual telemedicine encounter.       Reason for Telemedicine Visit: Services only offered telehealth     Originating Site (Patient Location): Patient's home     Distant Site (Provider Location): Provider Remote Setting- Home Office     Consent:  The patient/guardian has verbally consented to: the potential risks and benefits of telemedicine (video visit) versus in person care; bill my insurance or make self-payment for services provided; and responsibility for payment of non-covered services.        Mode of Communication:  telephone     As the provider I attest to compliance with applicable laws and regulations related to telemedicine.   There has been demonstrated improvement in functioning while patient has been engaged in psychotherapy/psychological service- if withdrawn the patient would deteriorate and/or relapse.        DATA  Interactive Complexity: No  Crisis: No        Progress Since Last Session (Related to Symptoms / Goals / Homework):   Symptoms: No change .    Homework: Completed in session      Episode of Care Goals: Minimal progress - ACTION (Actively working towards change); Intervened by reinforcing change plan / affirming steps taken     Current / Ongoing Stressors and Concerns:   The client stated her dad's celebration of life went well.   Now working with her mom on budgeting.   Still trying to get the divorce figured out.      Treatment  Objective(s) Addressed in This Session:   use thought-stopping strategy daily to reduce intrusive thoughts       Intervention:   Processed through stressors with her mom and how she can manage her own anxiety. Discussed boundaries with her kids and ex-. Processed through frustrations with her ex and how she can continue to stay grounded and boundaried with him. Worked on interpersonal effectiveness skills.          Assessments completed prior to visit:  The following assessments were completed by patient for this visit:  PHQA:        No data to display              GAD7:       2/26/2020    11:18 AM 5/27/2020     3:23 PM 7/28/2020    10:37 AM 2/24/2021     1:26 PM 7/13/2022    10:16 AM 6/9/2023    10:20 AM 8/19/2024     3:52 PM   MARLIN-7 SCORE   Total Score   19 (severe anxiety) 16 (severe anxiety) 21 (severe anxiety) 13 (moderate anxiety) 15 (severe anxiety)   Total Score 19 21 19 16 21 13 15     PROMIS 10-Global Health (all questions and answers displayed):        No data to display                  ASSESSMENT: Current Emotional / Mental Status (status of significant symptoms):   Risk status (Self / Other harm or suicidal ideation)   Patient denies current fears or concerns for personal safety.   Patient denies current or recent suicidal ideation or behaviors.   Patient denies current or recent homicidal ideation or behaviors.   Patient denies current or recent self injurious behavior or ideation.   Patient denies other safety concerns.   Patient reports there has been no change in risk factors since their last session.     Patient reports there has been no change in protective factors since their last session.     Recommended that patient call 911 or go to the local ED should there be a change in any of these risk factors.     Appearance:   Unable to assess due to phone session    Eye Contact:   Unable to assess due to phone session    Psychomotor Behavior: Unable to assess due to phone session     Attitude:   Cooperative    Orientation:   All   Speech    Rate / Production: Normal/ Responsive    Volume:  Normal    Mood:    Normal   Affect:    Unable to assess due to phone session    Thought Content:  Clear    Thought Form:  Coherent  Logical    Insight:    Good      Medication Review:   No changes to current psychiatric medication(s)     Medication Compliance:   Yes     Changes in Health Issues:   None reported     Chemical Use Review:   Substance Use: Chemical use reviewed, no active concerns identified      Tobacco Use: No change in amount of tobacco use since last session.  Patient declined discussion at this time    Diagnosis:  1. 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 Generalized anxiety disorder    3. Post traumatic stress disorder (PTSD)          Collateral Reports Completed:   Not Applicable    PLAN: (Patient Tasks / Therapist Tasks / Other)  Continue to work on stress reduction skills and anxiety reduction skills.            Nasrin Valladares, [facility]                                                         ______________________________________________________________________    Individual Treatment Plan    Patient's Name: Linda Walsh  YOB: 1960    Date of Creation: 7/11/22  Date Treatment Plan Last Reviewed/Revised: 4/7/25    DSM5 Diagnoses: 296.33 (F33.2) Major Depressive Disorder, Recurrent Episode, Severe _, 300.02 (F41.1) Generalized Anxiety Disorder or 309.81 (F43.10) Posttraumatic Stress Disorder (includes Posttraumatic Stress Disorder for Children 6 Years and Younger)  Without dissociative symptoms  Psychosocial / Contextual Factors: CRPS  PROMIS (reviewed every 90 days):     Referral / Collaboration:  Referral to another professional/service is not indicated at this time.    Anticipated number of session for this episode of care: on-going  Anticipation frequency of session: Monthly  Anticipated Duration of each session: 38-52 minutes/53+  minutes  Treatment plan will be reviewed in 90 days or when goals have been changed.       MeasurableTreatment Goal(s) related to diagnosis / functional impairment(s)  Goal 1: Client will decrease thoughts of wanting to be dead from 10 out of 10 opportunities to at most 9 out of 10 opportunities for at least 3 consecutive weeks as evidenced by client report and a decrease in symptom report as evidenced by PhQ9 scores and GAD7 scores.    Objective #A (Client Action)    Client will Client will look at and read safety plan at least once a day and follow safety plan when thoughts and urges come up.  Status: Continued - Date(s): 4/7/25    Intervention(s)  Therapist will teach emotional regulation skills. distress tolerance skills, interpersonal effectiveness skills, emotion regluation skills, mindfulness skills, radical acceptance. Therapist will develop safety plan with the client.     Objective #B  Client will Client will agree to call the therapist or after hours crisis line if noticing intense suicidal thoughts for skills coaching.   Status: Continued - Date(s): 4/7/25    Intervention(s)  Therapist will Therapist will be available as much as possible during work hours for the client to contact and give the client several crisis resources.         Goal 2: Client will increase the use of skills (emotion regulation, distress tolerance, communication skill) from 1 out of 10 opportunities to at least 2 out of 10 opportunities for at least 3 consecutive weeks as evidenced by client report and a decrease in symptom report as evidenced by PhQ9 scores and GAD7 scores.     Objective #A (Client Action)    Status: Continued - Date(s): 4/7/25    Client will Increase interest, engagement, and pleasure in doing things  Decrease frequency and intensity of feeling down, depressed, hopeless  Improve diet, appetite, mindful eating, and / or meal planning  Identify negative self-talk and behaviors: challenge core beliefs, myths, and  actions  Improve concentration, focus, and mindfulness in daily activities   Feel less fidgety, restless or slow in daily activities / interpersonal interactions  Decrease thoughts that you'd be better off dead or of suicide / self-harm.    Intervention(s)  Therapist will teach emotional regulation skills. distress tolerance skills, interpersonal effectiveness skills, emotion regluation skills, mindfulness skills, radical acceptance. Therapist will teach client how to ID body cues for anxiety, anxiety reduction techniques, how to ID triggers for depression and anxiety- decrease reactivity/eliminate, lifestyle changes to reduce depression and anxiety, communication skills, explore cognitive beliefs and help client to develop healthy cognitive patterns and beliefs.    Objective #B  Client will Client will learn and  practice DBT skills daily.    Status: Continued - Date(s): 4/7/25    Intervention(s)  Therapist will Therapist will Therapist will teach emotional regulation skills. distress tolerance skills, interpersonal effectiveness skills, emotion regluation skills, mindfulness skills, radical acceptance..      Goal 3: Client will decrease anxious symptoms and reactions to triggers from 9 out of 10 opportunities to at most 8 out of 10 opportunities for at least 3 consecutive weeks as evidenced by client report and a decrease in symptom report as evidenced by PhQ9 scores and GAD7 scores.    Objective #A (Client Action)    Status: Continued - Date(s): 4/7/25    Client will Client will use cognitive strategies identified in therapy to challenge anxious thoughts.    Intervention(s)  Therapist will Therapist will teach emotional recognition/identification. emotion regulation skills, coping skills, mindfulness skills.    Objective #B  Client will Client will use thought-stopping strategy daily to reduce intrusive thoughts for at least 3 consecutive weeks as evidenced by client report and a decrease in symptom report as  "evidenced by PhQ9 scores and GAD7 scores.      Status: Continued - Date(s):  4/7/25    Intervention(s)  Therapist will teach emotional regulation skills. distress tolerance skills, interpersonal effectiveness skills, emotion regluation skills, mindfulness skills, radical acceptance. Therapist will teach client how to ID body cues for anxiety, anxiety reduction techniques, how to ID triggers for depression and anxiety- decrease reactivity/eliminate, lifestyle changes to reduce depression and anxiety, communication skills, explore cognitive beliefs and help client to develop healthy cognitive patterns and beliefs.    Objective #C  Client will Client will learn 5 crisis survival skills during the Distress Tolerance Module (6-8 weeks) for at least 3 consecutive weeks as evidenced by client report and a decrease in symptom report as evidenced by PhQ9 scores and GAD7 scores.  Status: Continued - Date(s): 4/7/25    Intervention(s)  Therapist will teach emotional regulation skills. distress tolerance skills, interpersonal effectiveness skills, emotion regluation skills, mindfulness skills, radical acceptance.      Client has reviewed and agreed to the above plan.      Nasrin Valladares, [facility]        Linda Walsh     SAFETY PLAN:  Step 1: Warning signs / cues (Thoughts, images, mood, situation, behavior) that a crisis may be developing:  Thoughts: \"I don't matter\", \"People would be better off without me\", \"I'm a burden\", \"I can't do this anymore\", \"I just want this to end\" and \"Nothing makes it better\"  Images: obsessive thoughts of death or dying: car accident, using a gun and flashbacks  Thinking Processes: ruminations (can't stop thinking about my problems): court case, pain, racing thoughts, highly critical and negative thoughts: \"I can't do anything, I have to suffer everyday and go to work and other people have it so easy.\"  and paranoia: that the government is watching me  Mood: worsening depression, hopelessness, " "helplessness, intense anger, intense worry, agitation, disinhibited (not caring about things or consequences) and mood swings  Behaviors: isolating/withdrawing , can't stop crying, not taking care of myself, not taking care of my responsibilities, sleeping too much and not sleeping enough  Situations: legal issues: current court case, pain, trauma , financial stress and medical condition / diagnosis: CRPS   Step 2: Coping strategies - Things I can do to take my mind off of my problems without contacting another person (relaxation technique, physical activity):  Distress Tolerance Strategies:  arts and crafts: with her residents, play with my pet , pray, change body temperature (ice pack/cold water) , paced breathing/progressive muscle relaxation and puzzles, games on ipad  Physical Activities: deep breathing  Focus on helpful thoughts:  \"Ride the wave\", think about happy memories: when the grandkids were born, going camping, when the boys were little and remind myself of what is important to me: grandkids, family, the residents  Step 3: People and social settings that provide distraction:   Name: Alpesh  Phone: [phone]   Name: Velasquez  Phone:    Name: Thaddeus  Phone:   work   Step 4: Remind myself of people and things that are important to me and worth living for:    My family, my residents    Step 5: When I am in crisis, I can ask these people to help me use my safety plan:   Name: Alpesh  Phone: [phone]   Name: Velasquez  Phone: (number in phone)   Name: Thaddeus  Phone: (number in phone)  Step 6: Making the environment safe:   be around others  Step 7: Professionals or agencies I can contact during a crisis:  [facility] Number: [phone]  Suicide Prevention Lifeline: 9-492-930-TALK (6591)  Crisis Text Line Service (available 24 hours a day, 7 days a week): Text [place] to 944754  Local Crisis Services:     Call 911 or go to my nearest emergency department.   I helped develop this safety plan " and agree to use it when needed.  I have been given a copy of this plan.      Client signature _________________________________________________________________  Today s date: 7/11/22  Adapted from Safety Plan Template 2008 Griselda Perez and Livan Cutler is reprinted with the express permission of the authors.  No portion of the Safety Plan Template may be reproduced without the express, written permission.  You can contact the authors at [email or web address] or [email or web address].

## 2025-07-08 ENCOUNTER — TRANSFERRED RECORDS (OUTPATIENT)
Dept: HEALTH INFORMATION MANAGEMENT | Facility: CLINIC | Age: 65
End: 2025-07-08
Payer: COMMERCIAL

## 2025-07-23 ENCOUNTER — E-VISIT (OUTPATIENT)
Dept: FAMILY MEDICINE | Facility: OTHER | Age: 65
End: 2025-07-23
Payer: COMMERCIAL

## 2025-07-23 DIAGNOSIS — J45.20 MILD INTERMITTENT ASTHMA WITHOUT COMPLICATION: Primary | ICD-10-CM

## 2025-07-23 RX ORDER — BUDESONIDE AND FORMOTEROL FUMARATE DIHYDRATE 160; 4.5 UG/1; UG/1
AEROSOL RESPIRATORY (INHALATION)
Qty: 20.4 G | Refills: 5 | Status: SHIPPED | OUTPATIENT
Start: 2025-07-23

## 2025-07-23 ASSESSMENT — ASTHMA QUESTIONNAIRES: ACT_TOTALSCORE: 22

## 2025-08-03 DIAGNOSIS — E78.5 HYPERLIPIDEMIA LDL GOAL <160: ICD-10-CM

## 2025-08-04 ENCOUNTER — TRANSFERRED RECORDS (OUTPATIENT)
Dept: HEALTH INFORMATION MANAGEMENT | Facility: CLINIC | Age: 65
End: 2025-08-04
Payer: COMMERCIAL

## 2025-08-04 RX ORDER — ATORVASTATIN CALCIUM 20 MG/1
20 TABLET, FILM COATED ORAL DAILY
Qty: 90 TABLET | Refills: 0 | Status: SHIPPED | OUTPATIENT
Start: 2025-08-04

## 2025-08-11 ENCOUNTER — VIRTUAL VISIT (OUTPATIENT)
Dept: PSYCHOLOGY | Facility: CLINIC | Age: 65
End: 2025-08-11
Payer: COMMERCIAL

## 2025-08-11 DIAGNOSIS — F33.2 SEVERE RECURRENT MAJOR DEPRESSION WITHOUT PSYCHOTIC FEATURES (H): Primary | ICD-10-CM

## 2025-08-11 DIAGNOSIS — F43.10 POST TRAUMATIC STRESS DISORDER (PTSD): ICD-10-CM

## 2025-08-11 DIAGNOSIS — F41.1 GENERALIZED ANXIETY DISORDER: ICD-10-CM

## 2025-08-11 PROCEDURE — 90834 PSYTX W PT 45 MINUTES: CPT | Mod: 93 | Performed by: COUNSELOR

## 2025-08-20 ENCOUNTER — VIRTUAL VISIT (OUTPATIENT)
Dept: FAMILY MEDICINE | Facility: OTHER | Age: 65
End: 2025-08-20
Payer: COMMERCIAL

## 2025-08-20 DIAGNOSIS — G90.522 COMPLEX REGIONAL PAIN SYNDROME TYPE 1 OF LEFT LOWER EXTREMITY: ICD-10-CM

## 2025-08-20 DIAGNOSIS — R73.09 ELEVATED GLUCOSE: ICD-10-CM

## 2025-08-20 DIAGNOSIS — Z78.0 ASYMPTOMATIC POSTMENOPAUSAL STATUS: ICD-10-CM

## 2025-08-20 DIAGNOSIS — K21.9 GASTROESOPHAGEAL REFLUX DISEASE WITHOUT ESOPHAGITIS: ICD-10-CM

## 2025-08-20 DIAGNOSIS — M25.572 PAIN IN JOINT, ANKLE AND FOOT, LEFT: Primary | ICD-10-CM

## 2025-08-20 DIAGNOSIS — E78.5 HYPERLIPIDEMIA LDL GOAL <160: ICD-10-CM

## 2025-08-20 DIAGNOSIS — G93.32 CHRONIC FATIGUE SYNDROME: ICD-10-CM

## 2025-08-20 PROCEDURE — 98013 SYNCH AUDIO-ONLY EST LOW 20: CPT | Performed by: PHYSICIAN ASSISTANT

## 2025-08-20 PROCEDURE — 1125F AMNT PAIN NOTED PAIN PRSNT: CPT | Mod: 93 | Performed by: PHYSICIAN ASSISTANT

## 2025-08-20 ASSESSMENT — ANXIETY QUESTIONNAIRES
7. FEELING AFRAID AS IF SOMETHING AWFUL MIGHT HAPPEN: SEVERAL DAYS
GAD7 TOTAL SCORE: 8
3. WORRYING TOO MUCH ABOUT DIFFERENT THINGS: SEVERAL DAYS
5. BEING SO RESTLESS THAT IT IS HARD TO SIT STILL: SEVERAL DAYS
8. IF YOU CHECKED OFF ANY PROBLEMS, HOW DIFFICULT HAVE THESE MADE IT FOR YOU TO DO YOUR WORK, TAKE CARE OF THINGS AT HOME, OR GET ALONG WITH OTHER PEOPLE?: NOT DIFFICULT AT ALL
6. BECOMING EASILY ANNOYED OR IRRITABLE: NOT AT ALL
7. FEELING AFRAID AS IF SOMETHING AWFUL MIGHT HAPPEN: SEVERAL DAYS
1. FEELING NERVOUS, ANXIOUS, OR ON EDGE: SEVERAL DAYS
4. TROUBLE RELAXING: SEVERAL DAYS
GAD7 TOTAL SCORE: 8
IF YOU CHECKED OFF ANY PROBLEMS ON THIS QUESTIONNAIRE, HOW DIFFICULT HAVE THESE PROBLEMS MADE IT FOR YOU TO DO YOUR WORK, TAKE CARE OF THINGS AT HOME, OR GET ALONG WITH OTHER PEOPLE: NOT DIFFICULT AT ALL
2. NOT BEING ABLE TO STOP OR CONTROL WORRYING: NEARLY EVERY DAY
GAD7 TOTAL SCORE: 8

## 2025-08-20 ASSESSMENT — PATIENT HEALTH QUESTIONNAIRE - PHQ9
SUM OF ALL RESPONSES TO PHQ QUESTIONS 1-9: 12
10. IF YOU CHECKED OFF ANY PROBLEMS, HOW DIFFICULT HAVE THESE PROBLEMS MADE IT FOR YOU TO DO YOUR WORK, TAKE CARE OF THINGS AT HOME, OR GET ALONG WITH OTHER PEOPLE: VERY DIFFICULT
SUM OF ALL RESPONSES TO PHQ QUESTIONS 1-9: 12

## 2025-08-21 ENCOUNTER — PATIENT OUTREACH (OUTPATIENT)
Dept: CARE COORDINATION | Facility: CLINIC | Age: 65
End: 2025-08-21
Payer: COMMERCIAL

## 2025-08-24 DIAGNOSIS — K21.9 GASTROESOPHAGEAL REFLUX DISEASE WITHOUT ESOPHAGITIS: ICD-10-CM

## 2025-08-25 RX ORDER — OMEPRAZOLE 40 MG/1
40 CAPSULE, DELAYED RELEASE ORAL DAILY
Qty: 90 CAPSULE | Refills: 2 | Status: SHIPPED | OUTPATIENT
Start: 2025-08-25

## 2025-09-02 ENCOUNTER — OFFICE VISIT (OUTPATIENT)
Dept: PODIATRY | Facility: CLINIC | Age: 65
End: 2025-09-02
Attending: PHYSICIAN ASSISTANT
Payer: COMMERCIAL

## 2025-09-02 ENCOUNTER — ANCILLARY PROCEDURE (OUTPATIENT)
Dept: GENERAL RADIOLOGY | Facility: CLINIC | Age: 65
End: 2025-09-02
Attending: PODIATRIST
Payer: COMMERCIAL

## 2025-09-02 DIAGNOSIS — Q66.6 PES VALGUS OF LEFT FOOT: ICD-10-CM

## 2025-09-02 DIAGNOSIS — M72.2 PLANTAR FASCIITIS, LEFT: ICD-10-CM

## 2025-09-02 DIAGNOSIS — M24.572 EQUINUS CONTRACTURE OF LEFT ANKLE: ICD-10-CM

## 2025-09-02 DIAGNOSIS — G90.522 REFLEX SYMPATHETIC DYSTROPHY OF LEFT LOWER EXTREMITY: ICD-10-CM

## 2025-09-02 PROCEDURE — 73630 X-RAY EXAM OF FOOT: CPT | Mod: TC | Performed by: RADIOLOGY

## 2025-09-02 PROCEDURE — 73610 X-RAY EXAM OF ANKLE: CPT | Mod: TC | Performed by: RADIOLOGY

## 2025-09-02 ASSESSMENT — PAIN SCALES - GENERAL: PAINLEVEL_OUTOF10: SEVERE PAIN (7)

## 2025-09-03 ENCOUNTER — OFFICE VISIT (OUTPATIENT)
Dept: NEUROLOGY | Facility: CLINIC | Age: 65
End: 2025-09-03
Payer: COMMERCIAL

## 2025-09-03 VITALS
SYSTOLIC BLOOD PRESSURE: 131 MMHG | DIASTOLIC BLOOD PRESSURE: 76 MMHG | HEART RATE: 79 BPM | WEIGHT: 138 LBS | BODY MASS INDEX: 22.96 KG/M2

## 2025-09-03 DIAGNOSIS — R20.2 PARESTHESIA: Primary | ICD-10-CM

## 2025-09-03 PROCEDURE — 99204 OFFICE O/P NEW MOD 45 MIN: CPT | Performed by: INTERNAL MEDICINE

## 2025-09-03 PROCEDURE — 3075F SYST BP GE 130 - 139MM HG: CPT | Performed by: INTERNAL MEDICINE

## 2025-09-03 PROCEDURE — 3078F DIAST BP <80 MM HG: CPT | Performed by: INTERNAL MEDICINE

## 2025-09-03 RX ORDER — PREGABALIN 75 MG/1
75 CAPSULE ORAL
COMMUNITY

## (undated) DEVICE — SOL NACL 0.9% IRRIG 3000ML BAG 07972-08

## (undated) DEVICE — NDL COUNTER 40CT

## (undated) DEVICE — BNDG COBAN 6"X5YDS STERILE

## (undated) DEVICE — PREP CHLORAPREP 26ML TINTED ORANGE  260815

## (undated) DEVICE — DRAPE CONVERTORS U-DRAPE 60X72" 8476

## (undated) DEVICE — HOOD FLYTE 0408-800-000

## (undated) DEVICE — GLOVE ESTEEM BLUE W/NEU-THERA 8.0  2D73PB80

## (undated) DEVICE — DRAPE U SPLIT 74X120" 29440

## (undated) DEVICE — GLOVE PROTEXIS W/NEU-THERA 7.0  2D73TE70

## (undated) DEVICE — DRAPE STERI TOWEL SM 1000

## (undated) DEVICE — DRAPE SHEET REV FOLD 3/4 9349

## (undated) DEVICE — CAST PADDING 6" COTTON WEBRIL UNSTERILE 9086

## (undated) DEVICE — ESU ELEC BLADE 6" COATED E1450-6

## (undated) DEVICE — DRAPE IOBAN INCISE 36X23" 6651EZ

## (undated) DEVICE — LUBRICATING JELLY 4.25OZ

## (undated) DEVICE — GLOVE PROTEXIS W/NEU-THERA 7.5  2D73TE75

## (undated) DEVICE — DRSG GAUZE 4X4" TRAY

## (undated) DEVICE — GLOVE ESTEEM BLUE W/NEU-THERA 7.5  2D73PB75

## (undated) DEVICE — SUCTION IRR SYSTEM W/O TIP INTERPULSE HANDPIECE 0210-100-000

## (undated) DEVICE — DRAPE POUCH INSTRUMENT 1018

## (undated) DEVICE — SU VICRYL 2-0 CP-2 18" UND J762D

## (undated) DEVICE — PACK TOTAL JOINT STD LATEX

## (undated) DEVICE — SU VICRYL 0 OS-6 18" UND J754T

## (undated) DEVICE — ADH SKIN CLOSURE PREMIERPRO EXOFIN 1.0ML 3470

## (undated) DEVICE — TUBING SUCTION 12"X1/4" N612

## (undated) DEVICE — TAPE MEDIPORE 4"X10YD 2964

## (undated) DEVICE — KIT ENDO TURNOVER/PROCEDURE CARRY-ON 101822

## (undated) DEVICE — ESU PENCIL W/SMOKE EVAC

## (undated) DEVICE — SUCTION TIP YANKAUER ORTHO SUPER SUCKER EFS-111

## (undated) DEVICE — BONE CLEANING TIP INTERPULSE  0210-010-000

## (undated) DEVICE — SU ETHIBOND 2 V-37 4X30" MX69G

## (undated) DEVICE — ESU GROUND PAD UNIVERSAL W/O CORD

## (undated) DEVICE — BLADE SAW SAGITTAL STRK 25X90X1.19MM HD SYS 6 6125-119-090

## (undated) RX ORDER — DEXAMETHASONE SODIUM PHOSPHATE 10 MG/ML
INJECTION, SOLUTION INTRAMUSCULAR; INTRAVENOUS
Status: DISPENSED
Start: 2021-10-05

## (undated) RX ORDER — GLYCOPYRROLATE 0.2 MG/ML
INJECTION INTRAMUSCULAR; INTRAVENOUS
Status: DISPENSED
Start: 2023-06-26

## (undated) RX ORDER — FENTANYL CITRATE-0.9 % NACL/PF 10 MCG/ML
PLASTIC BAG, INJECTION (ML) INTRAVENOUS
Status: DISPENSED
Start: 2021-10-05

## (undated) RX ORDER — FENTANYL CITRATE 50 UG/ML
INJECTION, SOLUTION INTRAMUSCULAR; INTRAVENOUS
Status: DISPENSED
Start: 2023-06-26

## (undated) RX ORDER — PROPOFOL 10 MG/ML
INJECTION, EMULSION INTRAVENOUS
Status: DISPENSED
Start: 2021-10-05

## (undated) RX ORDER — LIDOCAINE HYDROCHLORIDE 10 MG/ML
INJECTION, SOLUTION EPIDURAL; INFILTRATION; INTRACAUDAL; PERINEURAL
Status: DISPENSED
Start: 2023-06-26

## (undated) RX ORDER — FENTANYL CITRATE 50 UG/ML
INJECTION, SOLUTION INTRAMUSCULAR; INTRAVENOUS
Status: DISPENSED
Start: 2021-10-05

## (undated) RX ORDER — ONDANSETRON 2 MG/ML
INJECTION INTRAMUSCULAR; INTRAVENOUS
Status: DISPENSED
Start: 2021-10-05